# Patient Record
Sex: FEMALE | Race: WHITE | NOT HISPANIC OR LATINO | Employment: OTHER | URBAN - METROPOLITAN AREA
[De-identification: names, ages, dates, MRNs, and addresses within clinical notes are randomized per-mention and may not be internally consistent; named-entity substitution may affect disease eponyms.]

---

## 2019-04-13 ENCOUNTER — APPOINTMENT (OUTPATIENT)
Dept: GENERAL RADIOLOGY | Facility: HOSPITAL | Age: 74
End: 2019-04-13

## 2019-04-13 ENCOUNTER — APPOINTMENT (OUTPATIENT)
Dept: CT IMAGING | Facility: HOSPITAL | Age: 74
End: 2019-04-13

## 2019-04-13 ENCOUNTER — HOSPITAL ENCOUNTER (OUTPATIENT)
Facility: HOSPITAL | Age: 74
Setting detail: OBSERVATION
Discharge: HOME | End: 2019-04-15
Attending: EMERGENCY MEDICINE | Admitting: NURSE PRACTITIONER

## 2019-04-13 DIAGNOSIS — R11.2 INTRACTABLE VOMITING WITH NAUSEA, UNSPECIFIED VOMITING TYPE: Primary | ICD-10-CM

## 2019-04-13 DIAGNOSIS — R10.9 ACUTE ABDOMINAL PAIN: ICD-10-CM

## 2019-04-13 DIAGNOSIS — Z74.09 IMPAIRED FUNCTIONAL MOBILITY, BALANCE, GAIT, AND ENDURANCE: ICD-10-CM

## 2019-04-13 DIAGNOSIS — R19.7 DIARRHEA, UNSPECIFIED TYPE: ICD-10-CM

## 2019-04-13 PROBLEM — F39 MOOD DISORDER (HCC): Status: ACTIVE | Noted: 2019-04-13

## 2019-04-13 PROBLEM — K52.9 GASTROENTERITIS: Status: ACTIVE | Noted: 2019-04-13

## 2019-04-13 PROBLEM — E78.5 HYPERLIPEMIA: Status: ACTIVE | Noted: 2019-04-13

## 2019-04-13 PROBLEM — Z79.4 TYPE 2 DIABETES MELLITUS, WITH LONG-TERM CURRENT USE OF INSULIN (HCC): Chronic | Status: ACTIVE | Noted: 2019-04-13

## 2019-04-13 PROBLEM — E11.9 TYPE 2 DIABETES MELLITUS, WITH LONG-TERM CURRENT USE OF INSULIN: Chronic | Status: ACTIVE | Noted: 2019-04-13

## 2019-04-13 PROBLEM — K21.9 GERD WITHOUT ESOPHAGITIS: Status: ACTIVE | Noted: 2019-04-13

## 2019-04-13 PROBLEM — I10 ESSENTIAL HYPERTENSION: Status: ACTIVE | Noted: 2019-04-13

## 2019-04-13 LAB
ALBUMIN SERPL-MCNC: 4.2 G/DL (ref 3.5–5.2)
ALBUMIN/GLOB SERPL: 1.3 G/DL
ALP SERPL-CCNC: 57 U/L (ref 39–117)
ALT SERPL W P-5'-P-CCNC: 17 U/L (ref 1–33)
ANION GAP SERPL CALCULATED.3IONS-SCNC: 15 MMOL/L
AST SERPL-CCNC: 18 U/L (ref 1–32)
BACTERIA UR QL AUTO: ABNORMAL /HPF
BASOPHILS # BLD AUTO: 0.02 10*3/MM3 (ref 0–0.2)
BASOPHILS NFR BLD AUTO: 0.1 % (ref 0–1.5)
BILIRUB SERPL-MCNC: 0.2 MG/DL (ref 0.2–1.2)
BILIRUB UR QL STRIP: NEGATIVE
BUN BLD-MCNC: 18 MG/DL (ref 8–23)
BUN/CREAT SERPL: 20.7 (ref 7–25)
CALCIUM SPEC-SCNC: 9.5 MG/DL (ref 8.6–10.5)
CHLORIDE SERPL-SCNC: 97 MMOL/L (ref 98–107)
CLARITY UR: CLEAR
CO2 SERPL-SCNC: 30 MMOL/L (ref 22–29)
COLOR UR: YELLOW
CREAT BLD-MCNC: 0.87 MG/DL (ref 0.57–1)
D-LACTATE SERPL-SCNC: 2.5 MMOL/L (ref 0.5–2)
D-LACTATE SERPL-SCNC: 3.6 MMOL/L (ref 0.5–2)
DEPRECATED RDW RBC AUTO: 46.2 FL (ref 37–54)
EOSINOPHIL # BLD AUTO: 0.63 10*3/MM3 (ref 0–0.4)
EOSINOPHIL NFR BLD AUTO: 4.6 % (ref 0.3–6.2)
ERYTHROCYTE [DISTWIDTH] IN BLOOD BY AUTOMATED COUNT: 13.7 % (ref 12.3–15.4)
GFR SERPL CREATININE-BSD FRML MDRD: 63 ML/MIN/1.73
GLOBULIN UR ELPH-MCNC: 3.2 GM/DL
GLUCOSE BLD-MCNC: 116 MG/DL (ref 65–99)
GLUCOSE UR STRIP-MCNC: NEGATIVE MG/DL
HBA1C MFR BLD: 6.4 % (ref 4.8–5.6)
HCT VFR BLD AUTO: 39.6 % (ref 34–46.6)
HGB BLD-MCNC: 13.3 G/DL (ref 12–15.9)
HGB UR QL STRIP.AUTO: NEGATIVE
HOLD SPECIMEN: NORMAL
HYALINE CASTS UR QL AUTO: ABNORMAL /LPF
IMM GRANULOCYTES # BLD AUTO: 0.03 10*3/MM3 (ref 0–0.05)
IMM GRANULOCYTES NFR BLD AUTO: 0.2 % (ref 0–0.5)
KETONES UR QL STRIP: NEGATIVE
LEUKOCYTE ESTERASE UR QL STRIP.AUTO: ABNORMAL
LIPASE SERPL-CCNC: 17 U/L (ref 13–60)
LYMPHOCYTES # BLD AUTO: 1.35 10*3/MM3 (ref 0.7–3.1)
LYMPHOCYTES NFR BLD AUTO: 9.8 % (ref 19.6–45.3)
MCH RBC QN AUTO: 31 PG (ref 26.6–33)
MCHC RBC AUTO-ENTMCNC: 33.6 G/DL (ref 31.5–35.7)
MCV RBC AUTO: 92.3 FL (ref 79–97)
MONOCYTES # BLD AUTO: 0.9 10*3/MM3 (ref 0.1–0.9)
MONOCYTES NFR BLD AUTO: 6.6 % (ref 5–12)
NEUTROPHILS # BLD AUTO: 10.81 10*3/MM3 (ref 1.4–7)
NEUTROPHILS NFR BLD AUTO: 78.7 % (ref 42.7–76)
NITRITE UR QL STRIP: NEGATIVE
PH UR STRIP.AUTO: 7 [PH] (ref 5–8)
PLATELET # BLD AUTO: 280 10*3/MM3 (ref 140–450)
PMV BLD AUTO: 9.5 FL (ref 6–12)
POTASSIUM BLD-SCNC: 3.4 MMOL/L (ref 3.5–5.2)
PROT SERPL-MCNC: 7.4 G/DL (ref 6–8.5)
PROT UR QL STRIP: NEGATIVE
RBC # BLD AUTO: 4.29 10*6/MM3 (ref 3.77–5.28)
RBC # UR: ABNORMAL /HPF
REF LAB TEST METHOD: ABNORMAL
SODIUM BLD-SCNC: 142 MMOL/L (ref 136–145)
SP GR UR STRIP: 1.01 (ref 1–1.03)
SQUAMOUS #/AREA URNS HPF: ABNORMAL /HPF
TROPONIN I SERPL-MCNC: 0 NG/ML (ref 0–0.07)
UROBILINOGEN UR QL STRIP: ABNORMAL
WBC NRBC COR # BLD: 13.74 10*3/MM3 (ref 3.4–10.8)
WBC UR QL AUTO: ABNORMAL /HPF

## 2019-04-13 PROCEDURE — 93005 ELECTROCARDIOGRAM TRACING: CPT | Performed by: NURSE PRACTITIONER

## 2019-04-13 PROCEDURE — 83605 ASSAY OF LACTIC ACID: CPT | Performed by: NURSE PRACTITIONER

## 2019-04-13 PROCEDURE — 96375 TX/PRO/DX INJ NEW DRUG ADDON: CPT

## 2019-04-13 PROCEDURE — 83690 ASSAY OF LIPASE: CPT | Performed by: NURSE PRACTITIONER

## 2019-04-13 PROCEDURE — G0378 HOSPITAL OBSERVATION PER HR: HCPCS

## 2019-04-13 PROCEDURE — 99220 PR INITIAL OBSERVATION CARE/DAY 70 MINUTES: CPT | Performed by: INTERNAL MEDICINE

## 2019-04-13 PROCEDURE — 71045 X-RAY EXAM CHEST 1 VIEW: CPT

## 2019-04-13 PROCEDURE — 70450 CT HEAD/BRAIN W/O DYE: CPT

## 2019-04-13 PROCEDURE — 25010000002 PROMETHAZINE PER 50 MG: Performed by: NURSE PRACTITIONER

## 2019-04-13 PROCEDURE — 84484 ASSAY OF TROPONIN QUANT: CPT

## 2019-04-13 PROCEDURE — 25010000002 ONDANSETRON PER 1 MG: Performed by: NURSE PRACTITIONER

## 2019-04-13 PROCEDURE — 85025 COMPLETE CBC W/AUTO DIFF WBC: CPT | Performed by: NURSE PRACTITIONER

## 2019-04-13 PROCEDURE — 74176 CT ABD & PELVIS W/O CONTRAST: CPT

## 2019-04-13 PROCEDURE — 80053 COMPREHEN METABOLIC PANEL: CPT | Performed by: NURSE PRACTITIONER

## 2019-04-13 PROCEDURE — 99285 EMERGENCY DEPT VISIT HI MDM: CPT

## 2019-04-13 PROCEDURE — 83036 HEMOGLOBIN GLYCOSYLATED A1C: CPT | Performed by: INTERNAL MEDICINE

## 2019-04-13 PROCEDURE — 25810000003 SODIUM CHLORIDE 0.9 % WITH KCL 20 MEQ 20-0.9 MEQ/L-% SOLUTION: Performed by: INTERNAL MEDICINE

## 2019-04-13 PROCEDURE — 81001 URINALYSIS AUTO W/SCOPE: CPT | Performed by: NURSE PRACTITIONER

## 2019-04-13 PROCEDURE — 96376 TX/PRO/DX INJ SAME DRUG ADON: CPT

## 2019-04-13 RX ORDER — ONDANSETRON 4 MG/1
4 TABLET, FILM COATED ORAL EVERY 6 HOURS PRN
Status: DISCONTINUED | OUTPATIENT
Start: 2019-04-13 | End: 2019-04-15 | Stop reason: HOSPADM

## 2019-04-13 RX ORDER — DOCUSATE SODIUM 100 MG/1
100 CAPSULE, LIQUID FILLED ORAL 2 TIMES DAILY PRN
Status: DISCONTINUED | OUTPATIENT
Start: 2019-04-13 | End: 2019-04-15 | Stop reason: HOSPADM

## 2019-04-13 RX ORDER — ATORVASTATIN CALCIUM 10 MG/1
10 TABLET, FILM COATED ORAL DAILY
Status: DISCONTINUED | OUTPATIENT
Start: 2019-04-14 | End: 2019-04-15 | Stop reason: HOSPADM

## 2019-04-13 RX ORDER — PRAVASTATIN SODIUM 40 MG
40 TABLET ORAL DAILY
COMMUNITY

## 2019-04-13 RX ORDER — ACETAMINOPHEN 325 MG/1
650 TABLET ORAL EVERY 6 HOURS PRN
Status: DISCONTINUED | OUTPATIENT
Start: 2019-04-13 | End: 2019-04-15 | Stop reason: HOSPADM

## 2019-04-13 RX ORDER — FLUTICASONE PROPIONATE 50 MCG
2 SPRAY, SUSPENSION (ML) NASAL DAILY
Status: DISCONTINUED | OUTPATIENT
Start: 2019-04-14 | End: 2019-04-15 | Stop reason: HOSPADM

## 2019-04-13 RX ORDER — KETOROLAC TROMETHAMINE 30 MG/ML
15 INJECTION, SOLUTION INTRAMUSCULAR; INTRAVENOUS ONCE AS NEEDED
Status: COMPLETED | OUTPATIENT
Start: 2019-04-13 | End: 2019-04-14

## 2019-04-13 RX ORDER — VERAPAMIL HYDROCHLORIDE 240 MG/1
240 TABLET, FILM COATED, EXTENDED RELEASE ORAL NIGHTLY
Status: DISCONTINUED | OUTPATIENT
Start: 2019-04-14 | End: 2019-04-15 | Stop reason: HOSPADM

## 2019-04-13 RX ORDER — HYDROCODONE BITARTRATE AND ACETAMINOPHEN 10; 325 MG/1; MG/1
1 TABLET ORAL EVERY 12 HOURS PRN
Status: ON HOLD | COMMUNITY
End: 2022-01-01 | Stop reason: SDUPTHER

## 2019-04-13 RX ORDER — SACCHAROMYCES BOULARDII 250 MG
250 CAPSULE ORAL 2 TIMES DAILY
Status: ON HOLD | COMMUNITY
End: 2022-01-01

## 2019-04-13 RX ORDER — OXYBUTYNIN CHLORIDE 5 MG/1
5 TABLET, EXTENDED RELEASE ORAL DAILY
Status: DISCONTINUED | OUTPATIENT
Start: 2019-04-14 | End: 2019-04-15 | Stop reason: HOSPADM

## 2019-04-13 RX ORDER — ALPRAZOLAM 0.5 MG/1
0.5 TABLET ORAL DAILY PRN
Status: ON HOLD | COMMUNITY
End: 2022-01-01

## 2019-04-13 RX ORDER — SODIUM CHLORIDE 0.9 % (FLUSH) 0.9 %
10 SYRINGE (ML) INJECTION AS NEEDED
Status: DISCONTINUED | OUTPATIENT
Start: 2019-04-13 | End: 2019-04-15 | Stop reason: HOSPADM

## 2019-04-13 RX ORDER — INDAPAMIDE 2.5 MG/1
2.5 TABLET, FILM COATED ORAL DAILY
COMMUNITY

## 2019-04-13 RX ORDER — PANTOPRAZOLE SODIUM 40 MG/1
40 TABLET, DELAYED RELEASE ORAL
Status: DISCONTINUED | OUTPATIENT
Start: 2019-04-14 | End: 2019-04-15 | Stop reason: HOSPADM

## 2019-04-13 RX ORDER — SODIUM CHLORIDE 0.9 % (FLUSH) 0.9 %
1-10 SYRINGE (ML) INJECTION AS NEEDED
Status: DISCONTINUED | OUTPATIENT
Start: 2019-04-13 | End: 2019-04-15 | Stop reason: HOSPADM

## 2019-04-13 RX ORDER — PROMETHAZINE HYDROCHLORIDE 25 MG/ML
12.5 INJECTION, SOLUTION INTRAMUSCULAR; INTRAVENOUS ONCE
Status: COMPLETED | OUTPATIENT
Start: 2019-04-13 | End: 2019-04-13

## 2019-04-13 RX ORDER — FLUOXETINE HYDROCHLORIDE 20 MG/1
40 CAPSULE ORAL DAILY
Status: DISCONTINUED | OUTPATIENT
Start: 2019-04-14 | End: 2019-04-15 | Stop reason: HOSPADM

## 2019-04-13 RX ORDER — LANOLIN ALCOHOL/MO/W.PET/CERES
1000 CREAM (GRAM) TOPICAL EVERY 24 HOURS
Status: DISCONTINUED | OUTPATIENT
Start: 2019-04-14 | End: 2019-04-15 | Stop reason: HOSPADM

## 2019-04-13 RX ORDER — GABAPENTIN 800 MG/1
1200 TABLET ORAL 3 TIMES DAILY
Status: ON HOLD | COMMUNITY
End: 2022-01-01

## 2019-04-13 RX ORDER — LANOLIN ALCOHOL/MO/W.PET/CERES
1000 CREAM (GRAM) TOPICAL DAILY
Status: ON HOLD | COMMUNITY
End: 2022-01-01

## 2019-04-13 RX ORDER — ONDANSETRON 2 MG/ML
4 INJECTION INTRAMUSCULAR; INTRAVENOUS EVERY 6 HOURS PRN
Status: DISCONTINUED | OUTPATIENT
Start: 2019-04-13 | End: 2019-04-15 | Stop reason: HOSPADM

## 2019-04-13 RX ORDER — NICOTINE POLACRILEX 4 MG
15 LOZENGE BUCCAL
Status: DISCONTINUED | OUTPATIENT
Start: 2019-04-13 | End: 2019-04-15 | Stop reason: HOSPADM

## 2019-04-13 RX ORDER — DEXTROSE MONOHYDRATE 25 G/50ML
25 INJECTION, SOLUTION INTRAVENOUS
Status: DISCONTINUED | OUTPATIENT
Start: 2019-04-13 | End: 2019-04-15 | Stop reason: HOSPADM

## 2019-04-13 RX ORDER — LOSARTAN POTASSIUM 50 MG/1
100 TABLET ORAL DAILY
Status: ON HOLD | COMMUNITY
End: 2022-01-01 | Stop reason: SDUPTHER

## 2019-04-13 RX ORDER — VERAPAMIL HYDROCHLORIDE 240 MG/1
240 TABLET, FILM COATED, EXTENDED RELEASE ORAL DAILY
COMMUNITY
End: 2022-01-01 | Stop reason: HOSPADM

## 2019-04-13 RX ORDER — MELOXICAM 15 MG/1
15 TABLET ORAL DAILY
Status: ON HOLD | COMMUNITY
End: 2022-01-01

## 2019-04-13 RX ORDER — RANITIDINE 150 MG/1
300 TABLET ORAL 2 TIMES DAILY
Status: ON HOLD | COMMUNITY
End: 2022-01-01

## 2019-04-13 RX ORDER — ZOLPIDEM TARTRATE 10 MG/1
10 TABLET ORAL NIGHTLY PRN
Status: ON HOLD | COMMUNITY
End: 2022-01-01

## 2019-04-13 RX ORDER — FLUCONAZOLE 150 MG/1
150 TABLET ORAL DAILY
COMMUNITY
End: 2019-04-15 | Stop reason: HOSPADM

## 2019-04-13 RX ORDER — LOSARTAN POTASSIUM 50 MG/1
100 TABLET ORAL DAILY
Status: DISCONTINUED | OUTPATIENT
Start: 2019-04-14 | End: 2019-04-15 | Stop reason: HOSPADM

## 2019-04-13 RX ORDER — SODIUM CHLORIDE 0.9 % (FLUSH) 0.9 %
3 SYRINGE (ML) INJECTION EVERY 12 HOURS SCHEDULED
Status: DISCONTINUED | OUTPATIENT
Start: 2019-04-13 | End: 2019-04-15 | Stop reason: HOSPADM

## 2019-04-13 RX ORDER — SODIUM CHLORIDE AND POTASSIUM CHLORIDE 150; 900 MG/100ML; MG/100ML
100 INJECTION, SOLUTION INTRAVENOUS CONTINUOUS
Status: DISCONTINUED | OUTPATIENT
Start: 2019-04-13 | End: 2019-04-15 | Stop reason: HOSPADM

## 2019-04-13 RX ORDER — FLUOXETINE HYDROCHLORIDE 20 MG/1
40 CAPSULE ORAL DAILY
COMMUNITY

## 2019-04-13 RX ORDER — ONDANSETRON 2 MG/ML
4 INJECTION INTRAMUSCULAR; INTRAVENOUS ONCE
Status: COMPLETED | OUTPATIENT
Start: 2019-04-13 | End: 2019-04-13

## 2019-04-13 RX ORDER — GLIPIZIDE 10 MG/1
10 TABLET ORAL
COMMUNITY

## 2019-04-13 RX ORDER — OXYBUTYNIN CHLORIDE 5 MG/1
5 TABLET, EXTENDED RELEASE ORAL DAILY
COMMUNITY

## 2019-04-13 RX ORDER — SACCHAROMYCES BOULARDII 250 MG
250 CAPSULE ORAL 2 TIMES DAILY
Status: DISCONTINUED | OUTPATIENT
Start: 2019-04-14 | End: 2019-04-15 | Stop reason: HOSPADM

## 2019-04-13 RX ORDER — DICYCLOMINE HCL 20 MG
20 TABLET ORAL EVERY 6 HOURS
Status: ON HOLD | COMMUNITY
End: 2022-01-01

## 2019-04-13 RX ORDER — INSULIN GLARGINE 100 [IU]/ML
45 INJECTION, SOLUTION SUBCUTANEOUS NIGHTLY
Status: ON HOLD | COMMUNITY
End: 2022-01-01

## 2019-04-13 RX ORDER — GABAPENTIN 400 MG/1
800 CAPSULE ORAL EVERY 8 HOURS SCHEDULED
Status: DISCONTINUED | OUTPATIENT
Start: 2019-04-14 | End: 2019-04-15 | Stop reason: HOSPADM

## 2019-04-13 RX ORDER — OMEPRAZOLE 40 MG/1
40 CAPSULE, DELAYED RELEASE ORAL DAILY
COMMUNITY

## 2019-04-13 RX ORDER — FLUTICASONE PROPIONATE 50 MCG
2 SPRAY, SUSPENSION (ML) NASAL DAILY
Status: ON HOLD | COMMUNITY
End: 2022-01-01

## 2019-04-13 RX ADMIN — ONDANSETRON 4 MG: 2 INJECTION INTRAMUSCULAR; INTRAVENOUS at 18:11

## 2019-04-13 RX ADMIN — POTASSIUM CHLORIDE AND SODIUM CHLORIDE 100 ML/HR: 900; 150 INJECTION, SOLUTION INTRAVENOUS at 23:31

## 2019-04-13 RX ADMIN — PROMETHAZINE HYDROCHLORIDE 12.5 MG: 25 INJECTION, SOLUTION INTRAMUSCULAR; INTRAVENOUS at 21:50

## 2019-04-13 RX ADMIN — VERAPAMIL HYDROCHLORIDE 240 MG: 240 TABLET, FILM COATED, EXTENDED RELEASE ORAL at 23:31

## 2019-04-13 RX ADMIN — SODIUM CHLORIDE 1000 ML: 9 INJECTION, SOLUTION INTRAVENOUS at 18:11

## 2019-04-13 RX ADMIN — ONDANSETRON 4 MG: 2 INJECTION INTRAMUSCULAR; INTRAVENOUS at 20:43

## 2019-04-13 NOTE — ED PROVIDER NOTES
Subjective   Nausea vomiting diarrhea for several days.   here in the ER for similar.  He came by ambulance she felt ill while in the ER and checked in.  She reports he has Parkinson's and has been cleaning up after him and there has been a large amount of diarrhea she has been exposed to.  She is having some abdominal cramping as well as a headache.  There is no fever.  Blood in her stool or in her vomit.        Abdominal Pain   Pain location:  Generalized  Pain quality: aching and cramping    Pain radiates to:  Does not radiate  Pain severity:  Mild  Onset quality:  Gradual  Duration:  2 days  Timing:  Constant  Progression:  Waxing and waning  Chronicity:  New  Relieved by:  Nothing  Worsened by:  Eating  Ineffective treatments:  None tried  Associated symptoms: diarrhea, nausea and vomiting    Associated symptoms: no chest pain, no chills, no constipation, no cough, no dysuria, no fever, no hematuria, no shortness of breath and no sore throat        Review of Systems   Constitutional: Negative for chills, diaphoresis and fever.   HENT: Negative for congestion and sore throat.    Respiratory: Negative for cough, choking, chest tightness, shortness of breath and wheezing.    Cardiovascular: Negative for chest pain and leg swelling.   Gastrointestinal: Positive for abdominal pain, diarrhea, nausea and vomiting. Negative for abdominal distention, anal bleeding, blood in stool and constipation.   Genitourinary: Negative for difficulty urinating, dysuria, flank pain, frequency, hematuria and urgency.   All other systems reviewed and are negative.      Past Medical History:   Diagnosis Date   • Arthritis    • Diabetes mellitus (CMS/HCC)    • Fibromyalgia    • Hyperlipidemia    • Hypertension        Allergies   Allergen Reactions   • Trazodone Hallucinations   • Altace [Ramipril] Palpitations       Past Surgical History:   Procedure Laterality Date   • BACK SURGERY     •  SECTION     • CHOLECYSTECTOMY      • HYSTERECTOMY     • REPLACEMENT TOTAL KNEE         History reviewed. No pertinent family history.    Social History     Socioeconomic History   • Marital status:      Spouse name: Not on file   • Number of children: Not on file   • Years of education: Not on file   • Highest education level: Not on file   Tobacco Use   • Smoking status: Never Smoker   • Smokeless tobacco: Never Used   Substance and Sexual Activity   • Alcohol use: No     Frequency: Never   • Drug use: No   • Sexual activity: Defer           Objective   Physical Exam   Constitutional: She is oriented to person, place, and time. She appears well-developed and well-nourished. She appears ill.   HENT:   Head: Normocephalic and atraumatic.   Right Ear: External ear normal.   Left Ear: External ear normal.   Nose: Nose normal.   Mouth/Throat: Oropharynx is clear and moist.   Eyes: Conjunctivae and EOM are normal. Pupils are equal, round, and reactive to light.   Neck: Normal range of motion. Neck supple.   Cardiovascular: Normal rate, regular rhythm, normal heart sounds and intact distal pulses.   Pulmonary/Chest: Effort normal and breath sounds normal.   Abdominal: Soft. Bowel sounds are normal. There is generalized tenderness.   Musculoskeletal: Normal range of motion.   Neurological: She is alert and oriented to person, place, and time.   Skin: Skin is warm and dry.   Psychiatric: She has a normal mood and affect. Her behavior is normal. Judgment and thought content normal.       Procedures           ED Course  ED Course as of Apr 13 2106   Sat Apr 13, 2019 2058 I spoke with Dr. Huitron and she will admit.  Patient with intractable nausea vomiting.  Will also need to get a C. difficile on her as well.  She has vomited 4-5 times just here in the ER.  Her  will also most likely be admitted.  [JM]      ED Course User Index  [RELL] Deni Bolton, JULIANNA          CT Abdomen Pelvis Without Contrast   Preliminary Result   Chronic changes seen  within the abdomen and pelvis with no   evidence of acute intra-abdominal or pelvic abnormality. There is stool   throughout the colon. Small hiatal hernia.       DICTATED:   04/13/2019   EDITED/ls :   04/13/2019               CT Head Without Contrast   Preliminary Result   Mild bilateral frontal atrophy with chronic changes seen in   the brain and no acute intracranial abnormality.       DICTATED:   04/13/2019   EDITED/ls :   04/13/2019               XR Chest 1 View   Preliminary Result   Chronic changes seen within the lung fields with no evidence   of acute parenchymal disease.       DICTATED:   04/13/2019   EDITED/ls :   04/13/2019                         MDM      Final diagnoses:   Intractable vomiting with nausea, unspecified vomiting type   Diarrhea, unspecified type   Acute abdominal pain            Deni Bolton, APRN  04/13/19 1052

## 2019-04-14 LAB
ALBUMIN SERPL-MCNC: 3.4 G/DL (ref 3.5–5.2)
ALBUMIN/GLOB SERPL: 1.1 G/DL
ALP SERPL-CCNC: 39 U/L (ref 39–117)
ALT SERPL W P-5'-P-CCNC: 14 U/L (ref 1–33)
ANION GAP SERPL CALCULATED.3IONS-SCNC: 14 MMOL/L
AST SERPL-CCNC: 17 U/L (ref 1–32)
BASOPHILS # BLD AUTO: 0.02 10*3/MM3 (ref 0–0.2)
BASOPHILS NFR BLD AUTO: 0.1 % (ref 0–1.5)
BILIRUB SERPL-MCNC: 0.3 MG/DL (ref 0.2–1.2)
BUN BLD-MCNC: 19 MG/DL (ref 8–23)
BUN/CREAT SERPL: 22.4 (ref 7–25)
C DIFF TOX GENS STL QL NAA+PROBE: NOT DETECTED
CALCIUM SPEC-SCNC: 8.1 MG/DL (ref 8.6–10.5)
CHLORIDE SERPL-SCNC: 101 MMOL/L (ref 98–107)
CO2 SERPL-SCNC: 25 MMOL/L (ref 22–29)
CREAT BLD-MCNC: 0.85 MG/DL (ref 0.57–1)
D-LACTATE SERPL-SCNC: 1.7 MMOL/L (ref 0.5–2)
DEPRECATED RDW RBC AUTO: 48 FL (ref 37–54)
EOSINOPHIL # BLD AUTO: 0.11 10*3/MM3 (ref 0–0.4)
EOSINOPHIL NFR BLD AUTO: 0.7 % (ref 0.3–6.2)
ERYTHROCYTE [DISTWIDTH] IN BLOOD BY AUTOMATED COUNT: 14.1 % (ref 12.3–15.4)
GFR SERPL CREATININE-BSD FRML MDRD: 65 ML/MIN/1.73
GLOBULIN UR ELPH-MCNC: 3 GM/DL
GLUCOSE BLD-MCNC: 165 MG/DL (ref 65–99)
GLUCOSE BLDC GLUCOMTR-MCNC: 138 MG/DL (ref 70–130)
GLUCOSE BLDC GLUCOMTR-MCNC: 144 MG/DL (ref 70–130)
GLUCOSE BLDC GLUCOMTR-MCNC: 151 MG/DL (ref 70–130)
GLUCOSE BLDC GLUCOMTR-MCNC: 170 MG/DL (ref 70–130)
HCT VFR BLD AUTO: 35.8 % (ref 34–46.6)
HGB BLD-MCNC: 11.5 G/DL (ref 12–15.9)
IMM GRANULOCYTES # BLD AUTO: 0.05 10*3/MM3 (ref 0–0.05)
IMM GRANULOCYTES NFR BLD AUTO: 0.3 % (ref 0–0.5)
LYMPHOCYTES # BLD AUTO: 0.88 10*3/MM3 (ref 0.7–3.1)
LYMPHOCYTES NFR BLD AUTO: 5.4 % (ref 19.6–45.3)
MAGNESIUM SERPL-MCNC: 1.4 MG/DL (ref 1.6–2.4)
MCH RBC QN AUTO: 30 PG (ref 26.6–33)
MCHC RBC AUTO-ENTMCNC: 32.1 G/DL (ref 31.5–35.7)
MCV RBC AUTO: 93.5 FL (ref 79–97)
MONOCYTES # BLD AUTO: 0.81 10*3/MM3 (ref 0.1–0.9)
MONOCYTES NFR BLD AUTO: 5 % (ref 5–12)
NEUTROPHILS # BLD AUTO: 14.4 10*3/MM3 (ref 1.4–7)
NEUTROPHILS NFR BLD AUTO: 88.8 % (ref 42.7–76)
PLATELET # BLD AUTO: 264 10*3/MM3 (ref 140–450)
PMV BLD AUTO: 10 FL (ref 6–12)
POTASSIUM BLD-SCNC: 3.8 MMOL/L (ref 3.5–5.2)
PROCALCITONIN SERPL-MCNC: 0.46 NG/ML (ref 0.1–0.25)
PROT SERPL-MCNC: 6.4 G/DL (ref 6–8.5)
RBC # BLD AUTO: 3.83 10*6/MM3 (ref 3.77–5.28)
SODIUM BLD-SCNC: 140 MMOL/L (ref 136–145)
WBC NRBC COR # BLD: 16.22 10*3/MM3 (ref 3.4–10.8)
WBC STL QL MICRO: NORMAL

## 2019-04-14 PROCEDURE — G0378 HOSPITAL OBSERVATION PER HR: HCPCS

## 2019-04-14 PROCEDURE — 96372 THER/PROPH/DIAG INJ SC/IM: CPT

## 2019-04-14 PROCEDURE — 82962 GLUCOSE BLOOD TEST: CPT

## 2019-04-14 PROCEDURE — 25010000002 ENOXAPARIN PER 10 MG: Performed by: INTERNAL MEDICINE

## 2019-04-14 PROCEDURE — 83735 ASSAY OF MAGNESIUM: CPT | Performed by: HOSPITALIST

## 2019-04-14 PROCEDURE — 87205 SMEAR GRAM STAIN: CPT | Performed by: NURSE PRACTITIONER

## 2019-04-14 PROCEDURE — 87209 SMEAR COMPLEX STAIN: CPT | Performed by: NURSE PRACTITIONER

## 2019-04-14 PROCEDURE — 80053 COMPREHEN METABOLIC PANEL: CPT | Performed by: INTERNAL MEDICINE

## 2019-04-14 PROCEDURE — 87045 FECES CULTURE AEROBIC BACT: CPT | Performed by: NURSE PRACTITIONER

## 2019-04-14 PROCEDURE — 87046 STOOL CULTR AEROBIC BACT EA: CPT | Performed by: NURSE PRACTITIONER

## 2019-04-14 PROCEDURE — 87177 OVA AND PARASITES SMEARS: CPT | Performed by: NURSE PRACTITIONER

## 2019-04-14 PROCEDURE — 84145 PROCALCITONIN (PCT): CPT | Performed by: HOSPITALIST

## 2019-04-14 PROCEDURE — 99226 PR SBSQ OBSERVATION CARE/DAY 35 MINUTES: CPT | Performed by: HOSPITALIST

## 2019-04-14 PROCEDURE — 25010000002 KETOROLAC TROMETHAMINE PER 15 MG: Performed by: INTERNAL MEDICINE

## 2019-04-14 PROCEDURE — 87507 IADNA-DNA/RNA PROBE TQ 12-25: CPT | Performed by: INTERNAL MEDICINE

## 2019-04-14 PROCEDURE — 96375 TX/PRO/DX INJ NEW DRUG ADDON: CPT

## 2019-04-14 PROCEDURE — 87493 C DIFF AMPLIFIED PROBE: CPT | Performed by: NURSE PRACTITIONER

## 2019-04-14 PROCEDURE — 96365 THER/PROPH/DIAG IV INF INIT: CPT

## 2019-04-14 PROCEDURE — 83605 ASSAY OF LACTIC ACID: CPT | Performed by: INTERNAL MEDICINE

## 2019-04-14 PROCEDURE — 96361 HYDRATE IV INFUSION ADD-ON: CPT

## 2019-04-14 PROCEDURE — 25010000002 MAGNESIUM SULFATE 2 GM/50ML SOLUTION: Performed by: HOSPITALIST

## 2019-04-14 PROCEDURE — 85025 COMPLETE CBC W/AUTO DIFF WBC: CPT | Performed by: INTERNAL MEDICINE

## 2019-04-14 RX ORDER — HYDROCODONE BITARTRATE AND ACETAMINOPHEN 10; 325 MG/1; MG/1
1 TABLET ORAL EVERY 8 HOURS PRN
Status: DISCONTINUED | OUTPATIENT
Start: 2019-04-14 | End: 2019-04-15 | Stop reason: HOSPADM

## 2019-04-14 RX ORDER — MAGNESIUM SULFATE HEPTAHYDRATE 40 MG/ML
2 INJECTION, SOLUTION INTRAVENOUS AS NEEDED
Status: DISCONTINUED | OUTPATIENT
Start: 2019-04-14 | End: 2019-04-15 | Stop reason: HOSPADM

## 2019-04-14 RX ORDER — METRONIDAZOLE 500 MG/1
500 TABLET ORAL EVERY 8 HOURS SCHEDULED
Status: DISCONTINUED | OUTPATIENT
Start: 2019-04-14 | End: 2019-04-15 | Stop reason: HOSPADM

## 2019-04-14 RX ORDER — MAGNESIUM SULFATE HEPTAHYDRATE 40 MG/ML
4 INJECTION, SOLUTION INTRAVENOUS AS NEEDED
Status: DISCONTINUED | OUTPATIENT
Start: 2019-04-14 | End: 2019-04-15 | Stop reason: HOSPADM

## 2019-04-14 RX ADMIN — PANTOPRAZOLE SODIUM 40 MG: 40 TABLET, DELAYED RELEASE ORAL at 05:31

## 2019-04-14 RX ADMIN — GABAPENTIN 800 MG: 400 CAPSULE ORAL at 22:07

## 2019-04-14 RX ADMIN — ATORVASTATIN CALCIUM 10 MG: 10 TABLET, FILM COATED ORAL at 09:35

## 2019-04-14 RX ADMIN — HYDROCODONE BITARTRATE AND ACETAMINOPHEN 1 TABLET: 10; 325 TABLET ORAL at 22:07

## 2019-04-14 RX ADMIN — HYDROCODONE BITARTRATE AND ACETAMINOPHEN 1 TABLET: 10; 325 TABLET ORAL at 14:31

## 2019-04-14 RX ADMIN — OXYBUTYNIN CHLORIDE 5 MG: 5 TABLET, EXTENDED RELEASE ORAL at 09:34

## 2019-04-14 RX ADMIN — GABAPENTIN 800 MG: 400 CAPSULE ORAL at 05:31

## 2019-04-14 RX ADMIN — KETOROLAC TROMETHAMINE 15 MG: 30 INJECTION, SOLUTION INTRAMUSCULAR; INTRAVENOUS at 01:18

## 2019-04-14 RX ADMIN — ONDANSETRON HYDROCHLORIDE 4 MG: 4 TABLET, FILM COATED ORAL at 09:34

## 2019-04-14 RX ADMIN — FLUTICASONE PROPIONATE 2 SPRAY: 50 SPRAY, METERED NASAL at 09:36

## 2019-04-14 RX ADMIN — FLUOXETINE HYDROCHLORIDE 40 MG: 20 CAPSULE ORAL at 09:34

## 2019-04-14 RX ADMIN — VERAPAMIL HYDROCHLORIDE 240 MG: 240 TABLET, FILM COATED, EXTENDED RELEASE ORAL at 22:56

## 2019-04-14 RX ADMIN — ENOXAPARIN SODIUM 30 MG: 30 INJECTION SUBCUTANEOUS at 05:31

## 2019-04-14 RX ADMIN — LOSARTAN POTASSIUM 100 MG: 50 TABLET ORAL at 09:34

## 2019-04-14 RX ADMIN — Medication 250 MG: at 22:07

## 2019-04-14 RX ADMIN — METRONIDAZOLE 500 MG: 500 TABLET ORAL at 22:07

## 2019-04-14 RX ADMIN — SODIUM CHLORIDE, PRESERVATIVE FREE 3 ML: 5 INJECTION INTRAVENOUS at 09:37

## 2019-04-14 RX ADMIN — SODIUM CHLORIDE 1000 ML: 9 INJECTION, SOLUTION INTRAVENOUS at 00:36

## 2019-04-14 RX ADMIN — Medication 250 MG: at 09:34

## 2019-04-14 RX ADMIN — SODIUM CHLORIDE, PRESERVATIVE FREE 3 ML: 5 INJECTION INTRAVENOUS at 00:35

## 2019-04-14 RX ADMIN — GABAPENTIN 800 MG: 400 CAPSULE ORAL at 14:30

## 2019-04-14 RX ADMIN — MAGNESIUM SULFATE HEPTAHYDRATE 2 G: 40 INJECTION, SOLUTION INTRAVENOUS at 19:02

## 2019-04-14 NOTE — PLAN OF CARE
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 Patient sees podiatrist as outpatient. At this time wound bed is clean and measures 0.5x0.5x0.2cm. Applied Povidone-iodine and placed dressing. To be done daily. Thanks

## 2019-04-14 NOTE — PROGRESS NOTES
"                  Clinical Nutrition     Nutrition Assessment  Reason for Visit:   Identified at risk by screening criteria    Patient Name: Chikis Cuellar  YOB: 1945  MRN: [id number]  Date of Encounter: 19 3:45 PM  Admission date: 2019    Nutrition Assessment   Admission Diagnosis         Gastroenteritis    Type 2 diabetes mellitus, with long-term current use of insulin (CMS/[facility])    Essential hypertension    Hyperlipemia    GERD without esophagitis    Mood disorder (CMS/[facility])        PMH: She  has a past medical history of Arthritis, Diabetes mellitus (CMS/[facility]), Fibromyalgia, Hyperlipidemia, and Hypertension.   PSxH: She  has a past surgical history that includes Hysterectomy; Back surgery;  section; Cholecystectomy; and Replacement total knee.       Reported/Observed/Food/Nutrition Related History:     Pt resting in bed, reports feeling better today, tolerating clear liquids, has been sick past 3-4 days with abdominal cramping, nausea, then eventually vomiting, diarrhea, her  was sick first and she had been taking care of him    Anthropometrics     Height: 162.6 cm (64\")  Last filed wt: 207lb  Weight Method: Bed scale    BMI: 35.5  Obese Class II: 35-39.9kg/m2    Pt unsure if she has lost any weight     Last 15 Recorded Weights   View Complete Flowsheet   Weight Weight (kg) Weight (lbs) Weight Method   2019 94.257 kg 207 lb 12.8 oz Bed scale   2019 93.441 kg 206 lb          Labs reviewed     Results from last 7 days   Lab Units 19  0611   SODIUM mmol/L 140   POTASSIUM mmol/L 3.8   CHLORIDE mmol/L 101   CO2 mmol/L 25.0   BUN mg/dL 19   CREATININE mg/dL 0.85   CALCIUM mg/dL 8.1*   BILIRUBIN mg/dL 0.3   ALK PHOS U/L 39   ALT (SGPT) U/L 14   AST (SGOT) U/L 17   GLUCOSE mg/dL 165*       Results from last 7 days   Lab Units 19  1159 19  0738   GLUCOSE mg/dL 170* 144*     Lab Results   Lab Value Date/Time    HGBA1C 6.40 (H) 2019 2325 "         Medications reviewed   Pertinent: insulin, protonix, probiotic, B-12, NS @100ml/hr         GI: diarrhea    SKIN: R. Plantar ulcer    Current Nutrition Prescription     PO: Diet Clear Liquid; Consistent Carbohydrate, Cardiac, Low Sodium    Intake: no data         Nutrition Diagnosis     Problem Altered GI function   Etiology Gastroenteritis   Signs/Symptoms N/V/D/ Cramping       Nutrition Intervention     Interventions Goal    General: Nutrition support treatment     Establish PO    Nutrition Interventions   1.  Follow treatment progress, Advised available snacks, Interview for preferences, Supplement provided    RFB 3x/day    Monitor/ Evaluation    Per protocol, I&O, PO intake, Pertinent labs, Skin status, GI status, Symptoms        Cassi Avina, OLENA  Time Spent: 30min

## 2019-04-14 NOTE — H&P
[facility]/ Medicine History and Physical    Primary Care Physician: Eduardo Gamboa MD    CHIEF COMPLAIN :    Nausea/vomiting.    History of Present Illness  74-year-old female presented to ED with chief complaint of nausea, vomiting,   Abdominal cramps-past 3 DAYS.  No BM for past 2 days.  Fever-subjective, chills, body aches, headache-neck, frontal, no as's photo/phonophobia.   also sick with similar illness/diarrhea.  No cough, chest pain, no sore throat, or URT Sx.  No recent travel.  No hx of cdiff.  Chronic backpain, now worse.  No vertigo, no reflux sx.    In ED patient got Zofran 4 mg x2, normal saline 1 L.    Review of Systems   Complete ROS done, which is negative except as above and HPI.  Old records reviewed and summarized in PM hx         Past Medical History:   Diagnosis Date   • Arthritis/chronic back pain    • Diabetes mellitus/neuropathy-glipizide 10 mg/12, Neurontin 1200 mg/every 8, Lantus 45 units nightly, metformin 500 mg every 12.    Mood disorder-Xanax 0.5 mg p.o. nightly as needed, fluoxetine 20 mg p.o. daily    GERD-omeprazole 40 mg p.o. daily.    • Fibromyalgia    • Hyperlipidemia-pravastatin 40 mg p.o. daily    • Hypertension-where a panel 240 mg p.o. nightly, losartan 100 mg p.o. daily, indapamide 2.5 mg p.o. daily,        Past Surgical History:   Procedure Laterality Date   • BACK SURGERY     •  SECTION     • CHOLECYSTECTOMY     • HYSTERECTOMY     • REPLACEMENT TOTAL KNEE         Family History:   family history-noncontributory.    Social History:    reports that she has never smoked. She has never used smokeless tobacco. She reports that she does not drink alcohol or use drugs.    Medications:    (Not in a hospital admission)  Allergies   Allergen Reactions   • Trazodone Hallucinations   • Altace [Ramipril] Palpitations         PHYSICAL EXAM :    Vitals:    19   BP: 140/81   Pulse: 92   Resp: 16   Temp: 98.9   SpO2: 95%     VITALS-   AS ABOVE.  GENERAL-   distressed, well nourished.  RS- CTA-BL, ,  No wheezing , no crackles, good effort.  CVS- s1s2 Regular, no murmur.  ABD- soft, non tender, not distended, no organomegaly. BS good.  EXT- no edema. Pulses + .  NEURO- AAO-3, power 5/5 in all ext, no gross sensory deficit, cranial nerves intact.  EYES- Conjunctivae are normal. Pupils are equal, round, and reactive to light. No scleral icterus.   ENT- no external ear nose lesions, mucosa dry.  NECK-  No tracheal deviation present. No thyromegaly present,No cervical lymphadenopathy.  JOINTS/MSK- no deformity, no swelling.  SKIN- no rash , warm to touch.  PSYCHIATRIC-  has a normal mood and affect.Thought content normal.           RESULTS REVIEWED :   Data.    Results from last 7 days   Lab Units 04/13/19  1802   WBC 10*3/mm3 13.74*   HEMOGLOBIN g/dL 13.3   HEMATOCRIT % 39.6   PLATELETS 10*3/mm3 280     Results from last 7 days   Lab Units 04/13/19  1802   SODIUM mmol/L 142   POTASSIUM mmol/L 3.4*   CHLORIDE mmol/L 97*   CO2 mmol/L 30.0*   BUN mg/dL 18   CREATININE mg/dL 0.87   GLUCOSE mg/dL 116*   CALCIUM mg/dL 9.5   ALT (SGPT) U/L 17   AST (SGOT) U/L 18       Brief Urine Lab Results  (Last result in the past 365 days)      Color   Clarity   Blood   Leuk Est   Nitrite   Protein   CREAT   Urine HCG        04/13/19 2039 Yellow Clear Negative Trace Negative Negative               No results found for: PHART, PCO2  Troponin-0.0  LFT's within normal limit  UA-negative  Ekg-sinus rhythm heart rate of 78, no acute ST-T wave changes QTc-467    Cx-chronic changes no acute changes.  CT head-no acute changes.  I have personally reviewed current lab, radiology, and ekg .      Active Hospital Problems    Diagnosis POA   • Gastroenteritis [K52.9] Yes         Assessment / Plan   Nausea/vomiting/diarrhea-lactic acid of 2.5, lipase of 17, WBC of 13, CT abdomen- chronic changes,?  Constipation  -Considering increased white count, chills, patient having significant cramps.  -may consider  antibiotics if starts having diarrhea  -stool diathrex.  -IV hydration with potassium.    Hypokalemia-potassium of 3.4-we will replace it.    • Arthritis/chronic back pain-occ norco.  -Acute worsening of the pain, one-time tramadol.     • Diabetes mellitus/neuropathy-glipizide 10 mg/12, Neurontin 1200 mg/every 8, NPH-20 U/12, metformin 500 mg every 12.  -Blood glucose of 116-hold her diabetic medications, since patient has poor p.o. intake, fingerstick monitoring plus coverage.    Mood disorder-Xanax 0.5 mg p.o. nightly as needed, fluoxetine 20 mg p.o. daily    GERD-omeprazole 40 mg p.o. Daily-We will continue home medications     • Fibromyalgia     • Hyperlipidemia-pravastatin 40 mg p.o. Daily     • Hypertension-where a panel 240 mg p.o. nightly, losartan 100 mg p.o. Daily,   indapamide 2.5 mg p.o. daily,-hold indapamide will continue other medications.     DVT PXL  -Sushil's''s'/scds  -lovenox      I discussed the patients findings and my recommendations with: patient/family.    I believe this patient meets INPATIENT status due to the need for care which can only be reasonably provided in an hospital setting such as aggressive/expedited ancillary services and/or consultation services, the necessity for IV medications, close physician monitoring and/or the possible need for procedures.  In such, I feel patient’s risk for adverse outcomes and need for care warrant INPATIENT evaluation and predict the patient’s care encounter to likely last beyond 2 midnights.        Lizette Win MD  04/13/19  9:16 PM

## 2019-04-14 NOTE — PROGRESS NOTES
[facility]  PROGRESS NOTE    Patient Name: Chikis Cuellar  : 1945  MRN: [id number]    Date of Admission: 2019  Length of Stay: 0  Primary Care Physician: Eduardo Gamboa MD    Subjective   Subjective     CC:  Nausea / vomiting / diarrhea    HPI:  Reporting 2 diarrhea episodes this am.  Perhaps 16 oz of volume.  Having pain.  Says she has been on Norco 10 for 20 yrs.  Says she has neuropathy, but is unclear which type.  She says she does not know.    Review of Systems     Gen- No fevers, chills  CV- No chest pain, palpitations  Resp- No cough, dyspnea  GI- No N/V/D, abd pain    Otherwise ROS is negative except as mentioned in the HPI.    Objective   Objective     Vital Signs:   Temp:  [98.1 °F (36.7 °C)-99.8 °F (37.7 °C)] 98.1 °F (36.7 °C)  Heart Rate:  [] 68  Resp:  [16-18] 16  BP: (112-157)/(63-91) 116/66        Physical Exam:    Constitutional: No acute distress, awake, alert  HENT: NCAT, dry tongue  Respiratory: Clear to auscultation bilaterally, respiratory effort normal   Cardiovascular: RRR, no murmurs, rubs, or gallops, palpable pedal pulses bilaterally  Gastrointestinal: Positive bowel sounds, soft, generalized tenderness to palpation, no guarding  Musculoskeletal: No bilateral ankle edema  Psychiatric: Appropriate affect, cooperative  Neurologic: Oriented x 3, strength symmetric in all extremities, Cranial Nerves grossly intact to confrontation, speech clear  Skin: dry, + skin tenting    Results Reviewed:  I have personally reviewed current lab, radiology, and data and agree.    Results from last 7 days   Lab Units 19  0611 19  1802   WBC 10*3/mm3 16.22* 13.74*   HEMOGLOBIN g/dL 11.5* 13.3   HEMATOCRIT % 35.8 39.6   PLATELETS 10*3/mm3 264 280     Results from last 7 days   Lab Units 19  0611 19  1805 19  1802   SODIUM mmol/L 140  --  142   POTASSIUM mmol/L 3.8  --  3.4*   CHLORIDE mmol/L 101  --  97*   CO2  mmol/L 25.0  --  30.0*   BUN mg/dL 19  --  18   CREATININE mg/dL 0.85  --  0.87   GLUCOSE mg/dL 165*  --  116*   CALCIUM mg/dL 8.1*  --  9.5   ALT (SGPT) U/L 14  --  17   AST (SGOT) U/L 17  --  18   TROPONIN I ng/mL  --  0.00  --      Estimated Creatinine Clearance: 63.6 mL/min (by C-G formula based on SCr of 0.85 mg/dL).    No results found for: BNP    Microbiology Results Abnormal     Procedure Component Value - Date/Time    Clostridium Difficile Toxin - Stool, Per Rectum [[id number]] Collected:  04/14/19 0906    Lab Status:  Final result Specimen:  Stool from Per Rectum Updated:  04/14/19 1033    Narrative:       The following orders were created for panel order Clostridium Difficile Toxin - Stool, Per Rectum.  Procedure                               Abnormality         Status                     ---------                               -----------         ------                     Clostridium Difficile To...[[id number]]  Normal              Final result                 Please view results for these tests on the individual orders.    Clostridium Difficile Toxin, PCR - Stool, Per Rectum [[id number]]  (Normal) Collected:  04/14/19 0906    Lab Status:  Final result Specimen:  Stool from Per Rectum Updated:  04/14/19 1033     C. Difficile Toxins by PCR Not Detected    Narrative:       Performance characteristics of test not established for patients <2 years of age.  Negative for Toxigenic C. Difficile          Imaging Results (last 24 hours)     Procedure Component Value Units Date/Time    CT Head Without Contrast [[id number]] Collected:  04/13/19 1834     Updated:  04/14/19 1035    Narrative:       EXAMINATION: CT HEAD WO CONTRAST - 04/13/2019     INDICATION: Headache, confusion.      TECHNIQUE: Multiple axial CT imaging is obtained of the head from skull  base to  skull vertex without the administration of intravenous  contrast.     The radiation dose reduction device was turned on for each scan per the  ALARA (As Low  as Reasonably Achievable) protocol.     COMPARISON: NONE     FINDINGS: No atrophy bilaterally. There is no hemorrhage or  hydrocephalus. No mass, mass effect or midline shift. No abnormal  extra-axial fluid collection identified. The bony structures reveal no  evidence of osseous abnormality. The visualized paranasal sinuses are  clear. The mastoid air cells are patent.       Impression:       Mild bilateral frontal atrophy with chronic changes seen in  the brain and no acute intracranial abnormality.     DICTATED:   04/13/2019  EDITED/ls :   04/13/2019         This report was finalized on 4/14/2019 10:32 AM by Dr. Tish Gold MD.       XR Chest 1 View [[id number]] Collected:  04/13/19 1822     Updated:  04/14/19 1035    Narrative:          EXAMINATION: XR CHEST 1 VW - 04/13/2019     INDICATION: Abdominal pain.     COMPARISON: NONE     FINDINGS: Portable chest reveals the heart to be borderline enlarged.  Calcified granuloma identified of the right lower lobe. The lung fields  are otherwise clear. Degenerative change is seen within the spine.  Pulmonary vascularity is within normal limits. No focal parenchymal  opacification present. No pleural effusion or pneumothorax.           Impression:       Chronic changes seen within the lung fields with no evidence  of acute parenchymal disease.     DICTATED:   04/13/2019  EDITED/ls :   04/13/2019      This report was finalized on 4/14/2019 10:32 AM by Dr. Tish Gold MD.       CT Abdomen Pelvis Without Contrast [[id number]] Collected:  04/13/19 1838     Updated:  04/14/19 1035    Narrative:       EXAMINATION: CT ABDOMEN/PELVIS WO CONTRAST - 04/13/2019      INDICATION: Generalized abdominal pain. Nausea and vomiting.     TECHNIQUE: Multiple axial CT imaging is obtained of the abdomen and  pelvis without the administration of oral or intravenous contrast.     The radiation dose reduction device was turned on for each scan per the  ALARA (As Low as Reasonably  Achievable) protocol.     COMPARISON: NONE     FINDINGS:      ABDOMEN: There are atelectatic changes seen in the lung bases  bilaterally with fibronodular densities present suggesting chronic  change. Old healed granulomatous disease is seen within the chest. The  liver is homogeneous in appearance. Surgical clips are seen in the right  upper quadrant from prior cholecystectomy. The spleen is homogeneous.  Small hilar hernia. Kidneys and adrenal glands are within normal limits.  No abdominal or retroperitoneal lymphadenopathy. Vascular calcification  seen within the abdominal aorta and iliac vessels. Stool is seen  scattered throughout the colon.     PELVIS: The pelvic organs are unremarkable. The pelvic portion of the   gastrointestinal tract is within normal limits. Stool throughout the  colon. Vascular calcification seen within the abdominal aorta and iliac  vessels. Bony structures reveal degenerative changes identified within  the spine.       Impression:       Chronic changes seen within the abdomen and pelvis with no  evidence of acute intra-abdominal or pelvic abnormality. There is stool  throughout the colon. Small hiatal hernia.     DICTATED:   04/13/2019  EDITED/ls :   04/13/2019         This report was finalized on 4/14/2019 10:32 AM by Dr. Tish Gold MD.                  I have reviewed the medications:    Current Facility-Administered Medications:   •  acetaminophen (TYLENOL) tablet 650 mg, 650 mg, Oral, Q6H PRN, Lizette Win MD  •  atorvastatin (LIPITOR) tablet 10 mg, 10 mg, Oral, Daily, Lizette Win MD, 10 mg at 04/14/19 0935  •  dextrose (D50W) 25 g/ 50mL Intravenous Solution 25 g, 25 g, Intravenous, Q15 Min PRN, Lizette Win MD  •  dextrose (GLUTOSE) oral gel 15 g, 15 g, Oral, Q15 Min PRN, Lizette Win MD  •  docusate sodium (COLACE) capsule 100 mg, 100 mg, Oral, BID PRN, Lizette Win MD  •  enoxaparin (LOVENOX) syringe 30 mg, 30 mg, Subcutaneous, Q24H, Lizette Win  MD, 30 mg at 04/14/19 0531  •  FLUoxetine (PROzac) capsule 40 mg, 40 mg, Oral, Daily, Lizette Win MD, 40 mg at 04/14/19 0934  •  fluticasone (FLONASE) 50 MCG/ACT nasal spray 2 spray, 2 spray, Nasal, Daily, Lizette Win MD, 2 spray at 04/14/19 0936  •  gabapentin (NEURONTIN) capsule 800 mg, 800 mg, Oral, Q8H, Lizette Win MD, 800 mg at 04/14/19 0531  •  glucagon (human recombinant) (GLUCAGEN DIAGNOSTIC) injection 1 mg, 1 mg, Subcutaneous, PRN, Lizette Win MD  •  HYDROcodone-acetaminophen (NORCO)  MG per tablet 1 tablet, 1 tablet, Oral, Q8H PRN, Amarjit Brown MD  •  insulin lispro (humaLOG) injection 0-7 Units, 0-7 Units, Subcutaneous, 4x Daily With Meals & Nightly, Lizette Win MD  •  losartan (COZAAR) tablet 100 mg, 100 mg, Oral, Daily, Lizette Win MD, 100 mg at 04/14/19 0934  •  ondansetron (ZOFRAN) tablet 4 mg, 4 mg, Oral, Q6H PRN, 4 mg at 04/14/19 0934 **OR** ondansetron (ZOFRAN) injection 4 mg, 4 mg, Intravenous, Q6H PRN, Lizette Win MD  •  oxybutynin XL (DITROPAN-XL) 24 hr tablet 5 mg, 5 mg, Oral, Daily, Lizette Win MD, 5 mg at 04/14/19 0934  •  pantoprazole (PROTONIX) EC tablet 40 mg, 40 mg, Oral, Q AM, Lizette Win MD, 40 mg at 04/14/19 0531  •  saccharomyces boulardii (FLORASTOR) capsule 250 mg, 250 mg, Oral, BID, Lizette Win MD, 250 mg at 04/14/19 0934  •  sodium chloride 0.9 % flush 1-10 mL, 1-10 mL, Intravenous, PRN, Lizette Win MD  •  [COMPLETED] Insert peripheral IV, , , Once **AND** sodium chloride 0.9 % flush 10 mL, 10 mL, Intravenous, PRN, Deni Bolton APRN  •  sodium chloride 0.9 % flush 3 mL, 3 mL, Intravenous, Q12H, Lizette Win MD, 3 mL at 04/14/19 0937  •  sodium chloride 0.9 % with KCl 20 mEq/L infusion, 100 mL/hr, Intravenous, Continuous, Lizette Win MD, Last Rate: 100 mL/hr at 04/14/19 0905, 100 mL/hr at 04/14/19 0905  •  verapamil SR (CALAN-SR) CR tablet 240 mg, 240 mg, Oral, Nightly, Lizette Win MD, 240 mg at 04/13/19  2331  •  vitamin B-12 (CYANOCOBALAMIN) tablet 1,000 mcg, 1,000 mcg, Oral, Q24H, Lizette Win MD, Stopped at 04/14/19 1021      Assessment/Plan   Assessment / Plan     Active Hospital Problems    Diagnosis POA   • Gastroenteritis [K52.9] Yes   • Type 2 diabetes mellitus, with long-term current use of insulin (CMS/Formerly [facility]) [E11.9, Z79.4] Not Applicable   • Essential hypertension [I10] Yes   • Hyperlipemia [E78.5] Yes   • GERD without esophagitis [K21.9] Yes   • Mood disorder (CMS/Former[facility]) [F39] Unknown          Brief Hospital Course to date:  Chikis Cuellar is a 74 y.o. female with nausea, vomiting, diarrhea.    Nausea/vomiting/diarrhea-lactic acid of 2.5, lipase of 17, WBC of 13, CT abdomen- chronic changes,?  Constipation  -Considering increased white count, chills, patient having significant cramps.  -may consider antibiotics  -stool diathrex.  -IV hydration with potassium.     Hypokalemia  - improved   - check Magnesium Level    Lactic Acidosis  - improved today    Chronic Pain  - on Norco for 20 yrs up to 3 times per day    Obesity  - BMI ~36  - risk for nocturnal desaturations    Neuropathy  - Gabapentin    DM  - hemoglobin A1C at goal  - A1C - 6.4  - insulin, metformin, glipizie  - currently on low dose SS and controlled    Mood Disorder  - Xanax    Fibromyalgia    Restart home [place] 10 / 325 every 8 hours PRN as she has been on it for years and to prevent withdrawal symptoms.  Not continued on admission    DVT Prophylaxis:  Lovenox SC    Disposition: I expect the patient to be discharged TBD    CODE STATUS:   Code Status and Medical Interventions:   Ordered at: 04/13/19 7572     Level Of Support Discussed With:    Patient     Code Status:    CPR     Medical Interventions (Level of Support Prior to Arrest):    Full         Electronically signed by Amarjit Brown MD, 04/14/19, 10:47 AM.

## 2019-04-14 NOTE — PLAN OF CARE
Problem: Patient Care Overview  Goal: Plan of Care Review   04/14/19 0555   Coping/Psychosocial   Plan of Care Reviewed With patient   Plan of Care Review   Progress no change   OTHER   Outcome Summary Pt admitted from ED after bringing spouse to ED for nauea/vomiting diarrhea. While in ED for spouse, pt became sick with several bouts of N/V and abdominal pain. Pt states that she has had episodes of diarrhea at home. Pt has had one episode of incontinent diarrhea, no episodes of vomiting, no complaints of nausea. Pt c/o pain in neck, stomach, back, and legs x1, tordal given x1. One liter NS bolus given over 2 hours, NS+20K at 100 is running. VSS. Spore precautions, stool sample still needed.       Problem: Fall Risk (Adult)  Goal: Identify Related Risk Factors and Signs and Symptoms  Outcome: Outcome(s) achieved Date Met: 04/14/19    Goal: Absence of Fall  Outcome: Ongoing (interventions implemented as appropriate)      Problem: Skin Injury Risk (Adult)  Goal: Identify Related Risk Factors and Signs and Symptoms  Outcome: Outcome(s) achieved Date Met: 04/14/19    Goal: Skin Health and Integrity  Outcome: Ongoing (interventions implemented as appropriate)

## 2019-04-14 NOTE — PLAN OF CARE
Problem: Patient Care Overview  Goal: Plan of Care Review  Outcome: Ongoing (interventions implemented as appropriate)  Patient getting good relief with PO pain medication

## 2019-04-15 VITALS
HEART RATE: 64 BPM | DIASTOLIC BLOOD PRESSURE: 95 MMHG | OXYGEN SATURATION: 98 % | RESPIRATION RATE: 18 BRPM | WEIGHT: 207 LBS | BODY MASS INDEX: 35.34 KG/M2 | HEIGHT: 64 IN | TEMPERATURE: 98.9 F | SYSTOLIC BLOOD PRESSURE: 135 MMHG

## 2019-04-15 LAB
ANION GAP SERPL CALCULATED.3IONS-SCNC: 10 MMOL/L
BASOPHILS # BLD AUTO: 0.02 10*3/MM3 (ref 0–0.2)
BASOPHILS NFR BLD AUTO: 0.3 % (ref 0–1.5)
BUN BLD-MCNC: 11 MG/DL (ref 8–23)
BUN/CREAT SERPL: 16.2 (ref 7–25)
CALCIUM SPEC-SCNC: 8.2 MG/DL (ref 8.6–10.5)
CHLORIDE SERPL-SCNC: 103 MMOL/L (ref 98–107)
CO2 SERPL-SCNC: 25 MMOL/L (ref 22–29)
CREAT BLD-MCNC: 0.68 MG/DL (ref 0.57–1)
DEPRECATED RDW RBC AUTO: 49.2 FL (ref 37–54)
EOSINOPHIL # BLD AUTO: 0.45 10*3/MM3 (ref 0–0.4)
EOSINOPHIL NFR BLD AUTO: 6.6 % (ref 0.3–6.2)
ERYTHROCYTE [DISTWIDTH] IN BLOOD BY AUTOMATED COUNT: 14.1 % (ref 12.3–15.4)
GFR SERPL CREATININE-BSD FRML MDRD: 84 ML/MIN/1.73
GLUCOSE BLD-MCNC: 143 MG/DL (ref 65–99)
GLUCOSE BLDC GLUCOMTR-MCNC: 135 MG/DL (ref 70–130)
GLUCOSE BLDC GLUCOMTR-MCNC: 149 MG/DL (ref 70–130)
HCT VFR BLD AUTO: 35.9 % (ref 34–46.6)
HGB BLD-MCNC: 11.4 G/DL (ref 12–15.9)
IMM GRANULOCYTES # BLD AUTO: 0.01 10*3/MM3 (ref 0–0.05)
IMM GRANULOCYTES NFR BLD AUTO: 0.1 % (ref 0–0.5)
LYMPHOCYTES # BLD AUTO: 1.05 10*3/MM3 (ref 0.7–3.1)
LYMPHOCYTES NFR BLD AUTO: 15.5 % (ref 19.6–45.3)
MAGNESIUM SERPL-MCNC: 1.8 MG/DL (ref 1.6–2.4)
MCH RBC QN AUTO: 30.3 PG (ref 26.6–33)
MCHC RBC AUTO-ENTMCNC: 31.8 G/DL (ref 31.5–35.7)
MCV RBC AUTO: 95.5 FL (ref 79–97)
MONOCYTES # BLD AUTO: 0.53 10*3/MM3 (ref 0.1–0.9)
MONOCYTES NFR BLD AUTO: 7.8 % (ref 5–12)
NEUTROPHILS # BLD AUTO: 4.72 10*3/MM3 (ref 1.4–7)
NEUTROPHILS NFR BLD AUTO: 69.8 % (ref 42.7–76)
NRBC BLD AUTO-RTO: 0 /100 WBC (ref 0–0)
PHOSPHATE SERPL-MCNC: 2.5 MG/DL (ref 2.5–4.5)
PLATELET # BLD AUTO: 220 10*3/MM3 (ref 140–450)
PMV BLD AUTO: 9.6 FL (ref 6–12)
POTASSIUM BLD-SCNC: 4 MMOL/L (ref 3.5–5.2)
RBC # BLD AUTO: 3.76 10*6/MM3 (ref 3.77–5.28)
SODIUM BLD-SCNC: 138 MMOL/L (ref 136–145)
WBC NRBC COR # BLD: 6.77 10*3/MM3 (ref 3.4–10.8)

## 2019-04-15 PROCEDURE — 82962 GLUCOSE BLOOD TEST: CPT

## 2019-04-15 PROCEDURE — 80048 BASIC METABOLIC PNL TOTAL CA: CPT | Performed by: HOSPITALIST

## 2019-04-15 PROCEDURE — 25010000002 ENOXAPARIN PER 10 MG: Performed by: INTERNAL MEDICINE

## 2019-04-15 PROCEDURE — 83735 ASSAY OF MAGNESIUM: CPT | Performed by: HOSPITALIST

## 2019-04-15 PROCEDURE — 85025 COMPLETE CBC W/AUTO DIFF WBC: CPT | Performed by: HOSPITALIST

## 2019-04-15 PROCEDURE — 96372 THER/PROPH/DIAG INJ SC/IM: CPT

## 2019-04-15 PROCEDURE — 99217 PR OBSERVATION CARE DISCHARGE MANAGEMENT: CPT | Performed by: NURSE PRACTITIONER

## 2019-04-15 PROCEDURE — 25810000003 SODIUM CHLORIDE 0.9 % WITH KCL 20 MEQ 20-0.9 MEQ/L-% SOLUTION: Performed by: INTERNAL MEDICINE

## 2019-04-15 PROCEDURE — G0378 HOSPITAL OBSERVATION PER HR: HCPCS

## 2019-04-15 PROCEDURE — 84100 ASSAY OF PHOSPHORUS: CPT | Performed by: HOSPITALIST

## 2019-04-15 PROCEDURE — 97162 PT EVAL MOD COMPLEX 30 MIN: CPT

## 2019-04-15 RX ADMIN — CYANOCOBALAMIN TAB 1000 MCG 1000 MCG: 1000 TAB at 09:31

## 2019-04-15 RX ADMIN — POTASSIUM CHLORIDE AND SODIUM CHLORIDE 100 ML/HR: 900; 150 INJECTION, SOLUTION INTRAVENOUS at 00:31

## 2019-04-15 RX ADMIN — ATORVASTATIN CALCIUM 10 MG: 10 TABLET, FILM COATED ORAL at 09:31

## 2019-04-15 RX ADMIN — ENOXAPARIN SODIUM 30 MG: 30 INJECTION SUBCUTANEOUS at 07:25

## 2019-04-15 RX ADMIN — FLUOXETINE HYDROCHLORIDE 40 MG: 20 CAPSULE ORAL at 09:31

## 2019-04-15 RX ADMIN — FLUTICASONE PROPIONATE 2 SPRAY: 50 SPRAY, METERED NASAL at 09:31

## 2019-04-15 RX ADMIN — LOSARTAN POTASSIUM 100 MG: 50 TABLET ORAL at 09:31

## 2019-04-15 RX ADMIN — METRONIDAZOLE 500 MG: 500 TABLET ORAL at 14:13

## 2019-04-15 RX ADMIN — PANTOPRAZOLE SODIUM 40 MG: 40 TABLET, DELAYED RELEASE ORAL at 07:25

## 2019-04-15 RX ADMIN — SODIUM CHLORIDE, PRESERVATIVE FREE 3 ML: 5 INJECTION INTRAVENOUS at 09:34

## 2019-04-15 RX ADMIN — GABAPENTIN 800 MG: 400 CAPSULE ORAL at 14:13

## 2019-04-15 RX ADMIN — Medication 250 MG: at 09:31

## 2019-04-15 RX ADMIN — HYDROCODONE BITARTRATE AND ACETAMINOPHEN 1 TABLET: 10; 325 TABLET ORAL at 12:10

## 2019-04-15 RX ADMIN — OXYBUTYNIN CHLORIDE 5 MG: 5 TABLET, EXTENDED RELEASE ORAL at 09:31

## 2019-04-15 RX ADMIN — METRONIDAZOLE 500 MG: 500 TABLET ORAL at 07:24

## 2019-04-15 RX ADMIN — GABAPENTIN 800 MG: 400 CAPSULE ORAL at 07:25

## 2019-04-15 RX ADMIN — MAGNESIUM SULFATE HEPTAHYDRATE 4 G: 40 INJECTION, SOLUTION INTRAVENOUS at 07:54

## 2019-04-15 NOTE — PLAN OF CARE
Problem: Patient Care Overview  Goal: Plan of Care Review  Outcome: Ongoing (interventions implemented as appropriate)   04/15/19 1040   Coping/Psychosocial   Plan of Care Reviewed With patient   Plan of Care Review   Progress improving   OTHER   Outcome Summary Patient is able to get out of bed and ambulate 120 feet with supervision, no loss of balance or c/o dizziness or nausea. She is close to her baseline. Recommend increasing her upright activity for general strengthening.

## 2019-04-15 NOTE — THERAPY EVALUATION
Acute Care - Physical Therapy Initial Evaluation  [facility]     Patient Name: Chikis Cuellar  : 1945  MRN: [id number]  Today's Date: 4/15/2019   Onset of Illness/Injury or Date of Surgery: 19  Date of Referral to PT: 19  Referring Physician: Kevin HUITRON      Admit Date: 2019    Visit Dx:     ICD-10-CM ICD-9-CM   1. Intractable vomiting with nausea, unspecified vomiting type R11.2 536.2   2. Diarrhea, unspecified type R19.7 787.91   3. Acute abdominal pain R10.9 789.00     338.19   4. Impaired functional mobility, balance, gait, and endurance Z74.09 V49.89     Patient Active Problem List   Diagnosis   • Gastroenteritis   • Type 2 diabetes mellitus, with long-term current use of insulin (CMS/[facility])   • Essential hypertension   • Hyperlipemia   • GERD without esophagitis   • Mood disorder (CMS/[facility])     Past Medical History:   Diagnosis Date   • Arthritis    • Diabetes mellitus (CMS/[facility])    • Fibromyalgia    • Hyperlipidemia    • Hypertension      Past Surgical History:   Procedure Laterality Date   • BACK SURGERY     •  SECTION     • CHOLECYSTECTOMY     • HYSTERECTOMY     • REPLACEMENT TOTAL KNEE          PT ASSESSMENT (last 12 hours)      Physical Therapy Evaluation     Row Name 04/15/19 1040          PT Evaluation Time/Intention    Subjective Information  no complaints  -SC     Document Type  evaluation  -SC     Mode of Treatment  physical therapy  -SC     Patient Effort  good  -SC     Symptoms Noted During/After Treatment  none  -SC     Row Name 04/15/19 1040          General Information    Patient Profile Reviewed?  yes  -SC     Onset of Illness/Injury or Date of Surgery  19  -SC     Referring Physician  Kevin HUITRON  -SC     Patient Observations  alert;cooperative;agree to therapy  -SC     Patient/Family Observations  no family  -SC     General Observations of Patient  in bed  -SC     Prior Level of Function  independent:;all household mobility;community  mobility;gait;transfer  -SC     Pertinent History of Current Functional Problem  Patient had been caring for her spouse and both came down with Vomiting and diarrhea, abdominal pain and nauses. She has been admitted for treatment and C-Diff testing  -SC     Existing Precautions/Restrictions  fall  -SC     Risks Reviewed  patient:;increased discomfort;change in vital signs  -SC     Benefits Reviewed  patient:;improve function;increase independence;increase strength  -SC     Barriers to Rehab  none identified  -SC     Row Name 04/15/19 1040          Relationship/Environment    Lives With  spouse  -SC     Row Name 04/15/19 1040          Resource/Environmental Concerns    Current Living Arrangements  home/apartment/condo  -SC     Row Name 04/15/19 1040          Home Main Entrance    Number of Stairs, Main Entrance  one  -SC     Row Name 04/15/19 1040          Cognitive Assessment/Intervention- PT/OT    Orientation Status (Cognition)  oriented x 4  -SC     Follows Commands (Cognition)  WNL  -SC     Safety Deficit (Cognitive)  insight into deficits/self awareness  -SC     Row Name 04/15/19 1040          Safety Issues, Functional Mobility    Safety Issues Affecting Function (Mobility)  insight into deficits/self awareness  -SC     Row Name 04/15/19 1040          Bed Mobility Assessment/Treatment    Bed Mobility Assessment/Treatment  bed mobility (all) activities;scooting/bridging  -SC     [place] Level (Bed Mobility)  conditional independence  -SC     Row Name 04/15/19 1040          Transfer Assessment/Treatment    Transfer Assessment/Treatment  sit-stand transfer;stand-sit transfer  -SC     Sit-Stand [place] (Transfers)  supervision  -SC     Stand-Sit [place] (Transfers)  supervision  -SC     Row Name 04/15/19 1040          Gait/Stairs Assessment/Training    Gait/Stairs Assessment/Training  gait/ambulation independence  -SC     [place] Level (Gait)  supervision  -SC     Distance in Feet (Gait)  120   -SC     Pattern (Gait)  step-through  -SC     Comment (Gait/Stairs)  walked in room for activity. No LOB or c.o dizziness noted  -SC     Row Name 04/15/19 1040          General ROM    GENERAL ROM COMMENTS  wnl  -[facility] 04/15/19 1040          MMT (Manual Muscle Testing)    General MMT Comments  grossly 4+/5 all extremities  -SC     Row Name 04/15/19 1040          Motor Assessment/Intervention    Additional Documentation  Balance (Group);Therapeutic Exercise (Group);Therapeutic Exercise Interventions (Group)  -SC     Row Name 04/15/19 1040          Therapeutic Exercise    Lower Extremity (Therapeutic Exercise)  LAQ (long arc quad), bilateral;gastroc stretch, bilateral;marching while standing heel raises  -SC     Exercise Type (Therapeutic Exercise)  AROM (active range of motion)  -SC     Position (Therapeutic Exercise)  seated;standing  -SC     Sets/Reps (Therapeutic Exercise)  10  -SC     Row Name 04/15/19 1040          Balance    Balance  dynamic standing balance  -SC     Row Name 04/15/19 1040          Dynamic Standing Balance    Level of [place], Reaches Outside Midline (Standing, Dynamic Balance)  supervision  -SC     Time Able to Maintain Position, Reaches Outside Midline (Standing, Dynamic Balance)  3 to 4 minutes  -SC     Row Name 04/15/19 1040          Sensory Assessment/Intervention    Sensory General Assessment  no sensation deficits identified  -SC     Row Name 04/15/19 1040          Pain Assessment    Additional Documentation  Pain Scale: Numbers Pre/Post-Treatment (Group)  -SC     Row Name 04/15/19 1040          Pain Scale: Numbers Pre/Post-Treatment    Pain Scale: Numbers, Pretreatment  0/10 - no pain  -SC     Pain Scale: Numbers, Post-Treatment  0/10 - no pain  -SC     Row Name             Wound 04/13/19 2230 Right foot diabetic/neuropathic ulceration    Wound - Properties Group Date first assessed: 04/13/19  -PB Time first assessed: 2230  -PB Present On Admission : yes  -PB Side: Right   -PB Location: foot  -PB Type: diabetic/neuropathic ulceration  -PB    Row Name 04/15/19 1040          Coping    Observed Emotional State  accepting  -SC     Verbalized Emotional State  acceptance  -SC     Row Name 04/15/19 1040          Plan of Care Review    Plan of Care Reviewed With  patient  -SC     Row Name 04/15/19 1040          Physical Therapy Clinical Impression    Date of Referral to PT  04/14/19  -SC     PT Diagnosis (PT Clinical Impression)  general weakness  -SC     Patient/Family Goals Statement (PT Clinical Impression)  go home  -SC     Criteria for Skilled Interventions Met (PT Clinical Impression)  yes;treatment indicated  -SC     Pathology/Pathophysiology Noted (Describe Specifically for Each System)  musculoskeletal;cardiovascular  -SC     Impairments Found (describe specific impairments)  aerobic capacity/endurance  -SC     Rehab Potential (PT Clinical Summary)  good, to achieve stated therapy goals  -SC     Care Plan Review (PT)  risks/benefits reviewed;care plan/treatment goals reviewed;evaluation/treatment results reviewed  -SC     Row Name 04/15/19 1040          Vital Signs    Post Systolic BP Rehab  146  -SC     Post Treatment Diastolic BP  69  -SC     Intratreatment Heart Rate (beats/min)  80  -SC     Posttreatment Heart Rate (beats/min)  65  -SC     Post SpO2 (%)  95  -SC     O2 Delivery Post Treatment  room air  -SC     Row Name 04/15/19 1040          Physical Therapy Goals    Gait Training Goal Selection (PT)  gait training, PT goal 1  -SC     Row Name 04/15/19 1040          Gait Training Goal 1 (PT)    Activity/Assistive Device (Gait Training Goal 1, PT)  gait (walking locomotion)  -SC     [place] Level (Gait Training Goal 1, PT)  supervision required  -SC     Distance (Gait Goal 1, PT)  400  -SC     Time Frame (Gait Training Goal 1, PT)  long term goal (LTG);10 days  -SC     Row Name 04/15/19 1100 04/15/19 1040       Positioning and Restraints    Pre-Treatment Position  --  -SC   in bed  -SC    Post Treatment Position  --  -SC  chair  -SC    In Chair  --  sitting;reclined;notified nsg;call light within reach;encouraged to call for assist;exit alarm on  -SC    Row Name 04/15/19 1040          Living Environment    Home Accessibility  stairs to enter home  -SC       User Key  (r) = Recorded By, (t) = Taken By, (c) = Cosigned By    Initials Name Provider Type    SC Aparna Jones, PT Physical Therapist    Rosy Lantigua RN Registered Nurse        Physical Therapy Education     Title: PT OT SLP Therapies (Done)     Topic: Physical Therapy (Done)     Point: Mobility training (Done)     Learning Progress Summary           Patient Acceptance, E, VU,DU,NR by SC at 4/15/2019 10:40 AM    Comment:  reviewed hep                   Point: Home exercise program (Done)     Learning Progress Summary           Patient Acceptance, E, VU,DU,NR by SC at 4/15/2019 10:40 AM    Comment:  reviewed hep                   Point: Body mechanics (Done)     Learning Progress Summary           Patient Acceptance, E, VU,DU,NR by SC at 4/15/2019 10:40 AM    Comment:  reviewed hep                   Point: Precautions (Done)     Learning Progress Summary           Patient Acceptance, E, VU,DU,NR by SC at 4/15/2019 10:40 AM    Comment:  reviewed hep                               User Key     Initials Effective Dates Name Provider Type [facility] 06/19/15 -  Aparna Jones PT Physical Therapist PT              PT Recommendation and Plan  Planned Therapy Interventions (PT Eval): home exercise program, gait training  Therapy Frequency (PT Clinical Impression): daily  Plan of Care Reviewed With: patient  Progress: improving  Outcome Summary: Mikayla is able to get out of bed and ambuate 120 feet with supervision, no loss of balance or c/o dizziness or nausea. She is close to her baseline. Recommend increasing her upright activityt for general strengehting.  Outcome Measures     Row Name 04/15/19 1040             How  much help from another person do you currently need...    Turning from your back to your side while in flat bed without using bedrails?  4  -SC      Moving from lying on back to sitting on the side of a flat bed without bedrails?  4  -SC      Moving to and from a bed to a chair (including a wheelchair)?  4  -SC      Standing up from a chair using your arms (e.g., wheelchair, bedside chair)?  4  -SC      Climbing 3-5 steps with a railing?  3  -SC      To walk in hospital room?  3  -SC      AM-PAC 6 Clicks Score  22  -SC         Functional Assessment    Outcome Measure Options  AM-PAC 6 Clicks Basic Mobility (PT)  -SC        User Key  (r) = Recorded By, (t) = Taken By, (c) = Cosigned By    Initials Name Provider Type    Aparna Beltran PT Physical Therapist         Time Calculation:   PT Charges     Row Name 04/15/19 1040             Time Calculation    Start Time  1040  -SC      PT Received On  04/15/19  -SC      PT Goal Re-Cert Due Date  04/25/19  -SC        User Key  (r) = Recorded By, (t) = Taken By, (c) = Cosigned By    Initials Name Provider Type    SC Aparna Jones, PT Physical Therapist        Therapy Charges for Today     Code Description Service Date Service Provider Modifiers Qty    [id number]  PT EVAL MOD COMPLEXITY 4 4/15/2019 Aparna Jones PT GP 1          PT G-Codes  Outcome Measure Options: AM-PAC 6 Clicks Basic Mobility (PT)  AM-PAC 6 Clicks Score: 22      Aparna Jones PT  4/15/2019

## 2019-04-15 NOTE — DISCHARGE SUMMARY
[facility] Medicine Services  DISCHARGE SUMMARY    Patient Name: Chikis Cuellar  : 1945  MRN: [id number]    Date of Admission: 2019  Date of Discharge:  4/15/2019  Primary Care Physician: Eduardo Gamboa MD    Consults     No orders found from 3/15/2019 to 2019.          Hospital Course     Presenting Problem:   Gastroenteritis [K52.9]    Active Hospital Problems    Diagnosis  POA   • Gastroenteritis [K52.9]  Yes   • Type 2 diabetes mellitus, with long-term current use of insulin (CMS/[facility]) [E11.9, Z79.4]  Not Applicable   • Essential hypertension [I10]  Yes   • Hyperlipemia [E78.5]  Yes   • GERD without esophagitis [K21.9]  Yes   • Mood disorder (CMS/[facility]) [F39]  Unknown      Resolved Hospital Problems   No resolved problems to display.          Hospital Course:  Chikis Cuellar is a 74 y.o. female to ED with chief complaint of nausea, vomiting, Abdominal cramps-past 3 DAYS.No BM for past 2 days.  Fever-subjective, chills, body aches, headache-neck, frontal, no as's photo/phonophobia. also sick with similar illness/diarrhea. Lactic acid 2.5, lipase 17, WBC 13 CT abdomen chronic changes.     Patient was admitted to hospital medicine for further evaluation. Patient WBC  Increased up to 16 and was given 24 hours of flagyl. No diarrhea for 24 hours and wBC normalized and lactic  Improved. diet was advanced and patient was able to tolerate. Talked with Dr. Brown and no strong evidence to continue abx at discharge possible gastroenteritis which has self resolved.   Stool  For cdiff is negative. Stool for diatherix is still pending and will need to be followed up on by PCP. Patient had some hypokalemia and hypomagnesia which was replaced. Patient worked with PT and was able to ambulate 120 feet.     Patient has remained clinically stable and wants to go home today. She  will be discharge home today. Patient will need to follow up with PCP in one week.      Discharge Follow Up Recommendations for labs/diagnostics:      Day of Discharge     HPI:   Patient is resting in bed in [facility]. She feels better today. No diarrhea since yesterday morning. Tolerating diet. Will advance and if she tolerates she will be discharged home today and she is agreeable.     Review of Systems  Gen- No fevers, chills  CV- No chest pain, palpitations  Resp- No cough, dyspnea  GI- No N/V/D, abd pain    Otherwise ROS is negative except as mentioned in the HPI.    Vital Signs:   Temp:  [98 °F (36.7 °C)-99 °F (37.2 °C)] 98.9 °F (37.2 °C)  Heart Rate:  [64-73] 64  Resp:  [16-18] 18  BP: (103-135)/(64-95) 135/95     Physical Exam:  Constitutional: No acute distress, awake, alert  HENT: NCAT, dry tongue  Respiratory: Clear to auscultation bilaterally, respiratory effort normal, room air 94%  Cardiovascular: RRR, no murmurs, rubs, or gallops, palpable pedal pulses bilaterally  Gastrointestinal: Positive bowel sounds, soft, generalized tenderness to palpation, no guarding  Musculoskeletal: No bilateral ankle edema  Psychiatric: Appropriate affect, cooperative  Neurologic: Oriented x 3, strength symmetric in all extremities, Cranial Nerves grossly intact to confrontation, speech clear  Skin: dry,           Pertinent  and/or Most Recent Results     Results from last 7 days   Lab Units 04/15/19  0534 04/14/19  0611 04/13/19  1802   WBC 10*3/mm3 6.77 16.22* 13.74*   HEMOGLOBIN g/dL 11.4* 11.5* 13.3   HEMATOCRIT % 35.9 35.8 39.6   PLATELETS 10*3/mm3 220 264 280   SODIUM mmol/L 138 140 142   POTASSIUM mmol/L 4.0 3.8 3.4*   CHLORIDE mmol/L 103 101 97*   CO2 mmol/L 25.0 25.0 30.0*   BUN mg/dL 11 19 18   CREATININE mg/dL 0.68 0.85 0.87   GLUCOSE mg/dL 143* 165* 116*   CALCIUM mg/dL 8.2* 8.1* 9.5     Results from last 7 days   Lab Units 04/14/19  0611 04/13/19  1802   BILIRUBIN mg/dL 0.3 0.2   ALK PHOS U/L 39 57   ALT (SGPT) U/L 14 17   AST (SGOT) U/L 17 18           Invalid input(s): TG, LDLCALC,  LDLREALC  Results from last 7 days   Lab Units 04/13/19  2325 04/13/19  1805   HEMOGLOBIN A1C % 6.40*  --    TROPONIN I ng/mL  --  0.00       Brief Urine Lab Results  (Last result in the past 365 days)      Color   Clarity   Blood   Leuk Est   Nitrite   Protein   CREAT   Urine HCG        04/13/19 2039 Yellow Clear Negative Trace Negative Negative               Microbiology Results Abnormal     Procedure Component Value - Date/Time    Fecal Leukocytes - Stool, Per Rectum [[id number]]  (Normal) Collected:  04/14/19 0906    Lab Status:  Final result Specimen:  Stool from Per Rectum Updated:  04/14/19 1155     Fecal Leukocytes No WBC's Seen    Clostridium Difficile Toxin - Stool, Per Rectum [[id number]] Collected:  04/14/19 0906    Lab Status:  Final result Specimen:  Stool from Per Rectum Updated:  04/14/19 1033    Narrative:       The following orders were created for panel order Clostridium Difficile Toxin - Stool, Per Rectum.  Procedure                               Abnormality         Status                     ---------                               -----------         ------                     Clostridium Difficile To...[[id number]]  Normal              Final result                 Please view results for these tests on the individual orders.    Clostridium Difficile Toxin, PCR - Stool, Per Rectum [[id number]]  (Normal) Collected:  04/14/19 0906    Lab Status:  Final result Specimen:  Stool from Per Rectum Updated:  04/14/19 1033     C. Difficile Toxins by PCR Not Detected    Narrative:       Performance characteristics of test not established for patients <2 years of age.  Negative for Toxigenic C. Difficile          Imaging Results (all)     Procedure Component Value Units Date/Time    CT Head Without Contrast [[id number]] Collected:  04/13/19 1834     Updated:  04/14/19 1035    Narrative:       EXAMINATION: CT HEAD WO CONTRAST - 04/13/2019     INDICATION: Headache, confusion.      TECHNIQUE: Multiple axial CT  imaging is obtained of the head from skull  base to  skull vertex without the administration of intravenous  contrast.     The radiation dose reduction device was turned on for each scan per the  ALARA (As Low as Reasonably Achievable) protocol.     COMPARISON: NONE     FINDINGS: No atrophy bilaterally. There is no hemorrhage or  hydrocephalus. No mass, mass effect or midline shift. No abnormal  extra-axial fluid collection identified. The bony structures reveal no  evidence of osseous abnormality. The visualized paranasal sinuses are  clear. The mastoid air cells are patent.       Impression:       Mild bilateral frontal atrophy with chronic changes seen in  the brain and no acute intracranial abnormality.     DICTATED:   04/13/2019  EDITED/ls :   04/13/2019         This report was finalized on 4/14/2019 10:32 AM by Dr. Tish Gold MD.       XR Chest 1 View [[id number]] Collected:  04/13/19 1822     Updated:  04/14/19 1035    Narrative:          EXAMINATION: XR CHEST 1 VW - 04/13/2019     INDICATION: Abdominal pain.     COMPARISON: NONE     FINDINGS: Portable chest reveals the heart to be borderline enlarged.  Calcified granuloma identified of the right lower lobe. The lung fields  are otherwise clear. Degenerative change is seen within the spine.  Pulmonary vascularity is within normal limits. No focal parenchymal  opacification present. No pleural effusion or pneumothorax.           Impression:       Chronic changes seen within the lung fields with no evidence  of acute parenchymal disease.     DICTATED:   04/13/2019  EDITED/ls :   04/13/2019      This report was finalized on 4/14/2019 10:32 AM by Dr. Tish Gold MD.       CT Abdomen Pelvis Without Contrast [[id number]] Collected:  04/13/19 1838     Updated:  04/14/19 1035    Narrative:       EXAMINATION: CT ABDOMEN/PELVIS WO CONTRAST - 04/13/2019      INDICATION: Generalized abdominal pain. Nausea and vomiting.     TECHNIQUE: Multiple axial CT  imaging is obtained of the abdomen and  pelvis without the administration of oral or intravenous contrast.     The radiation dose reduction device was turned on for each scan per the  ALARA (As Low as Reasonably Achievable) protocol.     COMPARISON: NONE     FINDINGS:      ABDOMEN: There are atelectatic changes seen in the lung bases  bilaterally with fibronodular densities present suggesting chronic  change. Old healed granulomatous disease is seen within the chest. The  liver is homogeneous in appearance. Surgical clips are seen in the right  upper quadrant from prior cholecystectomy. The spleen is homogeneous.  Small hilar hernia. Kidneys and adrenal glands are within normal limits.  No abdominal or retroperitoneal lymphadenopathy. Vascular calcification  seen within the abdominal aorta and iliac vessels. Stool is seen  scattered throughout the colon.     PELVIS: The pelvic organs are unremarkable. The pelvic portion of the   gastrointestinal tract is within normal limits. Stool throughout the  colon. Vascular calcification seen within the abdominal aorta and iliac  vessels. Bony structures reveal degenerative changes identified within  the spine.       Impression:       Chronic changes seen within the abdomen and pelvis with no  evidence of acute intra-abdominal or pelvic abnormality. There is stool  throughout the colon. Small hiatal hernia.     DICTATED:   04/13/2019  EDITED/ls :   04/13/2019         This report was finalized on 4/14/2019 10:32 AM by Dr. Tish Gold MD.                             Order Current Status    Gastrointestinal Panel, PCR ([facility]) - Stool, Per Rectum In process    Ova & Parasite Examination - Stool, Per Rectum In process    Stool Culture - Stool, Per Rectum In process        Discharge Details        Discharge Medications      Continue These Medications      Instructions Start Date   ALPRAZolam 0.5 MG tablet  Commonly known as:  XANAX   0.5 mg, Oral, Daily PRN      AMBIEN  10 MG tablet  Generic drug:  zolpidem   10 mg, Oral, Nightly PRN      diclofenac 1 % gel gel  Commonly known as:  VOLTAREN   4 g, Topical, 4 Times Daily PRN      dicyclomine 20 MG tablet  Commonly known as:  BENTYL   20 mg, Oral, Every 6 Hours      FLUoxetine 20 MG capsule  Commonly known as:  PROzac   40 mg, Oral, Daily      fluticasone 50 MCG/ACT nasal spray  Commonly known as:  FLONASE   2 sprays, Nasal, Daily      gabapentin 800 MG tablet  Commonly known as:  NEURONTIN   1,200 mg, Oral, 3 Times Daily      glipiZIDE 10 MG tablet  Commonly known as:  GLUCOTROL   10 mg, Oral, 2 Times Daily Before Meals      HYDROcodone-acetaminophen  MG per tablet  Commonly known as:  NORCO   1 tablet, Oral, Every 6 Hours PRN      indapamide 2.5 MG tablet  Commonly known as:  LOZOL   2.5 mg, Oral, Daily      insulin glargine 100 UNIT/ML injection  Commonly known as:  LANTUS   45 Units, Subcutaneous, Nightly      losartan 50 MG tablet  Commonly known as:  COZAAR   100 mg, Oral, Daily      meloxicam 15 MG tablet  Commonly known as:  MOBIC   15 mg, Oral, Daily      metFORMIN 500 MG tablet  Commonly known as:  GLUCOPHAGE   500 mg, Oral, 2 Times Daily With Meals      omeprazole 40 MG capsule  Commonly known as:  priLOSEC   40 mg, Oral, Daily      oxybutynin XL 5 MG 24 hr tablet  Commonly known as:  DITROPAN-XL   5 mg, Oral, Daily      pravastatin 40 MG tablet  Commonly known as:  PRAVACHOL   40 mg, Oral, Daily      raNITIdine 150 MG tablet  Commonly known as:  ZANTAC   300 mg, Oral, 2 Times Daily      saccharomyces boulardii 250 MG capsule  Commonly known as:  FLORASTOR   250 mg, Oral, 2 Times Daily      terconazole 0.4 % vaginal cream  Commonly known as:  TERAZOL 7   1 applicator, Vaginal, Nightly      verapamil  MG CR tablet  Commonly known as:  CALAN-SR   240 mg, Oral, Nightly      vitamin B-12 1000 MCG tablet  Commonly known as:  CYANOCOBALAMIN   1,000 mcg, Oral, Daily         Stop These Medications    fluconazole 150 MG  tablet  Commonly known as:  DIFLUCAN            Allergies   Allergen Reactions   • Trazodone Hallucinations   • Altace [Ramipril] Palpitations         Discharge Disposition:      Discharge Diet:  Diet Order   Procedures   • Diet Regular; GI Soft/[place], Consistent Carbohydrate, Cardiac         Discharge Activity:   Activity Instructions     Activity as Tolerated      Measure Blood Pressure              CODE STATUS:    Code Status and Medical Interventions:   Ordered at: 04/13/19 8020     Level Of Support Discussed With:    Patient     Code Status:    CPR     Medical Interventions (Level of Support Prior to Arrest):    Full         No future appointments.    Additional Instructions for the Follow-ups that You Need to Schedule     Discharge Follow-up with PCP   As directed       Currently Documented PCP:    Eduardo Gamboa MD    PCP Phone Number:    [phone]     Follow Up Details:  follow up PCP one week               Time Spent on Discharge: 35 minutes    Electronically signed by JULIANNA Marin, 04/15/19, 12:18 PM.

## 2019-04-15 NOTE — PROGRESS NOTES
Case Management Discharge Note    Final Note: Plan is home to self care    Destination      No service has been selected for the patient.      Durable Medical Equipment      No service has been selected for the patient.      Dialysis/Infusion      No service has been selected for the patient.      Home Medical Care      No service has been selected for the patient.      Therapy      No service has been selected for the patient.      Community Resources      No service has been selected for the patient.             Final Discharge Disposition Code: 01 - home or self-care

## 2019-04-15 NOTE — PROGRESS NOTES
Discharge Planning Assessment  [facility]     Patient Name: Chikis Cuellar  MRN: [id number]  Today's Date: 4/15/2019    Admit Date: 4/13/2019    Discharge Needs Assessment     Row Name 04/15/19 1311       Living Environment    Lives With  spouse    Name(s) of Who Lives With Patient  Spouse Williams Cuellar (currently in the hospital)    Current Living Arrangements  home/apartment/condo    Primary Care Provided by  self    Provides Primary Care For  spouse    Family Caregiver if Needed  child(javier), adult    Family Caregiver Names  Son, Wilver Cuellar    Quality of Family Relationships  involved;helpful;supportive    Able to Return to Prior Arrangements  yes    Living Arrangement Comments  Lives with spouse in house with 3 steps at entrance.  Spouse with alzeheimers.       Resource/Environmental Concerns    Resource/Environmental Concerns  none    Transportation Concerns  car, none       Transition Planning    Patient/Family Anticipates Transition to  home with family    Patient/Family Anticipated Services at Transition  none    Transportation Anticipated  family or friend will provide       Discharge Needs Assessment    Readmission Within the Last 30 Days  no previous admission in last 30 days    Concerns to be Addressed  no discharge needs identified    Equipment Currently Used at Home  none    Anticipated Changes Related to Illness  none    Equipment Needed After Discharge  none        Discharge Plan     Row Name 04/15/19 1313       Plan    Plan  Plan is home with family at discharge    Patient/Family in Agreement with Plan  yes    Plan Comments  Met with patient at bedside.  Lives with spouse she cares for in house with 3 steps at entrance.  Lift for going down steps to basement.  PCP is Eduardo Gamboa.  No home health or DME.  No discharge needs identified.     Final Discharge Disposition Code  01 - home or self-care        Destination      No service coordination in this encounter.      Durable Medical Equipment       No service coordination in this encounter.      Dialysis/Infusion      No service coordination in this encounter.      Home Medical Care      No service coordination in this encounter.      Therapy      No service coordination in this encounter.      Community Resources      No service coordination in this encounter.        Expected Discharge Date and Time     Expected Discharge Date Expected Discharge Time    Apr 15, 2019         Demographic Summary     Row Name 04/15/19 1309       General Information    Admission Type  observation    Arrived From  emergency department    Referral Source  admission list;physician    Reason for Consult  discharge planning    Preferred Language  English        Functional Status     Row Name 04/15/19 1310       Functional Status    Usual Activity Tolerance  good    Current Activity Tolerance  good       Functional Status, IADL    Medications  independent    Meal Preparation  independent    Housekeeping  independent    Laundry  independent    Shopping  independent        Psychosocial    No documentation.       Abuse/Neglect    No documentation.       Legal    No documentation.       Substance Abuse    No documentation.       Patient Forms    No documentation.           Tia Jarvis RN

## 2019-04-16 LAB
O+P SPEC MICRO: NORMAL
O+P STL TRI STN: NORMAL

## 2019-04-17 LAB — REF LAB TEST METHOD: NORMAL

## 2019-04-19 LAB
CAMPYLOBACTER STL CULT: NORMAL
E COLI SXT STL QL IA: NEGATIVE
Lab: NORMAL
Lab: NORMAL
SALM + SHIG STL CULT: NORMAL

## 2022-01-01 ENCOUNTER — APPOINTMENT (OUTPATIENT)
Dept: GENERAL RADIOLOGY | Facility: HOSPITAL | Age: 77
End: 2022-01-01

## 2022-01-01 ENCOUNTER — HOSPITAL ENCOUNTER (INPATIENT)
Facility: HOSPITAL | Age: 77
LOS: 26 days | Discharge: NURSING, REHAB OR LONG-TERM CARE | End: 2022-06-27
Attending: EMERGENCY MEDICINE | Admitting: INTERNAL MEDICINE

## 2022-01-01 ENCOUNTER — APPOINTMENT (OUTPATIENT)
Dept: CARDIOLOGY | Facility: HOSPITAL | Age: 77
End: 2022-01-01

## 2022-01-01 ENCOUNTER — ANCILLARY PROCEDURE (OUTPATIENT)
Dept: SPEECH THERAPY | Facility: HOSPITAL | Age: 77
End: 2022-01-01

## 2022-01-01 ENCOUNTER — APPOINTMENT (OUTPATIENT)
Dept: CT IMAGING | Facility: HOSPITAL | Age: 77
End: 2022-01-01

## 2022-01-01 ENCOUNTER — HOSPITAL ENCOUNTER (INPATIENT)
Facility: HOSPITAL | Age: 77
LOS: 2 days | End: 2022-07-15
Attending: INTERNAL MEDICINE

## 2022-01-01 ENCOUNTER — HOSPITAL ENCOUNTER (INPATIENT)
Facility: HOSPITAL | Age: 77
LOS: 15 days | End: 2022-07-13
Attending: EMERGENCY MEDICINE | Admitting: INTERNAL MEDICINE

## 2022-01-01 VITALS
SYSTOLIC BLOOD PRESSURE: 127 MMHG | WEIGHT: 195.1 LBS | OXYGEN SATURATION: 89 % | HEIGHT: 64 IN | BODY MASS INDEX: 33.31 KG/M2 | HEART RATE: 100 BPM | TEMPERATURE: 98.2 F | DIASTOLIC BLOOD PRESSURE: 82 MMHG | RESPIRATION RATE: 22 BRPM

## 2022-01-01 VITALS
BODY MASS INDEX: 38.55 KG/M2 | SYSTOLIC BLOOD PRESSURE: 129 MMHG | HEART RATE: 93 BPM | TEMPERATURE: 98.2 F | RESPIRATION RATE: 18 BRPM | WEIGHT: 209.5 LBS | OXYGEN SATURATION: 87 % | HEIGHT: 62 IN | DIASTOLIC BLOOD PRESSURE: 68 MMHG

## 2022-01-01 VITALS
RESPIRATION RATE: 24 BRPM | HEART RATE: 101 BPM | DIASTOLIC BLOOD PRESSURE: 60 MMHG | OXYGEN SATURATION: 79 % | TEMPERATURE: 98.2 F | SYSTOLIC BLOOD PRESSURE: 91 MMHG

## 2022-01-01 DIAGNOSIS — J18.9 ATYPICAL PNEUMONIA: ICD-10-CM

## 2022-01-01 DIAGNOSIS — R13.10 DYSPHAGIA, UNSPECIFIED TYPE: ICD-10-CM

## 2022-01-01 DIAGNOSIS — R06.02 SHORTNESS OF BREATH: ICD-10-CM

## 2022-01-01 DIAGNOSIS — J18.9 COMMUNITY ACQUIRED PNEUMONIA, UNSPECIFIED LATERALITY: ICD-10-CM

## 2022-01-01 DIAGNOSIS — D72.829 LEUKOCYTOSIS, UNSPECIFIED TYPE: ICD-10-CM

## 2022-01-01 DIAGNOSIS — J96.01 ACUTE RESPIRATORY FAILURE WITH HYPOXIA: Primary | ICD-10-CM

## 2022-01-01 DIAGNOSIS — F39 MOOD DISORDER: ICD-10-CM

## 2022-01-01 DIAGNOSIS — E87.6 HYPOKALEMIA: ICD-10-CM

## 2022-01-01 DIAGNOSIS — G89.29 OTHER CHRONIC PAIN: ICD-10-CM

## 2022-01-01 DIAGNOSIS — R73.9 HYPERGLYCEMIA: ICD-10-CM

## 2022-01-01 LAB
1,3 BETA GLUCAN SER-MCNC: <31 PG/ML
A FUMIGATUS IGG SER QL: NEGATIVE
A PULLULANS IGG SER QL: NEGATIVE
ALBUMIN SERPL-MCNC: 2.9 G/DL (ref 3.5–5.2)
ALBUMIN SERPL-MCNC: 3 G/DL (ref 3.5–5.2)
ALBUMIN SERPL-MCNC: 3.1 G/DL (ref 3.5–5.2)
ALBUMIN SERPL-MCNC: 3.2 G/DL (ref 3.5–5.2)
ALBUMIN SERPL-MCNC: 3.3 G/DL (ref 3.5–5.2)
ALBUMIN SERPL-MCNC: 3.4 G/DL (ref 3.5–5.2)
ALBUMIN/GLOB SERPL: 0.9 G/DL
ALBUMIN/GLOB SERPL: 1.2 G/DL
ALBUMIN/GLOB SERPL: 1.3 G/DL
ALP SERPL-CCNC: 60 U/L (ref 39–117)
ALP SERPL-CCNC: 79 U/L (ref 39–117)
ALP SERPL-CCNC: 83 U/L (ref 39–117)
ALP SERPL-CCNC: 86 U/L (ref 39–117)
ALP SERPL-CCNC: 90 U/L (ref 39–117)
ALP SERPL-CCNC: 92 U/L (ref 39–117)
ALT SERPL W P-5'-P-CCNC: 13 U/L (ref 1–33)
ALT SERPL W P-5'-P-CCNC: 14 U/L (ref 1–33)
ALT SERPL W P-5'-P-CCNC: 18 U/L (ref 1–33)
ALT SERPL W P-5'-P-CCNC: 35 U/L (ref 1–33)
ANION GAP SERPL CALCULATED.3IONS-SCNC: 10 MMOL/L (ref 5–15)
ANION GAP SERPL CALCULATED.3IONS-SCNC: 11 MMOL/L (ref 5–15)
ANION GAP SERPL CALCULATED.3IONS-SCNC: 12 MMOL/L (ref 5–15)
ANION GAP SERPL CALCULATED.3IONS-SCNC: 13 MMOL/L (ref 5–15)
ANION GAP SERPL CALCULATED.3IONS-SCNC: 16 MMOL/L (ref 5–15)
ANION GAP SERPL CALCULATED.3IONS-SCNC: 4 MMOL/L (ref 5–15)
ANION GAP SERPL CALCULATED.3IONS-SCNC: 5 MMOL/L (ref 5–15)
ANION GAP SERPL CALCULATED.3IONS-SCNC: 5 MMOL/L (ref 5–15)
ANION GAP SERPL CALCULATED.3IONS-SCNC: 6 MMOL/L (ref 5–15)
ANION GAP SERPL CALCULATED.3IONS-SCNC: 7 MMOL/L (ref 5–15)
ANION GAP SERPL CALCULATED.3IONS-SCNC: 8 MMOL/L (ref 5–15)
ANION GAP SERPL CALCULATED.3IONS-SCNC: 9 MMOL/L (ref 5–15)
ARTERIAL PATENCY WRIST A: ABNORMAL
ARTERIAL PATENCY WRIST A: POSITIVE
ASCENDING AORTA: 3.4 CM
AST SERPL-CCNC: 16 U/L (ref 1–32)
AST SERPL-CCNC: 17 U/L (ref 1–32)
AST SERPL-CCNC: 18 U/L (ref 1–32)
AST SERPL-CCNC: 18 U/L (ref 1–32)
AST SERPL-CCNC: 27 U/L (ref 1–32)
AST SERPL-CCNC: 38 U/L (ref 1–32)
ATMOSPHERIC PRESS: ABNORMAL MM[HG]
ATMOSPHERIC PRESS: ABNORMAL MM[HG]
B DERMAT AB TITR SER: NEGATIVE {TITER}
B PARAPERT DNA SPEC QL NAA+PROBE: NOT DETECTED
B PERT DNA SPEC QL NAA+PROBE: NOT DETECTED
BACTERIA SPEC AEROBE CULT: NORMAL
BACTERIA SPEC AEROBE CULT: NORMAL
BASE EXCESS BLDA CALC-SCNC: 10.2 MMOL/L (ref 0–2)
BASE EXCESS BLDA CALC-SCNC: 5.1 MMOL/L (ref 0–2)
BASOPHILS # BLD AUTO: 0.02 10*3/MM3 (ref 0–0.2)
BASOPHILS # BLD AUTO: 0.03 10*3/MM3 (ref 0–0.2)
BASOPHILS # BLD AUTO: 0.04 10*3/MM3 (ref 0–0.2)
BASOPHILS # BLD AUTO: 0.05 10*3/MM3 (ref 0–0.2)
BASOPHILS # BLD AUTO: 0.06 10*3/MM3 (ref 0–0.2)
BASOPHILS # BLD AUTO: 0.08 10*3/MM3 (ref 0–0.2)
BASOPHILS # BLD AUTO: 0.11 10*3/MM3 (ref 0–0.2)
BASOPHILS # BLD AUTO: 0.12 10*3/MM3 (ref 0–0.2)
BASOPHILS NFR BLD AUTO: 0.1 % (ref 0–1.5)
BASOPHILS NFR BLD AUTO: 0.2 % (ref 0–1.5)
BASOPHILS NFR BLD AUTO: 0.3 % (ref 0–1.5)
BASOPHILS NFR BLD AUTO: 0.3 % (ref 0–1.5)
BASOPHILS NFR BLD AUTO: 0.4 % (ref 0–1.5)
BASOPHILS NFR BLD AUTO: 0.4 % (ref 0–1.5)
BASOPHILS NFR BLD AUTO: 0.5 % (ref 0–1.5)
BASOPHILS NFR BLD AUTO: 0.5 % (ref 0–1.5)
BDY SITE: ABNORMAL
BDY SITE: ABNORMAL
BH CV ECHO MEAS - AO MAX PG: 6 MMHG
BH CV ECHO MEAS - AO MEAN PG: 2.9 MMHG
BH CV ECHO MEAS - AO ROOT DIAM: 3.2 CM
BH CV ECHO MEAS - AO V2 MAX: 122.6 CM/SEC
BH CV ECHO MEAS - AO V2 VTI: 26.1 CM
BH CV ECHO MEAS - AVA(I,D): 3.3 CM2
BH CV ECHO MEAS - EDV(CUBED): 132.4 ML
BH CV ECHO MEAS - EDV(MOD-SP2): 109 ML
BH CV ECHO MEAS - EDV(MOD-SP4): 109 ML
BH CV ECHO MEAS - EF(MOD-BP): 60 %
BH CV ECHO MEAS - EF(MOD-SP2): 61.5 %
BH CV ECHO MEAS - EF(MOD-SP4): 57.8 %
BH CV ECHO MEAS - ESV(CUBED): 51.7 ML
BH CV ECHO MEAS - ESV(MOD-SP2): 42 ML
BH CV ECHO MEAS - ESV(MOD-SP4): 46 ML
BH CV ECHO MEAS - FS: 26.9 %
BH CV ECHO MEAS - IVS/LVPW: 1.19 CM
BH CV ECHO MEAS - IVSD: 0.9 CM
BH CV ECHO MEAS - LA DIMENSION: 3.5 CM
BH CV ECHO MEAS - LAT PEAK E' VEL: 9 CM/SEC
BH CV ECHO MEAS - LV MASS(C)D: 145.8 GRAMS
BH CV ECHO MEAS - LV MAX PG: 4.8 MMHG
BH CV ECHO MEAS - LV MEAN PG: 2.7 MMHG
BH CV ECHO MEAS - LV V1 MAX: 109.7 CM/SEC
BH CV ECHO MEAS - LV V1 VTI: 26.4 CM
BH CV ECHO MEAS - LVIDD: 5.1 CM
BH CV ECHO MEAS - LVIDS: 3.7 CM
BH CV ECHO MEAS - LVOT AREA: 3.3 CM2
BH CV ECHO MEAS - LVOT DIAM: 2.04 CM
BH CV ECHO MEAS - LVPWD: 0.8 CM
BH CV ECHO MEAS - MED PEAK E' VEL: 8.44 CM/SEC
BH CV ECHO MEAS - MV A MAX VEL: 111.7 CM/SEC
BH CV ECHO MEAS - MV DEC TIME: 0.18 MSEC
BH CV ECHO MEAS - MV E MAX VEL: 92.5 CM/SEC
BH CV ECHO MEAS - MV E/A: 0.83
BH CV ECHO MEAS - PA ACC SLOPE: 314.5 CM/SEC2
BH CV ECHO MEAS - PA ACC TIME: 0.14 SEC
BH CV ECHO MEAS - PA PR(ACCEL): 17.2 MMHG
BH CV ECHO MEAS - PI END-D VEL: 97.7 CM/SEC
BH CV ECHO MEAS - RAP SYSTOLE: 3 MMHG
BH CV ECHO MEAS - RVSP: 40 MMHG
BH CV ECHO MEAS - SV(LVOT): 86.7 ML
BH CV ECHO MEAS - SV(MOD-SP2): 67 ML
BH CV ECHO MEAS - SV(MOD-SP4): 63 ML
BH CV ECHO MEAS - TAPSE (>1.6): 1.7 CM
BH CV ECHO MEAS - TR MAX PG: 32.8 MMHG
BH CV ECHO MEAS - TR MAX VEL: 285.1 CM/SEC
BH CV ECHO MEASUREMENTS AVERAGE E/E' RATIO: 10.61
BH CV VAS BP LEFT ARM: NORMAL MMHG
BH CV XLRA - RV BASE: 5.2 CM
BH CV XLRA - RV LENGTH: 7.4 CM
BH CV XLRA - RV MID: 4 CM
BH CV XLRA - TDI S': 8.86 CM/SEC
BILIRUB SERPL-MCNC: 0.2 MG/DL (ref 0–1.2)
BILIRUB SERPL-MCNC: 0.3 MG/DL (ref 0–1.2)
BILIRUB SERPL-MCNC: 0.4 MG/DL (ref 0–1.2)
BODY TEMPERATURE: 37 C
BODY TEMPERATURE: 37 C
BUN SERPL-MCNC: 16 MG/DL (ref 8–23)
BUN SERPL-MCNC: 19 MG/DL (ref 8–23)
BUN SERPL-MCNC: 19 MG/DL (ref 8–23)
BUN SERPL-MCNC: 20 MG/DL (ref 8–23)
BUN SERPL-MCNC: 21 MG/DL (ref 8–23)
BUN SERPL-MCNC: 22 MG/DL (ref 8–23)
BUN SERPL-MCNC: 23 MG/DL (ref 8–23)
BUN SERPL-MCNC: 24 MG/DL (ref 8–23)
BUN SERPL-MCNC: 25 MG/DL (ref 8–23)
BUN SERPL-MCNC: 26 MG/DL (ref 8–23)
BUN SERPL-MCNC: 27 MG/DL (ref 8–23)
BUN SERPL-MCNC: 27 MG/DL (ref 8–23)
BUN SERPL-MCNC: 28 MG/DL (ref 8–23)
BUN SERPL-MCNC: 28 MG/DL (ref 8–23)
BUN SERPL-MCNC: 30 MG/DL (ref 8–23)
BUN SERPL-MCNC: 31 MG/DL (ref 8–23)
BUN SERPL-MCNC: 36 MG/DL (ref 8–23)
BUN SERPL-MCNC: 37 MG/DL (ref 8–23)
BUN SERPL-MCNC: 37 MG/DL (ref 8–23)
BUN SERPL-MCNC: 38 MG/DL (ref 8–23)
BUN SERPL-MCNC: 41 MG/DL (ref 8–23)
BUN SERPL-MCNC: 43 MG/DL (ref 8–23)
BUN SERPL-MCNC: 44 MG/DL (ref 8–23)
BUN SERPL-MCNC: 46 MG/DL (ref 8–23)
BUN SERPL-MCNC: 48 MG/DL (ref 8–23)
BUN SERPL-MCNC: 55 MG/DL (ref 8–23)
BUN/CREAT SERPL: 19.7 (ref 7–25)
BUN/CREAT SERPL: 21.3 (ref 7–25)
BUN/CREAT SERPL: 25.9 (ref 7–25)
BUN/CREAT SERPL: 26.2 (ref 7–25)
BUN/CREAT SERPL: 26.3 (ref 7–25)
BUN/CREAT SERPL: 27.5 (ref 7–25)
BUN/CREAT SERPL: 28 (ref 7–25)
BUN/CREAT SERPL: 28.2 (ref 7–25)
BUN/CREAT SERPL: 28.6 (ref 7–25)
BUN/CREAT SERPL: 30.2 (ref 7–25)
BUN/CREAT SERPL: 30.8 (ref 7–25)
BUN/CREAT SERPL: 31.7 (ref 7–25)
BUN/CREAT SERPL: 31.8 (ref 7–25)
BUN/CREAT SERPL: 32.9 (ref 7–25)
BUN/CREAT SERPL: 34.2 (ref 7–25)
BUN/CREAT SERPL: 34.2 (ref 7–25)
BUN/CREAT SERPL: 34.4 (ref 7–25)
BUN/CREAT SERPL: 34.8 (ref 7–25)
BUN/CREAT SERPL: 35.4 (ref 7–25)
BUN/CREAT SERPL: 35.6 (ref 7–25)
BUN/CREAT SERPL: 36.1 (ref 7–25)
BUN/CREAT SERPL: 37.5 (ref 7–25)
BUN/CREAT SERPL: 38.5 (ref 7–25)
BUN/CREAT SERPL: 45.8 (ref 7–25)
BUN/CREAT SERPL: 45.8 (ref 7–25)
BUN/CREAT SERPL: 48.3 (ref 7–25)
BUN/CREAT SERPL: 48.4 (ref 7–25)
BUN/CREAT SERPL: 49.3 (ref 7–25)
BUN/CREAT SERPL: 49.5 (ref 7–25)
BUN/CREAT SERPL: 50.6 (ref 7–25)
BUN/CREAT SERPL: 50.9 (ref 7–25)
BUN/CREAT SERPL: 52.7 (ref 7–25)
BUN/CREAT SERPL: 55 (ref 7–25)
BUN/CREAT SERPL: 56.9 (ref 7–25)
BUN/CREAT SERPL: 61 (ref 7–25)
C PNEUM DNA NPH QL NAA+NON-PROBE: NOT DETECTED
C-ANCA TITR SER IF: ABNORMAL TITER
CALCIUM SPEC-SCNC: 10 MG/DL (ref 8.6–10.5)
CALCIUM SPEC-SCNC: 8.6 MG/DL (ref 8.6–10.5)
CALCIUM SPEC-SCNC: 8.7 MG/DL (ref 8.6–10.5)
CALCIUM SPEC-SCNC: 8.8 MG/DL (ref 8.6–10.5)
CALCIUM SPEC-SCNC: 8.8 MG/DL (ref 8.6–10.5)
CALCIUM SPEC-SCNC: 8.9 MG/DL (ref 8.6–10.5)
CALCIUM SPEC-SCNC: 9 MG/DL (ref 8.6–10.5)
CALCIUM SPEC-SCNC: 9.1 MG/DL (ref 8.6–10.5)
CALCIUM SPEC-SCNC: 9.2 MG/DL (ref 8.6–10.5)
CALCIUM SPEC-SCNC: 9.3 MG/DL (ref 8.6–10.5)
CALCIUM SPEC-SCNC: 9.4 MG/DL (ref 8.6–10.5)
CALCIUM SPEC-SCNC: 9.5 MG/DL (ref 8.6–10.5)
CALCIUM SPEC-SCNC: 9.6 MG/DL (ref 8.6–10.5)
CALCIUM SPEC-SCNC: 9.7 MG/DL (ref 8.6–10.5)
CALCIUM SPEC-SCNC: 9.8 MG/DL (ref 8.6–10.5)
CALCIUM SPEC-SCNC: 9.9 MG/DL (ref 8.6–10.5)
CENTROMERE B AB SER-ACNC: <0.2 AI (ref 0–0.9)
CHLORIDE SERPL-SCNC: 100 MMOL/L (ref 98–107)
CHLORIDE SERPL-SCNC: 100 MMOL/L (ref 98–107)
CHLORIDE SERPL-SCNC: 101 MMOL/L (ref 98–107)
CHLORIDE SERPL-SCNC: 103 MMOL/L (ref 98–107)
CHLORIDE SERPL-SCNC: 91 MMOL/L (ref 98–107)
CHLORIDE SERPL-SCNC: 92 MMOL/L (ref 98–107)
CHLORIDE SERPL-SCNC: 93 MMOL/L (ref 98–107)
CHLORIDE SERPL-SCNC: 94 MMOL/L (ref 98–107)
CHLORIDE SERPL-SCNC: 96 MMOL/L (ref 98–107)
CHLORIDE SERPL-SCNC: 97 MMOL/L (ref 98–107)
CHLORIDE SERPL-SCNC: 98 MMOL/L (ref 98–107)
CHLORIDE SERPL-SCNC: 98 MMOL/L (ref 98–107)
CHLORIDE SERPL-SCNC: 99 MMOL/L (ref 98–107)
CHROMATIN AB SERPL-ACNC: 21 IU/ML (ref 0–14)
CHROMATIN AB SERPL-ACNC: <0.2 AI (ref 0–0.9)
CO2 BLDA-SCNC: 31.1 MMOL/L (ref 22–33)
CO2 BLDA-SCNC: 38.5 MMOL/L (ref 22–33)
CO2 SERPL-SCNC: 25 MMOL/L (ref 22–29)
CO2 SERPL-SCNC: 29 MMOL/L (ref 22–29)
CO2 SERPL-SCNC: 30 MMOL/L (ref 22–29)
CO2 SERPL-SCNC: 31 MMOL/L (ref 22–29)
CO2 SERPL-SCNC: 32 MMOL/L (ref 22–29)
CO2 SERPL-SCNC: 33 MMOL/L (ref 22–29)
CO2 SERPL-SCNC: 34 MMOL/L (ref 22–29)
CO2 SERPL-SCNC: 35 MMOL/L (ref 22–29)
CO2 SERPL-SCNC: 36 MMOL/L (ref 22–29)
CO2 SERPL-SCNC: 37 MMOL/L (ref 22–29)
CO2 SERPL-SCNC: 38 MMOL/L (ref 22–29)
COHGB MFR BLD: 1.1 % (ref 0–2)
COHGB MFR BLD: 1.1 % (ref 0–2)
CREAT SERPL-MCNC: 0.55 MG/DL (ref 0.57–1)
CREAT SERPL-MCNC: 0.59 MG/DL (ref 0.57–1)
CREAT SERPL-MCNC: 0.59 MG/DL (ref 0.57–1)
CREAT SERPL-MCNC: 0.61 MG/DL (ref 0.57–1)
CREAT SERPL-MCNC: 0.63 MG/DL (ref 0.57–1)
CREAT SERPL-MCNC: 0.64 MG/DL (ref 0.57–1)
CREAT SERPL-MCNC: 0.65 MG/DL (ref 0.57–1)
CREAT SERPL-MCNC: 0.65 MG/DL (ref 0.57–1)
CREAT SERPL-MCNC: 0.66 MG/DL (ref 0.57–1)
CREAT SERPL-MCNC: 0.69 MG/DL (ref 0.57–1)
CREAT SERPL-MCNC: 0.69 MG/DL (ref 0.57–1)
CREAT SERPL-MCNC: 0.71 MG/DL (ref 0.57–1)
CREAT SERPL-MCNC: 0.73 MG/DL (ref 0.57–1)
CREAT SERPL-MCNC: 0.75 MG/DL (ref 0.57–1)
CREAT SERPL-MCNC: 0.76 MG/DL (ref 0.57–1)
CREAT SERPL-MCNC: 0.76 MG/DL (ref 0.57–1)
CREAT SERPL-MCNC: 0.78 MG/DL (ref 0.57–1)
CREAT SERPL-MCNC: 0.78 MG/DL (ref 0.57–1)
CREAT SERPL-MCNC: 0.79 MG/DL (ref 0.57–1)
CREAT SERPL-MCNC: 0.81 MG/DL (ref 0.57–1)
CREAT SERPL-MCNC: 0.81 MG/DL (ref 0.57–1)
CREAT SERPL-MCNC: 0.82 MG/DL (ref 0.57–1)
CREAT SERPL-MCNC: 0.83 MG/DL (ref 0.57–1)
CREAT SERPL-MCNC: 0.87 MG/DL (ref 0.57–1)
CREAT SERPL-MCNC: 0.89 MG/DL (ref 0.57–1)
CREAT SERPL-MCNC: 0.91 MG/DL (ref 0.57–1)
CREAT SERPL-MCNC: 0.93 MG/DL (ref 0.57–1)
CREAT SERPL-MCNC: 0.94 MG/DL (ref 0.57–1)
CREAT SERPL-MCNC: 1 MG/DL (ref 0.57–1)
CREAT SERPL-MCNC: 1 MG/DL (ref 0.57–1)
CREAT SERPL-MCNC: 1.27 MG/DL (ref 0.57–1)
CRP SERPL-MCNC: 18.79 MG/DL (ref 0–0.5)
D-LACTATE SERPL-SCNC: 1 MMOL/L (ref 0.5–2)
D-LACTATE SERPL-SCNC: 1.5 MMOL/L (ref 0.5–2)
D-LACTATE SERPL-SCNC: 1.6 MMOL/L (ref 0.5–2)
D-LACTATE SERPL-SCNC: 1.7 MMOL/L (ref 0.5–2)
D-LACTATE SERPL-SCNC: 2 MMOL/L (ref 0.5–2)
D-LACTATE SERPL-SCNC: 2.6 MMOL/L (ref 0.5–2)
DEPRECATED RDW RBC AUTO: 42 FL (ref 37–54)
DEPRECATED RDW RBC AUTO: 43 FL (ref 37–54)
DEPRECATED RDW RBC AUTO: 43.5 FL (ref 37–54)
DEPRECATED RDW RBC AUTO: 43.8 FL (ref 37–54)
DEPRECATED RDW RBC AUTO: 43.8 FL (ref 37–54)
DEPRECATED RDW RBC AUTO: 45 FL (ref 37–54)
DEPRECATED RDW RBC AUTO: 45 FL (ref 37–54)
DEPRECATED RDW RBC AUTO: 45.1 FL (ref 37–54)
DEPRECATED RDW RBC AUTO: 45.1 FL (ref 37–54)
DEPRECATED RDW RBC AUTO: 45.8 FL (ref 37–54)
DEPRECATED RDW RBC AUTO: 47.7 FL (ref 37–54)
DEPRECATED RDW RBC AUTO: 49.1 FL (ref 37–54)
DEPRECATED RDW RBC AUTO: 51.2 FL (ref 37–54)
DEPRECATED RDW RBC AUTO: 51.8 FL (ref 37–54)
DEPRECATED RDW RBC AUTO: 51.9 FL (ref 37–54)
DEPRECATED RDW RBC AUTO: 52 FL (ref 37–54)
DEPRECATED RDW RBC AUTO: 52.3 FL (ref 37–54)
DEPRECATED RDW RBC AUTO: 52.9 FL (ref 37–54)
DEPRECATED RDW RBC AUTO: 52.9 FL (ref 37–54)
DEPRECATED RDW RBC AUTO: 53.1 FL (ref 37–54)
DEPRECATED RDW RBC AUTO: 53.3 FL (ref 37–54)
DEPRECATED RDW RBC AUTO: 53.4 FL (ref 37–54)
DEPRECATED RDW RBC AUTO: 53.9 FL (ref 37–54)
DEPRECATED RDW RBC AUTO: 54.4 FL (ref 37–54)
DEPRECATED RDW RBC AUTO: 55.4 FL (ref 37–54)
DEPRECATED RDW RBC AUTO: 55.5 FL (ref 37–54)
DSDNA AB SER-ACNC: <1 IU/ML (ref 0–9)
EGFRCR SERPLBLD CKD-EPI 2021: 43.4 ML/MIN/1.73
EGFRCR SERPLBLD CKD-EPI 2021: 57.8 ML/MIN/1.73
EGFRCR SERPLBLD CKD-EPI 2021: 57.8 ML/MIN/1.73
EGFRCR SERPLBLD CKD-EPI 2021: 62.2 ML/MIN/1.73
EGFRCR SERPLBLD CKD-EPI 2021: 63 ML/MIN/1.73
EGFRCR SERPLBLD CKD-EPI 2021: 64.7 ML/MIN/1.73
EGFRCR SERPLBLD CKD-EPI 2021: 66.5 ML/MIN/1.73
EGFRCR SERPLBLD CKD-EPI 2021: 68.3 ML/MIN/1.73
EGFRCR SERPLBLD CKD-EPI 2021: 72.3 ML/MIN/1.73
EGFRCR SERPLBLD CKD-EPI 2021: 73.3 ML/MIN/1.73
EGFRCR SERPLBLD CKD-EPI 2021: 74.4 ML/MIN/1.73
EGFRCR SERPLBLD CKD-EPI 2021: 74.4 ML/MIN/1.73
EGFRCR SERPLBLD CKD-EPI 2021: 76.7 ML/MIN/1.73
EGFRCR SERPLBLD CKD-EPI 2021: 77.9 ML/MIN/1.73
EGFRCR SERPLBLD CKD-EPI 2021: 77.9 ML/MIN/1.73
EGFRCR SERPLBLD CKD-EPI 2021: 80.3 ML/MIN/1.73
EGFRCR SERPLBLD CKD-EPI 2021: 80.3 ML/MIN/1.73
EGFRCR SERPLBLD CKD-EPI 2021: 81.6 ML/MIN/1.73
EGFRCR SERPLBLD CKD-EPI 2021: 84.3 ML/MIN/1.73
EGFRCR SERPLBLD CKD-EPI 2021: 87.2 ML/MIN/1.73
EGFRCR SERPLBLD CKD-EPI 2021: 89 ML/MIN/1.73
EGFRCR SERPLBLD CKD-EPI 2021: 89 ML/MIN/1.73
EGFRCR SERPLBLD CKD-EPI 2021: 89.9 ML/MIN/1.73
EGFRCR SERPLBLD CKD-EPI 2021: 90.2 ML/MIN/1.73
EGFRCR SERPLBLD CKD-EPI 2021: 90.2 ML/MIN/1.73
EGFRCR SERPLBLD CKD-EPI 2021: 90.6 ML/MIN/1.73
EGFRCR SERPLBLD CKD-EPI 2021: 90.9 ML/MIN/1.73
EGFRCR SERPLBLD CKD-EPI 2021: 91.6 ML/MIN/1.73
EGFRCR SERPLBLD CKD-EPI 2021: 92.4 ML/MIN/1.73
EGFRCR SERPLBLD CKD-EPI 2021: 92.4 ML/MIN/1.73
EGFRCR SERPLBLD CKD-EPI 2021: 94 ML/MIN/1.73
ENA JO1 AB SER-ACNC: <0.2 AI (ref 0–0.9)
ENA RNP AB SER-ACNC: <0.2 AI (ref 0–0.9)
ENA SCL70 AB SER-ACNC: <0.2 AI (ref 0–0.9)
ENA SM AB SER-ACNC: <0.2 AI (ref 0–0.9)
ENA SS-A AB SER-ACNC: <0.2 AI (ref 0–0.9)
ENA SS-B AB SER-ACNC: <0.2 AI (ref 0–0.9)
EOSINOPHIL # BLD AUTO: 0.01 10*3/MM3 (ref 0–0.4)
EOSINOPHIL # BLD AUTO: 0.01 10*3/MM3 (ref 0–0.4)
EOSINOPHIL # BLD AUTO: 0.03 10*3/MM3 (ref 0–0.4)
EOSINOPHIL # BLD AUTO: 0.04 10*3/MM3 (ref 0–0.4)
EOSINOPHIL # BLD AUTO: 0.05 10*3/MM3 (ref 0–0.4)
EOSINOPHIL # BLD AUTO: 0.12 10*3/MM3 (ref 0–0.4)
EOSINOPHIL # BLD AUTO: 0.23 10*3/MM3 (ref 0–0.4)
EOSINOPHIL # BLD AUTO: 0.36 10*3/MM3 (ref 0–0.4)
EOSINOPHIL # BLD AUTO: 0.41 10*3/MM3 (ref 0–0.4)
EOSINOPHIL # BLD AUTO: 0.64 10*3/MM3 (ref 0–0.4)
EOSINOPHIL # BLD AUTO: 0.73 10*3/MM3 (ref 0–0.4)
EOSINOPHIL # BLD AUTO: 0.84 10*3/MM3 (ref 0–0.4)
EOSINOPHIL # BLD AUTO: 0.84 10*3/MM3 (ref 0–0.4)
EOSINOPHIL # BLD AUTO: 0.99 10*3/MM3 (ref 0–0.4)
EOSINOPHIL # BLD AUTO: 1.33 10*3/MM3 (ref 0–0.4)
EOSINOPHIL # BLD AUTO: 1.43 10*3/MM3 (ref 0–0.4)
EOSINOPHIL # BLD AUTO: 1.79 10*3/MM3 (ref 0–0.4)
EOSINOPHIL NFR BLD AUTO: 0 % (ref 0.3–6.2)
EOSINOPHIL NFR BLD AUTO: 0 % (ref 0.3–6.2)
EOSINOPHIL NFR BLD AUTO: 0.2 % (ref 0.3–6.2)
EOSINOPHIL NFR BLD AUTO: 0.2 % (ref 0.3–6.2)
EOSINOPHIL NFR BLD AUTO: 0.3 % (ref 0.3–6.2)
EOSINOPHIL NFR BLD AUTO: 0.7 % (ref 0.3–6.2)
EOSINOPHIL NFR BLD AUTO: 1.6 % (ref 0.3–6.2)
EOSINOPHIL NFR BLD AUTO: 10 % (ref 0.3–6.2)
EOSINOPHIL NFR BLD AUTO: 11.2 % (ref 0.3–6.2)
EOSINOPHIL NFR BLD AUTO: 2.4 % (ref 0.3–6.2)
EOSINOPHIL NFR BLD AUTO: 2.6 % (ref 0.3–6.2)
EOSINOPHIL NFR BLD AUTO: 3.3 % (ref 0.3–6.2)
EOSINOPHIL NFR BLD AUTO: 3.6 % (ref 0.3–6.2)
EOSINOPHIL NFR BLD AUTO: 3.7 % (ref 0.3–6.2)
EOSINOPHIL NFR BLD AUTO: 5.3 % (ref 0.3–6.2)
EOSINOPHIL NFR BLD AUTO: 5.4 % (ref 0.3–6.2)
EOSINOPHIL NFR BLD AUTO: 9.7 % (ref 0.3–6.2)
EPAP: 0
ERYTHROCYTE [DISTWIDTH] IN BLOOD BY AUTOMATED COUNT: 14.2 % (ref 12.3–15.4)
ERYTHROCYTE [DISTWIDTH] IN BLOOD BY AUTOMATED COUNT: 14.4 % (ref 12.3–15.4)
ERYTHROCYTE [DISTWIDTH] IN BLOOD BY AUTOMATED COUNT: 14.6 % (ref 12.3–15.4)
ERYTHROCYTE [DISTWIDTH] IN BLOOD BY AUTOMATED COUNT: 14.6 % (ref 12.3–15.4)
ERYTHROCYTE [DISTWIDTH] IN BLOOD BY AUTOMATED COUNT: 14.9 % (ref 12.3–15.4)
ERYTHROCYTE [DISTWIDTH] IN BLOOD BY AUTOMATED COUNT: 15 % (ref 12.3–15.4)
ERYTHROCYTE [DISTWIDTH] IN BLOOD BY AUTOMATED COUNT: 15.4 % (ref 12.3–15.4)
ERYTHROCYTE [DISTWIDTH] IN BLOOD BY AUTOMATED COUNT: 15.5 % (ref 12.3–15.4)
ERYTHROCYTE [DISTWIDTH] IN BLOOD BY AUTOMATED COUNT: 15.6 % (ref 12.3–15.4)
ERYTHROCYTE [DISTWIDTH] IN BLOOD BY AUTOMATED COUNT: 15.7 % (ref 12.3–15.4)
ERYTHROCYTE [DISTWIDTH] IN BLOOD BY AUTOMATED COUNT: 15.9 % (ref 12.3–15.4)
ERYTHROCYTE [DISTWIDTH] IN BLOOD BY AUTOMATED COUNT: 16.8 % (ref 12.3–15.4)
ERYTHROCYTE [DISTWIDTH] IN BLOOD BY AUTOMATED COUNT: 16.9 % (ref 12.3–15.4)
ERYTHROCYTE [DISTWIDTH] IN BLOOD BY AUTOMATED COUNT: 16.9 % (ref 12.3–15.4)
ERYTHROCYTE [DISTWIDTH] IN BLOOD BY AUTOMATED COUNT: 17 % (ref 12.3–15.4)
ERYTHROCYTE [DISTWIDTH] IN BLOOD BY AUTOMATED COUNT: 17.1 % (ref 12.3–15.4)
ERYTHROCYTE [DISTWIDTH] IN BLOOD BY AUTOMATED COUNT: 17.1 % (ref 12.3–15.4)
ERYTHROCYTE [DISTWIDTH] IN BLOOD BY AUTOMATED COUNT: 17.2 % (ref 12.3–15.4)
ERYTHROCYTE [DISTWIDTH] IN BLOOD BY AUTOMATED COUNT: 17.3 % (ref 12.3–15.4)
ERYTHROCYTE [DISTWIDTH] IN BLOOD BY AUTOMATED COUNT: 17.5 % (ref 12.3–15.4)
ERYTHROCYTE [DISTWIDTH] IN BLOOD BY AUTOMATED COUNT: 17.7 % (ref 12.3–15.4)
ERYTHROCYTE [DISTWIDTH] IN BLOOD BY AUTOMATED COUNT: 18.1 % (ref 12.3–15.4)
ERYTHROCYTE [DISTWIDTH] IN BLOOD BY AUTOMATED COUNT: 18.2 % (ref 12.3–15.4)
ERYTHROCYTE [SEDIMENTATION RATE] IN BLOOD: 37 MM/HR (ref 0–30)
ERYTHROCYTE [SEDIMENTATION RATE] IN BLOOD: 59 MM/HR (ref 0–30)
FLUAV RNA RESP QL NAA+PROBE: NOT DETECTED
FLUAV SUBTYP SPEC NAA+PROBE: NOT DETECTED
FLUAV SUBTYP SPEC NAA+PROBE: NOT DETECTED
FLUBV RNA ISLT QL NAA+PROBE: NOT DETECTED
FLUBV RNA ISLT QL NAA+PROBE: NOT DETECTED
FLUBV RNA RESP QL NAA+PROBE: NOT DETECTED
GLOBULIN UR ELPH-MCNC: 2.5 GM/DL
GLOBULIN UR ELPH-MCNC: 2.6 GM/DL
GLOBULIN UR ELPH-MCNC: 2.7 GM/DL
GLOBULIN UR ELPH-MCNC: 2.7 GM/DL
GLOBULIN UR ELPH-MCNC: 2.9 GM/DL
GLOBULIN UR ELPH-MCNC: 3.6 GM/DL
GLUCOSE BLDC GLUCOMTR-MCNC: 101 MG/DL (ref 70–130)
GLUCOSE BLDC GLUCOMTR-MCNC: 105 MG/DL (ref 70–130)
GLUCOSE BLDC GLUCOMTR-MCNC: 110 MG/DL (ref 70–130)
GLUCOSE BLDC GLUCOMTR-MCNC: 111 MG/DL (ref 70–130)
GLUCOSE BLDC GLUCOMTR-MCNC: 111 MG/DL (ref 70–130)
GLUCOSE BLDC GLUCOMTR-MCNC: 112 MG/DL (ref 70–130)
GLUCOSE BLDC GLUCOMTR-MCNC: 112 MG/DL (ref 70–130)
GLUCOSE BLDC GLUCOMTR-MCNC: 115 MG/DL (ref 70–130)
GLUCOSE BLDC GLUCOMTR-MCNC: 116 MG/DL (ref 70–130)
GLUCOSE BLDC GLUCOMTR-MCNC: 116 MG/DL (ref 70–130)
GLUCOSE BLDC GLUCOMTR-MCNC: 117 MG/DL (ref 70–130)
GLUCOSE BLDC GLUCOMTR-MCNC: 119 MG/DL (ref 70–130)
GLUCOSE BLDC GLUCOMTR-MCNC: 121 MG/DL (ref 70–130)
GLUCOSE BLDC GLUCOMTR-MCNC: 123 MG/DL (ref 70–130)
GLUCOSE BLDC GLUCOMTR-MCNC: 127 MG/DL (ref 70–130)
GLUCOSE BLDC GLUCOMTR-MCNC: 134 MG/DL (ref 70–130)
GLUCOSE BLDC GLUCOMTR-MCNC: 136 MG/DL (ref 70–130)
GLUCOSE BLDC GLUCOMTR-MCNC: 139 MG/DL (ref 70–130)
GLUCOSE BLDC GLUCOMTR-MCNC: 140 MG/DL (ref 70–130)
GLUCOSE BLDC GLUCOMTR-MCNC: 142 MG/DL (ref 70–130)
GLUCOSE BLDC GLUCOMTR-MCNC: 153 MG/DL (ref 70–130)
GLUCOSE BLDC GLUCOMTR-MCNC: 154 MG/DL (ref 70–130)
GLUCOSE BLDC GLUCOMTR-MCNC: 154 MG/DL (ref 70–130)
GLUCOSE BLDC GLUCOMTR-MCNC: 156 MG/DL (ref 70–130)
GLUCOSE BLDC GLUCOMTR-MCNC: 156 MG/DL (ref 70–130)
GLUCOSE BLDC GLUCOMTR-MCNC: 157 MG/DL (ref 70–130)
GLUCOSE BLDC GLUCOMTR-MCNC: 157 MG/DL (ref 70–130)
GLUCOSE BLDC GLUCOMTR-MCNC: 161 MG/DL (ref 70–130)
GLUCOSE BLDC GLUCOMTR-MCNC: 161 MG/DL (ref 70–130)
GLUCOSE BLDC GLUCOMTR-MCNC: 163 MG/DL (ref 70–130)
GLUCOSE BLDC GLUCOMTR-MCNC: 166 MG/DL (ref 70–130)
GLUCOSE BLDC GLUCOMTR-MCNC: 166 MG/DL (ref 70–130)
GLUCOSE BLDC GLUCOMTR-MCNC: 167 MG/DL (ref 70–130)
GLUCOSE BLDC GLUCOMTR-MCNC: 169 MG/DL (ref 70–130)
GLUCOSE BLDC GLUCOMTR-MCNC: 171 MG/DL (ref 70–130)
GLUCOSE BLDC GLUCOMTR-MCNC: 172 MG/DL (ref 70–130)
GLUCOSE BLDC GLUCOMTR-MCNC: 172 MG/DL (ref 70–130)
GLUCOSE BLDC GLUCOMTR-MCNC: 175 MG/DL (ref 70–130)
GLUCOSE BLDC GLUCOMTR-MCNC: 175 MG/DL (ref 70–130)
GLUCOSE BLDC GLUCOMTR-MCNC: 177 MG/DL (ref 70–130)
GLUCOSE BLDC GLUCOMTR-MCNC: 178 MG/DL (ref 70–130)
GLUCOSE BLDC GLUCOMTR-MCNC: 179 MG/DL (ref 70–130)
GLUCOSE BLDC GLUCOMTR-MCNC: 182 MG/DL (ref 70–130)
GLUCOSE BLDC GLUCOMTR-MCNC: 183 MG/DL (ref 70–130)
GLUCOSE BLDC GLUCOMTR-MCNC: 184 MG/DL (ref 70–130)
GLUCOSE BLDC GLUCOMTR-MCNC: 190 MG/DL (ref 70–130)
GLUCOSE BLDC GLUCOMTR-MCNC: 194 MG/DL (ref 70–130)
GLUCOSE BLDC GLUCOMTR-MCNC: 197 MG/DL (ref 70–130)
GLUCOSE BLDC GLUCOMTR-MCNC: 197 MG/DL (ref 70–130)
GLUCOSE BLDC GLUCOMTR-MCNC: 198 MG/DL (ref 70–130)
GLUCOSE BLDC GLUCOMTR-MCNC: 199 MG/DL (ref 70–130)
GLUCOSE BLDC GLUCOMTR-MCNC: 200 MG/DL (ref 70–130)
GLUCOSE BLDC GLUCOMTR-MCNC: 203 MG/DL (ref 70–130)
GLUCOSE BLDC GLUCOMTR-MCNC: 204 MG/DL (ref 70–130)
GLUCOSE BLDC GLUCOMTR-MCNC: 204 MG/DL (ref 70–130)
GLUCOSE BLDC GLUCOMTR-MCNC: 206 MG/DL (ref 70–130)
GLUCOSE BLDC GLUCOMTR-MCNC: 214 MG/DL (ref 70–130)
GLUCOSE BLDC GLUCOMTR-MCNC: 215 MG/DL (ref 70–130)
GLUCOSE BLDC GLUCOMTR-MCNC: 215 MG/DL (ref 70–130)
GLUCOSE BLDC GLUCOMTR-MCNC: 217 MG/DL (ref 70–130)
GLUCOSE BLDC GLUCOMTR-MCNC: 218 MG/DL (ref 70–130)
GLUCOSE BLDC GLUCOMTR-MCNC: 218 MG/DL (ref 70–130)
GLUCOSE BLDC GLUCOMTR-MCNC: 220 MG/DL (ref 70–130)
GLUCOSE BLDC GLUCOMTR-MCNC: 221 MG/DL (ref 70–130)
GLUCOSE BLDC GLUCOMTR-MCNC: 222 MG/DL (ref 70–130)
GLUCOSE BLDC GLUCOMTR-MCNC: 222 MG/DL (ref 70–130)
GLUCOSE BLDC GLUCOMTR-MCNC: 223 MG/DL (ref 70–130)
GLUCOSE BLDC GLUCOMTR-MCNC: 225 MG/DL (ref 70–130)
GLUCOSE BLDC GLUCOMTR-MCNC: 227 MG/DL (ref 70–130)
GLUCOSE BLDC GLUCOMTR-MCNC: 230 MG/DL (ref 70–130)
GLUCOSE BLDC GLUCOMTR-MCNC: 231 MG/DL (ref 70–130)
GLUCOSE BLDC GLUCOMTR-MCNC: 232 MG/DL (ref 70–130)
GLUCOSE BLDC GLUCOMTR-MCNC: 232 MG/DL (ref 70–130)
GLUCOSE BLDC GLUCOMTR-MCNC: 235 MG/DL (ref 70–130)
GLUCOSE BLDC GLUCOMTR-MCNC: 236 MG/DL (ref 70–130)
GLUCOSE BLDC GLUCOMTR-MCNC: 238 MG/DL (ref 70–130)
GLUCOSE BLDC GLUCOMTR-MCNC: 238 MG/DL (ref 70–130)
GLUCOSE BLDC GLUCOMTR-MCNC: 240 MG/DL (ref 70–130)
GLUCOSE BLDC GLUCOMTR-MCNC: 241 MG/DL (ref 70–130)
GLUCOSE BLDC GLUCOMTR-MCNC: 250 MG/DL (ref 70–130)
GLUCOSE BLDC GLUCOMTR-MCNC: 250 MG/DL (ref 70–130)
GLUCOSE BLDC GLUCOMTR-MCNC: 251 MG/DL (ref 70–130)
GLUCOSE BLDC GLUCOMTR-MCNC: 252 MG/DL (ref 70–130)
GLUCOSE BLDC GLUCOMTR-MCNC: 253 MG/DL (ref 70–130)
GLUCOSE BLDC GLUCOMTR-MCNC: 255 MG/DL (ref 70–130)
GLUCOSE BLDC GLUCOMTR-MCNC: 257 MG/DL (ref 70–130)
GLUCOSE BLDC GLUCOMTR-MCNC: 258 MG/DL (ref 70–130)
GLUCOSE BLDC GLUCOMTR-MCNC: 259 MG/DL (ref 70–130)
GLUCOSE BLDC GLUCOMTR-MCNC: 260 MG/DL (ref 70–130)
GLUCOSE BLDC GLUCOMTR-MCNC: 261 MG/DL (ref 70–130)
GLUCOSE BLDC GLUCOMTR-MCNC: 263 MG/DL (ref 70–130)
GLUCOSE BLDC GLUCOMTR-MCNC: 264 MG/DL (ref 70–130)
GLUCOSE BLDC GLUCOMTR-MCNC: 264 MG/DL (ref 70–130)
GLUCOSE BLDC GLUCOMTR-MCNC: 265 MG/DL (ref 70–130)
GLUCOSE BLDC GLUCOMTR-MCNC: 266 MG/DL (ref 70–130)
GLUCOSE BLDC GLUCOMTR-MCNC: 269 MG/DL (ref 70–130)
GLUCOSE BLDC GLUCOMTR-MCNC: 270 MG/DL (ref 70–130)
GLUCOSE BLDC GLUCOMTR-MCNC: 270 MG/DL (ref 70–130)
GLUCOSE BLDC GLUCOMTR-MCNC: 273 MG/DL (ref 70–130)
GLUCOSE BLDC GLUCOMTR-MCNC: 274 MG/DL (ref 70–130)
GLUCOSE BLDC GLUCOMTR-MCNC: 275 MG/DL (ref 70–130)
GLUCOSE BLDC GLUCOMTR-MCNC: 283 MG/DL (ref 70–130)
GLUCOSE BLDC GLUCOMTR-MCNC: 283 MG/DL (ref 70–130)
GLUCOSE BLDC GLUCOMTR-MCNC: 284 MG/DL (ref 70–130)
GLUCOSE BLDC GLUCOMTR-MCNC: 284 MG/DL (ref 70–130)
GLUCOSE BLDC GLUCOMTR-MCNC: 286 MG/DL (ref 70–130)
GLUCOSE BLDC GLUCOMTR-MCNC: 288 MG/DL (ref 70–130)
GLUCOSE BLDC GLUCOMTR-MCNC: 292 MG/DL (ref 70–130)
GLUCOSE BLDC GLUCOMTR-MCNC: 293 MG/DL (ref 70–130)
GLUCOSE BLDC GLUCOMTR-MCNC: 296 MG/DL (ref 70–130)
GLUCOSE BLDC GLUCOMTR-MCNC: 298 MG/DL (ref 70–130)
GLUCOSE BLDC GLUCOMTR-MCNC: 300 MG/DL (ref 70–130)
GLUCOSE BLDC GLUCOMTR-MCNC: 301 MG/DL (ref 70–130)
GLUCOSE BLDC GLUCOMTR-MCNC: 303 MG/DL (ref 70–130)
GLUCOSE BLDC GLUCOMTR-MCNC: 305 MG/DL (ref 70–130)
GLUCOSE BLDC GLUCOMTR-MCNC: 308 MG/DL (ref 70–130)
GLUCOSE BLDC GLUCOMTR-MCNC: 310 MG/DL (ref 70–130)
GLUCOSE BLDC GLUCOMTR-MCNC: 314 MG/DL (ref 70–130)
GLUCOSE BLDC GLUCOMTR-MCNC: 316 MG/DL (ref 70–130)
GLUCOSE BLDC GLUCOMTR-MCNC: 316 MG/DL (ref 70–130)
GLUCOSE BLDC GLUCOMTR-MCNC: 317 MG/DL (ref 70–130)
GLUCOSE BLDC GLUCOMTR-MCNC: 317 MG/DL (ref 70–130)
GLUCOSE BLDC GLUCOMTR-MCNC: 319 MG/DL (ref 70–130)
GLUCOSE BLDC GLUCOMTR-MCNC: 321 MG/DL (ref 70–130)
GLUCOSE BLDC GLUCOMTR-MCNC: 322 MG/DL (ref 70–130)
GLUCOSE BLDC GLUCOMTR-MCNC: 322 MG/DL (ref 70–130)
GLUCOSE BLDC GLUCOMTR-MCNC: 324 MG/DL (ref 70–130)
GLUCOSE BLDC GLUCOMTR-MCNC: 324 MG/DL (ref 70–130)
GLUCOSE BLDC GLUCOMTR-MCNC: 331 MG/DL (ref 70–130)
GLUCOSE BLDC GLUCOMTR-MCNC: 331 MG/DL (ref 70–130)
GLUCOSE BLDC GLUCOMTR-MCNC: 334 MG/DL (ref 70–130)
GLUCOSE BLDC GLUCOMTR-MCNC: 336 MG/DL (ref 70–130)
GLUCOSE BLDC GLUCOMTR-MCNC: 337 MG/DL (ref 70–130)
GLUCOSE BLDC GLUCOMTR-MCNC: 340 MG/DL (ref 70–130)
GLUCOSE BLDC GLUCOMTR-MCNC: 341 MG/DL (ref 70–130)
GLUCOSE BLDC GLUCOMTR-MCNC: 343 MG/DL (ref 70–130)
GLUCOSE BLDC GLUCOMTR-MCNC: 350 MG/DL (ref 70–130)
GLUCOSE BLDC GLUCOMTR-MCNC: 351 MG/DL (ref 70–130)
GLUCOSE BLDC GLUCOMTR-MCNC: 355 MG/DL (ref 70–130)
GLUCOSE BLDC GLUCOMTR-MCNC: 357 MG/DL (ref 70–130)
GLUCOSE BLDC GLUCOMTR-MCNC: 357 MG/DL (ref 70–130)
GLUCOSE BLDC GLUCOMTR-MCNC: 358 MG/DL (ref 70–130)
GLUCOSE BLDC GLUCOMTR-MCNC: 359 MG/DL (ref 70–130)
GLUCOSE BLDC GLUCOMTR-MCNC: 375 MG/DL (ref 70–130)
GLUCOSE BLDC GLUCOMTR-MCNC: 377 MG/DL (ref 70–130)
GLUCOSE BLDC GLUCOMTR-MCNC: 386 MG/DL (ref 70–130)
GLUCOSE BLDC GLUCOMTR-MCNC: 414 MG/DL (ref 70–130)
GLUCOSE BLDC GLUCOMTR-MCNC: 418 MG/DL (ref 70–130)
GLUCOSE BLDC GLUCOMTR-MCNC: 419 MG/DL (ref 70–130)
GLUCOSE BLDC GLUCOMTR-MCNC: 44 MG/DL (ref 70–130)
GLUCOSE BLDC GLUCOMTR-MCNC: 441 MG/DL (ref 70–130)
GLUCOSE BLDC GLUCOMTR-MCNC: 489 MG/DL (ref 70–130)
GLUCOSE BLDC GLUCOMTR-MCNC: 54 MG/DL (ref 70–130)
GLUCOSE BLDC GLUCOMTR-MCNC: 62 MG/DL (ref 70–130)
GLUCOSE BLDC GLUCOMTR-MCNC: 68 MG/DL (ref 70–130)
GLUCOSE BLDC GLUCOMTR-MCNC: 70 MG/DL (ref 70–130)
GLUCOSE BLDC GLUCOMTR-MCNC: 75 MG/DL (ref 70–130)
GLUCOSE BLDC GLUCOMTR-MCNC: 78 MG/DL (ref 70–130)
GLUCOSE BLDC GLUCOMTR-MCNC: 79 MG/DL (ref 70–130)
GLUCOSE BLDC GLUCOMTR-MCNC: 84 MG/DL (ref 70–130)
GLUCOSE BLDC GLUCOMTR-MCNC: 91 MG/DL (ref 70–130)
GLUCOSE BLDC GLUCOMTR-MCNC: 92 MG/DL (ref 70–130)
GLUCOSE BLDC GLUCOMTR-MCNC: 95 MG/DL (ref 70–130)
GLUCOSE BLDC GLUCOMTR-MCNC: 98 MG/DL (ref 70–130)
GLUCOSE SERPL-MCNC: 102 MG/DL (ref 65–99)
GLUCOSE SERPL-MCNC: 106 MG/DL (ref 65–99)
GLUCOSE SERPL-MCNC: 112 MG/DL (ref 65–99)
GLUCOSE SERPL-MCNC: 130 MG/DL (ref 65–99)
GLUCOSE SERPL-MCNC: 132 MG/DL (ref 65–99)
GLUCOSE SERPL-MCNC: 133 MG/DL (ref 65–99)
GLUCOSE SERPL-MCNC: 136 MG/DL (ref 65–99)
GLUCOSE SERPL-MCNC: 137 MG/DL (ref 65–99)
GLUCOSE SERPL-MCNC: 142 MG/DL (ref 65–99)
GLUCOSE SERPL-MCNC: 151 MG/DL (ref 65–99)
GLUCOSE SERPL-MCNC: 158 MG/DL (ref 65–99)
GLUCOSE SERPL-MCNC: 162 MG/DL (ref 65–99)
GLUCOSE SERPL-MCNC: 165 MG/DL (ref 65–99)
GLUCOSE SERPL-MCNC: 177 MG/DL (ref 65–99)
GLUCOSE SERPL-MCNC: 181 MG/DL (ref 65–99)
GLUCOSE SERPL-MCNC: 193 MG/DL (ref 65–99)
GLUCOSE SERPL-MCNC: 209 MG/DL (ref 65–99)
GLUCOSE SERPL-MCNC: 221 MG/DL (ref 65–99)
GLUCOSE SERPL-MCNC: 223 MG/DL (ref 65–99)
GLUCOSE SERPL-MCNC: 233 MG/DL (ref 65–99)
GLUCOSE SERPL-MCNC: 237 MG/DL (ref 65–99)
GLUCOSE SERPL-MCNC: 238 MG/DL (ref 65–99)
GLUCOSE SERPL-MCNC: 269 MG/DL (ref 65–99)
GLUCOSE SERPL-MCNC: 276 MG/DL (ref 65–99)
GLUCOSE SERPL-MCNC: 287 MG/DL (ref 65–99)
GLUCOSE SERPL-MCNC: 297 MG/DL (ref 65–99)
GLUCOSE SERPL-MCNC: 340 MG/DL (ref 65–99)
GLUCOSE SERPL-MCNC: 414 MG/DL (ref 65–99)
GLUCOSE SERPL-MCNC: 65 MG/DL (ref 65–99)
GLUCOSE SERPL-MCNC: 72 MG/DL (ref 65–99)
GLUCOSE SERPL-MCNC: 72 MG/DL (ref 65–99)
GLUCOSE SERPL-MCNC: 76 MG/DL (ref 65–99)
GLUCOSE SERPL-MCNC: 87 MG/DL (ref 65–99)
GLUCOSE SERPL-MCNC: 90 MG/DL (ref 65–99)
GLUCOSE SERPL-MCNC: 91 MG/DL (ref 65–99)
H CAPSUL AB TITR SER ID: NEGATIVE {TITER}
H CAPSUL AG UR QL IA: <0.5
HADV DNA SPEC NAA+PROBE: NOT DETECTED
HBA1C MFR BLD: 6.2 % (ref 4.8–5.6)
HCO3 BLDA-SCNC: 29.7 MMOL/L (ref 20–26)
HCO3 BLDA-SCNC: 36.8 MMOL/L (ref 20–26)
HCOV 229E RNA SPEC QL NAA+PROBE: NOT DETECTED
HCOV HKU1 RNA SPEC QL NAA+PROBE: NOT DETECTED
HCOV NL63 RNA SPEC QL NAA+PROBE: NOT DETECTED
HCOV OC43 RNA SPEC QL NAA+PROBE: NOT DETECTED
HCT VFR BLD AUTO: 29.8 % (ref 34–46.6)
HCT VFR BLD AUTO: 31.2 % (ref 34–46.6)
HCT VFR BLD AUTO: 32.6 % (ref 34–46.6)
HCT VFR BLD AUTO: 33.1 % (ref 34–46.6)
HCT VFR BLD AUTO: 33.3 % (ref 34–46.6)
HCT VFR BLD AUTO: 33.4 % (ref 34–46.6)
HCT VFR BLD AUTO: 33.8 % (ref 34–46.6)
HCT VFR BLD AUTO: 34.2 % (ref 34–46.6)
HCT VFR BLD AUTO: 34.7 % (ref 34–46.6)
HCT VFR BLD AUTO: 35.2 % (ref 34–46.6)
HCT VFR BLD AUTO: 35.8 % (ref 34–46.6)
HCT VFR BLD AUTO: 36.8 % (ref 34–46.6)
HCT VFR BLD AUTO: 36.9 % (ref 34–46.6)
HCT VFR BLD AUTO: 36.9 % (ref 34–46.6)
HCT VFR BLD AUTO: 37.4 % (ref 34–46.6)
HCT VFR BLD AUTO: 37.7 % (ref 34–46.6)
HCT VFR BLD AUTO: 37.8 % (ref 34–46.6)
HCT VFR BLD AUTO: 38.6 % (ref 34–46.6)
HCT VFR BLD AUTO: 39.6 % (ref 34–46.6)
HCT VFR BLD AUTO: 39.7 % (ref 34–46.6)
HCT VFR BLD AUTO: 40.2 % (ref 34–46.6)
HCT VFR BLD AUTO: 41.9 % (ref 34–46.6)
HCT VFR BLD AUTO: 42.6 % (ref 34–46.6)
HCT VFR BLD AUTO: 43.2 % (ref 34–46.6)
HCT VFR BLD AUTO: 47 % (ref 34–46.6)
HCT VFR BLD AUTO: 47.6 % (ref 34–46.6)
HCT VFR BLD CALC: 30 % (ref 38–51)
HCT VFR BLD CALC: 41.9 % (ref 38–51)
HGB BLD-MCNC: 10.3 G/DL (ref 12–15.9)
HGB BLD-MCNC: 10.4 G/DL (ref 12–15.9)
HGB BLD-MCNC: 10.7 G/DL (ref 12–15.9)
HGB BLD-MCNC: 10.7 G/DL (ref 12–15.9)
HGB BLD-MCNC: 10.8 G/DL (ref 12–15.9)
HGB BLD-MCNC: 10.9 G/DL (ref 12–15.9)
HGB BLD-MCNC: 11 G/DL (ref 12–15.9)
HGB BLD-MCNC: 11.1 G/DL (ref 12–15.9)
HGB BLD-MCNC: 11.2 G/DL (ref 12–15.9)
HGB BLD-MCNC: 11.4 G/DL (ref 12–15.9)
HGB BLD-MCNC: 11.5 G/DL (ref 12–15.9)
HGB BLD-MCNC: 11.6 G/DL (ref 12–15.9)
HGB BLD-MCNC: 11.8 G/DL (ref 12–15.9)
HGB BLD-MCNC: 12 G/DL (ref 12–15.9)
HGB BLD-MCNC: 12.1 G/DL (ref 12–15.9)
HGB BLD-MCNC: 12.1 G/DL (ref 12–15.9)
HGB BLD-MCNC: 12.3 G/DL (ref 12–15.9)
HGB BLD-MCNC: 12.8 G/DL (ref 12–15.9)
HGB BLD-MCNC: 13 G/DL (ref 12–15.9)
HGB BLD-MCNC: 13 G/DL (ref 12–15.9)
HGB BLD-MCNC: 13.4 G/DL (ref 12–15.9)
HGB BLD-MCNC: 13.4 G/DL (ref 12–15.9)
HGB BLD-MCNC: 13.9 G/DL (ref 12–15.9)
HGB BLD-MCNC: 15 G/DL (ref 12–15.9)
HGB BLD-MCNC: 15.3 G/DL (ref 12–15.9)
HGB BLD-MCNC: 9.7 G/DL (ref 12–15.9)
HGB BLDA-MCNC: 13.7 G/DL (ref 14–18)
HGB BLDA-MCNC: 9.8 G/DL (ref 14–18)
HISTONE IGG SER IA-ACNC: 0.6 UNITS (ref 0–0.9)
HMPV RNA NPH QL NAA+NON-PROBE: NOT DETECTED
HOLD SPECIMEN: NORMAL
HPIV1 RNA ISLT QL NAA+PROBE: NOT DETECTED
HPIV2 RNA SPEC QL NAA+PROBE: NOT DETECTED
HPIV3 RNA NPH QL NAA+PROBE: NOT DETECTED
HPIV4 P GENE NPH QL NAA+PROBE: NOT DETECTED
IMM GRANULOCYTES # BLD AUTO: 0.05 10*3/MM3 (ref 0–0.05)
IMM GRANULOCYTES # BLD AUTO: 0.06 10*3/MM3 (ref 0–0.05)
IMM GRANULOCYTES # BLD AUTO: 0.08 10*3/MM3 (ref 0–0.05)
IMM GRANULOCYTES # BLD AUTO: 0.08 10*3/MM3 (ref 0–0.05)
IMM GRANULOCYTES # BLD AUTO: 0.11 10*3/MM3 (ref 0–0.05)
IMM GRANULOCYTES # BLD AUTO: 0.12 10*3/MM3 (ref 0–0.05)
IMM GRANULOCYTES # BLD AUTO: 0.13 10*3/MM3 (ref 0–0.05)
IMM GRANULOCYTES # BLD AUTO: 0.17 10*3/MM3 (ref 0–0.05)
IMM GRANULOCYTES # BLD AUTO: 0.17 10*3/MM3 (ref 0–0.05)
IMM GRANULOCYTES # BLD AUTO: 0.18 10*3/MM3 (ref 0–0.05)
IMM GRANULOCYTES # BLD AUTO: 0.23 10*3/MM3 (ref 0–0.05)
IMM GRANULOCYTES # BLD AUTO: 0.25 10*3/MM3 (ref 0–0.05)
IMM GRANULOCYTES # BLD AUTO: 0.28 10*3/MM3 (ref 0–0.05)
IMM GRANULOCYTES # BLD AUTO: 0.31 10*3/MM3 (ref 0–0.05)
IMM GRANULOCYTES # BLD AUTO: 0.39 10*3/MM3 (ref 0–0.05)
IMM GRANULOCYTES # BLD AUTO: 0.45 10*3/MM3 (ref 0–0.05)
IMM GRANULOCYTES # BLD AUTO: 0.68 10*3/MM3 (ref 0–0.05)
IMM GRANULOCYTES NFR BLD AUTO: 0.4 % (ref 0–0.5)
IMM GRANULOCYTES NFR BLD AUTO: 0.4 % (ref 0–0.5)
IMM GRANULOCYTES NFR BLD AUTO: 0.5 % (ref 0–0.5)
IMM GRANULOCYTES NFR BLD AUTO: 0.5 % (ref 0–0.5)
IMM GRANULOCYTES NFR BLD AUTO: 0.6 % (ref 0–0.5)
IMM GRANULOCYTES NFR BLD AUTO: 0.8 % (ref 0–0.5)
IMM GRANULOCYTES NFR BLD AUTO: 0.8 % (ref 0–0.5)
IMM GRANULOCYTES NFR BLD AUTO: 0.9 % (ref 0–0.5)
IMM GRANULOCYTES NFR BLD AUTO: 1 % (ref 0–0.5)
IMM GRANULOCYTES NFR BLD AUTO: 1.3 % (ref 0–0.5)
IMM GRANULOCYTES NFR BLD AUTO: 1.5 % (ref 0–0.5)
IMM GRANULOCYTES NFR BLD AUTO: 1.5 % (ref 0–0.5)
IMM GRANULOCYTES NFR BLD AUTO: 1.8 % (ref 0–0.5)
IMM GRANULOCYTES NFR BLD AUTO: 2 % (ref 0–0.5)
IMM GRANULOCYTES NFR BLD AUTO: 2 % (ref 0–0.5)
IMM GRANULOCYTES NFR BLD AUTO: 2.4 % (ref 0–0.5)
IMM GRANULOCYTES NFR BLD AUTO: 2.5 % (ref 0–0.5)
INHALED O2 CONCENTRATION: 100 %
INHALED O2 CONCENTRATION: 70 %
IPAP: 0
L PNEUMO1 AG UR QL IA: NEGATIVE
LACEYELLA SACCHARI AB SER QL ID: NEGATIVE
LDH SERPL-CCNC: 412 U/L (ref 135–214)
LEFT ATRIUM VOLUME INDEX: 31.9 ML/M2
LV EF 2D ECHO EST: 60 %
LYMPHOCYTES # BLD AUTO: 0.75 10*3/MM3 (ref 0.7–3.1)
LYMPHOCYTES # BLD AUTO: 0.79 10*3/MM3 (ref 0.7–3.1)
LYMPHOCYTES # BLD AUTO: 0.87 10*3/MM3 (ref 0.7–3.1)
LYMPHOCYTES # BLD AUTO: 0.87 10*3/MM3 (ref 0.7–3.1)
LYMPHOCYTES # BLD AUTO: 0.9 10*3/MM3 (ref 0.7–3.1)
LYMPHOCYTES # BLD AUTO: 0.91 10*3/MM3 (ref 0.7–3.1)
LYMPHOCYTES # BLD AUTO: 0.95 10*3/MM3 (ref 0.7–3.1)
LYMPHOCYTES # BLD AUTO: 1.07 10*3/MM3 (ref 0.7–3.1)
LYMPHOCYTES # BLD AUTO: 1.16 10*3/MM3 (ref 0.7–3.1)
LYMPHOCYTES # BLD AUTO: 1.32 10*3/MM3 (ref 0.7–3.1)
LYMPHOCYTES # BLD AUTO: 1.5 10*3/MM3 (ref 0.7–3.1)
LYMPHOCYTES # BLD AUTO: 1.67 10*3/MM3 (ref 0.7–3.1)
LYMPHOCYTES # BLD AUTO: 1.68 10*3/MM3 (ref 0.7–3.1)
LYMPHOCYTES # BLD AUTO: 1.88 10*3/MM3 (ref 0.7–3.1)
LYMPHOCYTES # BLD AUTO: 2.11 10*3/MM3 (ref 0.7–3.1)
LYMPHOCYTES # BLD AUTO: 2.2 10*3/MM3 (ref 0.7–3.1)
LYMPHOCYTES # BLD AUTO: 2.56 10*3/MM3 (ref 0.7–3.1)
LYMPHOCYTES NFR BLD AUTO: 10.4 % (ref 19.6–45.3)
LYMPHOCYTES NFR BLD AUTO: 10.8 % (ref 19.6–45.3)
LYMPHOCYTES NFR BLD AUTO: 11.6 % (ref 19.6–45.3)
LYMPHOCYTES NFR BLD AUTO: 11.7 % (ref 19.6–45.3)
LYMPHOCYTES NFR BLD AUTO: 15.8 % (ref 19.6–45.3)
LYMPHOCYTES NFR BLD AUTO: 3.4 % (ref 19.6–45.3)
LYMPHOCYTES NFR BLD AUTO: 4.2 % (ref 19.6–45.3)
LYMPHOCYTES NFR BLD AUTO: 5 % (ref 19.6–45.3)
LYMPHOCYTES NFR BLD AUTO: 5.1 % (ref 19.6–45.3)
LYMPHOCYTES NFR BLD AUTO: 5.9 % (ref 19.6–45.3)
LYMPHOCYTES NFR BLD AUTO: 6.2 % (ref 19.6–45.3)
LYMPHOCYTES NFR BLD AUTO: 6.8 % (ref 19.6–45.3)
LYMPHOCYTES NFR BLD AUTO: 6.8 % (ref 19.6–45.3)
LYMPHOCYTES NFR BLD AUTO: 7.3 % (ref 19.6–45.3)
LYMPHOCYTES NFR BLD AUTO: 8 % (ref 19.6–45.3)
LYMPHOCYTES NFR BLD AUTO: 8.7 % (ref 19.6–45.3)
LYMPHOCYTES NFR BLD AUTO: 9 % (ref 19.6–45.3)
Lab: ABNORMAL
Lab: NORMAL
M PNEUMO IGG SER IA-ACNC: NOT DETECTED
MAGNESIUM SERPL-MCNC: 1.5 MG/DL (ref 1.6–2.4)
MAGNESIUM SERPL-MCNC: 1.6 MG/DL (ref 1.6–2.4)
MAGNESIUM SERPL-MCNC: 1.6 MG/DL (ref 1.6–2.4)
MAGNESIUM SERPL-MCNC: 1.7 MG/DL (ref 1.6–2.4)
MAGNESIUM SERPL-MCNC: 1.8 MG/DL (ref 1.6–2.4)
MAGNESIUM SERPL-MCNC: 1.9 MG/DL (ref 1.6–2.4)
MAGNESIUM SERPL-MCNC: 1.9 MG/DL (ref 1.6–2.4)
MAGNESIUM SERPL-MCNC: 2 MG/DL (ref 1.6–2.4)
MAGNESIUM SERPL-MCNC: 2.1 MG/DL (ref 1.6–2.4)
MAGNESIUM SERPL-MCNC: 2.2 MG/DL (ref 1.6–2.4)
MAGNESIUM SERPL-MCNC: 2.3 MG/DL (ref 1.6–2.4)
MAGNESIUM SERPL-MCNC: 2.6 MG/DL (ref 1.6–2.4)
MAXIMAL PREDICTED HEART RATE: 142 BPM
MCH RBC QN AUTO: 26.1 PG (ref 26.6–33)
MCH RBC QN AUTO: 26.1 PG (ref 26.6–33)
MCH RBC QN AUTO: 26.3 PG (ref 26.6–33)
MCH RBC QN AUTO: 26.5 PG (ref 26.6–33)
MCH RBC QN AUTO: 26.6 PG (ref 26.6–33)
MCH RBC QN AUTO: 26.7 PG (ref 26.6–33)
MCH RBC QN AUTO: 26.8 PG (ref 26.6–33)
MCH RBC QN AUTO: 26.9 PG (ref 26.6–33)
MCH RBC QN AUTO: 26.9 PG (ref 26.6–33)
MCH RBC QN AUTO: 27 PG (ref 26.6–33)
MCH RBC QN AUTO: 27 PG (ref 26.6–33)
MCH RBC QN AUTO: 27.1 PG (ref 26.6–33)
MCH RBC QN AUTO: 27.2 PG (ref 26.6–33)
MCH RBC QN AUTO: 27.3 PG (ref 26.6–33)
MCH RBC QN AUTO: 27.3 PG (ref 26.6–33)
MCH RBC QN AUTO: 27.5 PG (ref 26.6–33)
MCHC RBC AUTO-ENTMCNC: 30.7 G/DL (ref 31.5–35.7)
MCHC RBC AUTO-ENTMCNC: 31.2 G/DL (ref 31.5–35.7)
MCHC RBC AUTO-ENTMCNC: 31.4 G/DL (ref 31.5–35.7)
MCHC RBC AUTO-ENTMCNC: 31.5 G/DL (ref 31.5–35.7)
MCHC RBC AUTO-ENTMCNC: 31.9 G/DL (ref 31.5–35.7)
MCHC RBC AUTO-ENTMCNC: 32 G/DL (ref 31.5–35.7)
MCHC RBC AUTO-ENTMCNC: 32.1 G/DL (ref 31.5–35.7)
MCHC RBC AUTO-ENTMCNC: 32.2 G/DL (ref 31.5–35.7)
MCHC RBC AUTO-ENTMCNC: 32.2 G/DL (ref 31.5–35.7)
MCHC RBC AUTO-ENTMCNC: 32.3 G/DL (ref 31.5–35.7)
MCHC RBC AUTO-ENTMCNC: 32.4 G/DL (ref 31.5–35.7)
MCHC RBC AUTO-ENTMCNC: 32.6 G/DL (ref 31.5–35.7)
MCHC RBC AUTO-ENTMCNC: 32.6 G/DL (ref 31.5–35.7)
MCHC RBC AUTO-ENTMCNC: 32.8 G/DL (ref 31.5–35.7)
MCHC RBC AUTO-ENTMCNC: 33 G/DL (ref 31.5–35.7)
MCHC RBC AUTO-ENTMCNC: 33.1 G/DL (ref 31.5–35.7)
MCV RBC AUTO: 80.3 FL (ref 79–97)
MCV RBC AUTO: 80.6 FL (ref 79–97)
MCV RBC AUTO: 80.7 FL (ref 79–97)
MCV RBC AUTO: 81.5 FL (ref 79–97)
MCV RBC AUTO: 81.8 FL (ref 79–97)
MCV RBC AUTO: 82.1 FL (ref 79–97)
MCV RBC AUTO: 82.1 FL (ref 79–97)
MCV RBC AUTO: 82.2 FL (ref 79–97)
MCV RBC AUTO: 82.8 FL (ref 79–97)
MCV RBC AUTO: 83.6 FL (ref 79–97)
MCV RBC AUTO: 83.6 FL (ref 79–97)
MCV RBC AUTO: 83.7 FL (ref 79–97)
MCV RBC AUTO: 83.8 FL (ref 79–97)
MCV RBC AUTO: 83.9 FL (ref 79–97)
MCV RBC AUTO: 84.1 FL (ref 79–97)
MCV RBC AUTO: 84.3 FL (ref 79–97)
MCV RBC AUTO: 84.5 FL (ref 79–97)
MCV RBC AUTO: 84.5 FL (ref 79–97)
MCV RBC AUTO: 84.8 FL (ref 79–97)
MCV RBC AUTO: 84.9 FL (ref 79–97)
MCV RBC AUTO: 85 FL (ref 79–97)
MCV RBC AUTO: 85 FL (ref 79–97)
MCV RBC AUTO: 85.6 FL (ref 79–97)
MCV RBC AUTO: 85.8 FL (ref 79–97)
MCV RBC AUTO: 86.2 FL (ref 79–97)
MCV RBC AUTO: 88.2 FL (ref 79–97)
METHGB BLD QL: 0.2 % (ref 0–1.5)
METHGB BLD QL: ABNORMAL
MODALITY: ABNORMAL
MODALITY: ABNORMAL
MONOCYTES # BLD AUTO: 0.51 10*3/MM3 (ref 0.1–0.9)
MONOCYTES # BLD AUTO: 0.55 10*3/MM3 (ref 0.1–0.9)
MONOCYTES # BLD AUTO: 0.56 10*3/MM3 (ref 0.1–0.9)
MONOCYTES # BLD AUTO: 0.65 10*3/MM3 (ref 0.1–0.9)
MONOCYTES # BLD AUTO: 0.69 10*3/MM3 (ref 0.1–0.9)
MONOCYTES # BLD AUTO: 0.69 10*3/MM3 (ref 0.1–0.9)
MONOCYTES # BLD AUTO: 0.7 10*3/MM3 (ref 0.1–0.9)
MONOCYTES # BLD AUTO: 0.71 10*3/MM3 (ref 0.1–0.9)
MONOCYTES # BLD AUTO: 0.77 10*3/MM3 (ref 0.1–0.9)
MONOCYTES # BLD AUTO: 0.79 10*3/MM3 (ref 0.1–0.9)
MONOCYTES # BLD AUTO: 0.82 10*3/MM3 (ref 0.1–0.9)
MONOCYTES # BLD AUTO: 0.95 10*3/MM3 (ref 0.1–0.9)
MONOCYTES # BLD AUTO: 0.95 10*3/MM3 (ref 0.1–0.9)
MONOCYTES # BLD AUTO: 1 10*3/MM3 (ref 0.1–0.9)
MONOCYTES # BLD AUTO: 1.05 10*3/MM3 (ref 0.1–0.9)
MONOCYTES # BLD AUTO: 1.21 10*3/MM3 (ref 0.1–0.9)
MONOCYTES # BLD AUTO: 1.37 10*3/MM3 (ref 0.1–0.9)
MONOCYTES NFR BLD AUTO: 2.5 % (ref 5–12)
MONOCYTES NFR BLD AUTO: 3 % (ref 5–12)
MONOCYTES NFR BLD AUTO: 3.1 % (ref 5–12)
MONOCYTES NFR BLD AUTO: 3.7 % (ref 5–12)
MONOCYTES NFR BLD AUTO: 4.4 % (ref 5–12)
MONOCYTES NFR BLD AUTO: 4.5 % (ref 5–12)
MONOCYTES NFR BLD AUTO: 4.7 % (ref 5–12)
MONOCYTES NFR BLD AUTO: 4.8 % (ref 5–12)
MONOCYTES NFR BLD AUTO: 4.9 % (ref 5–12)
MONOCYTES NFR BLD AUTO: 5 % (ref 5–12)
MONOCYTES NFR BLD AUTO: 5.3 % (ref 5–12)
MONOCYTES NFR BLD AUTO: 5.6 % (ref 5–12)
MONOCYTES NFR BLD AUTO: 5.9 % (ref 5–12)
MONOCYTES NFR BLD AUTO: 6.1 % (ref 5–12)
MONOCYTES NFR BLD AUTO: 6.1 % (ref 5–12)
MONOCYTES NFR BLD AUTO: 6.2 % (ref 5–12)
MONOCYTES NFR BLD AUTO: 6.4 % (ref 5–12)
MRSA DNA SPEC QL NAA+PROBE: POSITIVE
MYELOPEROXIDASE AB SER IA-ACNC: <9 U/ML (ref 0–9)
NEUTROPHILS NFR BLD AUTO: 10.98 10*3/MM3 (ref 1.7–7)
NEUTROPHILS NFR BLD AUTO: 11.17 10*3/MM3 (ref 1.7–7)
NEUTROPHILS NFR BLD AUTO: 11.18 10*3/MM3 (ref 1.7–7)
NEUTROPHILS NFR BLD AUTO: 11.73 10*3/MM3 (ref 1.7–7)
NEUTROPHILS NFR BLD AUTO: 12.38 10*3/MM3 (ref 1.7–7)
NEUTROPHILS NFR BLD AUTO: 12.73 10*3/MM3 (ref 1.7–7)
NEUTROPHILS NFR BLD AUTO: 12.74 10*3/MM3 (ref 1.7–7)
NEUTROPHILS NFR BLD AUTO: 13.01 10*3/MM3 (ref 1.7–7)
NEUTROPHILS NFR BLD AUTO: 13.21 10*3/MM3 (ref 1.7–7)
NEUTROPHILS NFR BLD AUTO: 14.48 10*3/MM3 (ref 1.7–7)
NEUTROPHILS NFR BLD AUTO: 14.89 10*3/MM3 (ref 1.7–7)
NEUTROPHILS NFR BLD AUTO: 15.32 10*3/MM3 (ref 1.7–7)
NEUTROPHILS NFR BLD AUTO: 16.88 10*3/MM3 (ref 1.7–7)
NEUTROPHILS NFR BLD AUTO: 19.22 10*3/MM3 (ref 1.7–7)
NEUTROPHILS NFR BLD AUTO: 21.7 10*3/MM3 (ref 1.7–7)
NEUTROPHILS NFR BLD AUTO: 22.41 10*3/MM3 (ref 1.7–7)
NEUTROPHILS NFR BLD AUTO: 68.2 % (ref 42.7–76)
NEUTROPHILS NFR BLD AUTO: 68.4 % (ref 42.7–76)
NEUTROPHILS NFR BLD AUTO: 75.5 % (ref 42.7–76)
NEUTROPHILS NFR BLD AUTO: 75.9 % (ref 42.7–76)
NEUTROPHILS NFR BLD AUTO: 76.4 % (ref 42.7–76)
NEUTROPHILS NFR BLD AUTO: 77.4 % (ref 42.7–76)
NEUTROPHILS NFR BLD AUTO: 81.1 % (ref 42.7–76)
NEUTROPHILS NFR BLD AUTO: 83.2 % (ref 42.7–76)
NEUTROPHILS NFR BLD AUTO: 83.4 % (ref 42.7–76)
NEUTROPHILS NFR BLD AUTO: 86 % (ref 42.7–76)
NEUTROPHILS NFR BLD AUTO: 86.3 % (ref 42.7–76)
NEUTROPHILS NFR BLD AUTO: 86.6 % (ref 42.7–76)
NEUTROPHILS NFR BLD AUTO: 86.8 % (ref 42.7–76)
NEUTROPHILS NFR BLD AUTO: 87.5 % (ref 42.7–76)
NEUTROPHILS NFR BLD AUTO: 87.7 % (ref 42.7–76)
NEUTROPHILS NFR BLD AUTO: 9.12 10*3/MM3 (ref 1.7–7)
NEUTROPHILS NFR BLD AUTO: 92.6 % (ref 42.7–76)
NEUTROPHILS NFR BLD AUTO: 92.7 % (ref 42.7–76)
NOTE: ABNORMAL
NOTE: ABNORMAL
NOTIFIED BY: ABNORMAL
NOTIFIED WHO: ABNORMAL
NRBC BLD AUTO-RTO: 0 /100 WBC (ref 0–0.2)
NT-PROBNP SERPL-MCNC: 198.8 PG/ML (ref 0–1800)
NT-PROBNP SERPL-MCNC: 2368 PG/ML (ref 0–1800)
NT-PROBNP SERPL-MCNC: 365.6 PG/ML (ref 0–1800)
NT-PROBNP SERPL-MCNC: 485.9 PG/ML (ref 0–1800)
OXYHGB MFR BLDV: 95.4 % (ref 94–99)
OXYHGB MFR BLDV: ABNORMAL %
P-ANCA ATYPICAL TITR SER IF: ABNORMAL TITER
P-ANCA TITR SER IF: ABNORMAL TITER
PAW @ PEAK INSP FLOW SETTING VENT: 0 CMH2O
PCO2 BLDA: 43.1 MM HG (ref 35–45)
PCO2 BLDA: 56.5 MM HG (ref 35–45)
PCO2 TEMP ADJ BLD: 43.1 MM HG (ref 35–45)
PCO2 TEMP ADJ BLD: 56.5 MM HG (ref 35–45)
PH BLDA: 7.42 PH UNITS (ref 7.35–7.45)
PH BLDA: 7.45 PH UNITS (ref 7.35–7.45)
PH, TEMP CORRECTED: 7.42 PH UNITS
PH, TEMP CORRECTED: 7.45 PH UNITS
PHOSPHATE SERPL-MCNC: 2.7 MG/DL (ref 2.5–4.5)
PHOSPHATE SERPL-MCNC: 3 MG/DL (ref 2.5–4.5)
PHOSPHATE SERPL-MCNC: 3.2 MG/DL (ref 2.5–4.5)
PHOSPHATE SERPL-MCNC: 3.3 MG/DL (ref 2.5–4.5)
PHOSPHATE SERPL-MCNC: 3.4 MG/DL (ref 2.5–4.5)
PHOSPHATE SERPL-MCNC: 3.5 MG/DL (ref 2.5–4.5)
PHOSPHATE SERPL-MCNC: 3.9 MG/DL (ref 2.5–4.5)
PHOSPHATE SERPL-MCNC: 4.1 MG/DL (ref 2.5–4.5)
PIGEON SERUM IGG QL: NEGATIVE
PLATELET # BLD AUTO: 215 10*3/MM3 (ref 140–450)
PLATELET # BLD AUTO: 219 10*3/MM3 (ref 140–450)
PLATELET # BLD AUTO: 227 10*3/MM3 (ref 140–450)
PLATELET # BLD AUTO: 243 10*3/MM3 (ref 140–450)
PLATELET # BLD AUTO: 250 10*3/MM3 (ref 140–450)
PLATELET # BLD AUTO: 251 10*3/MM3 (ref 140–450)
PLATELET # BLD AUTO: 270 10*3/MM3 (ref 140–450)
PLATELET # BLD AUTO: 279 10*3/MM3 (ref 140–450)
PLATELET # BLD AUTO: 302 10*3/MM3 (ref 140–450)
PLATELET # BLD AUTO: 316 10*3/MM3 (ref 140–450)
PLATELET # BLD AUTO: 333 10*3/MM3 (ref 140–450)
PLATELET # BLD AUTO: 347 10*3/MM3 (ref 140–450)
PLATELET # BLD AUTO: 356 10*3/MM3 (ref 140–450)
PLATELET # BLD AUTO: 367 10*3/MM3 (ref 140–450)
PLATELET # BLD AUTO: 370 10*3/MM3 (ref 140–450)
PLATELET # BLD AUTO: 372 10*3/MM3 (ref 140–450)
PLATELET # BLD AUTO: 383 10*3/MM3 (ref 140–450)
PLATELET # BLD AUTO: 441 10*3/MM3 (ref 140–450)
PLATELET # BLD AUTO: 468 10*3/MM3 (ref 140–450)
PLATELET # BLD AUTO: 499 10*3/MM3 (ref 140–450)
PLATELET # BLD AUTO: 519 10*3/MM3 (ref 140–450)
PLATELET # BLD AUTO: 571 10*3/MM3 (ref 140–450)
PLATELET # BLD AUTO: 582 10*3/MM3 (ref 140–450)
PLATELET # BLD AUTO: 596 10*3/MM3 (ref 140–450)
PLATELET # BLD AUTO: 613 10*3/MM3 (ref 140–450)
PLATELET # BLD AUTO: 619 10*3/MM3 (ref 140–450)
PMV BLD AUTO: 10.1 FL (ref 6–12)
PMV BLD AUTO: 10.2 FL (ref 6–12)
PMV BLD AUTO: 10.2 FL (ref 6–12)
PMV BLD AUTO: 10.3 FL (ref 6–12)
PMV BLD AUTO: 10.4 FL (ref 6–12)
PMV BLD AUTO: 10.5 FL (ref 6–12)
PMV BLD AUTO: 10.6 FL (ref 6–12)
PMV BLD AUTO: 10.6 FL (ref 6–12)
PMV BLD AUTO: 10.8 FL (ref 6–12)
PMV BLD AUTO: 10.8 FL (ref 6–12)
PMV BLD AUTO: 10.9 FL (ref 6–12)
PMV BLD AUTO: 10.9 FL (ref 6–12)
PMV BLD AUTO: 11 FL (ref 6–12)
PMV BLD AUTO: 9.2 FL (ref 6–12)
PMV BLD AUTO: 9.3 FL (ref 6–12)
PMV BLD AUTO: 9.3 FL (ref 6–12)
PMV BLD AUTO: 9.4 FL (ref 6–12)
PMV BLD AUTO: 9.4 FL (ref 6–12)
PMV BLD AUTO: 9.5 FL (ref 6–12)
PMV BLD AUTO: 9.5 FL (ref 6–12)
PMV BLD AUTO: 9.6 FL (ref 6–12)
PMV BLD AUTO: 9.8 FL (ref 6–12)
PMV BLD AUTO: 9.9 FL (ref 6–12)
PO2 BLDA: 144 MM HG (ref 83–108)
PO2 BLDA: 84 MM HG (ref 83–108)
PO2 TEMP ADJ BLD: 144 MM HG (ref 83–108)
PO2 TEMP ADJ BLD: 84 MM HG (ref 83–108)
POTASSIUM SERPL-SCNC: 3.4 MMOL/L (ref 3.5–5.2)
POTASSIUM SERPL-SCNC: 3.4 MMOL/L (ref 3.5–5.2)
POTASSIUM SERPL-SCNC: 3.6 MMOL/L (ref 3.5–5.2)
POTASSIUM SERPL-SCNC: 3.7 MMOL/L (ref 3.5–5.2)
POTASSIUM SERPL-SCNC: 3.8 MMOL/L (ref 3.5–5.2)
POTASSIUM SERPL-SCNC: 3.9 MMOL/L (ref 3.5–5.2)
POTASSIUM SERPL-SCNC: 4 MMOL/L (ref 3.5–5.2)
POTASSIUM SERPL-SCNC: 4 MMOL/L (ref 3.5–5.2)
POTASSIUM SERPL-SCNC: 4.1 MMOL/L (ref 3.5–5.2)
POTASSIUM SERPL-SCNC: 4.1 MMOL/L (ref 3.5–5.2)
POTASSIUM SERPL-SCNC: 4.2 MMOL/L (ref 3.5–5.2)
POTASSIUM SERPL-SCNC: 4.2 MMOL/L (ref 3.5–5.2)
POTASSIUM SERPL-SCNC: 4.3 MMOL/L (ref 3.5–5.2)
POTASSIUM SERPL-SCNC: 4.5 MMOL/L (ref 3.5–5.2)
POTASSIUM SERPL-SCNC: 4.5 MMOL/L (ref 3.5–5.2)
POTASSIUM SERPL-SCNC: 4.6 MMOL/L (ref 3.5–5.2)
POTASSIUM SERPL-SCNC: 4.7 MMOL/L (ref 3.5–5.2)
POTASSIUM SERPL-SCNC: 4.8 MMOL/L (ref 3.5–5.2)
POTASSIUM SERPL-SCNC: 5 MMOL/L (ref 3.5–5.2)
POTASSIUM SERPL-SCNC: 5.1 MMOL/L (ref 3.5–5.2)
POTASSIUM SERPL-SCNC: 5.2 MMOL/L (ref 3.5–5.2)
POTASSIUM SERPL-SCNC: 5.2 MMOL/L (ref 3.5–5.2)
POTASSIUM SERPL-SCNC: 5.3 MMOL/L (ref 3.5–5.2)
PROCALCITONIN SERPL-MCNC: 0.06 NG/ML (ref 0–0.25)
PROCALCITONIN SERPL-MCNC: 0.08 NG/ML (ref 0–0.25)
PROCALCITONIN SERPL-MCNC: 0.09 NG/ML (ref 0–0.25)
PROCALCITONIN SERPL-MCNC: 0.09 NG/ML (ref 0–0.25)
PROCALCITONIN SERPL-MCNC: 0.18 NG/ML (ref 0–0.25)
PROT SERPL-MCNC: 5.6 G/DL (ref 6–8.5)
PROT SERPL-MCNC: 5.9 G/DL (ref 6–8.5)
PROT SERPL-MCNC: 6.3 G/DL (ref 6–8.5)
PROT SERPL-MCNC: 7 G/DL (ref 6–8.5)
PROTEINASE3 AB SER IA-ACNC: <3.5 U/ML (ref 0–3.5)
QT INTERVAL: 362 MS
QT INTERVAL: 394 MS
QT INTERVAL: 474 MS
QTC INTERVAL: 422 MS
QTC INTERVAL: 461 MS
QTC INTERVAL: 476 MS
RBC # BLD AUTO: 3.63 10*6/MM3 (ref 3.77–5.28)
RBC # BLD AUTO: 3.87 10*6/MM3 (ref 3.77–5.28)
RBC # BLD AUTO: 3.89 10*6/MM3 (ref 3.77–5.28)
RBC # BLD AUTO: 3.96 10*6/MM3 (ref 3.77–5.28)
RBC # BLD AUTO: 4.02 10*6/MM3 (ref 3.77–5.28)
RBC # BLD AUTO: 4.06 10*6/MM3 (ref 3.77–5.28)
RBC # BLD AUTO: 4.07 10*6/MM3 (ref 3.77–5.28)
RBC # BLD AUTO: 4.09 10*6/MM3 (ref 3.77–5.28)
RBC # BLD AUTO: 4.21 10*6/MM3 (ref 3.77–5.28)
RBC # BLD AUTO: 4.26 10*6/MM3 (ref 3.77–5.28)
RBC # BLD AUTO: 4.28 10*6/MM3 (ref 3.77–5.28)
RBC # BLD AUTO: 4.28 10*6/MM3 (ref 3.77–5.28)
RBC # BLD AUTO: 4.29 10*6/MM3 (ref 3.77–5.28)
RBC # BLD AUTO: 4.31 10*6/MM3 (ref 3.77–5.28)
RBC # BLD AUTO: 4.45 10*6/MM3 (ref 3.77–5.28)
RBC # BLD AUTO: 4.47 10*6/MM3 (ref 3.77–5.28)
RBC # BLD AUTO: 4.47 10*6/MM3 (ref 3.77–5.28)
RBC # BLD AUTO: 4.7 10*6/MM3 (ref 3.77–5.28)
RBC # BLD AUTO: 4.72 10*6/MM3 (ref 3.77–5.28)
RBC # BLD AUTO: 4.78 10*6/MM3 (ref 3.77–5.28)
RBC # BLD AUTO: 4.91 10*6/MM3 (ref 3.77–5.28)
RBC # BLD AUTO: 4.96 10*6/MM3 (ref 3.77–5.28)
RBC # BLD AUTO: 5.01 10*6/MM3 (ref 3.77–5.28)
RBC # BLD AUTO: 5.15 10*6/MM3 (ref 3.77–5.28)
RBC # BLD AUTO: 5.53 10*6/MM3 (ref 3.77–5.28)
RBC # BLD AUTO: 5.61 10*6/MM3 (ref 3.77–5.28)
RHINOVIRUS RNA SPEC NAA+PROBE: NOT DETECTED
RSV RNA NPH QL NAA+NON-PROBE: NOT DETECTED
S PNEUM AG SPEC QL LA: NEGATIVE
S RECTIVIRGULA IGG SER QL ID: NEGATIVE
SARS-COV-2 RNA NPH QL NAA+NON-PROBE: NOT DETECTED
SARS-COV-2 RNA PNL SPEC NAA+PROBE: NOT DETECTED
SARS-COV-2 RNA RESP QL NAA+PROBE: NOT DETECTED
SODIUM SERPL-SCNC: 133 MMOL/L (ref 136–145)
SODIUM SERPL-SCNC: 134 MMOL/L (ref 136–145)
SODIUM SERPL-SCNC: 134 MMOL/L (ref 136–145)
SODIUM SERPL-SCNC: 135 MMOL/L (ref 136–145)
SODIUM SERPL-SCNC: 135 MMOL/L (ref 136–145)
SODIUM SERPL-SCNC: 136 MMOL/L (ref 136–145)
SODIUM SERPL-SCNC: 137 MMOL/L (ref 136–145)
SODIUM SERPL-SCNC: 137 MMOL/L (ref 136–145)
SODIUM SERPL-SCNC: 138 MMOL/L (ref 136–145)
SODIUM SERPL-SCNC: 138 MMOL/L (ref 136–145)
SODIUM SERPL-SCNC: 139 MMOL/L (ref 136–145)
SODIUM SERPL-SCNC: 140 MMOL/L (ref 136–145)
SODIUM SERPL-SCNC: 140 MMOL/L (ref 136–145)
SODIUM SERPL-SCNC: 141 MMOL/L (ref 136–145)
SODIUM SERPL-SCNC: 142 MMOL/L (ref 136–145)
SODIUM SERPL-SCNC: 143 MMOL/L (ref 136–145)
SODIUM SERPL-SCNC: 145 MMOL/L (ref 136–145)
STRESS TARGET HR: 121 BPM
STRONGYLOIDES IGG SER QL IA: NEGATIVE
T VULGARIS IGG SER QL: NEGATIVE
TOTAL RATE: 0 BREATHS/MINUTE
TROPONIN T SERPL-MCNC: 0.01 NG/ML (ref 0–0.03)
TROPONIN T SERPL-MCNC: 0.03 NG/ML (ref 0–0.03)
TROPONIN T SERPL-MCNC: 0.03 NG/ML (ref 0–0.03)
TROPONIN T SERPL-MCNC: <0.01 NG/ML (ref 0–0.03)
VANCOMYCIN SERPL-MCNC: 11.1 MCG/ML (ref 5–40)
VANCOMYCIN SERPL-MCNC: 8.5 MCG/ML (ref 5–40)
VANCOMYCIN TROUGH SERPL-MCNC: 11.7 MCG/ML (ref 5–20)
VENTILATOR MODE: ABNORMAL
WBC NRBC COR # BLD: 11.45 10*3/MM3 (ref 3.4–10.8)
WBC NRBC COR # BLD: 13 10*3/MM3 (ref 3.4–10.8)
WBC NRBC COR # BLD: 13.36 10*3/MM3 (ref 3.4–10.8)
WBC NRBC COR # BLD: 13.87 10*3/MM3 (ref 3.4–10.8)
WBC NRBC COR # BLD: 14.39 10*3/MM3 (ref 3.4–10.8)
WBC NRBC COR # BLD: 14.51 10*3/MM3 (ref 3.4–10.8)
WBC NRBC COR # BLD: 14.7 10*3/MM3 (ref 3.4–10.8)
WBC NRBC COR # BLD: 14.73 10*3/MM3 (ref 3.4–10.8)
WBC NRBC COR # BLD: 15.3 10*3/MM3 (ref 3.4–10.8)
WBC NRBC COR # BLD: 15.54 10*3/MM3 (ref 3.4–10.8)
WBC NRBC COR # BLD: 15.65 10*3/MM3 (ref 3.4–10.8)
WBC NRBC COR # BLD: 15.73 10*3/MM3 (ref 3.4–10.8)
WBC NRBC COR # BLD: 15.85 10*3/MM3 (ref 3.4–10.8)
WBC NRBC COR # BLD: 15.99 10*3/MM3 (ref 3.4–10.8)
WBC NRBC COR # BLD: 16.05 10*3/MM3 (ref 3.4–10.8)
WBC NRBC COR # BLD: 16.22 10*3/MM3 (ref 3.4–10.8)
WBC NRBC COR # BLD: 16.43 10*3/MM3 (ref 3.4–10.8)
WBC NRBC COR # BLD: 16.54 10*3/MM3 (ref 3.4–10.8)
WBC NRBC COR # BLD: 17.15 10*3/MM3 (ref 3.4–10.8)
WBC NRBC COR # BLD: 17.34 10*3/MM3 (ref 3.4–10.8)
WBC NRBC COR # BLD: 17.5 10*3/MM3 (ref 3.4–10.8)
WBC NRBC COR # BLD: 18.19 10*3/MM3 (ref 3.4–10.8)
WBC NRBC COR # BLD: 20.76 10*3/MM3 (ref 3.4–10.8)
WBC NRBC COR # BLD: 22.1 10*3/MM3 (ref 3.4–10.8)
WBC NRBC COR # BLD: 23.41 10*3/MM3 (ref 3.4–10.8)
WBC NRBC COR # BLD: 27.61 10*3/MM3 (ref 3.4–10.8)
WHOLE BLOOD HOLD COAG: NORMAL
WHOLE BLOOD HOLD COAG: NORMAL
WHOLE BLOOD HOLD SPECIMEN: NORMAL
WHOLE BLOOD HOLD SPECIMEN: NORMAL

## 2022-01-01 PROCEDURE — 63710000001 INSULIN DETEMIR PER 5 UNITS: Performed by: NURSE PRACTITIONER

## 2022-01-01 PROCEDURE — 94799 UNLISTED PULMONARY SVC/PX: CPT

## 2022-01-01 PROCEDURE — 99232 SBSQ HOSP IP/OBS MODERATE 35: CPT | Performed by: INTERNAL MEDICINE

## 2022-01-01 PROCEDURE — 80048 BASIC METABOLIC PNL TOTAL CA: CPT | Performed by: INTERNAL MEDICINE

## 2022-01-01 PROCEDURE — 82962 GLUCOSE BLOOD TEST: CPT

## 2022-01-01 PROCEDURE — 94761 N-INVAS EAR/PLS OXIMETRY MLT: CPT

## 2022-01-01 PROCEDURE — 25010000002 VANCOMYCIN 10 G RECONSTITUTED SOLUTION: Performed by: INTERNAL MEDICINE

## 2022-01-01 PROCEDURE — 63710000001 PREDNISONE PER 5 MG: Performed by: NURSE PRACTITIONER

## 2022-01-01 PROCEDURE — 25010000002 METHYLPREDNISOLONE PER 40 MG: Performed by: INTERNAL MEDICINE

## 2022-01-01 PROCEDURE — 25010000002 ENOXAPARIN PER 10 MG: Performed by: NURSE PRACTITIONER

## 2022-01-01 PROCEDURE — 94664 DEMO&/EVAL PT USE INHALER: CPT

## 2022-01-01 PROCEDURE — 80202 ASSAY OF VANCOMYCIN: CPT | Performed by: INTERNAL MEDICINE

## 2022-01-01 PROCEDURE — 63710000001 INSULIN LISPRO (HUMAN) PER 5 UNITS: Performed by: NURSE PRACTITIONER

## 2022-01-01 PROCEDURE — 63710000001 INSULIN DETEMIR PER 5 UNITS: Performed by: INTERNAL MEDICINE

## 2022-01-01 PROCEDURE — 80053 COMPREHEN METABOLIC PANEL: CPT | Performed by: EMERGENCY MEDICINE

## 2022-01-01 PROCEDURE — 63710000001 INSULIN LISPRO (HUMAN) PER 5 UNITS: Performed by: INTERNAL MEDICINE

## 2022-01-01 PROCEDURE — 87040 BLOOD CULTURE FOR BACTERIA: CPT | Performed by: EMERGENCY MEDICINE

## 2022-01-01 PROCEDURE — 25010000002 FUROSEMIDE PER 20 MG: Performed by: INTERNAL MEDICINE

## 2022-01-01 PROCEDURE — 25010000002 MAGNESIUM SULFATE 2 GM/50ML SOLUTION: Performed by: NURSE PRACTITIONER

## 2022-01-01 PROCEDURE — 63710000001 PREDNISONE PER 1 MG: Performed by: INTERNAL MEDICINE

## 2022-01-01 PROCEDURE — 83735 ASSAY OF MAGNESIUM: CPT | Performed by: INTERNAL MEDICINE

## 2022-01-01 PROCEDURE — 63710000001 INSULIN LISPRO (HUMAN) PER 5 UNITS: Performed by: FAMILY MEDICINE

## 2022-01-01 PROCEDURE — 80069 RENAL FUNCTION PANEL: CPT | Performed by: INTERNAL MEDICINE

## 2022-01-01 PROCEDURE — 97530 THERAPEUTIC ACTIVITIES: CPT

## 2022-01-01 PROCEDURE — 86235 NUCLEAR ANTIGEN ANTIBODY: CPT | Performed by: INTERNAL MEDICINE

## 2022-01-01 PROCEDURE — 87636 SARSCOV2 & INF A&B AMP PRB: CPT | Performed by: EMERGENCY MEDICINE

## 2022-01-01 PROCEDURE — 80053 COMPREHEN METABOLIC PANEL: CPT | Performed by: INTERNAL MEDICINE

## 2022-01-01 PROCEDURE — 99233 SBSQ HOSP IP/OBS HIGH 50: CPT | Performed by: INTERNAL MEDICINE

## 2022-01-01 PROCEDURE — 25010000002 ENOXAPARIN PER 10 MG: Performed by: INTERNAL MEDICINE

## 2022-01-01 PROCEDURE — 71045 X-RAY EXAM CHEST 1 VIEW: CPT

## 2022-01-01 PROCEDURE — 97110 THERAPEUTIC EXERCISES: CPT

## 2022-01-01 PROCEDURE — 86037 ANCA TITER EACH ANTIBODY: CPT | Performed by: INTERNAL MEDICINE

## 2022-01-01 PROCEDURE — 25010000002 METHYLPREDNISOLONE PER 125 MG: Performed by: INTERNAL MEDICINE

## 2022-01-01 PROCEDURE — 80202 ASSAY OF VANCOMYCIN: CPT

## 2022-01-01 PROCEDURE — 25010000002 AZITHROMYCIN PER 500 MG: Performed by: INTERNAL MEDICINE

## 2022-01-01 PROCEDURE — 99223 1ST HOSP IP/OBS HIGH 75: CPT | Performed by: INTERNAL MEDICINE

## 2022-01-01 PROCEDURE — 84145 PROCALCITONIN (PCT): CPT | Performed by: INTERNAL MEDICINE

## 2022-01-01 PROCEDURE — 25010000002 PIPERACILLIN SOD-TAZOBACTAM PER 1 G: Performed by: INTERNAL MEDICINE

## 2022-01-01 PROCEDURE — 63710000001 INSULIN LISPRO (HUMAN) PER 5 UNITS: Performed by: HOSPITALIST

## 2022-01-01 PROCEDURE — 71250 CT THORAX DX C-: CPT

## 2022-01-01 PROCEDURE — 85027 COMPLETE CBC AUTOMATED: CPT | Performed by: INTERNAL MEDICINE

## 2022-01-01 PROCEDURE — 87449 NOS EACH ORGANISM AG IA: CPT | Performed by: INTERNAL MEDICINE

## 2022-01-01 PROCEDURE — 80048 BASIC METABOLIC PNL TOTAL CA: CPT | Performed by: NURSE PRACTITIONER

## 2022-01-01 PROCEDURE — 86671 FUNGUS NES ANTIBODY: CPT | Performed by: INTERNAL MEDICINE

## 2022-01-01 PROCEDURE — 25010000002 FLUCONAZOLE PER 200 MG: Performed by: FAMILY MEDICINE

## 2022-01-01 PROCEDURE — 25010000002 MIDAZOLAM PER 1 MG: Performed by: NURSE PRACTITIONER

## 2022-01-01 PROCEDURE — C1894 INTRO/SHEATH, NON-LASER: HCPCS

## 2022-01-01 PROCEDURE — 85652 RBC SED RATE AUTOMATED: CPT | Performed by: EMERGENCY MEDICINE

## 2022-01-01 PROCEDURE — 99232 SBSQ HOSP IP/OBS MODERATE 35: CPT | Performed by: STUDENT IN AN ORGANIZED HEALTH CARE EDUCATION/TRAINING PROGRAM

## 2022-01-01 PROCEDURE — 0 MAGNESIUM SULFATE 4 GM/100ML SOLUTION: Performed by: INTERNAL MEDICINE

## 2022-01-01 PROCEDURE — 0 IOPAMIDOL PER 1 ML: Performed by: EMERGENCY MEDICINE

## 2022-01-01 PROCEDURE — 63710000001 INSULIN ISOPHANE HUMAN PER 5 UNITS: Performed by: HOSPITALIST

## 2022-01-01 PROCEDURE — 85025 COMPLETE CBC W/AUTO DIFF WBC: CPT | Performed by: INTERNAL MEDICINE

## 2022-01-01 PROCEDURE — 83880 ASSAY OF NATRIURETIC PEPTIDE: CPT | Performed by: INTERNAL MEDICINE

## 2022-01-01 PROCEDURE — 0 MAGNESIUM SULFATE 4 GM/100ML SOLUTION: Performed by: NURSE PRACTITIONER

## 2022-01-01 PROCEDURE — 82805 BLOOD GASES W/O2 SATURATION: CPT

## 2022-01-01 PROCEDURE — 99232 SBSQ HOSP IP/OBS MODERATE 35: CPT | Performed by: FAMILY MEDICINE

## 2022-01-01 PROCEDURE — 86602 ANTINOMYCES ANTIBODY: CPT | Performed by: INTERNAL MEDICINE

## 2022-01-01 PROCEDURE — 83036 HEMOGLOBIN GLYCOSYLATED A1C: CPT | Performed by: INTERNAL MEDICINE

## 2022-01-01 PROCEDURE — 63710000001 DIPHENHYDRAMINE PER 50 MG: Performed by: INTERNAL MEDICINE

## 2022-01-01 PROCEDURE — 25010000002 ACETAZOLAMIDE PER 500 MG: Performed by: INTERNAL MEDICINE

## 2022-01-01 PROCEDURE — 25010000002 VANCOMYCIN PER 500 MG

## 2022-01-01 PROCEDURE — 99239 HOSP IP/OBS DSCHRG MGMT >30: CPT | Performed by: INTERNAL MEDICINE

## 2022-01-01 PROCEDURE — 71275 CT ANGIOGRAPHY CHEST: CPT

## 2022-01-01 PROCEDURE — 02HV33Z INSERTION OF INFUSION DEVICE INTO SUPERIOR VENA CAVA, PERCUTANEOUS APPROACH: ICD-10-PCS | Performed by: INTERNAL MEDICINE

## 2022-01-01 PROCEDURE — 63710000001 INSULIN DETEMIR PER 5 UNITS: Performed by: HOSPITALIST

## 2022-01-01 PROCEDURE — 99231 SBSQ HOSP IP/OBS SF/LOW 25: CPT | Performed by: HOSPITALIST

## 2022-01-01 PROCEDURE — 83605 ASSAY OF LACTIC ACID: CPT | Performed by: INTERNAL MEDICINE

## 2022-01-01 PROCEDURE — 85025 COMPLETE CBC W/AUTO DIFF WBC: CPT | Performed by: NURSE PRACTITIONER

## 2022-01-01 PROCEDURE — 87641 MR-STAPH DNA AMP PROBE: CPT

## 2022-01-01 PROCEDURE — 93306 TTE W/DOPPLER COMPLETE: CPT | Performed by: INTERNAL MEDICINE

## 2022-01-01 PROCEDURE — 83880 ASSAY OF NATRIURETIC PEPTIDE: CPT | Performed by: EMERGENCY MEDICINE

## 2022-01-01 PROCEDURE — 94668 MNPJ CHEST WALL SBSQ: CPT

## 2022-01-01 PROCEDURE — 93010 ELECTROCARDIOGRAM REPORT: CPT | Performed by: INTERNAL MEDICINE

## 2022-01-01 PROCEDURE — 25010000002 HALOPERIDOL LACTATE PER 5 MG: Performed by: NURSE PRACTITIONER

## 2022-01-01 PROCEDURE — 86331 IMMUNODIFFUSION OUCHTERLONY: CPT | Performed by: INTERNAL MEDICINE

## 2022-01-01 PROCEDURE — 97165 OT EVAL LOW COMPLEX 30 MIN: CPT

## 2022-01-01 PROCEDURE — 85027 COMPLETE CBC AUTOMATED: CPT | Performed by: FAMILY MEDICINE

## 2022-01-01 PROCEDURE — 84100 ASSAY OF PHOSPHORUS: CPT | Performed by: INTERNAL MEDICINE

## 2022-01-01 PROCEDURE — 99232 SBSQ HOSP IP/OBS MODERATE 35: CPT | Performed by: HOSPITALIST

## 2022-01-01 PROCEDURE — 63710000001 INSULIN DETEMIR PER 5 UNITS: Performed by: FAMILY MEDICINE

## 2022-01-01 PROCEDURE — 86431 RHEUMATOID FACTOR QUANT: CPT | Performed by: INTERNAL MEDICINE

## 2022-01-01 PROCEDURE — 36600 WITHDRAWAL OF ARTERIAL BLOOD: CPT

## 2022-01-01 PROCEDURE — 94760 N-INVAS EAR/PLS OXIMETRY 1: CPT

## 2022-01-01 PROCEDURE — 93005 ELECTROCARDIOGRAM TRACING: CPT | Performed by: EMERGENCY MEDICINE

## 2022-01-01 PROCEDURE — 99285 EMERGENCY DEPT VISIT HI MDM: CPT

## 2022-01-01 PROCEDURE — 92610 EVALUATE SWALLOWING FUNCTION: CPT

## 2022-01-01 PROCEDURE — 25010000002 VANCOMYCIN 10 G RECONSTITUTED SOLUTION

## 2022-01-01 PROCEDURE — 25010000002 METHYLPREDNISOLONE PER 40 MG: Performed by: NURSE PRACTITIONER

## 2022-01-01 PROCEDURE — 85025 COMPLETE CBC W/AUTO DIFF WBC: CPT | Performed by: EMERGENCY MEDICINE

## 2022-01-01 PROCEDURE — 25010000002 DEXAMETHASONE PER 1 MG: Performed by: EMERGENCY MEDICINE

## 2022-01-01 PROCEDURE — 83520 IMMUNOASSAY QUANT NOS NONAB: CPT | Performed by: INTERNAL MEDICINE

## 2022-01-01 PROCEDURE — 0202U NFCT DS 22 TRGT SARS-COV-2: CPT | Performed by: INTERNAL MEDICINE

## 2022-01-01 PROCEDURE — 80048 BASIC METABOLIC PNL TOTAL CA: CPT | Performed by: HOSPITALIST

## 2022-01-01 PROCEDURE — 83735 ASSAY OF MAGNESIUM: CPT | Performed by: NURSE PRACTITIONER

## 2022-01-01 PROCEDURE — 87899 AGENT NOS ASSAY W/OPTIC: CPT | Performed by: INTERNAL MEDICINE

## 2022-01-01 PROCEDURE — 25010000002 MORPHINE PER 10 MG: Performed by: INTERNAL MEDICINE

## 2022-01-01 PROCEDURE — 0 MORPHINE 50 MG/ML SOLUTION: Performed by: NURSE PRACTITIONER

## 2022-01-01 PROCEDURE — 80048 BASIC METABOLIC PNL TOTAL CA: CPT | Performed by: FAMILY MEDICINE

## 2022-01-01 PROCEDURE — 82375 ASSAY CARBOXYHB QUANT: CPT

## 2022-01-01 PROCEDURE — 93306 TTE W/DOPPLER COMPLETE: CPT

## 2022-01-01 PROCEDURE — 84484 ASSAY OF TROPONIN QUANT: CPT | Performed by: NURSE PRACTITIONER

## 2022-01-01 PROCEDURE — 86140 C-REACTIVE PROTEIN: CPT | Performed by: EMERGENCY MEDICINE

## 2022-01-01 PROCEDURE — 84132 ASSAY OF SERUM POTASSIUM: CPT | Performed by: INTERNAL MEDICINE

## 2022-01-01 PROCEDURE — 86698 HISTOPLASMA ANTIBODY: CPT | Performed by: INTERNAL MEDICINE

## 2022-01-01 PROCEDURE — 99233 SBSQ HOSP IP/OBS HIGH 50: CPT | Performed by: FAMILY MEDICINE

## 2022-01-01 PROCEDURE — 86225 DNA ANTIBODY NATIVE: CPT | Performed by: INTERNAL MEDICINE

## 2022-01-01 PROCEDURE — 92612 ENDOSCOPY SWALLOW (FEES) VID: CPT

## 2022-01-01 PROCEDURE — 99233 SBSQ HOSP IP/OBS HIGH 50: CPT | Performed by: HOSPITALIST

## 2022-01-01 PROCEDURE — 84484 ASSAY OF TROPONIN QUANT: CPT | Performed by: EMERGENCY MEDICINE

## 2022-01-01 PROCEDURE — 94667 MNPJ CHEST WALL 1ST: CPT

## 2022-01-01 PROCEDURE — 85652 RBC SED RATE AUTOMATED: CPT | Performed by: INTERNAL MEDICINE

## 2022-01-01 PROCEDURE — 87385 HISTOPLASMA CAPSUL AG IA: CPT | Performed by: INTERNAL MEDICINE

## 2022-01-01 PROCEDURE — 86606 ASPERGILLUS ANTIBODY: CPT | Performed by: INTERNAL MEDICINE

## 2022-01-01 PROCEDURE — 85025 COMPLETE CBC W/AUTO DIFF WBC: CPT | Performed by: FAMILY MEDICINE

## 2022-01-01 PROCEDURE — C1751 CATH, INF, PER/CENT/MIDLINE: HCPCS

## 2022-01-01 PROCEDURE — 25010000002 MORPHINE PER 10 MG: Performed by: NURSE PRACTITIONER

## 2022-01-01 PROCEDURE — G0378 HOSPITAL OBSERVATION PER HR: HCPCS

## 2022-01-01 PROCEDURE — 87636 SARSCOV2 & INF A&B AMP PRB: CPT | Performed by: INTERNAL MEDICINE

## 2022-01-01 PROCEDURE — 83605 ASSAY OF LACTIC ACID: CPT | Performed by: NURSE PRACTITIONER

## 2022-01-01 PROCEDURE — 83605 ASSAY OF LACTIC ACID: CPT | Performed by: EMERGENCY MEDICINE

## 2022-01-01 PROCEDURE — 83615 LACTATE (LD) (LDH) ENZYME: CPT | Performed by: EMERGENCY MEDICINE

## 2022-01-01 PROCEDURE — 85027 COMPLETE CBC AUTOMATED: CPT

## 2022-01-01 PROCEDURE — 25010000002 PIPERACILLIN SOD-TAZOBACTAM PER 1 G

## 2022-01-01 PROCEDURE — 97166 OT EVAL MOD COMPLEX 45 MIN: CPT

## 2022-01-01 PROCEDURE — 02HV33Z INSERTION OF INFUSION DEVICE INTO SUPERIOR VENA CAVA, PERCUTANEOUS APPROACH: ICD-10-PCS | Performed by: NURSE PRACTITIONER

## 2022-01-01 PROCEDURE — 86682 HELMINTH ANTIBODY: CPT | Performed by: INTERNAL MEDICINE

## 2022-01-01 PROCEDURE — 63710000001 DIPHENHYDRAMINE PER 50 MG: Performed by: NURSE PRACTITIONER

## 2022-01-01 PROCEDURE — 99222 1ST HOSP IP/OBS MODERATE 55: CPT | Performed by: INTERNAL MEDICINE

## 2022-01-01 PROCEDURE — 83050 HGB METHEMOGLOBIN QUAN: CPT

## 2022-01-01 PROCEDURE — 97535 SELF CARE MNGMENT TRAINING: CPT

## 2022-01-01 PROCEDURE — 0 POTASSIUM CHLORIDE 10 MEQ/100ML SOLUTION: Performed by: NURSE PRACTITIONER

## 2022-01-01 PROCEDURE — 97162 PT EVAL MOD COMPLEX 30 MIN: CPT

## 2022-01-01 PROCEDURE — 97116 GAIT TRAINING THERAPY: CPT

## 2022-01-01 PROCEDURE — 25010000002 CEFTRIAXONE PER 250 MG: Performed by: FAMILY MEDICINE

## 2022-01-01 PROCEDURE — 36415 COLL VENOUS BLD VENIPUNCTURE: CPT

## 2022-01-01 PROCEDURE — 84145 PROCALCITONIN (PCT): CPT | Performed by: NURSE PRACTITIONER

## 2022-01-01 PROCEDURE — 84145 PROCALCITONIN (PCT): CPT | Performed by: EMERGENCY MEDICINE

## 2022-01-01 PROCEDURE — 80048 BASIC METABOLIC PNL TOTAL CA: CPT

## 2022-01-01 PROCEDURE — 93005 ELECTROCARDIOGRAM TRACING: CPT | Performed by: NURSE PRACTITIONER

## 2022-01-01 PROCEDURE — 0 IOPAMIDOL PER 1 ML: Performed by: FAMILY MEDICINE

## 2022-01-01 PROCEDURE — 80048 BASIC METABOLIC PNL TOTAL CA: CPT | Performed by: STUDENT IN AN ORGANIZED HEALTH CARE EDUCATION/TRAINING PROGRAM

## 2022-01-01 PROCEDURE — 86612 BLASTOMYCES ANTIBODY: CPT | Performed by: INTERNAL MEDICINE

## 2022-01-01 PROCEDURE — 25010000002 MAGNESIUM SULFATE 2 GM/50ML SOLUTION: Performed by: INTERNAL MEDICINE

## 2022-01-01 PROCEDURE — 86609 BACTERIUM ANTIBODY: CPT | Performed by: INTERNAL MEDICINE

## 2022-01-01 PROCEDURE — 94640 AIRWAY INHALATION TREATMENT: CPT

## 2022-01-01 PROCEDURE — 85025 COMPLETE CBC W/AUTO DIFF WBC: CPT | Performed by: STUDENT IN AN ORGANIZED HEALTH CARE EDUCATION/TRAINING PROGRAM

## 2022-01-01 PROCEDURE — 99291 CRITICAL CARE FIRST HOUR: CPT | Performed by: INTERNAL MEDICINE

## 2022-01-01 RX ORDER — MAGNESIUM SULFATE HEPTAHYDRATE 40 MG/ML
2 INJECTION, SOLUTION INTRAVENOUS AS NEEDED
Status: DISCONTINUED | OUTPATIENT
Start: 2022-01-01 | End: 2022-01-01 | Stop reason: HOSPADM

## 2022-01-01 RX ORDER — ACETAMINOPHEN 325 MG/1
650 TABLET ORAL EVERY 6 HOURS PRN
Status: CANCELLED | OUTPATIENT
Start: 2022-01-01

## 2022-01-01 RX ORDER — PANTOPRAZOLE SODIUM 40 MG/1
40 TABLET, DELAYED RELEASE ORAL
Status: CANCELLED | OUTPATIENT
Start: 2022-01-01

## 2022-01-01 RX ORDER — ONDANSETRON 2 MG/ML
4 INJECTION INTRAMUSCULAR; INTRAVENOUS EVERY 6 HOURS PRN
Status: DISCONTINUED | OUTPATIENT
Start: 2022-01-01 | End: 2022-01-01 | Stop reason: HOSPADM

## 2022-01-01 RX ORDER — ALPRAZOLAM 0.5 MG/1
0.5 TABLET ORAL DAILY PRN
Status: DISCONTINUED | OUTPATIENT
Start: 2022-01-01 | End: 2022-01-01 | Stop reason: HOSPADM

## 2022-01-01 RX ORDER — MAGNESIUM SULFATE HEPTAHYDRATE 40 MG/ML
2 INJECTION, SOLUTION INTRAVENOUS AS NEEDED
Status: CANCELLED | OUTPATIENT
Start: 2022-01-01

## 2022-01-01 RX ORDER — BISACODYL 5 MG/1
5 TABLET, DELAYED RELEASE ORAL DAILY PRN
Status: DISCONTINUED | OUTPATIENT
Start: 2022-01-01 | End: 2022-01-01 | Stop reason: HOSPADM

## 2022-01-01 RX ORDER — NYSTATIN 100000 [USP'U]/G
POWDER TOPICAL EVERY 12 HOURS SCHEDULED
Status: DISCONTINUED | OUTPATIENT
Start: 2022-01-01 | End: 2022-01-01

## 2022-01-01 RX ORDER — FLUOXETINE HYDROCHLORIDE 20 MG/1
40 CAPSULE ORAL DAILY
Status: DISCONTINUED | OUTPATIENT
Start: 2022-01-01 | End: 2022-01-01 | Stop reason: HOSPADM

## 2022-01-01 RX ORDER — INSULIN LISPRO 100 [IU]/ML
0-9 INJECTION, SOLUTION INTRAVENOUS; SUBCUTANEOUS
Status: DISCONTINUED | OUTPATIENT
Start: 2022-01-01 | End: 2022-01-01

## 2022-01-01 RX ORDER — SODIUM CHLORIDE 0.9 % (FLUSH) 0.9 %
10 SYRINGE (ML) INJECTION AS NEEDED
Status: DISCONTINUED | OUTPATIENT
Start: 2022-01-01 | End: 2022-01-01 | Stop reason: HOSPADM

## 2022-01-01 RX ORDER — METHYLPREDNISOLONE SODIUM SUCCINATE 40 MG/ML
40 INJECTION, POWDER, LYOPHILIZED, FOR SOLUTION INTRAMUSCULAR; INTRAVENOUS 2 TIMES DAILY
Status: DISCONTINUED | OUTPATIENT
Start: 2022-01-01 | End: 2022-01-01

## 2022-01-01 RX ORDER — FLUCONAZOLE 200 MG/1
200 TABLET ORAL
Status: DISCONTINUED | OUTPATIENT
Start: 2022-01-01 | End: 2022-01-01 | Stop reason: HOSPADM

## 2022-01-01 RX ORDER — BISACODYL 10 MG
10 SUPPOSITORY, RECTAL RECTAL DAILY PRN
Status: DISCONTINUED | OUTPATIENT
Start: 2022-01-01 | End: 2022-01-01 | Stop reason: HOSPADM

## 2022-01-01 RX ORDER — HYDROCODONE BITARTRATE AND ACETAMINOPHEN 10; 325 MG/1; MG/1
1 TABLET ORAL EVERY 12 HOURS PRN
Status: DISCONTINUED | OUTPATIENT
Start: 2022-01-01 | End: 2022-01-01

## 2022-01-01 RX ORDER — INSULIN LISPRO 100 [IU]/ML
0-14 INJECTION, SOLUTION INTRAVENOUS; SUBCUTANEOUS
Status: DISCONTINUED | OUTPATIENT
Start: 2022-01-01 | End: 2022-01-01

## 2022-01-01 RX ORDER — POTASSIUM CHLORIDE 750 MG/1
40 CAPSULE, EXTENDED RELEASE ORAL AS NEEDED
Status: DISCONTINUED | OUTPATIENT
Start: 2022-01-01 | End: 2022-01-01 | Stop reason: HOSPADM

## 2022-01-01 RX ORDER — MORPHINE SULFATE 1 MG/ML
INJECTION INTRAVENOUS CONTINUOUS
Status: DISCONTINUED | OUTPATIENT
Start: 2022-01-01 | End: 2022-01-01 | Stop reason: HOSPADM

## 2022-01-01 RX ORDER — CLOTRIMAZOLE AND BETAMETHASONE DIPROPIONATE 10; .64 MG/G; MG/G
1 CREAM TOPICAL EVERY 12 HOURS SCHEDULED
Status: DISCONTINUED | OUTPATIENT
Start: 2022-01-01 | End: 2022-01-01 | Stop reason: HOSPADM

## 2022-01-01 RX ORDER — IPRATROPIUM BROMIDE AND ALBUTEROL SULFATE 2.5; .5 MG/3ML; MG/3ML
3 SOLUTION RESPIRATORY (INHALATION)
Status: DISCONTINUED | OUTPATIENT
Start: 2022-01-01 | End: 2022-01-01 | Stop reason: HOSPADM

## 2022-01-01 RX ORDER — POTASSIUM CHLORIDE 1.5 G/1.77G
40 POWDER, FOR SOLUTION ORAL AS NEEDED
Status: DISCONTINUED | OUTPATIENT
Start: 2022-01-01 | End: 2022-01-01 | Stop reason: HOSPADM

## 2022-01-01 RX ORDER — SODIUM CHLORIDE 0.9 % (FLUSH) 0.9 %
10 SYRINGE (ML) INJECTION EVERY 12 HOURS SCHEDULED
Status: CANCELLED | OUTPATIENT
Start: 2022-01-01

## 2022-01-01 RX ORDER — MAGNESIUM SULFATE HEPTAHYDRATE 40 MG/ML
2 INJECTION, SOLUTION INTRAVENOUS AS NEEDED
Status: DISCONTINUED | OUTPATIENT
Start: 2022-01-01 | End: 2022-01-01 | Stop reason: SDUPTHER

## 2022-01-01 RX ORDER — ENOXAPARIN SODIUM 100 MG/ML
40 INJECTION SUBCUTANEOUS EVERY 24 HOURS
Status: DISCONTINUED | OUTPATIENT
Start: 2022-01-01 | End: 2022-01-01 | Stop reason: HOSPADM

## 2022-01-01 RX ORDER — LORAZEPAM 2 MG/ML
0.5 INJECTION INTRAMUSCULAR EVERY 4 HOURS PRN
Status: DISCONTINUED | OUTPATIENT
Start: 2022-01-01 | End: 2022-01-01

## 2022-01-01 RX ORDER — INSULIN LISPRO 100 [IU]/ML
2 INJECTION, SOLUTION INTRAVENOUS; SUBCUTANEOUS
Status: DISCONTINUED | OUTPATIENT
Start: 2022-01-01 | End: 2022-01-01 | Stop reason: HOSPADM

## 2022-01-01 RX ORDER — HYDROCHLOROTHIAZIDE 25 MG/1
25 TABLET ORAL DAILY
Status: DISCONTINUED | OUTPATIENT
Start: 2022-01-01 | End: 2022-01-01 | Stop reason: HOSPADM

## 2022-01-01 RX ORDER — ACETAZOLAMIDE SODIUM 500 MG/5ML
500 INJECTION, POWDER, LYOPHILIZED, FOR SOLUTION INTRAVENOUS ONCE
Status: COMPLETED | OUTPATIENT
Start: 2022-01-01 | End: 2022-01-01

## 2022-01-01 RX ORDER — BUMETANIDE 0.25 MG/ML
2 INJECTION INTRAMUSCULAR; INTRAVENOUS EVERY 12 HOURS
Status: COMPLETED | OUTPATIENT
Start: 2022-01-01 | End: 2022-01-01

## 2022-01-01 RX ORDER — GABAPENTIN 400 MG/1
800 CAPSULE ORAL EVERY 8 HOURS SCHEDULED
Status: DISCONTINUED | OUTPATIENT
Start: 2022-01-01 | End: 2022-01-01

## 2022-01-01 RX ORDER — FLUOXETINE HYDROCHLORIDE 20 MG/1
40 CAPSULE ORAL DAILY
Status: CANCELLED | OUTPATIENT
Start: 2022-01-01

## 2022-01-01 RX ORDER — L.ACID,PARA/B.BIFIDUM/S.THERM 8B CELL
1 CAPSULE ORAL DAILY
Status: DISCONTINUED | OUTPATIENT
Start: 2022-01-01 | End: 2022-01-01 | Stop reason: HOSPADM

## 2022-01-01 RX ORDER — SULFAMETHOXAZOLE AND TRIMETHOPRIM 800; 160 MG/1; MG/1
1 TABLET ORAL 3 TIMES WEEKLY
Status: CANCELLED | OUTPATIENT
Start: 2022-01-01 | End: 2022-08-03

## 2022-01-01 RX ORDER — MORPHINE SULFATE 4 MG/ML
4 INJECTION, SOLUTION INTRAMUSCULAR; INTRAVENOUS
Status: DISCONTINUED | OUTPATIENT
Start: 2022-01-01 | End: 2022-01-01 | Stop reason: HOSPADM

## 2022-01-01 RX ORDER — PREGABALIN 75 MG/1
150 CAPSULE ORAL EVERY 8 HOURS SCHEDULED
Status: DISCONTINUED | OUTPATIENT
Start: 2022-01-01 | End: 2022-01-01 | Stop reason: HOSPADM

## 2022-01-01 RX ORDER — FLUCONAZOLE 150 MG/1
150 TABLET ORAL ONCE
Status: COMPLETED | OUTPATIENT
Start: 2022-01-01 | End: 2022-01-01

## 2022-01-01 RX ORDER — PREDNISONE 10 MG/1
10 TABLET ORAL
Status: DISCONTINUED | OUTPATIENT
Start: 2022-01-01 | End: 2022-01-01

## 2022-01-01 RX ORDER — ALBUTEROL SULFATE 2.5 MG/3ML
2.5 SOLUTION RESPIRATORY (INHALATION)
Status: DISCONTINUED | OUTPATIENT
Start: 2022-01-01 | End: 2022-01-01

## 2022-01-01 RX ORDER — MIDAZOLAM HYDROCHLORIDE 1 MG/ML
1 INJECTION INTRAMUSCULAR; INTRAVENOUS EVERY 4 HOURS PRN
Status: DISCONTINUED | OUTPATIENT
Start: 2022-01-01 | End: 2022-01-01 | Stop reason: HOSPADM

## 2022-01-01 RX ORDER — PREDNISONE 1 MG/1
TABLET ORAL
Qty: 24 TABLET | Refills: 0 | Status: SHIPPED | OUTPATIENT
Start: 2022-01-01 | End: 2022-01-01

## 2022-01-01 RX ORDER — METHYLPREDNISOLONE SODIUM SUCCINATE 40 MG/ML
40 INJECTION, POWDER, LYOPHILIZED, FOR SOLUTION INTRAMUSCULAR; INTRAVENOUS EVERY 12 HOURS
Status: DISCONTINUED | OUTPATIENT
Start: 2022-01-01 | End: 2022-01-01

## 2022-01-01 RX ORDER — POTASSIUM CHLORIDE 750 MG/1
20 CAPSULE, EXTENDED RELEASE ORAL ONCE
Status: COMPLETED | OUTPATIENT
Start: 2022-01-01 | End: 2022-01-01

## 2022-01-01 RX ORDER — KETOROLAC TROMETHAMINE 15 MG/ML
15 INJECTION, SOLUTION INTRAMUSCULAR; INTRAVENOUS EVERY 6 HOURS PRN
Status: DISCONTINUED | OUTPATIENT
Start: 2022-01-01 | End: 2022-01-01 | Stop reason: HOSPADM

## 2022-01-01 RX ORDER — GUAIFENESIN 600 MG/1
600 TABLET, EXTENDED RELEASE ORAL EVERY 12 HOURS SCHEDULED
Status: DISCONTINUED | OUTPATIENT
Start: 2022-01-01 | End: 2022-01-01 | Stop reason: HOSPADM

## 2022-01-01 RX ORDER — LOSARTAN POTASSIUM 25 MG/1
25 TABLET ORAL DAILY
Status: DISCONTINUED | OUTPATIENT
Start: 2022-01-01 | End: 2022-01-01

## 2022-01-01 RX ORDER — HYDROCODONE BITARTRATE AND ACETAMINOPHEN 10; 325 MG/1; MG/1
1 TABLET ORAL EVERY 6 HOURS PRN
Status: DISCONTINUED | OUTPATIENT
Start: 2022-01-01 | End: 2022-01-01 | Stop reason: HOSPADM

## 2022-01-01 RX ORDER — FLUCONAZOLE 200 MG/1
200 TABLET ORAL EVERY 24 HOURS
Status: COMPLETED | OUTPATIENT
Start: 2022-01-01 | End: 2022-01-01

## 2022-01-01 RX ORDER — INSULIN LISPRO 100 [IU]/ML
5 INJECTION, SOLUTION INTRAVENOUS; SUBCUTANEOUS
Status: DISCONTINUED | OUTPATIENT
Start: 2022-01-01 | End: 2022-01-01

## 2022-01-01 RX ORDER — INSULIN LISPRO 100 [IU]/ML
0-24 INJECTION, SOLUTION INTRAVENOUS; SUBCUTANEOUS
Status: DISCONTINUED | OUTPATIENT
Start: 2022-01-01 | End: 2022-01-01 | Stop reason: HOSPADM

## 2022-01-01 RX ORDER — FUROSEMIDE 10 MG/ML
40 INJECTION INTRAMUSCULAR; INTRAVENOUS ONCE
Status: COMPLETED | OUTPATIENT
Start: 2022-01-01 | End: 2022-01-01

## 2022-01-01 RX ORDER — NALOXONE HCL 0.4 MG/ML
0.1 VIAL (ML) INJECTION
Status: DISCONTINUED | OUTPATIENT
Start: 2022-01-01 | End: 2022-01-01

## 2022-01-01 RX ORDER — PREGABALIN 75 MG/1
150 CAPSULE ORAL 3 TIMES DAILY
Status: DISCONTINUED | OUTPATIENT
Start: 2022-01-01 | End: 2022-01-01 | Stop reason: HOSPADM

## 2022-01-01 RX ORDER — PREGABALIN 75 MG/1
150 CAPSULE ORAL 3 TIMES DAILY
Status: CANCELLED | OUTPATIENT
Start: 2022-01-01

## 2022-01-01 RX ORDER — BUMETANIDE 0.25 MG/ML
2 INJECTION INTRAMUSCULAR; INTRAVENOUS ONCE
Status: COMPLETED | OUTPATIENT
Start: 2022-01-01 | End: 2022-01-01

## 2022-01-01 RX ORDER — INSULIN LISPRO 100 [IU]/ML
13 INJECTION, SOLUTION INTRAVENOUS; SUBCUTANEOUS
Status: DISCONTINUED | OUTPATIENT
Start: 2022-01-01 | End: 2022-01-01

## 2022-01-01 RX ORDER — DIPHENHYDRAMINE HCL 25 MG
25 CAPSULE ORAL EVERY 6 HOURS PRN
Status: DISCONTINUED | OUTPATIENT
Start: 2022-01-01 | End: 2022-01-01 | Stop reason: HOSPADM

## 2022-01-01 RX ORDER — ALBUTEROL SULFATE 2.5 MG/3ML
2.5 SOLUTION RESPIRATORY (INHALATION) EVERY 6 HOURS PRN
Status: DISCONTINUED | OUTPATIENT
Start: 2022-01-01 | End: 2022-01-01 | Stop reason: HOSPADM

## 2022-01-01 RX ORDER — PREDNISONE 20 MG/1
40 TABLET ORAL
Status: DISCONTINUED | OUTPATIENT
Start: 2022-01-01 | End: 2022-01-01

## 2022-01-01 RX ORDER — IPRATROPIUM BROMIDE AND ALBUTEROL SULFATE 2.5; .5 MG/3ML; MG/3ML
3 SOLUTION RESPIRATORY (INHALATION)
Status: CANCELLED | OUTPATIENT
Start: 2022-01-01

## 2022-01-01 RX ORDER — HYDROCODONE BITARTRATE AND ACETAMINOPHEN 10; 325 MG/1; MG/1
1 TABLET ORAL EVERY 12 HOURS PRN
Qty: 6 TABLET | Refills: 0 | Status: SHIPPED | OUTPATIENT
Start: 2022-01-01

## 2022-01-01 RX ORDER — INSULIN LISPRO 100 [IU]/ML
8 INJECTION, SOLUTION INTRAVENOUS; SUBCUTANEOUS
Status: DISCONTINUED | OUTPATIENT
Start: 2022-01-01 | End: 2022-01-01

## 2022-01-01 RX ORDER — MORPHINE SULFATE 2 MG/ML
2 INJECTION, SOLUTION INTRAMUSCULAR; INTRAVENOUS EVERY 6 HOURS
Status: DISCONTINUED | OUTPATIENT
Start: 2022-01-01 | End: 2022-01-01

## 2022-01-01 RX ORDER — SODIUM CHLORIDE 0.9 % (FLUSH) 0.9 %
10 SYRINGE (ML) INJECTION EVERY 12 HOURS SCHEDULED
Status: DISCONTINUED | OUTPATIENT
Start: 2022-01-01 | End: 2022-01-01 | Stop reason: HOSPADM

## 2022-01-01 RX ORDER — INSULIN LISPRO 100 [IU]/ML
0-24 INJECTION, SOLUTION INTRAVENOUS; SUBCUTANEOUS
Status: CANCELLED | OUTPATIENT
Start: 2022-01-01

## 2022-01-01 RX ORDER — INSULIN LISPRO 100 [IU]/ML
0-9 INJECTION, SOLUTION INTRAVENOUS; SUBCUTANEOUS EVERY 6 HOURS
Status: DISCONTINUED | OUTPATIENT
Start: 2022-01-01 | End: 2022-01-01

## 2022-01-01 RX ORDER — ALBUTEROL SULFATE 2.5 MG/3ML
2.5 SOLUTION RESPIRATORY (INHALATION) EVERY 6 HOURS PRN
Status: CANCELLED | OUTPATIENT
Start: 2022-01-01

## 2022-01-01 RX ORDER — LOSARTAN POTASSIUM 50 MG/1
50 TABLET ORAL DAILY
Status: DISCONTINUED | OUTPATIENT
Start: 2022-01-01 | End: 2022-01-01

## 2022-01-01 RX ORDER — NICOTINE POLACRILEX 4 MG
15 LOZENGE BUCCAL
Status: DISCONTINUED | OUTPATIENT
Start: 2022-01-01 | End: 2022-01-01 | Stop reason: HOSPADM

## 2022-01-01 RX ORDER — DEXTROSE MONOHYDRATE 25 G/50ML
25 INJECTION, SOLUTION INTRAVENOUS
Status: CANCELLED | OUTPATIENT
Start: 2022-01-01

## 2022-01-01 RX ORDER — METHYLPREDNISOLONE SODIUM SUCCINATE 40 MG/ML
40 INJECTION, POWDER, LYOPHILIZED, FOR SOLUTION INTRAMUSCULAR; INTRAVENOUS DAILY
Status: DISCONTINUED | OUTPATIENT
Start: 2022-01-01 | End: 2022-01-01

## 2022-01-01 RX ORDER — POLYETHYLENE GLYCOL 3350 17 G/17G
17 POWDER, FOR SOLUTION ORAL DAILY PRN
Status: CANCELLED | OUTPATIENT
Start: 2022-01-01

## 2022-01-01 RX ORDER — PREDNISONE 1 MG/1
5 TABLET ORAL
Status: DISCONTINUED | OUTPATIENT
Start: 2022-01-01 | End: 2022-01-01

## 2022-01-01 RX ORDER — INDAPAMIDE 1.25 MG/1
2.5 TABLET, FILM COATED ORAL DAILY
Status: DISCONTINUED | OUTPATIENT
Start: 2022-01-01 | End: 2022-01-01

## 2022-01-01 RX ORDER — PREGABALIN 150 MG/1
150 CAPSULE ORAL 3 TIMES DAILY
Status: ON HOLD | COMMUNITY
End: 2022-01-01 | Stop reason: SDUPTHER

## 2022-01-01 RX ORDER — NICOTINE POLACRILEX 4 MG
15 LOZENGE BUCCAL
Status: CANCELLED | OUTPATIENT
Start: 2022-01-01

## 2022-01-01 RX ORDER — PANTOPRAZOLE SODIUM 40 MG/1
40 TABLET, DELAYED RELEASE ORAL EVERY MORNING
Status: DISCONTINUED | OUTPATIENT
Start: 2022-01-01 | End: 2022-01-01 | Stop reason: HOSPADM

## 2022-01-01 RX ORDER — METHYLPREDNISOLONE SODIUM SUCCINATE 40 MG/ML
40 INJECTION, POWDER, LYOPHILIZED, FOR SOLUTION INTRAMUSCULAR; INTRAVENOUS EVERY 24 HOURS
Status: CANCELLED | OUTPATIENT
Start: 2022-01-01

## 2022-01-01 RX ORDER — PREDNISONE 1 MG/1
15 TABLET ORAL
Status: COMPLETED | OUTPATIENT
Start: 2022-01-01 | End: 2022-01-01

## 2022-01-01 RX ORDER — SCOLOPAMINE TRANSDERMAL SYSTEM 1 MG/1
1 PATCH, EXTENDED RELEASE TRANSDERMAL
Status: DISCONTINUED | OUTPATIENT
Start: 2022-01-01 | End: 2022-01-01 | Stop reason: HOSPADM

## 2022-01-01 RX ORDER — MAGNESIUM SULFATE HEPTAHYDRATE 40 MG/ML
4 INJECTION, SOLUTION INTRAVENOUS AS NEEDED
Status: DISCONTINUED | OUTPATIENT
Start: 2022-01-01 | End: 2022-01-01 | Stop reason: SDUPTHER

## 2022-01-01 RX ORDER — BUMETANIDE 0.25 MG/ML
2 INJECTION INTRAMUSCULAR; INTRAVENOUS DAILY
Status: DISCONTINUED | OUTPATIENT
Start: 2022-01-01 | End: 2022-01-01

## 2022-01-01 RX ORDER — FLUCONAZOLE 200 MG/1
200 TABLET ORAL
Status: CANCELLED | OUTPATIENT
Start: 2022-01-01 | End: 2022-07-20

## 2022-01-01 RX ORDER — OXYBUTYNIN CHLORIDE 5 MG/1
5 TABLET, EXTENDED RELEASE ORAL DAILY
Status: DISCONTINUED | OUTPATIENT
Start: 2022-01-01 | End: 2022-01-01 | Stop reason: HOSPADM

## 2022-01-01 RX ORDER — INSULIN LISPRO 100 [IU]/ML
10 INJECTION, SOLUTION INTRAVENOUS; SUBCUTANEOUS
Status: DISCONTINUED | OUTPATIENT
Start: 2022-01-01 | End: 2022-01-01

## 2022-01-01 RX ORDER — CHOLECALCIFEROL (VITAMIN D3) 125 MCG
5 CAPSULE ORAL NIGHTLY PRN
Status: DISCONTINUED | OUTPATIENT
Start: 2022-01-01 | End: 2022-01-01 | Stop reason: HOSPADM

## 2022-01-01 RX ORDER — HYDROXYZINE HYDROCHLORIDE 25 MG/1
25 TABLET, FILM COATED ORAL 3 TIMES DAILY PRN
Status: CANCELLED | OUTPATIENT
Start: 2022-01-01

## 2022-01-01 RX ORDER — SODIUM CHLORIDE 0.9 % (FLUSH) 0.9 %
10 SYRINGE (ML) INJECTION AS NEEDED
Status: CANCELLED | OUTPATIENT
Start: 2022-01-01

## 2022-01-01 RX ORDER — MAGNESIUM SULFATE HEPTAHYDRATE 40 MG/ML
4 INJECTION, SOLUTION INTRAVENOUS AS NEEDED
Status: DISCONTINUED | OUTPATIENT
Start: 2022-01-01 | End: 2022-01-01 | Stop reason: HOSPADM

## 2022-01-01 RX ORDER — PREGABALIN 100 MG/1
100 CAPSULE ORAL EVERY 8 HOURS SCHEDULED
Status: DISCONTINUED | OUTPATIENT
Start: 2022-01-01 | End: 2022-01-01

## 2022-01-01 RX ORDER — ACETAMINOPHEN 325 MG/1
650 TABLET ORAL EVERY 6 HOURS PRN
Status: DISCONTINUED | OUTPATIENT
Start: 2022-01-01 | End: 2022-01-01 | Stop reason: HOSPADM

## 2022-01-01 RX ORDER — POTASSIUM CHLORIDE 7.45 MG/ML
10 INJECTION INTRAVENOUS
Status: DISCONTINUED | OUTPATIENT
Start: 2022-01-01 | End: 2022-01-01 | Stop reason: HOSPADM

## 2022-01-01 RX ORDER — DEXTROSE MONOHYDRATE 25 G/50ML
25 INJECTION, SOLUTION INTRAVENOUS
Status: DISCONTINUED | OUTPATIENT
Start: 2022-01-01 | End: 2022-01-01 | Stop reason: HOSPADM

## 2022-01-01 RX ORDER — BISACODYL 10 MG
10 SUPPOSITORY, RECTAL RECTAL DAILY PRN
Status: CANCELLED | OUTPATIENT
Start: 2022-01-01

## 2022-01-01 RX ORDER — BUMETANIDE 0.25 MG/ML
1 INJECTION INTRAMUSCULAR; INTRAVENOUS ONCE
Status: COMPLETED | OUTPATIENT
Start: 2022-01-01 | End: 2022-01-01

## 2022-01-01 RX ORDER — AMOXICILLIN 250 MG
2 CAPSULE ORAL 2 TIMES DAILY
Status: DISCONTINUED | OUTPATIENT
Start: 2022-01-01 | End: 2022-01-01 | Stop reason: HOSPADM

## 2022-01-01 RX ORDER — AMOXICILLIN 250 MG
2 CAPSULE ORAL 2 TIMES DAILY
Status: CANCELLED | OUTPATIENT
Start: 2022-01-01

## 2022-01-01 RX ORDER — MORPHINE SULFATE 2 MG/ML
2 INJECTION, SOLUTION INTRAMUSCULAR; INTRAVENOUS
Status: DISCONTINUED | OUTPATIENT
Start: 2022-01-01 | End: 2022-01-01

## 2022-01-01 RX ORDER — PRAVASTATIN SODIUM 40 MG
40 TABLET ORAL NIGHTLY
Status: CANCELLED | OUTPATIENT
Start: 2022-01-01

## 2022-01-01 RX ORDER — FUROSEMIDE 10 MG/ML
60 INJECTION INTRAMUSCULAR; INTRAVENOUS ONCE
Status: COMPLETED | OUTPATIENT
Start: 2022-01-01 | End: 2022-01-01

## 2022-01-01 RX ORDER — ALPRAZOLAM 0.5 MG/1
0.5 TABLET ORAL EVERY 12 HOURS
Status: CANCELLED | OUTPATIENT
Start: 2022-01-01 | End: 2022-07-18

## 2022-01-01 RX ORDER — ALPRAZOLAM 0.5 MG/1
0.5 TABLET ORAL EVERY 12 HOURS
Status: DISCONTINUED | OUTPATIENT
Start: 2022-01-01 | End: 2022-01-01 | Stop reason: HOSPADM

## 2022-01-01 RX ORDER — OXYBUTYNIN CHLORIDE 5 MG/1
5 TABLET, EXTENDED RELEASE ORAL DAILY
Status: CANCELLED | OUTPATIENT
Start: 2022-01-01

## 2022-01-01 RX ORDER — SODIUM CHLORIDE 0.9 % (FLUSH) 0.9 %
10 SYRINGE (ML) INJECTION AS NEEDED
Status: DISCONTINUED | OUTPATIENT
Start: 2022-01-01 | End: 2022-01-01

## 2022-01-01 RX ORDER — IPRATROPIUM BROMIDE AND ALBUTEROL SULFATE 2.5; .5 MG/3ML; MG/3ML
3 SOLUTION RESPIRATORY (INHALATION) EVERY 4 HOURS PRN
Status: DISCONTINUED | OUTPATIENT
Start: 2022-01-01 | End: 2022-01-01 | Stop reason: HOSPADM

## 2022-01-01 RX ORDER — INSULIN ASPART 100 [IU]/ML
INJECTION, SOLUTION INTRAVENOUS; SUBCUTANEOUS
COMMUNITY

## 2022-01-01 RX ORDER — PANTOPRAZOLE SODIUM 40 MG/1
40 TABLET, DELAYED RELEASE ORAL
Status: DISCONTINUED | OUTPATIENT
Start: 2022-01-01 | End: 2022-01-01 | Stop reason: HOSPADM

## 2022-01-01 RX ORDER — MAGNESIUM SULFATE HEPTAHYDRATE 40 MG/ML
4 INJECTION, SOLUTION INTRAVENOUS AS NEEDED
Status: CANCELLED | OUTPATIENT
Start: 2022-01-01

## 2022-01-01 RX ORDER — MORPHINE SULFATE 2 MG/ML
0.5 INJECTION, SOLUTION INTRAMUSCULAR; INTRAVENOUS EVERY 4 HOURS PRN
Status: DISCONTINUED | OUTPATIENT
Start: 2022-01-01 | End: 2022-01-01

## 2022-01-01 RX ORDER — DIPHENHYDRAMINE HCL 25 MG
25 CAPSULE ORAL EVERY 6 HOURS PRN
Status: CANCELLED | OUTPATIENT
Start: 2022-01-01

## 2022-01-01 RX ORDER — ACETAMINOPHEN 650 MG/1
650 SUPPOSITORY RECTAL EVERY 4 HOURS PRN
Status: DISCONTINUED | OUTPATIENT
Start: 2022-01-01 | End: 2022-01-01 | Stop reason: HOSPADM

## 2022-01-01 RX ORDER — INSULIN LISPRO 100 [IU]/ML
0-7 INJECTION, SOLUTION INTRAVENOUS; SUBCUTANEOUS
Status: DISCONTINUED | OUTPATIENT
Start: 2022-01-01 | End: 2022-01-01 | Stop reason: HOSPADM

## 2022-01-01 RX ORDER — MORPHINE SULFATE 2 MG/ML
1 INJECTION, SOLUTION INTRAMUSCULAR; INTRAVENOUS
Status: CANCELLED | OUTPATIENT
Start: 2022-01-01

## 2022-01-01 RX ORDER — BISACODYL 5 MG/1
5 TABLET, DELAYED RELEASE ORAL DAILY PRN
Status: CANCELLED | OUTPATIENT
Start: 2022-01-01

## 2022-01-01 RX ORDER — METHYLPREDNISOLONE SODIUM SUCCINATE 40 MG/ML
40 INJECTION, POWDER, LYOPHILIZED, FOR SOLUTION INTRAMUSCULAR; INTRAVENOUS EVERY 24 HOURS
Status: DISCONTINUED | OUTPATIENT
Start: 2022-01-01 | End: 2022-01-01 | Stop reason: HOSPADM

## 2022-01-01 RX ORDER — ALBUTEROL SULFATE 2.5 MG/3ML
2.5 SOLUTION RESPIRATORY (INHALATION) EVERY 6 HOURS PRN
Refills: 12
Start: 2022-01-01

## 2022-01-01 RX ORDER — POLYETHYLENE GLYCOL 3350 17 G/17G
17 POWDER, FOR SOLUTION ORAL DAILY PRN
Status: DISCONTINUED | OUTPATIENT
Start: 2022-01-01 | End: 2022-01-01 | Stop reason: HOSPADM

## 2022-01-01 RX ORDER — LOSARTAN POTASSIUM 25 MG/1
25 TABLET ORAL DAILY
Start: 2022-01-01

## 2022-01-01 RX ORDER — HALOPERIDOL 5 MG/ML
1 INJECTION INTRAMUSCULAR EVERY 4 HOURS PRN
Status: DISCONTINUED | OUTPATIENT
Start: 2022-01-01 | End: 2022-01-01 | Stop reason: HOSPADM

## 2022-01-01 RX ORDER — PREGABALIN 50 MG/1
50 CAPSULE ORAL ONCE
Status: COMPLETED | OUTPATIENT
Start: 2022-01-01 | End: 2022-01-01

## 2022-01-01 RX ORDER — L.ACID,PARA/B.BIFIDUM/S.THERM 8B CELL
1 CAPSULE ORAL DAILY
Status: CANCELLED | OUTPATIENT
Start: 2022-01-01

## 2022-01-01 RX ORDER — CYANOCOBALAMIN 1000 UG/ML
1000 INJECTION, SOLUTION INTRAMUSCULAR; SUBCUTANEOUS
COMMUNITY

## 2022-01-01 RX ORDER — SODIUM CHLORIDE 0.9 % (FLUSH) 0.9 %
10 SYRINGE (ML) INJECTION EVERY 12 HOURS SCHEDULED
Status: DISCONTINUED | OUTPATIENT
Start: 2022-01-01 | End: 2022-01-01

## 2022-01-01 RX ORDER — INSULIN LISPRO 100 [IU]/ML
15 INJECTION, SOLUTION INTRAVENOUS; SUBCUTANEOUS
Status: DISCONTINUED | OUTPATIENT
Start: 2022-01-01 | End: 2022-01-01

## 2022-01-01 RX ORDER — LOSARTAN POTASSIUM 50 MG/1
50 TABLET ORAL EVERY 12 HOURS SCHEDULED
Status: CANCELLED | OUTPATIENT
Start: 2022-01-01

## 2022-01-01 RX ORDER — SULFAMETHOXAZOLE AND TRIMETHOPRIM 800; 160 MG/1; MG/1
1 TABLET ORAL 3 TIMES WEEKLY
Status: COMPLETED | OUTPATIENT
Start: 2022-01-01 | End: 2022-01-01

## 2022-01-01 RX ORDER — MORPHINE SULFATE 2 MG/ML
1 INJECTION, SOLUTION INTRAMUSCULAR; INTRAVENOUS
Status: DISCONTINUED | OUTPATIENT
Start: 2022-01-01 | End: 2022-01-01 | Stop reason: HOSPADM

## 2022-01-01 RX ORDER — SULFAMETHOXAZOLE AND TRIMETHOPRIM 800; 160 MG/1; MG/1
1 TABLET ORAL 3 TIMES WEEKLY
Status: DISCONTINUED | OUTPATIENT
Start: 2022-01-01 | End: 2022-01-01 | Stop reason: HOSPADM

## 2022-01-01 RX ORDER — PRAVASTATIN SODIUM 40 MG
40 TABLET ORAL NIGHTLY
Status: DISCONTINUED | OUTPATIENT
Start: 2022-01-01 | End: 2022-01-01 | Stop reason: HOSPADM

## 2022-01-01 RX ORDER — VANCOMYCIN HYDROCHLORIDE 1 G/200ML
1000 INJECTION, SOLUTION INTRAVENOUS EVERY 12 HOURS
Status: DISCONTINUED | OUTPATIENT
Start: 2022-01-01 | End: 2022-01-01

## 2022-01-01 RX ORDER — ALPRAZOLAM 0.5 MG/1
0.5 TABLET ORAL DAILY PRN
Qty: 3 TABLET | Refills: 0 | Status: SHIPPED | OUTPATIENT
Start: 2022-01-01

## 2022-01-01 RX ORDER — HYDROCHLOROTHIAZIDE 25 MG/1
25 TABLET ORAL DAILY
Status: CANCELLED | OUTPATIENT
Start: 2022-01-01

## 2022-01-01 RX ORDER — PREGABALIN 150 MG/1
150 CAPSULE ORAL 3 TIMES DAILY
Qty: 9 CAPSULE | Refills: 0 | Status: SHIPPED | OUTPATIENT
Start: 2022-01-01

## 2022-01-01 RX ORDER — LOSARTAN POTASSIUM 50 MG/1
50 TABLET ORAL EVERY 12 HOURS SCHEDULED
Status: DISCONTINUED | OUTPATIENT
Start: 2022-01-01 | End: 2022-01-01 | Stop reason: HOSPADM

## 2022-01-01 RX ORDER — ZOLPIDEM TARTRATE 5 MG/1
10 TABLET ORAL NIGHTLY PRN
Status: DISCONTINUED | OUTPATIENT
Start: 2022-01-01 | End: 2022-01-01

## 2022-01-01 RX ORDER — METHYLPREDNISOLONE SODIUM SUCCINATE 125 MG/2ML
60 INJECTION, POWDER, LYOPHILIZED, FOR SOLUTION INTRAMUSCULAR; INTRAVENOUS EVERY 12 HOURS
Status: COMPLETED | OUTPATIENT
Start: 2022-01-01 | End: 2022-01-01

## 2022-01-01 RX ORDER — FUROSEMIDE 10 MG/ML
20 INJECTION INTRAMUSCULAR; INTRAVENOUS EVERY 6 HOURS PRN
Status: DISCONTINUED | OUTPATIENT
Start: 2022-01-01 | End: 2022-01-01 | Stop reason: HOSPADM

## 2022-01-01 RX ORDER — PRAVASTATIN SODIUM 40 MG
40 TABLET ORAL DAILY
Status: DISCONTINUED | OUTPATIENT
Start: 2022-01-01 | End: 2022-01-01 | Stop reason: HOSPADM

## 2022-01-01 RX ORDER — NAPROXEN 250 MG/1
250 TABLET ORAL ONCE
Status: COMPLETED | OUTPATIENT
Start: 2022-01-01 | End: 2022-01-01

## 2022-01-01 RX ORDER — PREDNISONE 20 MG/1
20 TABLET ORAL
Status: DISCONTINUED | OUTPATIENT
Start: 2022-01-01 | End: 2022-01-01 | Stop reason: HOSPADM

## 2022-01-01 RX ORDER — METHYLPREDNISOLONE SODIUM SUCCINATE 125 MG/2ML
60 INJECTION, POWDER, LYOPHILIZED, FOR SOLUTION INTRAMUSCULAR; INTRAVENOUS EVERY 8 HOURS
Status: DISCONTINUED | OUTPATIENT
Start: 2022-01-01 | End: 2022-01-01

## 2022-01-01 RX ORDER — ENOXAPARIN SODIUM 100 MG/ML
40 INJECTION SUBCUTANEOUS EVERY 24 HOURS
Status: CANCELLED | OUTPATIENT
Start: 2022-01-01

## 2022-01-01 RX ORDER — IPRATROPIUM BROMIDE AND ALBUTEROL SULFATE 2.5; .5 MG/3ML; MG/3ML
3 SOLUTION RESPIRATORY (INHALATION) EVERY 4 HOURS PRN
Status: CANCELLED | OUTPATIENT
Start: 2022-01-01

## 2022-01-01 RX ORDER — DIPHENHYDRAMINE HCL 25 MG
25 CAPSULE ORAL ONCE
Status: COMPLETED | OUTPATIENT
Start: 2022-01-01 | End: 2022-01-01

## 2022-01-01 RX ORDER — ALBUTEROL SULFATE 2.5 MG/3ML
2.5 SOLUTION RESPIRATORY (INHALATION) EVERY 6 HOURS PRN
Status: DISCONTINUED | OUTPATIENT
Start: 2022-01-01 | End: 2022-01-01

## 2022-01-01 RX ORDER — BUMETANIDE 0.25 MG/ML
0.5 INJECTION INTRAMUSCULAR; INTRAVENOUS ONCE
Status: COMPLETED | OUTPATIENT
Start: 2022-01-01 | End: 2022-01-01

## 2022-01-01 RX ORDER — GLYCOPYRROLATE 0.2 MG/ML
0.2 INJECTION INTRAMUSCULAR; INTRAVENOUS EVERY 6 HOURS PRN
Status: DISCONTINUED | OUTPATIENT
Start: 2022-01-01 | End: 2022-01-01 | Stop reason: HOSPADM

## 2022-01-01 RX ORDER — DEXAMETHASONE SODIUM PHOSPHATE 4 MG/ML
6 INJECTION, SOLUTION INTRA-ARTICULAR; INTRALESIONAL; INTRAMUSCULAR; INTRAVENOUS; SOFT TISSUE ONCE
Status: COMPLETED | OUTPATIENT
Start: 2022-01-01 | End: 2022-01-01

## 2022-01-01 RX ORDER — CLOTRIMAZOLE AND BETAMETHASONE DIPROPIONATE 10; .64 MG/G; MG/G
1 CREAM TOPICAL EVERY 12 HOURS SCHEDULED
Status: CANCELLED | OUTPATIENT
Start: 2022-01-01 | End: 2022-07-19

## 2022-01-01 RX ORDER — ALPRAZOLAM 0.5 MG/1
0.5 TABLET ORAL DAILY PRN
Status: CANCELLED | OUTPATIENT
Start: 2022-01-01

## 2022-01-01 RX ORDER — INSULIN LISPRO 100 [IU]/ML
0-7 INJECTION, SOLUTION INTRAVENOUS; SUBCUTANEOUS
Status: DISCONTINUED | OUTPATIENT
Start: 2022-01-01 | End: 2022-01-01

## 2022-01-01 RX ORDER — SODIUM CHLORIDE, SODIUM LACTATE, POTASSIUM CHLORIDE, CALCIUM CHLORIDE 600; 310; 30; 20 MG/100ML; MG/100ML; MG/100ML; MG/100ML
150 INJECTION, SOLUTION INTRAVENOUS CONTINUOUS
Status: ACTIVE | OUTPATIENT
Start: 2022-01-01 | End: 2022-01-01

## 2022-01-01 RX ORDER — HYDROCODONE BITARTRATE AND ACETAMINOPHEN 10; 325 MG/1; MG/1
1 TABLET ORAL EVERY 6 HOURS PRN
Status: CANCELLED | OUTPATIENT
Start: 2022-01-01

## 2022-01-01 RX ORDER — FLUCONAZOLE 2 MG/ML
400 INJECTION, SOLUTION INTRAVENOUS DAILY
Status: DISCONTINUED | OUTPATIENT
Start: 2022-01-01 | End: 2022-01-01

## 2022-01-01 RX ORDER — VERAPAMIL HYDROCHLORIDE 120 MG/1
120 CAPSULE, EXTENDED RELEASE ORAL NIGHTLY
COMMUNITY
End: 2022-01-01 | Stop reason: HOSPADM

## 2022-01-01 RX ORDER — HYDROXYZINE HYDROCHLORIDE 25 MG/1
25 TABLET, FILM COATED ORAL 3 TIMES DAILY PRN
Status: DISCONTINUED | OUTPATIENT
Start: 2022-01-01 | End: 2022-01-01 | Stop reason: HOSPADM

## 2022-01-01 RX ADMIN — SENNOSIDES AND DOCUSATE SODIUM 2 TABLET: 50; 8.6 TABLET ORAL at 08:17

## 2022-01-01 RX ADMIN — METHYLPREDNISOLONE SODIUM SUCCINATE 60 MG: 125 INJECTION, POWDER, FOR SOLUTION INTRAMUSCULAR; INTRAVENOUS at 10:07

## 2022-01-01 RX ADMIN — INSULIN LISPRO 2 UNITS: 100 INJECTION, SOLUTION INTRAVENOUS; SUBCUTANEOUS at 06:36

## 2022-01-01 RX ADMIN — FLUCONAZOLE 400 MG: 400 INJECTION, SOLUTION INTRAVENOUS at 10:51

## 2022-01-01 RX ADMIN — ALBUTEROL SULFATE 2.5 MG: 2.5 SOLUTION RESPIRATORY (INHALATION) at 19:11

## 2022-01-01 RX ADMIN — PREGABALIN 150 MG: 75 CAPSULE ORAL at 17:54

## 2022-01-01 RX ADMIN — PANTOPRAZOLE SODIUM 40 MG: 40 TABLET, DELAYED RELEASE ORAL at 05:59

## 2022-01-01 RX ADMIN — HYDROCODONE BITARTRATE AND ACETAMINOPHEN 1 TABLET: 10; 325 TABLET ORAL at 16:57

## 2022-01-01 RX ADMIN — Medication 5 MG: at 21:31

## 2022-01-01 RX ADMIN — TAZOBACTAM SODIUM AND PIPERACILLIN SODIUM 3.38 G: 375; 3 INJECTION, SOLUTION INTRAVENOUS at 05:25

## 2022-01-01 RX ADMIN — PRAVASTATIN SODIUM 40 MG: 40 TABLET ORAL at 21:48

## 2022-01-01 RX ADMIN — INSULIN LISPRO 4 UNITS: 100 INJECTION, SOLUTION INTRAVENOUS; SUBCUTANEOUS at 12:25

## 2022-01-01 RX ADMIN — Medication 10 ML: at 20:12

## 2022-01-01 RX ADMIN — LOSARTAN POTASSIUM 25 MG: 25 TABLET, FILM COATED ORAL at 08:34

## 2022-01-01 RX ADMIN — HYDROCORTISONE 1 APPLICATION: 25 CREAM TOPICAL at 08:41

## 2022-01-01 RX ADMIN — PRAVASTATIN SODIUM 40 MG: 40 TABLET ORAL at 21:26

## 2022-01-01 RX ADMIN — INSULIN DETEMIR 10 UNITS: 100 INJECTION, SOLUTION SUBCUTANEOUS at 20:47

## 2022-01-01 RX ADMIN — IPRATROPIUM BROMIDE AND ALBUTEROL SULFATE 3 ML: .5; 3 SOLUTION RESPIRATORY (INHALATION) at 15:58

## 2022-01-01 RX ADMIN — IPRATROPIUM BROMIDE AND ALBUTEROL SULFATE 3 ML: .5; 3 SOLUTION RESPIRATORY (INHALATION) at 19:25

## 2022-01-01 RX ADMIN — MICONAZOLE NITRATE 1 APPLICATION: 2 CREAM TOPICAL at 20:14

## 2022-01-01 RX ADMIN — GABAPENTIN 800 MG: 400 CAPSULE ORAL at 22:18

## 2022-01-01 RX ADMIN — LOSARTAN POTASSIUM 25 MG: 25 TABLET, FILM COATED ORAL at 09:51

## 2022-01-01 RX ADMIN — Medication 10 ML: at 08:54

## 2022-01-01 RX ADMIN — ENOXAPARIN SODIUM 40 MG: 40 INJECTION SUBCUTANEOUS at 16:14

## 2022-01-01 RX ADMIN — INSULIN LISPRO 3 UNITS: 100 INJECTION, SOLUTION INTRAVENOUS; SUBCUTANEOUS at 12:35

## 2022-01-01 RX ADMIN — INSULIN LISPRO 10 UNITS: 100 INJECTION, SOLUTION INTRAVENOUS; SUBCUTANEOUS at 12:53

## 2022-01-01 RX ADMIN — GUAIFENESIN 600 MG: 600 TABLET, EXTENDED RELEASE ORAL at 08:29

## 2022-01-01 RX ADMIN — CLOTRIMAZOLE AND BETAMETHASONE DIPROPIONATE 1 APPLICATION: 10; .5 CREAM TOPICAL at 21:33

## 2022-01-01 RX ADMIN — METHYLPREDNISOLONE SODIUM SUCCINATE 40 MG: 40 INJECTION, POWDER, FOR SOLUTION INTRAMUSCULAR; INTRAVENOUS at 17:34

## 2022-01-01 RX ADMIN — ENOXAPARIN SODIUM 40 MG: 40 INJECTION SUBCUTANEOUS at 16:01

## 2022-01-01 RX ADMIN — PREGABALIN 150 MG: 75 CAPSULE ORAL at 06:23

## 2022-01-01 RX ADMIN — MORPHINE SULFATE 2 MG: 2 INJECTION, SOLUTION INTRAMUSCULAR; INTRAVENOUS at 09:28

## 2022-01-01 RX ADMIN — INSULIN LISPRO 10 UNITS: 100 INJECTION, SOLUTION INTRAVENOUS; SUBCUTANEOUS at 07:44

## 2022-01-01 RX ADMIN — LOSARTAN POTASSIUM 25 MG: 25 TABLET, FILM COATED ORAL at 08:36

## 2022-01-01 RX ADMIN — HYDROCODONE BITARTRATE AND ACETAMINOPHEN 1 TABLET: 10; 325 TABLET ORAL at 08:26

## 2022-01-01 RX ADMIN — INSULIN LISPRO 13 UNITS: 100 INJECTION, SOLUTION INTRAVENOUS; SUBCUTANEOUS at 12:05

## 2022-01-01 RX ADMIN — BUMETANIDE 2 MG: 0.25 INJECTION, SOLUTION INTRAMUSCULAR; INTRAVENOUS at 15:41

## 2022-01-01 RX ADMIN — SENNOSIDES AND DOCUSATE SODIUM 2 TABLET: 50; 8.6 TABLET ORAL at 08:55

## 2022-01-01 RX ADMIN — Medication 10 ML: at 21:14

## 2022-01-01 RX ADMIN — HYDROCORTISONE 1 APPLICATION: 25 CREAM TOPICAL at 15:43

## 2022-01-01 RX ADMIN — SENNOSIDES AND DOCUSATE SODIUM 2 TABLET: 50; 8.6 TABLET ORAL at 20:13

## 2022-01-01 RX ADMIN — GABAPENTIN 800 MG: 400 CAPSULE ORAL at 23:04

## 2022-01-01 RX ADMIN — PREGABALIN 150 MG: 75 CAPSULE ORAL at 15:41

## 2022-01-01 RX ADMIN — HYDROCODONE BITARTRATE AND ACETAMINOPHEN 1 TABLET: 10; 325 TABLET ORAL at 09:14

## 2022-01-01 RX ADMIN — PREDNISONE 10 MG: 5 TABLET ORAL at 08:55

## 2022-01-01 RX ADMIN — PREGABALIN 150 MG: 75 CAPSULE ORAL at 20:49

## 2022-01-01 RX ADMIN — Medication 10 ML: at 21:33

## 2022-01-01 RX ADMIN — Medication 10 ML: at 20:17

## 2022-01-01 RX ADMIN — TAZOBACTAM SODIUM AND PIPERACILLIN SODIUM 3.38 G: 375; 3 INJECTION, SOLUTION INTRAVENOUS at 22:18

## 2022-01-01 RX ADMIN — HYDROCODONE BITARTRATE AND ACETAMINOPHEN 1 TABLET: 10; 325 TABLET ORAL at 13:22

## 2022-01-01 RX ADMIN — Medication 10 ML: at 09:31

## 2022-01-01 RX ADMIN — INSULIN DETEMIR 8 UNITS: 100 INJECTION, SOLUTION SUBCUTANEOUS at 08:59

## 2022-01-01 RX ADMIN — IPRATROPIUM BROMIDE AND ALBUTEROL SULFATE 3 ML: .5; 3 SOLUTION RESPIRATORY (INHALATION) at 20:18

## 2022-01-01 RX ADMIN — PREGABALIN 150 MG: 75 CAPSULE ORAL at 17:40

## 2022-01-01 RX ADMIN — ENOXAPARIN SODIUM 40 MG: 40 INJECTION SUBCUTANEOUS at 17:15

## 2022-01-01 RX ADMIN — INSULIN LISPRO 10 UNITS: 100 INJECTION, SOLUTION INTRAVENOUS; SUBCUTANEOUS at 12:25

## 2022-01-01 RX ADMIN — HYDROCODONE BITARTRATE AND ACETAMINOPHEN 1 TABLET: 10; 325 TABLET ORAL at 15:26

## 2022-01-01 RX ADMIN — INSULIN LISPRO 8 UNITS: 100 INJECTION, SOLUTION INTRAVENOUS; SUBCUTANEOUS at 16:52

## 2022-01-01 RX ADMIN — INSULIN DETEMIR 10 UNITS: 100 INJECTION, SOLUTION SUBCUTANEOUS at 09:48

## 2022-01-01 RX ADMIN — HYDROCODONE BITARTRATE AND ACETAMINOPHEN 1 TABLET: 10; 325 TABLET ORAL at 09:25

## 2022-01-01 RX ADMIN — INSULIN LISPRO 3 UNITS: 100 INJECTION, SOLUTION INTRAVENOUS; SUBCUTANEOUS at 18:19

## 2022-01-01 RX ADMIN — PANTOPRAZOLE SODIUM 40 MG: 40 TABLET, DELAYED RELEASE ORAL at 05:23

## 2022-01-01 RX ADMIN — PRAVASTATIN SODIUM 40 MG: 40 TABLET ORAL at 09:00

## 2022-01-01 RX ADMIN — INSULIN DETEMIR 5 UNITS: 100 INJECTION, SOLUTION SUBCUTANEOUS at 08:32

## 2022-01-01 RX ADMIN — MICONAZOLE NITRATE 1 APPLICATION: 2 CREAM TOPICAL at 08:24

## 2022-01-01 RX ADMIN — INSULIN LISPRO 2 UNITS: 100 INJECTION, SOLUTION INTRAVENOUS; SUBCUTANEOUS at 08:47

## 2022-01-01 RX ADMIN — ENOXAPARIN SODIUM 40 MG: 40 INJECTION SUBCUTANEOUS at 05:24

## 2022-01-01 RX ADMIN — MORPHINE SULFATE 4 MG: 4 INJECTION, SOLUTION INTRAMUSCULAR; INTRAVENOUS at 08:29

## 2022-01-01 RX ADMIN — IPRATROPIUM BROMIDE AND ALBUTEROL SULFATE 3 ML: .5; 3 SOLUTION RESPIRATORY (INHALATION) at 18:57

## 2022-01-01 RX ADMIN — CLOTRIMAZOLE AND BETAMETHASONE DIPROPIONATE 1 APPLICATION: 10; .5 CREAM TOPICAL at 21:10

## 2022-01-01 RX ADMIN — OXYBUTYNIN CHLORIDE 5 MG: 5 TABLET, EXTENDED RELEASE ORAL at 09:36

## 2022-01-01 RX ADMIN — INSULIN LISPRO 4 UNITS: 100 INJECTION, SOLUTION INTRAVENOUS; SUBCUTANEOUS at 16:28

## 2022-01-01 RX ADMIN — INSULIN DETEMIR 20 UNITS: 100 INJECTION, SOLUTION SUBCUTANEOUS at 08:04

## 2022-01-01 RX ADMIN — IPRATROPIUM BROMIDE AND ALBUTEROL SULFATE 3 ML: .5; 3 SOLUTION RESPIRATORY (INHALATION) at 08:03

## 2022-01-01 RX ADMIN — HYDROCODONE BITARTRATE AND ACETAMINOPHEN 1 TABLET: 10; 325 TABLET ORAL at 17:55

## 2022-01-01 RX ADMIN — INSULIN DETEMIR 12 UNITS: 100 INJECTION, SOLUTION SUBCUTANEOUS at 20:54

## 2022-01-01 RX ADMIN — BISACODYL 5 MG: 5 TABLET, COATED ORAL at 08:41

## 2022-01-01 RX ADMIN — GUAIFENESIN 600 MG: 600 TABLET, EXTENDED RELEASE ORAL at 20:07

## 2022-01-01 RX ADMIN — INSULIN DETEMIR 30 UNITS: 100 INJECTION, SOLUTION SUBCUTANEOUS at 20:23

## 2022-01-01 RX ADMIN — INSULIN LISPRO 8 UNITS: 100 INJECTION, SOLUTION INTRAVENOUS; SUBCUTANEOUS at 11:55

## 2022-01-01 RX ADMIN — INSULIN DETEMIR 12 UNITS: 100 INJECTION, SOLUTION SUBCUTANEOUS at 08:07

## 2022-01-01 RX ADMIN — ALBUTEROL SULFATE 2.5 MG: 2.5 SOLUTION RESPIRATORY (INHALATION) at 00:25

## 2022-01-01 RX ADMIN — Medication 10 ML: at 08:52

## 2022-01-01 RX ADMIN — INSULIN LISPRO 10 UNITS: 100 INJECTION, SOLUTION INTRAVENOUS; SUBCUTANEOUS at 17:15

## 2022-01-01 RX ADMIN — Medication 10 ML: at 21:50

## 2022-01-01 RX ADMIN — IPRATROPIUM BROMIDE AND ALBUTEROL SULFATE 3 ML: .5; 3 SOLUTION RESPIRATORY (INHALATION) at 15:29

## 2022-01-01 RX ADMIN — Medication 10 ML: at 08:18

## 2022-01-01 RX ADMIN — MICONAZOLE NITRATE 1 APPLICATION: 2 CREAM TOPICAL at 08:36

## 2022-01-01 RX ADMIN — HYDROCODONE BITARTRATE AND ACETAMINOPHEN 1 TABLET: 10; 325 TABLET ORAL at 13:43

## 2022-01-01 RX ADMIN — Medication 10 ML: at 08:08

## 2022-01-01 RX ADMIN — METHYLPREDNISOLONE SODIUM SUCCINATE 40 MG: 40 INJECTION, POWDER, FOR SOLUTION INTRAMUSCULAR; INTRAVENOUS at 09:05

## 2022-01-01 RX ADMIN — FLUCONAZOLE 150 MG: 150 TABLET ORAL at 09:43

## 2022-01-01 RX ADMIN — HYDROCODONE BITARTRATE AND ACETAMINOPHEN 1 TABLET: 10; 325 TABLET ORAL at 14:54

## 2022-01-01 RX ADMIN — MORPHINE SULFATE 2 MG: 2 INJECTION, SOLUTION INTRAMUSCULAR; INTRAVENOUS at 16:05

## 2022-01-01 RX ADMIN — ACETAMINOPHEN 650 MG: 325 TABLET ORAL at 21:32

## 2022-01-01 RX ADMIN — FLUCONAZOLE 200 MG: 200 TABLET ORAL at 15:32

## 2022-01-01 RX ADMIN — FLUOXETINE 40 MG: 20 CAPSULE ORAL at 09:36

## 2022-01-01 RX ADMIN — INSULIN DETEMIR 20 UNITS: 100 INJECTION, SOLUTION SUBCUTANEOUS at 21:32

## 2022-01-01 RX ADMIN — HYDROCORTISONE 1 APPLICATION: 25 CREAM TOPICAL at 22:08

## 2022-01-01 RX ADMIN — INSULIN LISPRO 10 UNITS: 100 INJECTION, SOLUTION INTRAVENOUS; SUBCUTANEOUS at 13:02

## 2022-01-01 RX ADMIN — HYDROCODONE BITARTRATE AND ACETAMINOPHEN 1 TABLET: 10; 325 TABLET ORAL at 12:24

## 2022-01-01 RX ADMIN — PANTOPRAZOLE SODIUM 40 MG: 40 TABLET, DELAYED RELEASE ORAL at 06:14

## 2022-01-01 RX ADMIN — TAZOBACTAM SODIUM AND PIPERACILLIN SODIUM 3.38 G: 375; 3 INJECTION, SOLUTION INTRAVENOUS at 05:07

## 2022-01-01 RX ADMIN — ACETAMINOPHEN 650 MG: 325 TABLET ORAL at 20:52

## 2022-01-01 RX ADMIN — INSULIN DETEMIR 14 UNITS: 100 INJECTION, SOLUTION SUBCUTANEOUS at 20:32

## 2022-01-01 RX ADMIN — PRAVASTATIN SODIUM 40 MG: 40 TABLET ORAL at 09:56

## 2022-01-01 RX ADMIN — PRAVASTATIN SODIUM 40 MG: 40 TABLET ORAL at 22:06

## 2022-01-01 RX ADMIN — IPRATROPIUM BROMIDE AND ALBUTEROL SULFATE 3 ML: .5; 3 SOLUTION RESPIRATORY (INHALATION) at 16:12

## 2022-01-01 RX ADMIN — OXYBUTYNIN CHLORIDE 5 MG: 5 TABLET, EXTENDED RELEASE ORAL at 09:44

## 2022-01-01 RX ADMIN — PREGABALIN 150 MG: 75 CAPSULE ORAL at 22:06

## 2022-01-01 RX ADMIN — METHYLPREDNISOLONE SODIUM SUCCINATE 40 MG: 40 INJECTION, POWDER, FOR SOLUTION INTRAMUSCULAR; INTRAVENOUS at 06:14

## 2022-01-01 RX ADMIN — PREGABALIN 150 MG: 75 CAPSULE ORAL at 05:44

## 2022-01-01 RX ADMIN — FLUOXETINE HYDROCHLORIDE 40 MG: 20 CAPSULE ORAL at 09:14

## 2022-01-01 RX ADMIN — Medication 1 CAPSULE: at 12:23

## 2022-01-01 RX ADMIN — VANCOMYCIN HYDROCHLORIDE 1500 MG: 10 INJECTION, POWDER, LYOPHILIZED, FOR SOLUTION INTRAVENOUS at 10:51

## 2022-01-01 RX ADMIN — INSULIN LISPRO 10 UNITS: 100 INJECTION, SOLUTION INTRAVENOUS; SUBCUTANEOUS at 17:50

## 2022-01-01 RX ADMIN — SENNOSIDES AND DOCUSATE SODIUM 2 TABLET: 50; 8.6 TABLET ORAL at 20:23

## 2022-01-01 RX ADMIN — ENOXAPARIN SODIUM 40 MG: 40 INJECTION SUBCUTANEOUS at 17:09

## 2022-01-01 RX ADMIN — SENNOSIDES AND DOCUSATE SODIUM 2 TABLET: 50; 8.6 TABLET ORAL at 22:18

## 2022-01-01 RX ADMIN — OXYBUTYNIN CHLORIDE 5 MG: 5 TABLET, EXTENDED RELEASE ORAL at 09:22

## 2022-01-01 RX ADMIN — GUAIFENESIN 600 MG: 600 TABLET, EXTENDED RELEASE ORAL at 08:46

## 2022-01-01 RX ADMIN — Medication 10 ML: at 08:50

## 2022-01-01 RX ADMIN — METHYLPREDNISOLONE SODIUM SUCCINATE 40 MG: 40 INJECTION, POWDER, FOR SOLUTION INTRAMUSCULAR; INTRAVENOUS at 16:31

## 2022-01-01 RX ADMIN — INSULIN LISPRO 5 UNITS: 100 INJECTION, SOLUTION INTRAVENOUS; SUBCUTANEOUS at 21:12

## 2022-01-01 RX ADMIN — PREGABALIN 150 MG: 75 CAPSULE ORAL at 21:29

## 2022-01-01 RX ADMIN — GUAIFENESIN 600 MG: 600 TABLET, EXTENDED RELEASE ORAL at 21:00

## 2022-01-01 RX ADMIN — SENNOSIDES AND DOCUSATE SODIUM 2 TABLET: 50; 8.6 TABLET ORAL at 21:00

## 2022-01-01 RX ADMIN — PREGABALIN 150 MG: 75 CAPSULE ORAL at 05:18

## 2022-01-01 RX ADMIN — PREGABALIN 50 MG: 50 CAPSULE ORAL at 23:15

## 2022-01-01 RX ADMIN — MORPHINE SULFATE 2 MG: 2 INJECTION, SOLUTION INTRAMUSCULAR; INTRAVENOUS at 11:05

## 2022-01-01 RX ADMIN — INSULIN DETEMIR 10 UNITS: 100 INJECTION, SOLUTION SUBCUTANEOUS at 21:32

## 2022-01-01 RX ADMIN — Medication 10 ML: at 08:20

## 2022-01-01 RX ADMIN — PREGABALIN 150 MG: 75 CAPSULE ORAL at 14:36

## 2022-01-01 RX ADMIN — INSULIN LISPRO 5 UNITS: 100 INJECTION, SOLUTION INTRAVENOUS; SUBCUTANEOUS at 17:18

## 2022-01-01 RX ADMIN — Medication 10 ML: at 20:16

## 2022-01-01 RX ADMIN — IPRATROPIUM BROMIDE AND ALBUTEROL SULFATE 3 ML: .5; 3 SOLUTION RESPIRATORY (INHALATION) at 19:31

## 2022-01-01 RX ADMIN — ALBUTEROL SULFATE 2.5 MG: 2.5 SOLUTION RESPIRATORY (INHALATION) at 08:05

## 2022-01-01 RX ADMIN — INSULIN DETEMIR 16 UNITS: 100 INJECTION, SOLUTION SUBCUTANEOUS at 20:22

## 2022-01-01 RX ADMIN — SENNOSIDES AND DOCUSATE SODIUM 2 TABLET: 50; 8.6 TABLET ORAL at 20:43

## 2022-01-01 RX ADMIN — PREGABALIN 100 MG: 100 CAPSULE ORAL at 21:12

## 2022-01-01 RX ADMIN — FLUOXETINE 40 MG: 20 CAPSULE ORAL at 08:34

## 2022-01-01 RX ADMIN — Medication 10 ML: at 20:56

## 2022-01-01 RX ADMIN — METHYLPREDNISOLONE SODIUM SUCCINATE 60 MG: 125 INJECTION, POWDER, FOR SOLUTION INTRAMUSCULAR; INTRAVENOUS at 17:15

## 2022-01-01 RX ADMIN — INSULIN LISPRO 5 UNITS: 100 INJECTION, SOLUTION INTRAVENOUS; SUBCUTANEOUS at 17:15

## 2022-01-01 RX ADMIN — INSULIN LISPRO 15 UNITS: 100 INJECTION, SOLUTION INTRAVENOUS; SUBCUTANEOUS at 12:10

## 2022-01-01 RX ADMIN — PANTOPRAZOLE SODIUM 40 MG: 40 TABLET, DELAYED RELEASE ORAL at 08:36

## 2022-01-01 RX ADMIN — GABAPENTIN 800 MG: 400 CAPSULE ORAL at 13:29

## 2022-01-01 RX ADMIN — CLOTRIMAZOLE AND BETAMETHASONE DIPROPIONATE 1 APPLICATION: 10; .5 CREAM TOPICAL at 08:41

## 2022-01-01 RX ADMIN — Medication 10 ML: at 08:29

## 2022-01-01 RX ADMIN — INSULIN LISPRO 10 UNITS: 100 INJECTION, SOLUTION INTRAVENOUS; SUBCUTANEOUS at 13:34

## 2022-01-01 RX ADMIN — PRAVASTATIN SODIUM 40 MG: 40 TABLET ORAL at 09:31

## 2022-01-01 RX ADMIN — BUMETANIDE 2 MG: 0.25 INJECTION, SOLUTION INTRAMUSCULAR; INTRAVENOUS at 09:00

## 2022-01-01 RX ADMIN — BISACODYL 5 MG: 5 TABLET, COATED ORAL at 08:04

## 2022-01-01 RX ADMIN — MAGNESIUM SULFATE HEPTAHYDRATE 2 G: 2 INJECTION, SOLUTION INTRAVENOUS at 17:17

## 2022-01-01 RX ADMIN — ALBUTEROL SULFATE 2.5 MG: 2.5 SOLUTION RESPIRATORY (INHALATION) at 14:23

## 2022-01-01 RX ADMIN — FLUOXETINE 40 MG: 20 CAPSULE ORAL at 08:44

## 2022-01-01 RX ADMIN — Medication 1 CAPSULE: at 09:01

## 2022-01-01 RX ADMIN — Medication 10 ML: at 10:03

## 2022-01-01 RX ADMIN — Medication 10 ML: at 09:09

## 2022-01-01 RX ADMIN — ALBUTEROL SULFATE 2.5 MG: 2.5 SOLUTION RESPIRATORY (INHALATION) at 15:15

## 2022-01-01 RX ADMIN — SENNOSIDES AND DOCUSATE SODIUM 2 TABLET: 50; 8.6 TABLET ORAL at 21:45

## 2022-01-01 RX ADMIN — OXYBUTYNIN CHLORIDE 5 MG: 5 TABLET, EXTENDED RELEASE ORAL at 08:26

## 2022-01-01 RX ADMIN — INSULIN LISPRO 10 UNITS: 100 INJECTION, SOLUTION INTRAVENOUS; SUBCUTANEOUS at 17:45

## 2022-01-01 RX ADMIN — INSULIN LISPRO 3 UNITS: 100 INJECTION, SOLUTION INTRAVENOUS; SUBCUTANEOUS at 11:57

## 2022-01-01 RX ADMIN — INSULIN LISPRO 10 UNITS: 100 INJECTION, SOLUTION INTRAVENOUS; SUBCUTANEOUS at 11:28

## 2022-01-01 RX ADMIN — INSULIN LISPRO 10 UNITS: 100 INJECTION, SOLUTION INTRAVENOUS; SUBCUTANEOUS at 11:51

## 2022-01-01 RX ADMIN — SENNOSIDES AND DOCUSATE SODIUM 2 TABLET: 50; 8.6 TABLET ORAL at 08:29

## 2022-01-01 RX ADMIN — OXYBUTYNIN CHLORIDE 5 MG: 5 TABLET, EXTENDED RELEASE ORAL at 08:34

## 2022-01-01 RX ADMIN — Medication 10 ML: at 08:35

## 2022-01-01 RX ADMIN — PREGABALIN 150 MG: 75 CAPSULE ORAL at 08:35

## 2022-01-01 RX ADMIN — INSULIN LISPRO 5 UNITS: 100 INJECTION, SOLUTION INTRAVENOUS; SUBCUTANEOUS at 20:54

## 2022-01-01 RX ADMIN — ENOXAPARIN SODIUM 40 MG: 40 INJECTION SUBCUTANEOUS at 05:17

## 2022-01-01 RX ADMIN — INSULIN LISPRO 3 UNITS: 100 INJECTION, SOLUTION INTRAVENOUS; SUBCUTANEOUS at 11:59

## 2022-01-01 RX ADMIN — HYDROCODONE BITARTRATE AND ACETAMINOPHEN 1 TABLET: 10; 325 TABLET ORAL at 12:25

## 2022-01-01 RX ADMIN — INSULIN DETEMIR 12 UNITS: 100 INJECTION, SOLUTION SUBCUTANEOUS at 21:27

## 2022-01-01 RX ADMIN — INSULIN LISPRO 4 UNITS: 100 INJECTION, SOLUTION INTRAVENOUS; SUBCUTANEOUS at 17:53

## 2022-01-01 RX ADMIN — INSULIN DETEMIR 20 UNITS: 100 INJECTION, SOLUTION SUBCUTANEOUS at 22:03

## 2022-01-01 RX ADMIN — PRAVASTATIN SODIUM 40 MG: 40 TABLET ORAL at 21:03

## 2022-01-01 RX ADMIN — TAZOBACTAM SODIUM AND PIPERACILLIN SODIUM 3.38 G: 375; 3 INJECTION, SOLUTION INTRAVENOUS at 14:13

## 2022-01-01 RX ADMIN — INSULIN LISPRO 13 UNITS: 100 INJECTION, SOLUTION INTRAVENOUS; SUBCUTANEOUS at 17:35

## 2022-01-01 RX ADMIN — INSULIN LISPRO 5 UNITS: 100 INJECTION, SOLUTION INTRAVENOUS; SUBCUTANEOUS at 10:37

## 2022-01-01 RX ADMIN — PREGABALIN 150 MG: 75 CAPSULE ORAL at 15:17

## 2022-01-01 RX ADMIN — Medication 10 ML: at 20:24

## 2022-01-01 RX ADMIN — ENOXAPARIN SODIUM 40 MG: 40 INJECTION SUBCUTANEOUS at 06:10

## 2022-01-01 RX ADMIN — PRAVASTATIN SODIUM 40 MG: 40 TABLET ORAL at 21:41

## 2022-01-01 RX ADMIN — INSULIN LISPRO 3 UNITS: 100 INJECTION, SOLUTION INTRAVENOUS; SUBCUTANEOUS at 21:02

## 2022-01-01 RX ADMIN — HYDROCODONE BITARTRATE AND ACETAMINOPHEN 1 TABLET: 10; 325 TABLET ORAL at 11:57

## 2022-01-01 RX ADMIN — FLUOXETINE HYDROCHLORIDE 40 MG: 20 CAPSULE ORAL at 08:08

## 2022-01-01 RX ADMIN — Medication 10 ML: at 08:48

## 2022-01-01 RX ADMIN — ALPRAZOLAM 0.5 MG: 0.5 TABLET ORAL at 21:32

## 2022-01-01 RX ADMIN — INSULIN LISPRO 10 UNITS: 100 INJECTION, SOLUTION INTRAVENOUS; SUBCUTANEOUS at 09:22

## 2022-01-01 RX ADMIN — PREDNISONE 15 MG: 5 TABLET ORAL at 08:33

## 2022-01-01 RX ADMIN — SENNOSIDES AND DOCUSATE SODIUM 2 TABLET: 50; 8.6 TABLET ORAL at 08:06

## 2022-01-01 RX ADMIN — INSULIN DETEMIR 12 UNITS: 100 INJECTION, SOLUTION SUBCUTANEOUS at 20:14

## 2022-01-01 RX ADMIN — Medication 10 ML: at 21:27

## 2022-01-01 RX ADMIN — INDAPAMIDE 2.5 MG: 1.25 TABLET, FILM COATED ORAL at 08:35

## 2022-01-01 RX ADMIN — INSULIN LISPRO 2 UNITS: 100 INJECTION, SOLUTION INTRAVENOUS; SUBCUTANEOUS at 21:27

## 2022-01-01 RX ADMIN — PREGABALIN 150 MG: 75 CAPSULE ORAL at 20:48

## 2022-01-01 RX ADMIN — HYDROCHLOROTHIAZIDE 25 MG: 25 TABLET ORAL at 11:39

## 2022-01-01 RX ADMIN — INSULIN DETEMIR 14 UNITS: 100 INJECTION, SOLUTION SUBCUTANEOUS at 08:36

## 2022-01-01 RX ADMIN — IPRATROPIUM BROMIDE AND ALBUTEROL SULFATE 3 ML: .5; 3 SOLUTION RESPIRATORY (INHALATION) at 16:44

## 2022-01-01 RX ADMIN — ALBUTEROL SULFATE 2.5 MG: 2.5 SOLUTION RESPIRATORY (INHALATION) at 00:53

## 2022-01-01 RX ADMIN — DIPHENHYDRAMINE HYDROCHLORIDE 25 MG: 25 CAPSULE ORAL at 16:50

## 2022-01-01 RX ADMIN — INSULIN DETEMIR 10 UNITS: 100 INJECTION, SOLUTION SUBCUTANEOUS at 21:06

## 2022-01-01 RX ADMIN — INSULIN DETEMIR 17 UNITS: 100 INJECTION, SOLUTION SUBCUTANEOUS at 20:08

## 2022-01-01 RX ADMIN — SENNOSIDES AND DOCUSATE SODIUM 2 TABLET: 50; 8.6 TABLET ORAL at 09:04

## 2022-01-01 RX ADMIN — INSULIN LISPRO 13 UNITS: 100 INJECTION, SOLUTION INTRAVENOUS; SUBCUTANEOUS at 08:17

## 2022-01-01 RX ADMIN — IPRATROPIUM BROMIDE AND ALBUTEROL SULFATE 3 ML: .5; 3 SOLUTION RESPIRATORY (INHALATION) at 07:51

## 2022-01-01 RX ADMIN — ALPRAZOLAM 0.5 MG: 0.5 TABLET ORAL at 21:11

## 2022-01-01 RX ADMIN — ENOXAPARIN SODIUM 40 MG: 40 INJECTION SUBCUTANEOUS at 16:22

## 2022-01-01 RX ADMIN — INSULIN LISPRO 3 UNITS: 100 INJECTION, SOLUTION INTRAVENOUS; SUBCUTANEOUS at 17:15

## 2022-01-01 RX ADMIN — IPRATROPIUM BROMIDE AND ALBUTEROL SULFATE 3 ML: .5; 3 SOLUTION RESPIRATORY (INHALATION) at 12:37

## 2022-01-01 RX ADMIN — PRAVASTATIN SODIUM 40 MG: 40 TABLET ORAL at 08:00

## 2022-01-01 RX ADMIN — PREDNISONE 20 MG: 20 TABLET ORAL at 08:45

## 2022-01-01 RX ADMIN — HYDROCODONE BITARTRATE AND ACETAMINOPHEN 1 TABLET: 10; 325 TABLET ORAL at 03:00

## 2022-01-01 RX ADMIN — INSULIN DETEMIR 10 UNITS: 100 INJECTION, SOLUTION SUBCUTANEOUS at 21:03

## 2022-01-01 RX ADMIN — IPRATROPIUM BROMIDE AND ALBUTEROL SULFATE 3 ML: .5; 3 SOLUTION RESPIRATORY (INHALATION) at 07:57

## 2022-01-01 RX ADMIN — INSULIN LISPRO 10 UNITS: 100 INJECTION, SOLUTION INTRAVENOUS; SUBCUTANEOUS at 17:09

## 2022-01-01 RX ADMIN — INSULIN LISPRO 10 UNITS: 100 INJECTION, SOLUTION INTRAVENOUS; SUBCUTANEOUS at 09:57

## 2022-01-01 RX ADMIN — PREGABALIN 150 MG: 75 CAPSULE ORAL at 13:03

## 2022-01-01 RX ADMIN — Medication 10 ML: at 09:54

## 2022-01-01 RX ADMIN — Medication 10 ML: at 09:01

## 2022-01-01 RX ADMIN — INSULIN LISPRO 5 UNITS: 100 INJECTION, SOLUTION INTRAVENOUS; SUBCUTANEOUS at 12:37

## 2022-01-01 RX ADMIN — Medication 10 ML: at 21:19

## 2022-01-01 RX ADMIN — HYDROCODONE BITARTRATE AND ACETAMINOPHEN 1 TABLET: 10; 325 TABLET ORAL at 13:21

## 2022-01-01 RX ADMIN — FLUOXETINE HYDROCHLORIDE 40 MG: 20 CAPSULE ORAL at 08:00

## 2022-01-01 RX ADMIN — INSULIN LISPRO 10 UNITS: 100 INJECTION, SOLUTION INTRAVENOUS; SUBCUTANEOUS at 08:59

## 2022-01-01 RX ADMIN — METHYLPREDNISOLONE SODIUM SUCCINATE 60 MG: 125 INJECTION, POWDER, FOR SOLUTION INTRAMUSCULAR; INTRAVENOUS at 18:25

## 2022-01-01 RX ADMIN — Medication 10 ML: at 21:16

## 2022-01-01 RX ADMIN — Medication 5 MG: at 20:06

## 2022-01-01 RX ADMIN — FLUOXETINE HYDROCHLORIDE 40 MG: 20 CAPSULE ORAL at 08:45

## 2022-01-01 RX ADMIN — MAGNESIUM SULFATE HEPTAHYDRATE 2 G: 2 INJECTION, SOLUTION INTRAVENOUS at 11:52

## 2022-01-01 RX ADMIN — INSULIN DETEMIR 10 UNITS: 100 INJECTION, SOLUTION SUBCUTANEOUS at 09:31

## 2022-01-01 RX ADMIN — BISACODYL 5 MG: 5 TABLET, COATED ORAL at 05:39

## 2022-01-01 RX ADMIN — INSULIN DETEMIR 10 UNITS: 100 INJECTION, SOLUTION SUBCUTANEOUS at 21:00

## 2022-01-01 RX ADMIN — MICONAZOLE NITRATE 1 APPLICATION: 2 CREAM TOPICAL at 09:21

## 2022-01-01 RX ADMIN — INSULIN LISPRO 5 UNITS: 100 INJECTION, SOLUTION INTRAVENOUS; SUBCUTANEOUS at 17:49

## 2022-01-01 RX ADMIN — INSULIN DETEMIR 20 UNITS: 100 INJECTION, SOLUTION SUBCUTANEOUS at 21:27

## 2022-01-01 RX ADMIN — CLOTRIMAZOLE AND BETAMETHASONE DIPROPIONATE 1 APPLICATION: 10; .5 CREAM TOPICAL at 20:14

## 2022-01-01 RX ADMIN — SENNOSIDES AND DOCUSATE SODIUM 2 TABLET: 50; 8.6 TABLET ORAL at 20:33

## 2022-01-01 RX ADMIN — INSULIN LISPRO 3 UNITS: 100 INJECTION, SOLUTION INTRAVENOUS; SUBCUTANEOUS at 08:06

## 2022-01-01 RX ADMIN — OXYBUTYNIN CHLORIDE 5 MG: 5 TABLET, EXTENDED RELEASE ORAL at 08:33

## 2022-01-01 RX ADMIN — GABAPENTIN 800 MG: 400 CAPSULE ORAL at 14:12

## 2022-01-01 RX ADMIN — ALPRAZOLAM 0.5 MG: 0.5 TABLET ORAL at 21:01

## 2022-01-01 RX ADMIN — HYDROCODONE BITARTRATE AND ACETAMINOPHEN 1 TABLET: 10; 325 TABLET ORAL at 00:14

## 2022-01-01 RX ADMIN — HYDROCODONE BITARTRATE AND ACETAMINOPHEN 1 TABLET: 10; 325 TABLET ORAL at 21:02

## 2022-01-01 RX ADMIN — INSULIN LISPRO 3 UNITS: 100 INJECTION, SOLUTION INTRAVENOUS; SUBCUTANEOUS at 08:32

## 2022-01-01 RX ADMIN — Medication 10 ML: at 21:38

## 2022-01-01 RX ADMIN — MORPHINE SULFATE 2 MG: 2 INJECTION, SOLUTION INTRAMUSCULAR; INTRAVENOUS at 01:38

## 2022-01-01 RX ADMIN — INSULIN DETEMIR 20 UNITS: 100 INJECTION, SOLUTION SUBCUTANEOUS at 21:02

## 2022-01-01 RX ADMIN — CLOTRIMAZOLE AND BETAMETHASONE DIPROPIONATE 1 APPLICATION: 10; .5 CREAM TOPICAL at 09:23

## 2022-01-01 RX ADMIN — IPRATROPIUM BROMIDE AND ALBUTEROL SULFATE 3 ML: .5; 3 SOLUTION RESPIRATORY (INHALATION) at 19:30

## 2022-01-01 RX ADMIN — FLUOXETINE HYDROCHLORIDE 40 MG: 20 CAPSULE ORAL at 17:48

## 2022-01-01 RX ADMIN — PREGABALIN 150 MG: 75 CAPSULE ORAL at 17:17

## 2022-01-01 RX ADMIN — SENNOSIDES AND DOCUSATE SODIUM 2 TABLET: 50; 8.6 TABLET ORAL at 20:57

## 2022-01-01 RX ADMIN — INSULIN LISPRO 4 UNITS: 100 INJECTION, SOLUTION INTRAVENOUS; SUBCUTANEOUS at 13:01

## 2022-01-01 RX ADMIN — PREGABALIN 150 MG: 75 CAPSULE ORAL at 21:49

## 2022-01-01 RX ADMIN — MAGNESIUM SULFATE HEPTAHYDRATE 2 G: 2 INJECTION, SOLUTION INTRAVENOUS at 15:00

## 2022-01-01 RX ADMIN — MAGNESIUM SULFATE HEPTAHYDRATE 2 G: 2 INJECTION, SOLUTION INTRAVENOUS at 14:06

## 2022-01-01 RX ADMIN — HYDROCODONE BITARTRATE AND ACETAMINOPHEN 1 TABLET: 10; 325 TABLET ORAL at 09:31

## 2022-01-01 RX ADMIN — LOSARTAN POTASSIUM 25 MG: 25 TABLET, FILM COATED ORAL at 08:27

## 2022-01-01 RX ADMIN — HYDROCODONE BITARTRATE AND ACETAMINOPHEN 1 TABLET: 10; 325 TABLET ORAL at 16:31

## 2022-01-01 RX ADMIN — LOSARTAN POTASSIUM 50 MG: 50 TABLET, FILM COATED ORAL at 09:23

## 2022-01-01 RX ADMIN — PRAVASTATIN SODIUM 40 MG: 40 TABLET ORAL at 08:06

## 2022-01-01 RX ADMIN — INSULIN LISPRO 10 UNITS: 100 INJECTION, SOLUTION INTRAVENOUS; SUBCUTANEOUS at 12:35

## 2022-01-01 RX ADMIN — GUAIFENESIN 600 MG: 600 TABLET, EXTENDED RELEASE ORAL at 20:06

## 2022-01-01 RX ADMIN — FLUCONAZOLE 200 MG: 200 TABLET ORAL at 16:05

## 2022-01-01 RX ADMIN — TAZOBACTAM SODIUM AND PIPERACILLIN SODIUM 3.38 G: 375; 3 INJECTION, SOLUTION INTRAVENOUS at 23:04

## 2022-01-01 RX ADMIN — PREGABALIN 150 MG: 75 CAPSULE ORAL at 08:34

## 2022-01-01 RX ADMIN — INSULIN LISPRO 12 UNITS: 100 INJECTION, SOLUTION INTRAVENOUS; SUBCUTANEOUS at 18:29

## 2022-01-01 RX ADMIN — METHYLPREDNISOLONE SODIUM SUCCINATE 40 MG: 40 INJECTION, POWDER, FOR SOLUTION INTRAMUSCULAR; INTRAVENOUS at 16:51

## 2022-01-01 RX ADMIN — MAGNESIUM SULFATE HEPTAHYDRATE 2 G: 2 INJECTION, SOLUTION INTRAVENOUS at 07:38

## 2022-01-01 RX ADMIN — HYDROCODONE BITARTRATE AND ACETAMINOPHEN 1 TABLET: 10; 325 TABLET ORAL at 01:19

## 2022-01-01 RX ADMIN — TAZOBACTAM SODIUM AND PIPERACILLIN SODIUM 3.38 G: 375; 3 INJECTION, SOLUTION INTRAVENOUS at 06:36

## 2022-01-01 RX ADMIN — METHYLPREDNISOLONE SODIUM SUCCINATE 40 MG: 40 INJECTION, POWDER, FOR SOLUTION INTRAMUSCULAR; INTRAVENOUS at 09:30

## 2022-01-01 RX ADMIN — PANTOPRAZOLE SODIUM 40 MG: 40 TABLET, DELAYED RELEASE ORAL at 08:18

## 2022-01-01 RX ADMIN — ALPRAZOLAM 0.5 MG: 0.5 TABLET ORAL at 21:28

## 2022-01-01 RX ADMIN — Medication 1 CAPSULE: at 08:26

## 2022-01-01 RX ADMIN — INSULIN LISPRO 5 UNITS: 100 INJECTION, SOLUTION INTRAVENOUS; SUBCUTANEOUS at 11:18

## 2022-01-01 RX ADMIN — ALBUTEROL SULFATE 2.5 MG: 2.5 SOLUTION RESPIRATORY (INHALATION) at 08:38

## 2022-01-01 RX ADMIN — INSULIN LISPRO 12 UNITS: 100 INJECTION, SOLUTION INTRAVENOUS; SUBCUTANEOUS at 20:21

## 2022-01-01 RX ADMIN — PREGABALIN 150 MG: 75 CAPSULE ORAL at 16:55

## 2022-01-01 RX ADMIN — INSULIN LISPRO 5 UNITS: 100 INJECTION, SOLUTION INTRAVENOUS; SUBCUTANEOUS at 12:25

## 2022-01-01 RX ADMIN — IPRATROPIUM BROMIDE AND ALBUTEROL SULFATE 3 ML: .5; 3 SOLUTION RESPIRATORY (INHALATION) at 07:29

## 2022-01-01 RX ADMIN — INSULIN LISPRO 10 UNITS: 100 INJECTION, SOLUTION INTRAVENOUS; SUBCUTANEOUS at 12:23

## 2022-01-01 RX ADMIN — HYDROCODONE BITARTRATE AND ACETAMINOPHEN 1 TABLET: 10; 325 TABLET ORAL at 09:40

## 2022-01-01 RX ADMIN — PREDNISONE 15 MG: 5 TABLET ORAL at 08:34

## 2022-01-01 RX ADMIN — FLUCONAZOLE 200 MG: 200 TABLET ORAL at 16:55

## 2022-01-01 RX ADMIN — OXYBUTYNIN CHLORIDE 5 MG: 5 TABLET, EXTENDED RELEASE ORAL at 08:44

## 2022-01-01 RX ADMIN — SENNOSIDES AND DOCUSATE SODIUM 2 TABLET: 50; 8.6 TABLET ORAL at 21:02

## 2022-01-01 RX ADMIN — DIPHENHYDRAMINE HYDROCHLORIDE 25 MG: 25 CAPSULE ORAL at 08:35

## 2022-01-01 RX ADMIN — PRAVASTATIN SODIUM 40 MG: 40 TABLET ORAL at 20:16

## 2022-01-01 RX ADMIN — INSULIN LISPRO 10 UNITS: 100 INJECTION, SOLUTION INTRAVENOUS; SUBCUTANEOUS at 11:39

## 2022-01-01 RX ADMIN — FLUOXETINE HYDROCHLORIDE 40 MG: 20 CAPSULE ORAL at 08:18

## 2022-01-01 RX ADMIN — INSULIN LISPRO 5 UNITS: 100 INJECTION, SOLUTION INTRAVENOUS; SUBCUTANEOUS at 17:07

## 2022-01-01 RX ADMIN — Medication 10 ML: at 21:28

## 2022-01-01 RX ADMIN — PANTOPRAZOLE SODIUM 40 MG: 40 TABLET, DELAYED RELEASE ORAL at 04:49

## 2022-01-01 RX ADMIN — Medication 10 ML: at 23:04

## 2022-01-01 RX ADMIN — HYDROCODONE BITARTRATE AND ACETAMINOPHEN 1 TABLET: 10; 325 TABLET ORAL at 12:13

## 2022-01-01 RX ADMIN — DIPHENHYDRAMINE HYDROCHLORIDE 25 MG: 25 CAPSULE ORAL at 11:00

## 2022-01-01 RX ADMIN — INSULIN LISPRO 8 UNITS: 100 INJECTION, SOLUTION INTRAVENOUS; SUBCUTANEOUS at 08:56

## 2022-01-01 RX ADMIN — TAZOBACTAM SODIUM AND PIPERACILLIN SODIUM 3.38 G: 375; 3 INJECTION, SOLUTION INTRAVENOUS at 06:32

## 2022-01-01 RX ADMIN — INSULIN LISPRO 12 UNITS: 100 INJECTION, SOLUTION INTRAVENOUS; SUBCUTANEOUS at 18:08

## 2022-01-01 RX ADMIN — IPRATROPIUM BROMIDE AND ALBUTEROL SULFATE 3 ML: .5; 3 SOLUTION RESPIRATORY (INHALATION) at 16:42

## 2022-01-01 RX ADMIN — PRAVASTATIN SODIUM 40 MG: 40 TABLET ORAL at 21:30

## 2022-01-01 RX ADMIN — INSULIN LISPRO 2 UNITS: 100 INJECTION, SOLUTION INTRAVENOUS; SUBCUTANEOUS at 21:25

## 2022-01-01 RX ADMIN — INSULIN LISPRO 12 UNITS: 100 INJECTION, SOLUTION INTRAVENOUS; SUBCUTANEOUS at 11:28

## 2022-01-01 RX ADMIN — PANTOPRAZOLE SODIUM 40 MG: 40 TABLET, DELAYED RELEASE ORAL at 08:06

## 2022-01-01 RX ADMIN — PREGABALIN 150 MG: 75 CAPSULE ORAL at 16:06

## 2022-01-01 RX ADMIN — METHYLPREDNISOLONE SODIUM SUCCINATE 40 MG: 40 INJECTION, POWDER, LYOPHILIZED, FOR SOLUTION INTRAMUSCULAR; INTRAVENOUS at 14:18

## 2022-01-01 RX ADMIN — PREGABALIN 150 MG: 75 CAPSULE ORAL at 15:16

## 2022-01-01 RX ADMIN — METHYLPREDNISOLONE SODIUM SUCCINATE 40 MG: 40 INJECTION, POWDER, FOR SOLUTION INTRAMUSCULAR; INTRAVENOUS at 09:08

## 2022-01-01 RX ADMIN — FLUOXETINE 40 MG: 20 CAPSULE ORAL at 07:55

## 2022-01-01 RX ADMIN — WATER 500 MG: 1 INJECTION INTRAMUSCULAR; INTRAVENOUS; SUBCUTANEOUS at 16:15

## 2022-01-01 RX ADMIN — ALPRAZOLAM 0.5 MG: 0.5 TABLET ORAL at 21:18

## 2022-01-01 RX ADMIN — Medication 10 ML: at 09:24

## 2022-01-01 RX ADMIN — INSULIN LISPRO 10 UNITS: 100 INJECTION, SOLUTION INTRAVENOUS; SUBCUTANEOUS at 17:35

## 2022-01-01 RX ADMIN — HYDROCHLOROTHIAZIDE 25 MG: 25 TABLET ORAL at 08:25

## 2022-01-01 RX ADMIN — SENNOSIDES AND DOCUSATE SODIUM 2 TABLET: 50; 8.6 TABLET ORAL at 08:34

## 2022-01-01 RX ADMIN — GABAPENTIN 800 MG: 400 CAPSULE ORAL at 21:27

## 2022-01-01 RX ADMIN — MAGNESIUM SULFATE HEPTAHYDRATE 4 G: 40 INJECTION, SOLUTION INTRAVENOUS at 13:20

## 2022-01-01 RX ADMIN — INSULIN LISPRO 10 UNITS: 100 INJECTION, SOLUTION INTRAVENOUS; SUBCUTANEOUS at 13:03

## 2022-01-01 RX ADMIN — INSULIN DETEMIR 13 UNITS: 100 INJECTION, SOLUTION SUBCUTANEOUS at 20:43

## 2022-01-01 RX ADMIN — VANCOMYCIN HYDROCHLORIDE 1250 MG: 10 INJECTION, POWDER, LYOPHILIZED, FOR SOLUTION INTRAVENOUS at 13:07

## 2022-01-01 RX ADMIN — INSULIN LISPRO 15 UNITS: 100 INJECTION, SOLUTION INTRAVENOUS; SUBCUTANEOUS at 17:47

## 2022-01-01 RX ADMIN — PREGABALIN 150 MG: 75 CAPSULE ORAL at 08:33

## 2022-01-01 RX ADMIN — ALBUTEROL SULFATE 2.5 MG: 2.5 SOLUTION RESPIRATORY (INHALATION) at 18:37

## 2022-01-01 RX ADMIN — PREGABALIN 150 MG: 75 CAPSULE ORAL at 21:26

## 2022-01-01 RX ADMIN — Medication 10 ML: at 20:38

## 2022-01-01 RX ADMIN — Medication 1 CAPSULE: at 13:34

## 2022-01-01 RX ADMIN — INSULIN LISPRO 12 UNITS: 100 INJECTION, SOLUTION INTRAVENOUS; SUBCUTANEOUS at 18:02

## 2022-01-01 RX ADMIN — IPRATROPIUM BROMIDE AND ALBUTEROL SULFATE 3 ML: .5; 3 SOLUTION RESPIRATORY (INHALATION) at 13:17

## 2022-01-01 RX ADMIN — ALPRAZOLAM 0.5 MG: 0.5 TABLET ORAL at 20:12

## 2022-01-01 RX ADMIN — PANTOPRAZOLE SODIUM 40 MG: 40 TABLET, DELAYED RELEASE ORAL at 05:56

## 2022-01-01 RX ADMIN — PREGABALIN 150 MG: 75 CAPSULE ORAL at 16:22

## 2022-01-01 RX ADMIN — FLUOXETINE HYDROCHLORIDE 40 MG: 20 CAPSULE ORAL at 08:41

## 2022-01-01 RX ADMIN — INSULIN LISPRO 8 UNITS: 100 INJECTION, SOLUTION INTRAVENOUS; SUBCUTANEOUS at 20:37

## 2022-01-01 RX ADMIN — INSULIN LISPRO 2 UNITS: 100 INJECTION, SOLUTION INTRAVENOUS; SUBCUTANEOUS at 18:15

## 2022-01-01 RX ADMIN — GABAPENTIN 800 MG: 400 CAPSULE ORAL at 21:02

## 2022-01-01 RX ADMIN — INSULIN LISPRO 2 UNITS: 100 INJECTION, SOLUTION INTRAVENOUS; SUBCUTANEOUS at 13:18

## 2022-01-01 RX ADMIN — PRAVASTATIN SODIUM 40 MG: 40 TABLET ORAL at 08:18

## 2022-01-01 RX ADMIN — TAZOBACTAM SODIUM AND PIPERACILLIN SODIUM 3.38 G: 375; 3 INJECTION, SOLUTION INTRAVENOUS at 20:26

## 2022-01-01 RX ADMIN — METHYLPREDNISOLONE SODIUM SUCCINATE 40 MG: 40 INJECTION, POWDER, LYOPHILIZED, FOR SOLUTION INTRAMUSCULAR; INTRAVENOUS at 14:58

## 2022-01-01 RX ADMIN — FLUOXETINE HYDROCHLORIDE 40 MG: 20 CAPSULE ORAL at 09:00

## 2022-01-01 RX ADMIN — PANTOPRAZOLE SODIUM 40 MG: 40 TABLET, DELAYED RELEASE ORAL at 08:41

## 2022-01-01 RX ADMIN — HYDROCODONE BITARTRATE AND ACETAMINOPHEN 1 TABLET: 10; 325 TABLET ORAL at 11:49

## 2022-01-01 RX ADMIN — PREGABALIN 150 MG: 75 CAPSULE ORAL at 09:22

## 2022-01-01 RX ADMIN — Medication 10 ML: at 08:34

## 2022-01-01 RX ADMIN — PRAVASTATIN SODIUM 40 MG: 40 TABLET ORAL at 20:43

## 2022-01-01 RX ADMIN — HYDROCODONE BITARTRATE AND ACETAMINOPHEN 1 TABLET: 10; 325 TABLET ORAL at 04:48

## 2022-01-01 RX ADMIN — FLUOXETINE 40 MG: 20 CAPSULE ORAL at 09:24

## 2022-01-01 RX ADMIN — IPRATROPIUM BROMIDE AND ALBUTEROL SULFATE 3 ML: .5; 3 SOLUTION RESPIRATORY (INHALATION) at 06:43

## 2022-01-01 RX ADMIN — OXYBUTYNIN CHLORIDE 5 MG: 5 TABLET, EXTENDED RELEASE ORAL at 08:36

## 2022-01-01 RX ADMIN — ALBUTEROL SULFATE 2.5 MG: 2.5 SOLUTION RESPIRATORY (INHALATION) at 19:33

## 2022-01-01 RX ADMIN — SENNOSIDES AND DOCUSATE SODIUM 2 TABLET: 50; 8.6 TABLET ORAL at 08:22

## 2022-01-01 RX ADMIN — INSULIN LISPRO 10 UNITS: 100 INJECTION, SOLUTION INTRAVENOUS; SUBCUTANEOUS at 23:48

## 2022-01-01 RX ADMIN — PREGABALIN 150 MG: 75 CAPSULE ORAL at 09:36

## 2022-01-01 RX ADMIN — Medication 1 CAPSULE: at 13:20

## 2022-01-01 RX ADMIN — SENNOSIDES AND DOCUSATE SODIUM 2 TABLET: 50; 8.6 TABLET ORAL at 21:30

## 2022-01-01 RX ADMIN — ENOXAPARIN SODIUM 40 MG: 40 INJECTION SUBCUTANEOUS at 17:21

## 2022-01-01 RX ADMIN — Medication 10 ML: at 08:24

## 2022-01-01 RX ADMIN — SENNOSIDES AND DOCUSATE SODIUM 2 TABLET: 50; 8.6 TABLET ORAL at 22:06

## 2022-01-01 RX ADMIN — Medication 10 ML: at 08:45

## 2022-01-01 RX ADMIN — Medication 10 ML: at 08:23

## 2022-01-01 RX ADMIN — Medication 10 ML: at 22:18

## 2022-01-01 RX ADMIN — INSULIN LISPRO 16 UNITS: 100 INJECTION, SOLUTION INTRAVENOUS; SUBCUTANEOUS at 12:23

## 2022-01-01 RX ADMIN — INSULIN LISPRO 10 UNITS: 100 INJECTION, SOLUTION INTRAVENOUS; SUBCUTANEOUS at 13:09

## 2022-01-01 RX ADMIN — FLUOXETINE HYDROCHLORIDE 40 MG: 20 CAPSULE ORAL at 09:31

## 2022-01-01 RX ADMIN — HYDROCODONE BITARTRATE AND ACETAMINOPHEN 1 TABLET: 10; 325 TABLET ORAL at 05:13

## 2022-01-01 RX ADMIN — Medication 10 ML: at 21:30

## 2022-01-01 RX ADMIN — HYDROCODONE BITARTRATE AND ACETAMINOPHEN 1 TABLET: 10; 325 TABLET ORAL at 22:21

## 2022-01-01 RX ADMIN — PREGABALIN 150 MG: 75 CAPSULE ORAL at 17:19

## 2022-01-01 RX ADMIN — HYDROCODONE BITARTRATE AND ACETAMINOPHEN 1 TABLET: 10; 325 TABLET ORAL at 10:28

## 2022-01-01 RX ADMIN — HYDROCODONE BITARTRATE AND ACETAMINOPHEN 1 TABLET: 10; 325 TABLET ORAL at 16:14

## 2022-01-01 RX ADMIN — ENOXAPARIN SODIUM 40 MG: 40 INJECTION SUBCUTANEOUS at 17:36

## 2022-01-01 RX ADMIN — Medication 10 ML: at 20:07

## 2022-01-01 RX ADMIN — HYDROCODONE BITARTRATE AND ACETAMINOPHEN 1 TABLET: 10; 325 TABLET ORAL at 18:38

## 2022-01-01 RX ADMIN — MORPHINE SULFATE: 50 INJECTION, SOLUTION, CONCENTRATE INTRAVENOUS at 17:34

## 2022-01-01 RX ADMIN — INSULIN DETEMIR 20 UNITS: 100 INJECTION, SOLUTION SUBCUTANEOUS at 08:37

## 2022-01-01 RX ADMIN — INSULIN DETEMIR 5 UNITS: 100 INJECTION, SOLUTION SUBCUTANEOUS at 12:04

## 2022-01-01 RX ADMIN — PRAVASTATIN SODIUM 40 MG: 40 TABLET ORAL at 08:43

## 2022-01-01 RX ADMIN — ENOXAPARIN SODIUM 40 MG: 40 INJECTION SUBCUTANEOUS at 04:49

## 2022-01-01 RX ADMIN — Medication 10 ML: at 09:16

## 2022-01-01 RX ADMIN — ENOXAPARIN SODIUM 40 MG: 40 INJECTION SUBCUTANEOUS at 17:39

## 2022-01-01 RX ADMIN — ALBUTEROL SULFATE 2.5 MG: 2.5 SOLUTION RESPIRATORY (INHALATION) at 19:10

## 2022-01-01 RX ADMIN — ALPRAZOLAM 0.5 MG: 0.5 TABLET ORAL at 23:17

## 2022-01-01 RX ADMIN — PANTOPRAZOLE SODIUM 40 MG: 40 TABLET, DELAYED RELEASE ORAL at 09:31

## 2022-01-01 RX ADMIN — PREGABALIN 150 MG: 75 CAPSULE ORAL at 16:51

## 2022-01-01 RX ADMIN — INSULIN LISPRO 13 UNITS: 100 INJECTION, SOLUTION INTRAVENOUS; SUBCUTANEOUS at 13:03

## 2022-01-01 RX ADMIN — ALBUTEROL SULFATE 2.5 MG: 2.5 SOLUTION RESPIRATORY (INHALATION) at 12:16

## 2022-01-01 RX ADMIN — METHYLPREDNISOLONE SODIUM SUCCINATE 40 MG: 40 INJECTION, POWDER, FOR SOLUTION INTRAMUSCULAR; INTRAVENOUS at 20:11

## 2022-01-01 RX ADMIN — HYDROCODONE BITARTRATE AND ACETAMINOPHEN 1 TABLET: 10; 325 TABLET ORAL at 21:01

## 2022-01-01 RX ADMIN — PRAVASTATIN SODIUM 40 MG: 40 TABLET ORAL at 20:03

## 2022-01-01 RX ADMIN — TAZOBACTAM SODIUM AND PIPERACILLIN SODIUM 3.38 G: 375; 3 INJECTION, SOLUTION INTRAVENOUS at 14:39

## 2022-01-01 RX ADMIN — CLOTRIMAZOLE AND BETAMETHASONE DIPROPIONATE 1 APPLICATION: 10; .5 CREAM TOPICAL at 08:36

## 2022-01-01 RX ADMIN — HYDROCODONE BITARTRATE AND ACETAMINOPHEN 1 TABLET: 10; 325 TABLET ORAL at 05:02

## 2022-01-01 RX ADMIN — LOSARTAN POTASSIUM 50 MG: 50 TABLET, FILM COATED ORAL at 21:48

## 2022-01-01 RX ADMIN — PREGABALIN 150 MG: 75 CAPSULE ORAL at 13:07

## 2022-01-01 RX ADMIN — IPRATROPIUM BROMIDE AND ALBUTEROL SULFATE 3 ML: .5; 3 SOLUTION RESPIRATORY (INHALATION) at 09:05

## 2022-01-01 RX ADMIN — PREGABALIN 150 MG: 75 CAPSULE ORAL at 20:33

## 2022-01-01 RX ADMIN — ALBUTEROL SULFATE 2.5 MG: 2.5 SOLUTION RESPIRATORY (INHALATION) at 10:32

## 2022-01-01 RX ADMIN — BUMETANIDE 1 MG: 0.25 INJECTION, SOLUTION INTRAMUSCULAR; INTRAVENOUS at 18:30

## 2022-01-01 RX ADMIN — PANTOPRAZOLE SODIUM 40 MG: 40 TABLET, DELAYED RELEASE ORAL at 05:17

## 2022-01-01 RX ADMIN — TAZOBACTAM SODIUM AND PIPERACILLIN SODIUM 3.38 G: 375; 3 INJECTION, SOLUTION INTRAVENOUS at 14:32

## 2022-01-01 RX ADMIN — IOPAMIDOL 42 ML: 755 INJECTION, SOLUTION INTRAVENOUS at 14:10

## 2022-01-01 RX ADMIN — FUROSEMIDE 40 MG: 10 INJECTION, SOLUTION INTRAMUSCULAR; INTRAVENOUS at 12:49

## 2022-01-01 RX ADMIN — PREGABALIN 150 MG: 75 CAPSULE ORAL at 21:44

## 2022-01-01 RX ADMIN — FLUOXETINE HYDROCHLORIDE 40 MG: 20 CAPSULE ORAL at 08:36

## 2022-01-01 RX ADMIN — VANCOMYCIN HYDROCHLORIDE 1000 MG: 1 INJECTION, SOLUTION INTRAVENOUS at 11:12

## 2022-01-01 RX ADMIN — INSULIN DETEMIR 14 UNITS: 100 INJECTION, SOLUTION SUBCUTANEOUS at 08:29

## 2022-01-01 RX ADMIN — DIPHENHYDRAMINE HYDROCHLORIDE 25 MG: 25 CAPSULE ORAL at 21:05

## 2022-01-01 RX ADMIN — INSULIN LISPRO 10 UNITS: 100 INJECTION, SOLUTION INTRAVENOUS; SUBCUTANEOUS at 08:03

## 2022-01-01 RX ADMIN — PREDNISONE 40 MG: 20 TABLET ORAL at 08:29

## 2022-01-01 RX ADMIN — SENNOSIDES AND DOCUSATE SODIUM 2 TABLET: 50; 8.6 TABLET ORAL at 08:47

## 2022-01-01 RX ADMIN — INSULIN LISPRO 10 UNITS: 100 INJECTION, SOLUTION INTRAVENOUS; SUBCUTANEOUS at 17:24

## 2022-01-01 RX ADMIN — IPRATROPIUM BROMIDE AND ALBUTEROL SULFATE 3 ML: .5; 3 SOLUTION RESPIRATORY (INHALATION) at 08:37

## 2022-01-01 RX ADMIN — Medication 10 ML: at 20:30

## 2022-01-01 RX ADMIN — INSULIN LISPRO 10 UNITS: 100 INJECTION, SOLUTION INTRAVENOUS; SUBCUTANEOUS at 17:14

## 2022-01-01 RX ADMIN — PREGABALIN 150 MG: 75 CAPSULE ORAL at 14:37

## 2022-01-01 RX ADMIN — INSULIN LISPRO 5 UNITS: 100 INJECTION, SOLUTION INTRAVENOUS; SUBCUTANEOUS at 12:26

## 2022-01-01 RX ADMIN — PREGABALIN 150 MG: 75 CAPSULE ORAL at 21:38

## 2022-01-01 RX ADMIN — PRAVASTATIN SODIUM 40 MG: 40 TABLET ORAL at 08:49

## 2022-01-01 RX ADMIN — SENNOSIDES AND DOCUSATE SODIUM 2 TABLET: 50; 8.6 TABLET ORAL at 09:07

## 2022-01-01 RX ADMIN — INSULIN DETEMIR 17 UNITS: 100 INJECTION, SOLUTION SUBCUTANEOUS at 20:14

## 2022-01-01 RX ADMIN — FLUCONAZOLE 200 MG: 200 TABLET ORAL at 07:56

## 2022-01-01 RX ADMIN — Medication 10 ML: at 13:03

## 2022-01-01 RX ADMIN — Medication 5 MG: at 20:11

## 2022-01-01 RX ADMIN — HYDROCODONE BITARTRATE AND ACETAMINOPHEN 1 TABLET: 10; 325 TABLET ORAL at 13:34

## 2022-01-01 RX ADMIN — MORPHINE SULFATE 2 MG: 2 INJECTION, SOLUTION INTRAMUSCULAR; INTRAVENOUS at 15:05

## 2022-01-01 RX ADMIN — INSULIN DETEMIR 10 UNITS: 100 INJECTION, SOLUTION SUBCUTANEOUS at 09:03

## 2022-01-01 RX ADMIN — ALPRAZOLAM 0.5 MG: 0.5 TABLET ORAL at 22:06

## 2022-01-01 RX ADMIN — GABAPENTIN 800 MG: 400 CAPSULE ORAL at 05:07

## 2022-01-01 RX ADMIN — CLOTRIMAZOLE AND BETAMETHASONE DIPROPIONATE 1 APPLICATION: 10; .5 CREAM TOPICAL at 09:24

## 2022-01-01 RX ADMIN — SENNOSIDES AND DOCUSATE SODIUM 2 TABLET: 50; 8.6 TABLET ORAL at 21:01

## 2022-01-01 RX ADMIN — INSULIN LISPRO 5 UNITS: 100 INJECTION, SOLUTION INTRAVENOUS; SUBCUTANEOUS at 16:44

## 2022-01-01 RX ADMIN — ENOXAPARIN SODIUM 40 MG: 40 INJECTION SUBCUTANEOUS at 16:23

## 2022-01-01 RX ADMIN — PRAVASTATIN SODIUM 40 MG: 40 TABLET ORAL at 20:49

## 2022-01-01 RX ADMIN — HYDROCODONE BITARTRATE AND ACETAMINOPHEN 1 TABLET: 10; 325 TABLET ORAL at 11:27

## 2022-01-01 RX ADMIN — PANTOPRAZOLE SODIUM 40 MG: 40 TABLET, DELAYED RELEASE ORAL at 08:47

## 2022-01-01 RX ADMIN — INSULIN DETEMIR 5 UNITS: 100 INJECTION, SOLUTION SUBCUTANEOUS at 21:38

## 2022-01-01 RX ADMIN — Medication 10 ML: at 11:42

## 2022-01-01 RX ADMIN — SENNOSIDES AND DOCUSATE SODIUM 2 TABLET: 50; 8.6 TABLET ORAL at 21:03

## 2022-01-01 RX ADMIN — Medication 10 ML: at 20:35

## 2022-01-01 RX ADMIN — METHYLPREDNISOLONE SODIUM SUCCINATE 40 MG: 40 INJECTION, POWDER, FOR SOLUTION INTRAMUSCULAR; INTRAVENOUS at 09:20

## 2022-01-01 RX ADMIN — FLUOXETINE HYDROCHLORIDE 40 MG: 20 CAPSULE ORAL at 09:08

## 2022-01-01 RX ADMIN — METHYLPREDNISOLONE SODIUM SUCCINATE 40 MG: 40 INJECTION, POWDER, FOR SOLUTION INTRAMUSCULAR; INTRAVENOUS at 20:12

## 2022-01-01 RX ADMIN — ENOXAPARIN SODIUM 40 MG: 40 INJECTION SUBCUTANEOUS at 05:49

## 2022-01-01 RX ADMIN — SULFAMETHOXAZOLE AND TRIMETHOPRIM 1 TABLET: 800; 160 TABLET ORAL at 11:59

## 2022-01-01 RX ADMIN — PREGABALIN 150 MG: 75 CAPSULE ORAL at 05:51

## 2022-01-01 RX ADMIN — SENNOSIDES AND DOCUSATE SODIUM 2 TABLET: 50; 8.6 TABLET ORAL at 09:08

## 2022-01-01 RX ADMIN — HYDROCODONE BITARTRATE AND ACETAMINOPHEN 1 TABLET: 10; 325 TABLET ORAL at 21:26

## 2022-01-01 RX ADMIN — PREGABALIN 150 MG: 75 CAPSULE ORAL at 08:25

## 2022-01-01 RX ADMIN — HYDROCODONE BITARTRATE AND ACETAMINOPHEN 1 TABLET: 10; 325 TABLET ORAL at 20:57

## 2022-01-01 RX ADMIN — INSULIN LISPRO 10 UNITS: 100 INJECTION, SOLUTION INTRAVENOUS; SUBCUTANEOUS at 12:05

## 2022-01-01 RX ADMIN — INSULIN LISPRO 10 UNITS: 100 INJECTION, SOLUTION INTRAVENOUS; SUBCUTANEOUS at 17:17

## 2022-01-01 RX ADMIN — INSULIN LISPRO 5 UNITS: 100 INJECTION, SOLUTION INTRAVENOUS; SUBCUTANEOUS at 17:23

## 2022-01-01 RX ADMIN — ALBUTEROL SULFATE 2.5 MG: 2.5 SOLUTION RESPIRATORY (INHALATION) at 13:31

## 2022-01-01 RX ADMIN — PREDNISONE 20 MG: 20 TABLET ORAL at 08:47

## 2022-01-01 RX ADMIN — IPRATROPIUM BROMIDE AND ALBUTEROL SULFATE 3 ML: .5; 3 SOLUTION RESPIRATORY (INHALATION) at 08:05

## 2022-01-01 RX ADMIN — SODIUM CHLORIDE 1 G: 900 INJECTION INTRAVENOUS at 11:11

## 2022-01-01 RX ADMIN — PREGABALIN 150 MG: 75 CAPSULE ORAL at 20:26

## 2022-01-01 RX ADMIN — Medication 5 MG: at 20:12

## 2022-01-01 RX ADMIN — INSULIN LISPRO 8 UNITS: 100 INJECTION, SOLUTION INTRAVENOUS; SUBCUTANEOUS at 20:05

## 2022-01-01 RX ADMIN — Medication 10 ML: at 12:26

## 2022-01-01 RX ADMIN — INSULIN LISPRO 3 UNITS: 100 INJECTION, SOLUTION INTRAVENOUS; SUBCUTANEOUS at 09:16

## 2022-01-01 RX ADMIN — INSULIN LISPRO 8 UNITS: 100 INJECTION, SOLUTION INTRAVENOUS; SUBCUTANEOUS at 09:44

## 2022-01-01 RX ADMIN — ALPRAZOLAM 0.5 MG: 0.5 TABLET ORAL at 13:03

## 2022-01-01 RX ADMIN — ENOXAPARIN SODIUM 40 MG: 40 INJECTION SUBCUTANEOUS at 06:14

## 2022-01-01 RX ADMIN — MAGNESIUM SULFATE HEPTAHYDRATE 2 G: 2 INJECTION, SOLUTION INTRAVENOUS at 09:54

## 2022-01-01 RX ADMIN — Medication 10 ML: at 21:51

## 2022-01-01 RX ADMIN — Medication 5 MG: at 21:00

## 2022-01-01 RX ADMIN — HYDROCODONE BITARTRATE AND ACETAMINOPHEN 1 TABLET: 10; 325 TABLET ORAL at 20:07

## 2022-01-01 RX ADMIN — ENOXAPARIN SODIUM 40 MG: 40 INJECTION SUBCUTANEOUS at 05:56

## 2022-01-01 RX ADMIN — MAGNESIUM SULFATE HEPTAHYDRATE 4 G: 40 INJECTION, SOLUTION INTRAVENOUS at 07:58

## 2022-01-01 RX ADMIN — INSULIN LISPRO 10 UNITS: 100 INJECTION, SOLUTION INTRAVENOUS; SUBCUTANEOUS at 18:02

## 2022-01-01 RX ADMIN — PREGABALIN 150 MG: 75 CAPSULE ORAL at 13:36

## 2022-01-01 RX ADMIN — HYDROCODONE BITARTRATE AND ACETAMINOPHEN 1 TABLET: 10; 325 TABLET ORAL at 10:39

## 2022-01-01 RX ADMIN — INSULIN DETEMIR 5 UNITS: 100 INJECTION, SOLUTION SUBCUTANEOUS at 00:16

## 2022-01-01 RX ADMIN — GABAPENTIN 800 MG: 400 CAPSULE ORAL at 05:24

## 2022-01-01 RX ADMIN — ALBUTEROL SULFATE 2.5 MG: 2.5 SOLUTION RESPIRATORY (INHALATION) at 18:58

## 2022-01-01 RX ADMIN — ALBUTEROL SULFATE 2.5 MG: 2.5 SOLUTION RESPIRATORY (INHALATION) at 08:01

## 2022-01-01 RX ADMIN — MAGNESIUM SULFATE HEPTAHYDRATE 2 G: 2 INJECTION, SOLUTION INTRAVENOUS at 11:57

## 2022-01-01 RX ADMIN — PANTOPRAZOLE SODIUM 40 MG: 40 TABLET, DELAYED RELEASE ORAL at 05:13

## 2022-01-01 RX ADMIN — METHYLPREDNISOLONE SODIUM SUCCINATE 40 MG: 40 INJECTION, POWDER, FOR SOLUTION INTRAMUSCULAR; INTRAVENOUS at 18:23

## 2022-01-01 RX ADMIN — PRAVASTATIN SODIUM 40 MG: 40 TABLET ORAL at 08:41

## 2022-01-01 RX ADMIN — FLUOXETINE HYDROCHLORIDE 40 MG: 20 CAPSULE ORAL at 09:04

## 2022-01-01 RX ADMIN — METHYLPREDNISOLONE SODIUM SUCCINATE 40 MG: 40 INJECTION, POWDER, FOR SOLUTION INTRAMUSCULAR; INTRAVENOUS at 05:08

## 2022-01-01 RX ADMIN — SENNOSIDES AND DOCUSATE SODIUM 2 TABLET: 50; 8.6 TABLET ORAL at 08:36

## 2022-01-01 RX ADMIN — GUAIFENESIN 600 MG: 600 TABLET, EXTENDED RELEASE ORAL at 21:19

## 2022-01-01 RX ADMIN — TAZOBACTAM SODIUM AND PIPERACILLIN SODIUM 3.38 G: 375; 3 INJECTION, SOLUTION INTRAVENOUS at 05:09

## 2022-01-01 RX ADMIN — HYDROCODONE BITARTRATE AND ACETAMINOPHEN 1 TABLET: 10; 325 TABLET ORAL at 13:19

## 2022-01-01 RX ADMIN — SENNOSIDES AND DOCUSATE SODIUM 2 TABLET: 50; 8.6 TABLET ORAL at 09:35

## 2022-01-01 RX ADMIN — SULFAMETHOXAZOLE AND TRIMETHOPRIM 1 TABLET: 800; 160 TABLET ORAL at 09:04

## 2022-01-01 RX ADMIN — PANTOPRAZOLE SODIUM 40 MG: 40 TABLET, DELAYED RELEASE ORAL at 07:35

## 2022-01-01 RX ADMIN — IPRATROPIUM BROMIDE AND ALBUTEROL SULFATE 3 ML: .5; 3 SOLUTION RESPIRATORY (INHALATION) at 16:35

## 2022-01-01 RX ADMIN — SENNOSIDES AND DOCUSATE SODIUM 2 TABLET: 50; 8.6 TABLET ORAL at 21:18

## 2022-01-01 RX ADMIN — METHYLPREDNISOLONE SODIUM SUCCINATE 40 MG: 40 INJECTION, POWDER, FOR SOLUTION INTRAMUSCULAR; INTRAVENOUS at 05:13

## 2022-01-01 RX ADMIN — IPRATROPIUM BROMIDE AND ALBUTEROL SULFATE 3 ML: .5; 3 SOLUTION RESPIRATORY (INHALATION) at 19:51

## 2022-01-01 RX ADMIN — INSULIN LISPRO 8 UNITS: 100 INJECTION, SOLUTION INTRAVENOUS; SUBCUTANEOUS at 12:03

## 2022-01-01 RX ADMIN — PANTOPRAZOLE SODIUM 40 MG: 40 TABLET, DELAYED RELEASE ORAL at 08:29

## 2022-01-01 RX ADMIN — INSULIN LISPRO 2 UNITS: 100 INJECTION, SOLUTION INTRAVENOUS; SUBCUTANEOUS at 08:56

## 2022-01-01 RX ADMIN — INSULIN DETEMIR 17 UNITS: 100 INJECTION, SOLUTION SUBCUTANEOUS at 09:10

## 2022-01-01 RX ADMIN — NYSTATIN: 100000 POWDER TOPICAL at 20:17

## 2022-01-01 RX ADMIN — ALPRAZOLAM 0.5 MG: 0.5 TABLET ORAL at 21:31

## 2022-01-01 RX ADMIN — CLOTRIMAZOLE AND BETAMETHASONE DIPROPIONATE 1 APPLICATION: 10; .5 CREAM TOPICAL at 21:13

## 2022-01-01 RX ADMIN — FLUOXETINE 40 MG: 20 CAPSULE ORAL at 09:21

## 2022-01-01 RX ADMIN — LOSARTAN POTASSIUM 25 MG: 25 TABLET, FILM COATED ORAL at 09:35

## 2022-01-01 RX ADMIN — METHYLPREDNISOLONE SODIUM SUCCINATE 40 MG: 40 INJECTION, POWDER, FOR SOLUTION INTRAMUSCULAR; INTRAVENOUS at 06:40

## 2022-01-01 RX ADMIN — PANTOPRAZOLE SODIUM 40 MG: 40 TABLET, DELAYED RELEASE ORAL at 05:49

## 2022-01-01 RX ADMIN — HYDROCORTISONE 1 APPLICATION: 25 CREAM TOPICAL at 21:27

## 2022-01-01 RX ADMIN — Medication 5 MG: at 00:38

## 2022-01-01 RX ADMIN — IPRATROPIUM BROMIDE AND ALBUTEROL SULFATE 3 ML: .5; 3 SOLUTION RESPIRATORY (INHALATION) at 08:00

## 2022-01-01 RX ADMIN — INSULIN LISPRO 8 UNITS: 100 INJECTION, SOLUTION INTRAVENOUS; SUBCUTANEOUS at 11:57

## 2022-01-01 RX ADMIN — PREGABALIN 150 MG: 75 CAPSULE ORAL at 21:03

## 2022-01-01 RX ADMIN — PREGABALIN 150 MG: 75 CAPSULE ORAL at 20:16

## 2022-01-01 RX ADMIN — FLUCONAZOLE 200 MG: 200 TABLET ORAL at 15:16

## 2022-01-01 RX ADMIN — OXYBUTYNIN CHLORIDE 5 MG: 5 TABLET, EXTENDED RELEASE ORAL at 08:55

## 2022-01-01 RX ADMIN — NYSTATIN 1 APPLICATION: 100000 POWDER TOPICAL at 08:56

## 2022-01-01 RX ADMIN — ALBUTEROL SULFATE 2.5 MG: 2.5 SOLUTION RESPIRATORY (INHALATION) at 12:33

## 2022-01-01 RX ADMIN — TAZOBACTAM SODIUM AND PIPERACILLIN SODIUM 3.38 G: 375; 3 INJECTION, SOLUTION INTRAVENOUS at 13:51

## 2022-01-01 RX ADMIN — INSULIN LISPRO 4 UNITS: 100 INJECTION, SOLUTION INTRAVENOUS; SUBCUTANEOUS at 07:53

## 2022-01-01 RX ADMIN — MAGNESIUM SULFATE HEPTAHYDRATE 4 G: 40 INJECTION, SOLUTION INTRAVENOUS at 09:03

## 2022-01-01 RX ADMIN — MICONAZOLE NITRATE 1 APPLICATION: 2 CREAM TOPICAL at 21:30

## 2022-01-01 RX ADMIN — FLUOXETINE HYDROCHLORIDE 40 MG: 20 CAPSULE ORAL at 08:14

## 2022-01-01 RX ADMIN — INSULIN LISPRO 8 UNITS: 100 INJECTION, SOLUTION INTRAVENOUS; SUBCUTANEOUS at 12:07

## 2022-01-01 RX ADMIN — PANTOPRAZOLE SODIUM 40 MG: 40 TABLET, DELAYED RELEASE ORAL at 08:45

## 2022-01-01 RX ADMIN — INSULIN LISPRO 3 UNITS: 100 INJECTION, SOLUTION INTRAVENOUS; SUBCUTANEOUS at 20:26

## 2022-01-01 RX ADMIN — INSULIN DETEMIR 20 UNITS: 100 INJECTION, SOLUTION SUBCUTANEOUS at 08:26

## 2022-01-01 RX ADMIN — Medication 5 MG: at 20:55

## 2022-01-01 RX ADMIN — ALBUTEROL SULFATE 2.5 MG: 2.5 SOLUTION RESPIRATORY (INHALATION) at 19:05

## 2022-01-01 RX ADMIN — INSULIN LISPRO 15 UNITS: 100 INJECTION, SOLUTION INTRAVENOUS; SUBCUTANEOUS at 13:07

## 2022-01-01 RX ADMIN — IPRATROPIUM BROMIDE AND ALBUTEROL SULFATE 3 ML: .5; 3 SOLUTION RESPIRATORY (INHALATION) at 12:57

## 2022-01-01 RX ADMIN — INSULIN DETEMIR 13 UNITS: 100 INJECTION, SOLUTION SUBCUTANEOUS at 10:43

## 2022-01-01 RX ADMIN — Medication 10 ML: at 21:10

## 2022-01-01 RX ADMIN — FLUOXETINE 40 MG: 20 CAPSULE ORAL at 08:03

## 2022-01-01 RX ADMIN — HYDROXYZINE HYDROCHLORIDE 25 MG: 25 TABLET ORAL at 10:13

## 2022-01-01 RX ADMIN — INSULIN DETEMIR 20 UNITS: 100 INJECTION, SOLUTION SUBCUTANEOUS at 20:16

## 2022-01-01 RX ADMIN — INSULIN LISPRO 10 UNITS: 100 INJECTION, SOLUTION INTRAVENOUS; SUBCUTANEOUS at 08:42

## 2022-01-01 RX ADMIN — GABAPENTIN 800 MG: 400 CAPSULE ORAL at 13:51

## 2022-01-01 RX ADMIN — PANTOPRAZOLE SODIUM 40 MG: 40 TABLET, DELAYED RELEASE ORAL at 09:00

## 2022-01-01 RX ADMIN — INSULIN LISPRO 4 UNITS: 100 INJECTION, SOLUTION INTRAVENOUS; SUBCUTANEOUS at 13:13

## 2022-01-01 RX ADMIN — HYDROXYZINE HYDROCHLORIDE 25 MG: 25 TABLET ORAL at 10:51

## 2022-01-01 RX ADMIN — INSULIN DETEMIR 20 UNITS: 100 INJECTION, SOLUTION SUBCUTANEOUS at 21:15

## 2022-01-01 RX ADMIN — HYDROCORTISONE 1 APPLICATION: 25 CREAM TOPICAL at 20:32

## 2022-01-01 RX ADMIN — INSULIN LISPRO 5 UNITS: 100 INJECTION, SOLUTION INTRAVENOUS; SUBCUTANEOUS at 08:26

## 2022-01-01 RX ADMIN — ALBUTEROL SULFATE 2.5 MG: 2.5 SOLUTION RESPIRATORY (INHALATION) at 12:43

## 2022-01-01 RX ADMIN — LOSARTAN POTASSIUM 25 MG: 25 TABLET, FILM COATED ORAL at 08:35

## 2022-01-01 RX ADMIN — METHYLPREDNISOLONE SODIUM SUCCINATE 60 MG: 125 INJECTION, POWDER, FOR SOLUTION INTRAMUSCULAR; INTRAVENOUS at 10:36

## 2022-01-01 RX ADMIN — MICONAZOLE NITRATE 1 APPLICATION: 2 CREAM TOPICAL at 21:49

## 2022-01-01 RX ADMIN — INSULIN LISPRO 2 UNITS: 100 INJECTION, SOLUTION INTRAVENOUS; SUBCUTANEOUS at 17:54

## 2022-01-01 RX ADMIN — ALBUTEROL SULFATE 2.5 MG: 2.5 SOLUTION RESPIRATORY (INHALATION) at 21:09

## 2022-01-01 RX ADMIN — FLUOXETINE HYDROCHLORIDE 40 MG: 20 CAPSULE ORAL at 08:22

## 2022-01-01 RX ADMIN — PREGABALIN 150 MG: 75 CAPSULE ORAL at 21:31

## 2022-01-01 RX ADMIN — INSULIN LISPRO 7 UNITS: 100 INJECTION, SOLUTION INTRAVENOUS; SUBCUTANEOUS at 22:20

## 2022-01-01 RX ADMIN — Medication 10 ML: at 21:03

## 2022-01-01 RX ADMIN — INSULIN LISPRO 10 UNITS: 100 INJECTION, SOLUTION INTRAVENOUS; SUBCUTANEOUS at 12:37

## 2022-01-01 RX ADMIN — GABAPENTIN 800 MG: 400 CAPSULE ORAL at 05:40

## 2022-01-01 RX ADMIN — ALBUTEROL SULFATE 2.5 MG: 2.5 SOLUTION RESPIRATORY (INHALATION) at 07:56

## 2022-01-01 RX ADMIN — TAZOBACTAM SODIUM AND PIPERACILLIN SODIUM 3.38 G: 375; 3 INJECTION, SOLUTION INTRAVENOUS at 05:53

## 2022-01-01 RX ADMIN — INSULIN DETEMIR 17 UNITS: 100 INJECTION, SOLUTION SUBCUTANEOUS at 08:46

## 2022-01-01 RX ADMIN — IPRATROPIUM BROMIDE AND ALBUTEROL SULFATE 3 ML: .5; 3 SOLUTION RESPIRATORY (INHALATION) at 19:14

## 2022-01-01 RX ADMIN — INSULIN DETEMIR 10 UNITS: 100 INJECTION, SOLUTION SUBCUTANEOUS at 10:02

## 2022-01-01 RX ADMIN — PRAVASTATIN SODIUM 40 MG: 40 TABLET ORAL at 08:46

## 2022-01-01 RX ADMIN — FLUOXETINE HYDROCHLORIDE 40 MG: 20 CAPSULE ORAL at 08:49

## 2022-01-01 RX ADMIN — INSULIN LISPRO 2 UNITS: 100 INJECTION, SOLUTION INTRAVENOUS; SUBCUTANEOUS at 17:48

## 2022-01-01 RX ADMIN — INSULIN LISPRO 5 UNITS: 100 INJECTION, SOLUTION INTRAVENOUS; SUBCUTANEOUS at 13:07

## 2022-01-01 RX ADMIN — OXYBUTYNIN CHLORIDE 5 MG: 5 TABLET, EXTENDED RELEASE ORAL at 08:04

## 2022-01-01 RX ADMIN — Medication 10 ML: at 20:15

## 2022-01-01 RX ADMIN — NAPROXEN 250 MG: 250 TABLET ORAL at 20:52

## 2022-01-01 RX ADMIN — HYDROCODONE BITARTRATE AND ACETAMINOPHEN 1 TABLET: 10; 325 TABLET ORAL at 04:49

## 2022-01-01 RX ADMIN — TAZOBACTAM SODIUM AND PIPERACILLIN SODIUM 3.38 G: 375; 3 INJECTION, SOLUTION INTRAVENOUS at 20:49

## 2022-01-01 RX ADMIN — ENOXAPARIN SODIUM 40 MG: 40 INJECTION SUBCUTANEOUS at 17:11

## 2022-01-01 RX ADMIN — CLOTRIMAZOLE AND BETAMETHASONE DIPROPIONATE 1 APPLICATION: 10; .5 CREAM TOPICAL at 08:24

## 2022-01-01 RX ADMIN — LOSARTAN POTASSIUM 25 MG: 25 TABLET, FILM COATED ORAL at 08:44

## 2022-01-01 RX ADMIN — Medication 1 CAPSULE: at 12:53

## 2022-01-01 RX ADMIN — Medication 10 ML: at 09:07

## 2022-01-01 RX ADMIN — METHYLPREDNISOLONE SODIUM SUCCINATE 40 MG: 40 INJECTION, POWDER, LYOPHILIZED, FOR SOLUTION INTRAMUSCULAR; INTRAVENOUS at 01:37

## 2022-01-01 RX ADMIN — INSULIN DETEMIR 20 UNITS: 100 INJECTION, SOLUTION SUBCUTANEOUS at 21:04

## 2022-01-01 RX ADMIN — HYDROCODONE BITARTRATE AND ACETAMINOPHEN 1 TABLET: 10; 325 TABLET ORAL at 17:04

## 2022-01-01 RX ADMIN — INSULIN LISPRO 10 UNITS: 100 INJECTION, SOLUTION INTRAVENOUS; SUBCUTANEOUS at 17:11

## 2022-01-01 RX ADMIN — PREGABALIN 150 MG: 75 CAPSULE ORAL at 04:48

## 2022-01-01 RX ADMIN — VANCOMYCIN HYDROCHLORIDE 1250 MG: 10 INJECTION, POWDER, LYOPHILIZED, FOR SOLUTION INTRAVENOUS at 12:07

## 2022-01-01 RX ADMIN — PRAVASTATIN SODIUM 40 MG: 40 TABLET ORAL at 08:29

## 2022-01-01 RX ADMIN — INSULIN LISPRO 15 UNITS: 100 INJECTION, SOLUTION INTRAVENOUS; SUBCUTANEOUS at 08:45

## 2022-01-01 RX ADMIN — ALBUTEROL SULFATE 2.5 MG: 2.5 SOLUTION RESPIRATORY (INHALATION) at 00:00

## 2022-01-01 RX ADMIN — ALPRAZOLAM 0.5 MG: 0.5 TABLET ORAL at 15:12

## 2022-01-01 RX ADMIN — ALPRAZOLAM 0.5 MG: 0.5 TABLET ORAL at 15:16

## 2022-01-01 RX ADMIN — Medication 10 ML: at 20:44

## 2022-01-01 RX ADMIN — INSULIN DETEMIR 10 UNITS: 100 INJECTION, SOLUTION SUBCUTANEOUS at 20:58

## 2022-01-01 RX ADMIN — LOSARTAN POTASSIUM 25 MG: 25 TABLET, FILM COATED ORAL at 07:51

## 2022-01-01 RX ADMIN — PRAVASTATIN SODIUM 40 MG: 40 TABLET ORAL at 20:33

## 2022-01-01 RX ADMIN — INSULIN LISPRO 4 UNITS: 100 INJECTION, SOLUTION INTRAVENOUS; SUBCUTANEOUS at 16:46

## 2022-01-01 RX ADMIN — Medication 10 ML: at 21:00

## 2022-01-01 RX ADMIN — ALBUTEROL SULFATE 2.5 MG: 2.5 SOLUTION RESPIRATORY (INHALATION) at 13:23

## 2022-01-01 RX ADMIN — INSULIN LISPRO 15 UNITS: 100 INJECTION, SOLUTION INTRAVENOUS; SUBCUTANEOUS at 09:08

## 2022-01-01 RX ADMIN — Medication 10 ML: at 09:44

## 2022-01-01 RX ADMIN — HYDROXYZINE HYDROCHLORIDE 25 MG: 25 TABLET ORAL at 14:09

## 2022-01-01 RX ADMIN — INSULIN LISPRO 5 UNITS: 100 INJECTION, SOLUTION INTRAVENOUS; SUBCUTANEOUS at 21:38

## 2022-01-01 RX ADMIN — GUAIFENESIN 600 MG: 600 TABLET, EXTENDED RELEASE ORAL at 08:45

## 2022-01-01 RX ADMIN — INSULIN LISPRO 8 UNITS: 100 INJECTION, SOLUTION INTRAVENOUS; SUBCUTANEOUS at 20:13

## 2022-01-01 RX ADMIN — INSULIN DETEMIR 20 UNITS: 100 INJECTION, SOLUTION SUBCUTANEOUS at 07:58

## 2022-01-01 RX ADMIN — ENOXAPARIN SODIUM 40 MG: 40 INJECTION SUBCUTANEOUS at 17:22

## 2022-01-01 RX ADMIN — METHYLPREDNISOLONE SODIUM SUCCINATE 40 MG: 40 INJECTION, POWDER, FOR SOLUTION INTRAMUSCULAR; INTRAVENOUS at 20:54

## 2022-01-01 RX ADMIN — TAZOBACTAM SODIUM AND PIPERACILLIN SODIUM 3.38 G: 375; 3 INJECTION, SOLUTION INTRAVENOUS at 21:12

## 2022-01-01 RX ADMIN — INSULIN LISPRO 13 UNITS: 100 INJECTION, SOLUTION INTRAVENOUS; SUBCUTANEOUS at 10:36

## 2022-01-01 RX ADMIN — CLOTRIMAZOLE AND BETAMETHASONE DIPROPIONATE 1 APPLICATION: 10; .5 CREAM TOPICAL at 08:37

## 2022-01-01 RX ADMIN — HYDROCODONE BITARTRATE AND ACETAMINOPHEN 1 TABLET: 10; 325 TABLET ORAL at 19:16

## 2022-01-01 RX ADMIN — SENNOSIDES AND DOCUSATE SODIUM 2 TABLET: 50; 8.6 TABLET ORAL at 20:49

## 2022-01-01 RX ADMIN — PREGABALIN 150 MG: 75 CAPSULE ORAL at 15:05

## 2022-01-01 RX ADMIN — PRAVASTATIN SODIUM 40 MG: 40 TABLET ORAL at 08:47

## 2022-01-01 RX ADMIN — HYDROCODONE BITARTRATE AND ACETAMINOPHEN 1 TABLET: 10; 325 TABLET ORAL at 22:58

## 2022-01-01 RX ADMIN — SENNOSIDES AND DOCUSATE SODIUM 2 TABLET: 50; 8.6 TABLET ORAL at 09:01

## 2022-01-01 RX ADMIN — IPRATROPIUM BROMIDE AND ALBUTEROL SULFATE 3 ML: .5; 3 SOLUTION RESPIRATORY (INHALATION) at 08:26

## 2022-01-01 RX ADMIN — VANCOMYCIN HYDROCHLORIDE 1250 MG: 10 INJECTION, POWDER, LYOPHILIZED, FOR SOLUTION INTRAVENOUS at 11:40

## 2022-01-01 RX ADMIN — PREGABALIN 100 MG: 100 CAPSULE ORAL at 14:58

## 2022-01-01 RX ADMIN — INSULIN LISPRO 10 UNITS: 100 INJECTION, SOLUTION INTRAVENOUS; SUBCUTANEOUS at 16:31

## 2022-01-01 RX ADMIN — INSULIN DETEMIR 14 UNITS: 100 INJECTION, SOLUTION SUBCUTANEOUS at 20:58

## 2022-01-01 RX ADMIN — NYSTATIN: 100000 POWDER TOPICAL at 09:36

## 2022-01-01 RX ADMIN — TAZOBACTAM SODIUM AND PIPERACILLIN SODIUM 3.38 G: 375; 3 INJECTION, SOLUTION INTRAVENOUS at 14:07

## 2022-01-01 RX ADMIN — SENNOSIDES AND DOCUSATE SODIUM 2 TABLET: 50; 8.6 TABLET ORAL at 21:27

## 2022-01-01 RX ADMIN — MAGNESIUM SULFATE HEPTAHYDRATE 2 G: 2 INJECTION, SOLUTION INTRAVENOUS at 18:48

## 2022-01-01 RX ADMIN — CLOTRIMAZOLE AND BETAMETHASONE DIPROPIONATE 1 APPLICATION: 10; .5 CREAM TOPICAL at 08:27

## 2022-01-01 RX ADMIN — ALPRAZOLAM 0.5 MG: 0.5 TABLET ORAL at 02:17

## 2022-01-01 RX ADMIN — INSULIN LISPRO 10 UNITS: 100 INJECTION, SOLUTION INTRAVENOUS; SUBCUTANEOUS at 18:26

## 2022-01-01 RX ADMIN — Medication 5 MG: at 01:21

## 2022-01-01 RX ADMIN — PREGABALIN 150 MG: 75 CAPSULE ORAL at 05:55

## 2022-01-01 RX ADMIN — SENNOSIDES AND DOCUSATE SODIUM 2 TABLET: 50; 8.6 TABLET ORAL at 10:36

## 2022-01-01 RX ADMIN — ALBUTEROL SULFATE 2.5 MG: 2.5 SOLUTION RESPIRATORY (INHALATION) at 00:55

## 2022-01-01 RX ADMIN — PREGABALIN 150 MG: 75 CAPSULE ORAL at 20:42

## 2022-01-01 RX ADMIN — ALBUTEROL SULFATE 2.5 MG: 2.5 SOLUTION RESPIRATORY (INHALATION) at 16:00

## 2022-01-01 RX ADMIN — INSULIN LISPRO 6 UNITS: 100 INJECTION, SOLUTION INTRAVENOUS; SUBCUTANEOUS at 17:36

## 2022-01-01 RX ADMIN — INSULIN DETEMIR 10 UNITS: 100 INJECTION, SOLUTION SUBCUTANEOUS at 20:42

## 2022-01-01 RX ADMIN — HYDROCODONE BITARTRATE AND ACETAMINOPHEN 1 TABLET: 10; 325 TABLET ORAL at 15:40

## 2022-01-01 RX ADMIN — TAZOBACTAM SODIUM AND PIPERACILLIN SODIUM 3.38 G: 375; 3 INJECTION, SOLUTION INTRAVENOUS at 14:03

## 2022-01-01 RX ADMIN — ALBUTEROL SULFATE 2.5 MG: 2.5 SOLUTION RESPIRATORY (INHALATION) at 07:21

## 2022-01-01 RX ADMIN — TAZOBACTAM SODIUM AND PIPERACILLIN SODIUM 3.38 G: 375; 3 INJECTION, SOLUTION INTRAVENOUS at 05:00

## 2022-01-01 RX ADMIN — SENNOSIDES AND DOCUSATE SODIUM 2 TABLET: 50; 8.6 TABLET ORAL at 20:12

## 2022-01-01 RX ADMIN — FLUCONAZOLE 200 MG: 200 TABLET ORAL at 17:34

## 2022-01-01 RX ADMIN — ALPRAZOLAM 0.5 MG: 0.5 TABLET ORAL at 21:27

## 2022-01-01 RX ADMIN — INSULIN LISPRO 10 UNITS: 100 INJECTION, SOLUTION INTRAVENOUS; SUBCUTANEOUS at 18:25

## 2022-01-01 RX ADMIN — Medication 10 ML: at 20:55

## 2022-01-01 RX ADMIN — MORPHINE SULFATE 2 MG: 2 INJECTION, SOLUTION INTRAMUSCULAR; INTRAVENOUS at 05:56

## 2022-01-01 RX ADMIN — PREGABALIN 150 MG: 75 CAPSULE ORAL at 20:12

## 2022-01-01 RX ADMIN — GUAIFENESIN 600 MG: 600 TABLET, EXTENDED RELEASE ORAL at 08:47

## 2022-01-01 RX ADMIN — IPRATROPIUM BROMIDE AND ALBUTEROL SULFATE 3 ML: .5; 3 SOLUTION RESPIRATORY (INHALATION) at 19:05

## 2022-01-01 RX ADMIN — MORPHINE SULFATE 2 MG: 2 INJECTION, SOLUTION INTRAMUSCULAR; INTRAVENOUS at 23:19

## 2022-01-01 RX ADMIN — PREGABALIN 150 MG: 75 CAPSULE ORAL at 20:57

## 2022-01-01 RX ADMIN — MICONAZOLE NITRATE 1 APPLICATION: 2 CREAM TOPICAL at 22:10

## 2022-01-01 RX ADMIN — INSULIN LISPRO 10 UNITS: 100 INJECTION, SOLUTION INTRAVENOUS; SUBCUTANEOUS at 17:01

## 2022-01-01 RX ADMIN — INSULIN LISPRO 5 UNITS: 100 INJECTION, SOLUTION INTRAVENOUS; SUBCUTANEOUS at 17:10

## 2022-01-01 RX ADMIN — Medication 10 ML: at 08:41

## 2022-01-01 RX ADMIN — MIDAZOLAM 1 MG: 1 INJECTION, SOLUTION INTRAMUSCULAR; INTRAVENOUS at 20:35

## 2022-01-01 RX ADMIN — PANTOPRAZOLE SODIUM 40 MG: 40 TABLET, DELAYED RELEASE ORAL at 05:08

## 2022-01-01 RX ADMIN — INSULIN LISPRO 5 UNITS: 100 INJECTION, SOLUTION INTRAVENOUS; SUBCUTANEOUS at 20:55

## 2022-01-01 RX ADMIN — OXYBUTYNIN CHLORIDE 5 MG: 5 TABLET, EXTENDED RELEASE ORAL at 09:24

## 2022-01-01 RX ADMIN — SODIUM CHLORIDE 1 G: 900 INJECTION INTRAVENOUS at 17:15

## 2022-01-01 RX ADMIN — HYDROCODONE BITARTRATE AND ACETAMINOPHEN 1 TABLET: 10; 325 TABLET ORAL at 21:18

## 2022-01-01 RX ADMIN — INSULIN LISPRO 8 UNITS: 100 INJECTION, SOLUTION INTRAVENOUS; SUBCUTANEOUS at 20:48

## 2022-01-01 RX ADMIN — HYDROCODONE BITARTRATE AND ACETAMINOPHEN 1 TABLET: 10; 325 TABLET ORAL at 11:50

## 2022-01-01 RX ADMIN — INSULIN LISPRO 6 UNITS: 100 INJECTION, SOLUTION INTRAVENOUS; SUBCUTANEOUS at 11:53

## 2022-01-01 RX ADMIN — HYDROCODONE BITARTRATE AND ACETAMINOPHEN 1 TABLET: 10; 325 TABLET ORAL at 11:59

## 2022-01-01 RX ADMIN — HYDROCODONE BITARTRATE AND ACETAMINOPHEN 1 TABLET: 10; 325 TABLET ORAL at 16:22

## 2022-01-01 RX ADMIN — SENNOSIDES AND DOCUSATE SODIUM 2 TABLET: 50; 8.6 TABLET ORAL at 09:24

## 2022-01-01 RX ADMIN — ENOXAPARIN SODIUM 40 MG: 40 INJECTION SUBCUTANEOUS at 16:41

## 2022-01-01 RX ADMIN — IPRATROPIUM BROMIDE AND ALBUTEROL SULFATE 3 ML: .5; 3 SOLUTION RESPIRATORY (INHALATION) at 13:27

## 2022-01-01 RX ADMIN — PREGABALIN 150 MG: 75 CAPSULE ORAL at 05:00

## 2022-01-01 RX ADMIN — IPRATROPIUM BROMIDE AND ALBUTEROL SULFATE 3 ML: .5; 3 SOLUTION RESPIRATORY (INHALATION) at 16:06

## 2022-01-01 RX ADMIN — INSULIN LISPRO 8 UNITS: 100 INJECTION, SOLUTION INTRAVENOUS; SUBCUTANEOUS at 09:05

## 2022-01-01 RX ADMIN — PRAVASTATIN SODIUM 40 MG: 40 TABLET ORAL at 09:14

## 2022-01-01 RX ADMIN — FLUOXETINE 40 MG: 20 CAPSULE ORAL at 08:35

## 2022-01-01 RX ADMIN — VANCOMYCIN HYDROCHLORIDE 1000 MG: 1 INJECTION, SOLUTION INTRAVENOUS at 09:40

## 2022-01-01 RX ADMIN — INSULIN LISPRO 10 UNITS: 100 INJECTION, SOLUTION INTRAVENOUS; SUBCUTANEOUS at 11:18

## 2022-01-01 RX ADMIN — WATER 500 MG: 1 INJECTION INTRAMUSCULAR; INTRAVENOUS; SUBCUTANEOUS at 17:21

## 2022-01-01 RX ADMIN — GUAIFENESIN 600 MG: 600 TABLET, EXTENDED RELEASE ORAL at 08:43

## 2022-01-01 RX ADMIN — PREGABALIN 150 MG: 75 CAPSULE ORAL at 14:07

## 2022-01-01 RX ADMIN — METHYLPREDNISOLONE SODIUM SUCCINATE 60 MG: 125 INJECTION, POWDER, FOR SOLUTION INTRAMUSCULAR; INTRAVENOUS at 09:57

## 2022-01-01 RX ADMIN — BISACODYL 5 MG: 5 TABLET, COATED ORAL at 08:35

## 2022-01-01 RX ADMIN — PRAVASTATIN SODIUM 40 MG: 40 TABLET ORAL at 17:48

## 2022-01-01 RX ADMIN — METHYLPREDNISOLONE SODIUM SUCCINATE 60 MG: 125 INJECTION, POWDER, FOR SOLUTION INTRAMUSCULAR; INTRAVENOUS at 17:24

## 2022-01-01 RX ADMIN — ENOXAPARIN SODIUM 40 MG: 40 INJECTION SUBCUTANEOUS at 06:55

## 2022-01-01 RX ADMIN — GUAIFENESIN 600 MG: 600 TABLET, EXTENDED RELEASE ORAL at 20:12

## 2022-01-01 RX ADMIN — AZITHROMYCIN 500 MG: 500 INJECTION, POWDER, LYOPHILIZED, FOR SOLUTION INTRAVENOUS at 16:11

## 2022-01-01 RX ADMIN — MAGNESIUM SULFATE HEPTAHYDRATE 4 G: 40 INJECTION, SOLUTION INTRAVENOUS at 21:46

## 2022-01-01 RX ADMIN — NYSTATIN: 100000 POWDER TOPICAL at 07:54

## 2022-01-01 RX ADMIN — MORPHINE SULFATE 2 MG: 2 INJECTION, SOLUTION INTRAMUSCULAR; INTRAVENOUS at 20:55

## 2022-01-01 RX ADMIN — HYDROCODONE BITARTRATE AND ACETAMINOPHEN 1 TABLET: 10; 325 TABLET ORAL at 05:15

## 2022-01-01 RX ADMIN — PREGABALIN 150 MG: 75 CAPSULE ORAL at 14:41

## 2022-01-01 RX ADMIN — PREDNISONE 10 MG: 5 TABLET ORAL at 08:27

## 2022-01-01 RX ADMIN — INSULIN DETEMIR 20 UNITS: 100 INJECTION, SOLUTION SUBCUTANEOUS at 20:44

## 2022-01-01 RX ADMIN — PANTOPRAZOLE SODIUM 40 MG: 40 TABLET, DELAYED RELEASE ORAL at 09:07

## 2022-01-01 RX ADMIN — LOSARTAN POTASSIUM 50 MG: 50 TABLET, FILM COATED ORAL at 21:04

## 2022-01-01 RX ADMIN — HYDROCODONE BITARTRATE AND ACETAMINOPHEN 1 TABLET: 10; 325 TABLET ORAL at 06:10

## 2022-01-01 RX ADMIN — HYDROCODONE BITARTRATE AND ACETAMINOPHEN 1 TABLET: 10; 325 TABLET ORAL at 16:20

## 2022-01-01 RX ADMIN — IPRATROPIUM BROMIDE AND ALBUTEROL SULFATE 3 ML: .5; 3 SOLUTION RESPIRATORY (INHALATION) at 15:28

## 2022-01-01 RX ADMIN — PANTOPRAZOLE SODIUM 40 MG: 40 TABLET, DELAYED RELEASE ORAL at 05:39

## 2022-01-01 RX ADMIN — HYDROCODONE BITARTRATE AND ACETAMINOPHEN 1 TABLET: 10; 325 TABLET ORAL at 03:23

## 2022-01-01 RX ADMIN — FLUCONAZOLE 200 MG: 200 TABLET ORAL at 17:54

## 2022-01-01 RX ADMIN — PREGABALIN 150 MG: 75 CAPSULE ORAL at 07:52

## 2022-01-01 RX ADMIN — INSULIN LISPRO 4 UNITS: 100 INJECTION, SOLUTION INTRAVENOUS; SUBCUTANEOUS at 11:57

## 2022-01-01 RX ADMIN — PREGABALIN 150 MG: 75 CAPSULE ORAL at 08:36

## 2022-01-01 RX ADMIN — INSULIN LISPRO 2 UNITS: 100 INJECTION, SOLUTION INTRAVENOUS; SUBCUTANEOUS at 11:44

## 2022-01-01 RX ADMIN — ENOXAPARIN SODIUM 40 MG: 40 INJECTION SUBCUTANEOUS at 05:35

## 2022-01-01 RX ADMIN — PANTOPRAZOLE SODIUM 40 MG: 40 TABLET, DELAYED RELEASE ORAL at 08:17

## 2022-01-01 RX ADMIN — PREGABALIN 150 MG: 75 CAPSULE ORAL at 13:57

## 2022-01-01 RX ADMIN — ENOXAPARIN SODIUM 40 MG: 40 INJECTION SUBCUTANEOUS at 15:16

## 2022-01-01 RX ADMIN — HYDROCODONE BITARTRATE AND ACETAMINOPHEN 1 TABLET: 10; 325 TABLET ORAL at 21:44

## 2022-01-01 RX ADMIN — FLUOXETINE HYDROCHLORIDE 40 MG: 20 CAPSULE ORAL at 09:56

## 2022-01-01 RX ADMIN — PREGABALIN 150 MG: 75 CAPSULE ORAL at 09:43

## 2022-01-01 RX ADMIN — PREGABALIN 150 MG: 75 CAPSULE ORAL at 20:06

## 2022-01-01 RX ADMIN — ALPRAZOLAM 0.5 MG: 0.5 TABLET ORAL at 16:06

## 2022-01-01 RX ADMIN — ALBUTEROL SULFATE 2.5 MG: 2.5 SOLUTION RESPIRATORY (INHALATION) at 11:26

## 2022-01-01 RX ADMIN — METHYLPREDNISOLONE SODIUM SUCCINATE 40 MG: 40 INJECTION, POWDER, FOR SOLUTION INTRAMUSCULAR; INTRAVENOUS at 17:47

## 2022-01-01 RX ADMIN — PREGABALIN 150 MG: 75 CAPSULE ORAL at 05:14

## 2022-01-01 RX ADMIN — PREGABALIN 150 MG: 75 CAPSULE ORAL at 14:19

## 2022-01-01 RX ADMIN — PRAVASTATIN SODIUM 40 MG: 40 TABLET ORAL at 21:15

## 2022-01-01 RX ADMIN — ALPRAZOLAM 0.5 MG: 0.5 TABLET ORAL at 01:10

## 2022-01-01 RX ADMIN — INSULIN LISPRO 5 UNITS: 100 INJECTION, SOLUTION INTRAVENOUS; SUBCUTANEOUS at 11:45

## 2022-01-01 RX ADMIN — MICONAZOLE NITRATE 1 APPLICATION: 2 CREAM TOPICAL at 21:10

## 2022-01-01 RX ADMIN — HYDROCODONE BITARTRATE AND ACETAMINOPHEN 1 TABLET: 10; 325 TABLET ORAL at 02:42

## 2022-01-01 RX ADMIN — IPRATROPIUM BROMIDE AND ALBUTEROL SULFATE 3 ML: .5; 3 SOLUTION RESPIRATORY (INHALATION) at 16:16

## 2022-01-01 RX ADMIN — AZITHROMYCIN 500 MG: 500 INJECTION, POWDER, LYOPHILIZED, FOR SOLUTION INTRAVENOUS at 16:01

## 2022-01-01 RX ADMIN — IPRATROPIUM BROMIDE AND ALBUTEROL SULFATE 3 ML: .5; 3 SOLUTION RESPIRATORY (INHALATION) at 19:47

## 2022-01-01 RX ADMIN — INSULIN DETEMIR 30 UNITS: 100 INJECTION, SOLUTION SUBCUTANEOUS at 22:20

## 2022-01-01 RX ADMIN — PREGABALIN 150 MG: 75 CAPSULE ORAL at 05:11

## 2022-01-01 RX ADMIN — INSULIN DETEMIR 12 UNITS: 100 INJECTION, SOLUTION SUBCUTANEOUS at 09:45

## 2022-01-01 RX ADMIN — HYDROCODONE BITARTRATE AND ACETAMINOPHEN 1 TABLET: 10; 325 TABLET ORAL at 05:11

## 2022-01-01 RX ADMIN — INSULIN LISPRO 10 UNITS: 100 INJECTION, SOLUTION INTRAVENOUS; SUBCUTANEOUS at 12:07

## 2022-01-01 RX ADMIN — TAZOBACTAM SODIUM AND PIPERACILLIN SODIUM 3.38 G: 375; 3 INJECTION, SOLUTION INTRAVENOUS at 21:02

## 2022-01-01 RX ADMIN — INSULIN LISPRO 3 UNITS: 100 INJECTION, SOLUTION INTRAVENOUS; SUBCUTANEOUS at 12:34

## 2022-01-01 RX ADMIN — ENOXAPARIN SODIUM 40 MG: 40 INJECTION SUBCUTANEOUS at 16:26

## 2022-01-01 RX ADMIN — SENNOSIDES AND DOCUSATE SODIUM 2 TABLET: 50; 8.6 TABLET ORAL at 09:56

## 2022-01-01 RX ADMIN — FLUOXETINE HYDROCHLORIDE 40 MG: 20 CAPSULE ORAL at 09:07

## 2022-01-01 RX ADMIN — IPRATROPIUM BROMIDE AND ALBUTEROL SULFATE 3 ML: .5; 3 SOLUTION RESPIRATORY (INHALATION) at 20:56

## 2022-01-01 RX ADMIN — SENNOSIDES AND DOCUSATE SODIUM 2 TABLET: 50; 8.6 TABLET ORAL at 21:11

## 2022-01-01 RX ADMIN — INSULIN DETEMIR 20 UNITS: 100 INJECTION, SOLUTION SUBCUTANEOUS at 09:17

## 2022-01-01 RX ADMIN — HYDROCODONE BITARTRATE AND ACETAMINOPHEN 1 TABLET: 10; 325 TABLET ORAL at 02:08

## 2022-01-01 RX ADMIN — ALPRAZOLAM 0.5 MG: 0.5 TABLET ORAL at 23:32

## 2022-01-01 RX ADMIN — Medication 10 ML: at 22:10

## 2022-01-01 RX ADMIN — BISACODYL 10 MG: 10 SUPPOSITORY RECTAL at 14:13

## 2022-01-01 RX ADMIN — HYDROCODONE BITARTRATE AND ACETAMINOPHEN 1 TABLET: 10; 325 TABLET ORAL at 13:18

## 2022-01-01 RX ADMIN — POTASSIUM CHLORIDE 10 MEQ: 7.46 INJECTION, SOLUTION INTRAVENOUS at 10:15

## 2022-01-01 RX ADMIN — ALBUTEROL SULFATE 2.5 MG: 2.5 SOLUTION RESPIRATORY (INHALATION) at 13:26

## 2022-01-01 RX ADMIN — ALPRAZOLAM 0.5 MG: 0.5 TABLET ORAL at 02:42

## 2022-01-01 RX ADMIN — INSULIN LISPRO 10 UNITS: 100 INJECTION, SOLUTION INTRAVENOUS; SUBCUTANEOUS at 07:57

## 2022-01-01 RX ADMIN — MICONAZOLE NITRATE 1 APPLICATION: 2 CREAM TOPICAL at 21:13

## 2022-01-01 RX ADMIN — Medication 10 ML: at 20:34

## 2022-01-01 RX ADMIN — NYSTATIN: 100000 POWDER TOPICAL at 08:33

## 2022-01-01 RX ADMIN — SENNOSIDES AND DOCUSATE SODIUM 2 TABLET: 50; 8.6 TABLET ORAL at 20:07

## 2022-01-01 RX ADMIN — MICONAZOLE NITRATE 1 APPLICATION: 2 CREAM TOPICAL at 08:27

## 2022-01-01 RX ADMIN — PREGABALIN 150 MG: 75 CAPSULE ORAL at 14:28

## 2022-01-01 RX ADMIN — HYDROCODONE BITARTRATE AND ACETAMINOPHEN 1 TABLET: 10; 325 TABLET ORAL at 19:36

## 2022-01-01 RX ADMIN — PREGABALIN 150 MG: 75 CAPSULE ORAL at 09:24

## 2022-01-01 RX ADMIN — IPRATROPIUM BROMIDE AND ALBUTEROL SULFATE 3 ML: .5; 3 SOLUTION RESPIRATORY (INHALATION) at 08:44

## 2022-01-01 RX ADMIN — FLUOXETINE 40 MG: 20 CAPSULE ORAL at 08:25

## 2022-01-01 RX ADMIN — FLUOXETINE HYDROCHLORIDE 40 MG: 20 CAPSULE ORAL at 08:37

## 2022-01-01 RX ADMIN — Medication 10 ML: at 20:53

## 2022-01-01 RX ADMIN — HYDROCODONE BITARTRATE AND ACETAMINOPHEN 1 TABLET: 10; 325 TABLET ORAL at 14:20

## 2022-01-01 RX ADMIN — PANTOPRAZOLE SODIUM 40 MG: 40 TABLET, DELAYED RELEASE ORAL at 08:08

## 2022-01-01 RX ADMIN — INSULIN DETEMIR 12 UNITS: 100 INJECTION, SOLUTION SUBCUTANEOUS at 08:37

## 2022-01-01 RX ADMIN — Medication 10 ML: at 09:56

## 2022-01-01 RX ADMIN — OXYBUTYNIN CHLORIDE 5 MG: 5 TABLET, EXTENDED RELEASE ORAL at 08:27

## 2022-01-01 RX ADMIN — PRAVASTATIN SODIUM 40 MG: 40 TABLET ORAL at 00:14

## 2022-01-01 RX ADMIN — Medication 10 ML: at 09:23

## 2022-01-01 RX ADMIN — TAZOBACTAM SODIUM AND PIPERACILLIN SODIUM 3.38 G: 375; 3 INJECTION, SOLUTION INTRAVENOUS at 15:27

## 2022-01-01 RX ADMIN — HYDROCHLOROTHIAZIDE 25 MG: 25 TABLET ORAL at 09:24

## 2022-01-01 RX ADMIN — PRAVASTATIN SODIUM 40 MG: 40 TABLET ORAL at 08:45

## 2022-01-01 RX ADMIN — SENNOSIDES AND DOCUSATE SODIUM 2 TABLET: 50; 8.6 TABLET ORAL at 20:06

## 2022-01-01 RX ADMIN — ENOXAPARIN SODIUM 40 MG: 40 INJECTION SUBCUTANEOUS at 05:13

## 2022-01-01 RX ADMIN — HYDROCODONE BITARTRATE AND ACETAMINOPHEN 1 TABLET: 10; 325 TABLET ORAL at 19:14

## 2022-01-01 RX ADMIN — INSULIN LISPRO 3 UNITS: 100 INJECTION, SOLUTION INTRAVENOUS; SUBCUTANEOUS at 17:49

## 2022-01-01 RX ADMIN — INSULIN LISPRO 12 UNITS: 100 INJECTION, SOLUTION INTRAVENOUS; SUBCUTANEOUS at 12:10

## 2022-01-01 RX ADMIN — METHYLPREDNISOLONE SODIUM SUCCINATE 60 MG: 125 INJECTION, POWDER, FOR SOLUTION INTRAMUSCULAR; INTRAVENOUS at 02:39

## 2022-01-01 RX ADMIN — ENOXAPARIN SODIUM 40 MG: 40 INJECTION SUBCUTANEOUS at 17:17

## 2022-01-01 RX ADMIN — SENNOSIDES AND DOCUSATE SODIUM 2 TABLET: 50; 8.6 TABLET ORAL at 20:24

## 2022-01-01 RX ADMIN — FLUOXETINE 40 MG: 20 CAPSULE ORAL at 08:36

## 2022-01-01 RX ADMIN — SENNOSIDES AND DOCUSATE SODIUM 2 TABLET: 50; 8.6 TABLET ORAL at 08:43

## 2022-01-01 RX ADMIN — ALBUTEROL SULFATE 2.5 MG: 2.5 SOLUTION RESPIRATORY (INHALATION) at 07:45

## 2022-01-01 RX ADMIN — FLUOXETINE 40 MG: 20 CAPSULE ORAL at 07:51

## 2022-01-01 RX ADMIN — INSULIN DETEMIR 20 UNITS: 100 INJECTION, SOLUTION SUBCUTANEOUS at 08:45

## 2022-01-01 RX ADMIN — INSULIN LISPRO 15 UNITS: 100 INJECTION, SOLUTION INTRAVENOUS; SUBCUTANEOUS at 12:25

## 2022-01-01 RX ADMIN — ALBUTEROL SULFATE 2.5 MG: 2.5 SOLUTION RESPIRATORY (INHALATION) at 00:13

## 2022-01-01 RX ADMIN — HYDROCODONE BITARTRATE AND ACETAMINOPHEN 1 TABLET: 10; 325 TABLET ORAL at 14:09

## 2022-01-01 RX ADMIN — ENOXAPARIN SODIUM 40 MG: 40 INJECTION SUBCUTANEOUS at 17:48

## 2022-01-01 RX ADMIN — GUAIFENESIN 600 MG: 600 TABLET, EXTENDED RELEASE ORAL at 09:08

## 2022-01-01 RX ADMIN — INSULIN LISPRO 12 UNITS: 100 INJECTION, SOLUTION INTRAVENOUS; SUBCUTANEOUS at 17:23

## 2022-01-01 RX ADMIN — Medication 10 ML: at 21:12

## 2022-01-01 RX ADMIN — METHYLPREDNISOLONE SODIUM SUCCINATE 40 MG: 40 INJECTION, POWDER, FOR SOLUTION INTRAMUSCULAR; INTRAVENOUS at 09:14

## 2022-01-01 RX ADMIN — HYDROCODONE BITARTRATE AND ACETAMINOPHEN 1 TABLET: 10; 325 TABLET ORAL at 14:19

## 2022-01-01 RX ADMIN — METHYLPREDNISOLONE SODIUM SUCCINATE 40 MG: 40 INJECTION, POWDER, FOR SOLUTION INTRAMUSCULAR; INTRAVENOUS at 20:53

## 2022-01-01 RX ADMIN — IPRATROPIUM BROMIDE AND ALBUTEROL SULFATE 3 ML: .5; 3 SOLUTION RESPIRATORY (INHALATION) at 13:28

## 2022-01-01 RX ADMIN — Medication 10 ML: at 20:29

## 2022-01-01 RX ADMIN — PREGABALIN 150 MG: 75 CAPSULE ORAL at 06:38

## 2022-01-01 RX ADMIN — INSULIN LISPRO 10 UNITS: 100 INJECTION, SOLUTION INTRAVENOUS; SUBCUTANEOUS at 08:29

## 2022-01-01 RX ADMIN — FLUOXETINE 40 MG: 20 CAPSULE ORAL at 08:27

## 2022-01-01 RX ADMIN — INSULIN LISPRO 5 UNITS: 100 INJECTION, SOLUTION INTRAVENOUS; SUBCUTANEOUS at 20:02

## 2022-01-01 RX ADMIN — INSULIN DETEMIR 15 UNITS: 100 INJECTION, SOLUTION SUBCUTANEOUS at 21:16

## 2022-01-01 RX ADMIN — IPRATROPIUM BROMIDE AND ALBUTEROL SULFATE 3 ML: .5; 3 SOLUTION RESPIRATORY (INHALATION) at 15:54

## 2022-01-01 RX ADMIN — SENNOSIDES AND DOCUSATE SODIUM 2 TABLET: 50; 8.6 TABLET ORAL at 21:15

## 2022-01-01 RX ADMIN — PREGABALIN 150 MG: 75 CAPSULE ORAL at 08:27

## 2022-01-01 RX ADMIN — FLUOXETINE HYDROCHLORIDE 40 MG: 20 CAPSULE ORAL at 08:06

## 2022-01-01 RX ADMIN — GABAPENTIN 800 MG: 400 CAPSULE ORAL at 14:03

## 2022-01-01 RX ADMIN — POTASSIUM CHLORIDE 10 MEQ: 7.46 INJECTION, SOLUTION INTRAVENOUS at 06:52

## 2022-01-01 RX ADMIN — FUROSEMIDE 40 MG: 10 INJECTION, SOLUTION INTRAMUSCULAR; INTRAVENOUS at 10:03

## 2022-01-01 RX ADMIN — HYDROCODONE BITARTRATE AND ACETAMINOPHEN 1 TABLET: 10; 325 TABLET ORAL at 04:17

## 2022-01-01 RX ADMIN — HYDROCODONE BITARTRATE AND ACETAMINOPHEN 1 TABLET: 10; 325 TABLET ORAL at 21:27

## 2022-01-01 RX ADMIN — IPRATROPIUM BROMIDE AND ALBUTEROL SULFATE 3 ML: .5; 3 SOLUTION RESPIRATORY (INHALATION) at 12:27

## 2022-01-01 RX ADMIN — INSULIN DETEMIR 17 UNITS: 100 INJECTION, SOLUTION SUBCUTANEOUS at 20:07

## 2022-01-01 RX ADMIN — CLOTRIMAZOLE AND BETAMETHASONE DIPROPIONATE 1 APPLICATION: 10; .5 CREAM TOPICAL at 22:08

## 2022-01-01 RX ADMIN — PREGABALIN 150 MG: 75 CAPSULE ORAL at 20:17

## 2022-01-01 RX ADMIN — MORPHINE SULFATE 1 MG: 2 INJECTION, SOLUTION INTRAMUSCULAR; INTRAVENOUS at 09:24

## 2022-01-01 RX ADMIN — INSULIN LISPRO 10 UNITS: 100 INJECTION, SOLUTION INTRAVENOUS; SUBCUTANEOUS at 13:20

## 2022-01-01 RX ADMIN — SENNOSIDES AND DOCUSATE SODIUM 2 TABLET: 50; 8.6 TABLET ORAL at 09:31

## 2022-01-01 RX ADMIN — MICONAZOLE NITRATE 1 APPLICATION: 2 CREAM TOPICAL at 21:02

## 2022-01-01 RX ADMIN — INSULIN LISPRO 10 UNITS: 100 INJECTION, SOLUTION INTRAVENOUS; SUBCUTANEOUS at 08:35

## 2022-01-01 RX ADMIN — METHYLPREDNISOLONE SODIUM SUCCINATE 40 MG: 40 INJECTION, POWDER, FOR SOLUTION INTRAMUSCULAR; INTRAVENOUS at 04:49

## 2022-01-01 RX ADMIN — INSULIN DETEMIR 13 UNITS: 100 INJECTION, SOLUTION SUBCUTANEOUS at 20:15

## 2022-01-01 RX ADMIN — ENOXAPARIN SODIUM 40 MG: 40 INJECTION SUBCUTANEOUS at 16:11

## 2022-01-01 RX ADMIN — PREGABALIN 150 MG: 75 CAPSULE ORAL at 16:47

## 2022-01-01 RX ADMIN — FLUOXETINE 40 MG: 20 CAPSULE ORAL at 08:33

## 2022-01-01 RX ADMIN — INSULIN LISPRO 8 UNITS: 100 INJECTION, SOLUTION INTRAVENOUS; SUBCUTANEOUS at 12:23

## 2022-01-01 RX ADMIN — INSULIN DETEMIR 10 UNITS: 100 INJECTION, SOLUTION SUBCUTANEOUS at 20:31

## 2022-01-01 RX ADMIN — INSULIN LISPRO 8 UNITS: 100 INJECTION, SOLUTION INTRAVENOUS; SUBCUTANEOUS at 18:25

## 2022-01-01 RX ADMIN — DEXAMETHASONE SODIUM PHOSPHATE 6 MG: 4 INJECTION, SOLUTION INTRA-ARTICULAR; INTRALESIONAL; INTRAMUSCULAR; INTRAVENOUS; SOFT TISSUE at 14:53

## 2022-01-01 RX ADMIN — INSULIN LISPRO 5 UNITS: 100 INJECTION, SOLUTION INTRAVENOUS; SUBCUTANEOUS at 13:18

## 2022-01-01 RX ADMIN — SENNOSIDES AND DOCUSATE SODIUM 2 TABLET: 50; 8.6 TABLET ORAL at 21:48

## 2022-01-01 RX ADMIN — SENNOSIDES AND DOCUSATE SODIUM 2 TABLET: 50; 8.6 TABLET ORAL at 08:44

## 2022-01-01 RX ADMIN — TAZOBACTAM SODIUM AND PIPERACILLIN SODIUM 3.38 G: 375; 3 INJECTION, SOLUTION INTRAVENOUS at 13:30

## 2022-01-01 RX ADMIN — LOSARTAN POTASSIUM 25 MG: 25 TABLET, FILM COATED ORAL at 08:33

## 2022-01-01 RX ADMIN — FLUOXETINE 40 MG: 20 CAPSULE ORAL at 09:44

## 2022-01-01 RX ADMIN — Medication 5 MG: at 21:18

## 2022-01-01 RX ADMIN — PREGABALIN 150 MG: 75 CAPSULE ORAL at 21:04

## 2022-01-01 RX ADMIN — PRAVASTATIN SODIUM 40 MG: 40 TABLET ORAL at 08:37

## 2022-01-01 RX ADMIN — INSULIN LISPRO 14 UNITS: 100 INJECTION, SOLUTION INTRAVENOUS; SUBCUTANEOUS at 21:01

## 2022-01-01 RX ADMIN — HYDROCORTISONE 1 APPLICATION: 25 CREAM TOPICAL at 09:55

## 2022-01-01 RX ADMIN — INSULIN LISPRO 15 UNITS: 100 INJECTION, SOLUTION INTRAVENOUS; SUBCUTANEOUS at 17:18

## 2022-01-01 RX ADMIN — MORPHINE SULFATE 2 MG: 2 INJECTION, SOLUTION INTRAMUSCULAR; INTRAVENOUS at 17:26

## 2022-01-01 RX ADMIN — PREGABALIN 150 MG: 75 CAPSULE ORAL at 17:34

## 2022-01-01 RX ADMIN — ENOXAPARIN SODIUM 40 MG: 40 INJECTION SUBCUTANEOUS at 17:35

## 2022-01-01 RX ADMIN — INSULIN DETEMIR 10 UNITS: 100 INJECTION, SOLUTION SUBCUTANEOUS at 20:49

## 2022-01-01 RX ADMIN — METHYLPREDNISOLONE SODIUM SUCCINATE 40 MG: 40 INJECTION, POWDER, FOR SOLUTION INTRAMUSCULAR; INTRAVENOUS at 10:36

## 2022-01-01 RX ADMIN — VANCOMYCIN HYDROCHLORIDE 1750 MG: 10 INJECTION, POWDER, LYOPHILIZED, FOR SOLUTION INTRAVENOUS at 17:49

## 2022-01-01 RX ADMIN — POTASSIUM CHLORIDE 20 MEQ: 750 CAPSULE, EXTENDED RELEASE ORAL at 15:31

## 2022-01-01 RX ADMIN — HALOPERIDOL LACTATE 1 MG: 5 INJECTION, SOLUTION INTRAMUSCULAR at 23:20

## 2022-01-01 RX ADMIN — ALPRAZOLAM 0.5 MG: 0.5 TABLET ORAL at 21:15

## 2022-01-01 RX ADMIN — ENOXAPARIN SODIUM 40 MG: 40 INJECTION SUBCUTANEOUS at 17:49

## 2022-01-01 RX ADMIN — SENNOSIDES AND DOCUSATE SODIUM 2 TABLET: 50; 8.6 TABLET ORAL at 08:35

## 2022-01-01 RX ADMIN — PRAVASTATIN SODIUM 40 MG: 40 TABLET ORAL at 08:22

## 2022-01-01 RX ADMIN — Medication 10 ML: at 23:10

## 2022-01-01 RX ADMIN — METHYLPREDNISOLONE SODIUM SUCCINATE 60 MG: 125 INJECTION, POWDER, FOR SOLUTION INTRAMUSCULAR; INTRAVENOUS at 02:24

## 2022-01-01 RX ADMIN — ENOXAPARIN SODIUM 40 MG: 40 INJECTION SUBCUTANEOUS at 05:38

## 2022-01-01 RX ADMIN — HYDROCODONE BITARTRATE AND ACETAMINOPHEN 1 TABLET: 10; 325 TABLET ORAL at 14:58

## 2022-01-01 RX ADMIN — METHYLPREDNISOLONE SODIUM SUCCINATE 60 MG: 125 INJECTION, POWDER, FOR SOLUTION INTRAMUSCULAR; INTRAVENOUS at 20:38

## 2022-01-01 RX ADMIN — Medication 5 MG: at 20:07

## 2022-01-01 RX ADMIN — PREGABALIN 150 MG: 75 CAPSULE ORAL at 21:01

## 2022-01-01 RX ADMIN — ENOXAPARIN SODIUM 40 MG: 40 INJECTION SUBCUTANEOUS at 18:02

## 2022-01-01 RX ADMIN — HYDROCODONE BITARTRATE AND ACETAMINOPHEN 1 TABLET: 10; 325 TABLET ORAL at 08:16

## 2022-01-01 RX ADMIN — INSULIN LISPRO 8 UNITS: 100 INJECTION, SOLUTION INTRAVENOUS; SUBCUTANEOUS at 17:11

## 2022-01-01 RX ADMIN — INSULIN LISPRO 8 UNITS: 100 INJECTION, SOLUTION INTRAVENOUS; SUBCUTANEOUS at 18:26

## 2022-01-01 RX ADMIN — HYDROCODONE BITARTRATE AND ACETAMINOPHEN 1 TABLET: 10; 325 TABLET ORAL at 11:37

## 2022-01-01 RX ADMIN — PANTOPRAZOLE SODIUM 40 MG: 40 TABLET, DELAYED RELEASE ORAL at 08:49

## 2022-01-01 RX ADMIN — HYDROCODONE BITARTRATE AND ACETAMINOPHEN 1 TABLET: 10; 325 TABLET ORAL at 18:25

## 2022-01-01 RX ADMIN — INSULIN LISPRO 8 UNITS: 100 INJECTION, SOLUTION INTRAVENOUS; SUBCUTANEOUS at 17:02

## 2022-01-01 RX ADMIN — MICONAZOLE NITRATE 1 APPLICATION: 2 CREAM TOPICAL at 12:15

## 2022-01-01 RX ADMIN — ALBUTEROL SULFATE 2.5 MG: 2.5 SOLUTION RESPIRATORY (INHALATION) at 13:07

## 2022-01-01 RX ADMIN — TAZOBACTAM SODIUM AND PIPERACILLIN SODIUM 3.38 G: 375; 3 INJECTION, SOLUTION INTRAVENOUS at 21:27

## 2022-01-01 RX ADMIN — HYDROCODONE BITARTRATE AND ACETAMINOPHEN 1 TABLET: 10; 325 TABLET ORAL at 05:07

## 2022-01-01 RX ADMIN — BUMETANIDE 2 MG: 0.25 INJECTION, SOLUTION INTRAMUSCULAR; INTRAVENOUS at 09:15

## 2022-01-01 RX ADMIN — PREGABALIN 150 MG: 75 CAPSULE ORAL at 15:34

## 2022-01-01 RX ADMIN — SULFAMETHOXAZOLE AND TRIMETHOPRIM 1 TABLET: 800; 160 TABLET ORAL at 08:29

## 2022-01-01 RX ADMIN — IPRATROPIUM BROMIDE AND ALBUTEROL SULFATE 3 ML: .5; 3 SOLUTION RESPIRATORY (INHALATION) at 12:31

## 2022-01-01 RX ADMIN — PANTOPRAZOLE SODIUM 40 MG: 40 TABLET, DELAYED RELEASE ORAL at 06:10

## 2022-01-01 RX ADMIN — SENNOSIDES AND DOCUSATE SODIUM 2 TABLET: 50; 8.6 TABLET ORAL at 08:14

## 2022-01-01 RX ADMIN — OXYBUTYNIN CHLORIDE 5 MG: 5 TABLET, EXTENDED RELEASE ORAL at 08:35

## 2022-01-01 RX ADMIN — INSULIN LISPRO 6 UNITS: 100 INJECTION, SOLUTION INTRAVENOUS; SUBCUTANEOUS at 17:19

## 2022-01-01 RX ADMIN — PREGABALIN 150 MG: 75 CAPSULE ORAL at 21:00

## 2022-01-01 RX ADMIN — PREGABALIN 150 MG: 75 CAPSULE ORAL at 20:55

## 2022-01-01 RX ADMIN — PRAVASTATIN SODIUM 40 MG: 40 TABLET ORAL at 20:14

## 2022-01-01 RX ADMIN — GABAPENTIN 800 MG: 400 CAPSULE ORAL at 06:32

## 2022-01-01 RX ADMIN — PREGABALIN 150 MG: 75 CAPSULE ORAL at 20:23

## 2022-01-01 RX ADMIN — INSULIN LISPRO 10 UNITS: 100 INJECTION, SOLUTION INTRAVENOUS; SUBCUTANEOUS at 09:30

## 2022-01-01 RX ADMIN — IPRATROPIUM BROMIDE AND ALBUTEROL SULFATE 3 ML: .5; 3 SOLUTION RESPIRATORY (INHALATION) at 08:36

## 2022-01-01 RX ADMIN — HYDROCODONE BITARTRATE AND ACETAMINOPHEN 1 TABLET: 10; 325 TABLET ORAL at 07:35

## 2022-01-01 RX ADMIN — GUAIFENESIN 600 MG: 600 TABLET, EXTENDED RELEASE ORAL at 20:14

## 2022-01-01 RX ADMIN — MORPHINE SULFATE 4 MG: 4 INJECTION, SOLUTION INTRAMUSCULAR; INTRAVENOUS at 17:50

## 2022-01-01 RX ADMIN — MORPHINE SULFATE 4 MG: 4 INJECTION, SOLUTION INTRAMUSCULAR; INTRAVENOUS at 13:41

## 2022-01-01 RX ADMIN — INSULIN LISPRO 3 UNITS: 100 INJECTION, SOLUTION INTRAVENOUS; SUBCUTANEOUS at 11:55

## 2022-01-01 RX ADMIN — INSULIN LISPRO 10 UNITS: 100 INJECTION, SOLUTION INTRAVENOUS; SUBCUTANEOUS at 20:47

## 2022-01-01 RX ADMIN — ALPRAZOLAM 0.5 MG: 0.5 TABLET ORAL at 21:03

## 2022-01-01 RX ADMIN — SENNOSIDES AND DOCUSATE SODIUM 2 TABLET: 50; 8.6 TABLET ORAL at 20:16

## 2022-01-01 RX ADMIN — PANTOPRAZOLE SODIUM 40 MG: 40 TABLET, DELAYED RELEASE ORAL at 08:43

## 2022-01-01 RX ADMIN — Medication 1 CAPSULE: at 11:39

## 2022-01-01 RX ADMIN — HYDROCODONE BITARTRATE AND ACETAMINOPHEN 1 TABLET: 10; 325 TABLET ORAL at 02:40

## 2022-01-01 RX ADMIN — INSULIN DETEMIR 15 UNITS: 100 INJECTION, SOLUTION SUBCUTANEOUS at 08:25

## 2022-01-01 RX ADMIN — PRAVASTATIN SODIUM 40 MG: 40 TABLET ORAL at 10:36

## 2022-01-01 RX ADMIN — VANCOMYCIN HYDROCHLORIDE 1250 MG: 10 INJECTION, POWDER, LYOPHILIZED, FOR SOLUTION INTRAVENOUS at 12:04

## 2022-01-01 RX ADMIN — INSULIN LISPRO 10 UNITS: 100 INJECTION, SOLUTION INTRAVENOUS; SUBCUTANEOUS at 11:22

## 2022-01-01 RX ADMIN — INSULIN DETEMIR 10 UNITS: 100 INJECTION, SOLUTION SUBCUTANEOUS at 09:00

## 2022-01-01 RX ADMIN — ALBUTEROL SULFATE 2.5 MG: 2.5 SOLUTION RESPIRATORY (INHALATION) at 00:54

## 2022-01-01 RX ADMIN — METHYLPREDNISOLONE SODIUM SUCCINATE 40 MG: 40 INJECTION, POWDER, FOR SOLUTION INTRAMUSCULAR; INTRAVENOUS at 17:54

## 2022-01-01 RX ADMIN — INSULIN DETEMIR 20 UNITS: 100 INJECTION, SOLUTION SUBCUTANEOUS at 21:12

## 2022-01-01 RX ADMIN — ENOXAPARIN SODIUM 40 MG: 40 INJECTION SUBCUTANEOUS at 06:01

## 2022-01-01 RX ADMIN — FLUCONAZOLE 400 MG: 400 INJECTION, SOLUTION INTRAVENOUS at 16:02

## 2022-01-01 RX ADMIN — SODIUM CHLORIDE, POTASSIUM CHLORIDE, SODIUM LACTATE AND CALCIUM CHLORIDE 150 ML/HR: 600; 310; 30; 20 INJECTION, SOLUTION INTRAVENOUS at 00:29

## 2022-01-01 RX ADMIN — INSULIN LISPRO 4 UNITS: 100 INJECTION, SOLUTION INTRAVENOUS; SUBCUTANEOUS at 17:18

## 2022-01-01 RX ADMIN — INSULIN LISPRO 8 UNITS: 100 INJECTION, SOLUTION INTRAVENOUS; SUBCUTANEOUS at 17:47

## 2022-01-01 RX ADMIN — Medication 10 ML: at 21:02

## 2022-01-01 RX ADMIN — HYDROCODONE BITARTRATE AND ACETAMINOPHEN 1 TABLET: 10; 325 TABLET ORAL at 13:29

## 2022-01-01 RX ADMIN — PREGABALIN 150 MG: 75 CAPSULE ORAL at 20:10

## 2022-01-01 RX ADMIN — POLYETHYLENE GLYCOL 3350 17 G: 17 POWDER, FOR SOLUTION ORAL at 11:53

## 2022-01-01 RX ADMIN — Medication 10 ML: at 08:00

## 2022-01-01 RX ADMIN — ALPRAZOLAM 0.5 MG: 0.5 TABLET ORAL at 22:57

## 2022-01-01 RX ADMIN — PRAVASTATIN SODIUM 40 MG: 40 TABLET ORAL at 09:08

## 2022-01-01 RX ADMIN — ALBUTEROL SULFATE 2.5 MG: 2.5 SOLUTION RESPIRATORY (INHALATION) at 19:30

## 2022-01-01 RX ADMIN — SENNOSIDES AND DOCUSATE SODIUM 2 TABLET: 50; 8.6 TABLET ORAL at 20:26

## 2022-01-01 RX ADMIN — PRAVASTATIN SODIUM 40 MG: 40 TABLET ORAL at 08:17

## 2022-01-01 RX ADMIN — OXYBUTYNIN CHLORIDE 5 MG: 5 TABLET, EXTENDED RELEASE ORAL at 07:54

## 2022-01-01 RX ADMIN — PREGABALIN 150 MG: 75 CAPSULE ORAL at 17:15

## 2022-01-01 RX ADMIN — INSULIN DETEMIR 12 UNITS: 100 INJECTION, SOLUTION SUBCUTANEOUS at 21:03

## 2022-01-01 RX ADMIN — PREDNISONE 20 MG: 20 TABLET ORAL at 08:43

## 2022-01-01 RX ADMIN — POTASSIUM CHLORIDE 10 MEQ: 7.46 INJECTION, SOLUTION INTRAVENOUS at 08:48

## 2022-01-01 RX ADMIN — INSULIN LISPRO 13 UNITS: 100 INJECTION, SOLUTION INTRAVENOUS; SUBCUTANEOUS at 17:20

## 2022-01-01 RX ADMIN — INSULIN LISPRO 15 UNITS: 100 INJECTION, SOLUTION INTRAVENOUS; SUBCUTANEOUS at 18:19

## 2022-01-01 RX ADMIN — AZITHROMYCIN 500 MG: 500 INJECTION, POWDER, LYOPHILIZED, FOR SOLUTION INTRAVENOUS at 16:23

## 2022-01-01 RX ADMIN — PANTOPRAZOLE SODIUM 40 MG: 40 TABLET, DELAYED RELEASE ORAL at 09:15

## 2022-01-01 RX ADMIN — PREDNISONE 15 MG: 5 TABLET ORAL at 08:45

## 2022-01-01 RX ADMIN — HYDROCODONE BITARTRATE AND ACETAMINOPHEN 1 TABLET: 10; 325 TABLET ORAL at 20:10

## 2022-01-01 RX ADMIN — FLUOXETINE HYDROCHLORIDE 40 MG: 20 CAPSULE ORAL at 08:43

## 2022-01-01 RX ADMIN — GABAPENTIN 800 MG: 400 CAPSULE ORAL at 05:09

## 2022-01-01 RX ADMIN — INSULIN LISPRO 16 UNITS: 100 INJECTION, SOLUTION INTRAVENOUS; SUBCUTANEOUS at 12:25

## 2022-01-01 RX ADMIN — SENNOSIDES AND DOCUSATE SODIUM 2 TABLET: 50; 8.6 TABLET ORAL at 20:48

## 2022-01-01 RX ADMIN — METHYLPREDNISOLONE SODIUM SUCCINATE 40 MG: 40 INJECTION, POWDER, FOR SOLUTION INTRAMUSCULAR; INTRAVENOUS at 09:00

## 2022-01-01 RX ADMIN — INSULIN LISPRO 3 UNITS: 100 INJECTION, SOLUTION INTRAVENOUS; SUBCUTANEOUS at 17:21

## 2022-01-01 RX ADMIN — HYDROCODONE BITARTRATE AND ACETAMINOPHEN 1 TABLET: 10; 325 TABLET ORAL at 10:18

## 2022-01-01 RX ADMIN — VANCOMYCIN HYDROCHLORIDE 1000 MG: 1 INJECTION, SOLUTION INTRAVENOUS at 21:27

## 2022-01-01 RX ADMIN — ALBUTEROL SULFATE 2.5 MG: 2.5 SOLUTION RESPIRATORY (INHALATION) at 14:09

## 2022-01-01 RX ADMIN — PREGABALIN 150 MG: 75 CAPSULE ORAL at 20:47

## 2022-01-01 RX ADMIN — METHYLPREDNISOLONE SODIUM SUCCINATE 60 MG: 125 INJECTION, POWDER, FOR SOLUTION INTRAMUSCULAR; INTRAVENOUS at 10:18

## 2022-01-01 RX ADMIN — MICONAZOLE NITRATE 1 APPLICATION: 2 CREAM TOPICAL at 21:27

## 2022-01-01 RX ADMIN — INSULIN LISPRO 5 UNITS: 100 INJECTION, SOLUTION INTRAVENOUS; SUBCUTANEOUS at 12:35

## 2022-01-01 RX ADMIN — BUMETANIDE 2 MG: 0.25 INJECTION, SOLUTION INTRAMUSCULAR; INTRAVENOUS at 06:37

## 2022-01-01 RX ADMIN — INSULIN LISPRO 8 UNITS: 100 INJECTION, SOLUTION INTRAVENOUS; SUBCUTANEOUS at 20:12

## 2022-01-01 RX ADMIN — ENOXAPARIN SODIUM 40 MG: 40 INJECTION SUBCUTANEOUS at 16:44

## 2022-01-01 RX ADMIN — CLOTRIMAZOLE AND BETAMETHASONE DIPROPIONATE 1 APPLICATION: 10; .5 CREAM TOPICAL at 20:43

## 2022-01-01 RX ADMIN — LOSARTAN POTASSIUM 25 MG: 25 TABLET, FILM COATED ORAL at 08:04

## 2022-01-01 RX ADMIN — SENNOSIDES AND DOCUSATE SODIUM 2 TABLET: 50; 8.6 TABLET ORAL at 08:25

## 2022-01-01 RX ADMIN — METHYLPREDNISOLONE SODIUM SUCCINATE 40 MG: 40 INJECTION, POWDER, FOR SOLUTION INTRAMUSCULAR; INTRAVENOUS at 05:24

## 2022-01-01 RX ADMIN — INSULIN LISPRO 10 UNITS: 100 INJECTION, SOLUTION INTRAVENOUS; SUBCUTANEOUS at 07:53

## 2022-01-01 RX ADMIN — GUAIFENESIN 600 MG: 600 TABLET, EXTENDED RELEASE ORAL at 20:16

## 2022-01-01 RX ADMIN — PRAVASTATIN SODIUM 40 MG: 40 TABLET ORAL at 08:08

## 2022-01-01 RX ADMIN — PREGABALIN 150 MG: 75 CAPSULE ORAL at 16:05

## 2022-01-01 RX ADMIN — PREGABALIN 150 MG: 75 CAPSULE ORAL at 15:44

## 2022-01-01 RX ADMIN — FLUOXETINE HYDROCHLORIDE 40 MG: 20 CAPSULE ORAL at 08:47

## 2022-01-01 RX ADMIN — HYDROCODONE BITARTRATE AND ACETAMINOPHEN 1 TABLET: 10; 325 TABLET ORAL at 09:30

## 2022-01-01 RX ADMIN — FLUOXETINE HYDROCHLORIDE 40 MG: 20 CAPSULE ORAL at 08:46

## 2022-01-01 RX ADMIN — MORPHINE SULFATE 2 MG: 2 INJECTION, SOLUTION INTRAMUSCULAR; INTRAVENOUS at 14:52

## 2022-01-01 RX ADMIN — PREGABALIN 150 MG: 75 CAPSULE ORAL at 15:13

## 2022-01-01 RX ADMIN — IPRATROPIUM BROMIDE AND ALBUTEROL SULFATE 3 ML: .5; 3 SOLUTION RESPIRATORY (INHALATION) at 19:34

## 2022-01-01 RX ADMIN — GUAIFENESIN 600 MG: 600 TABLET, EXTENDED RELEASE ORAL at 21:01

## 2022-01-01 RX ADMIN — INSULIN LISPRO 8 UNITS: 100 INJECTION, SOLUTION INTRAVENOUS; SUBCUTANEOUS at 13:21

## 2022-01-01 RX ADMIN — ALBUTEROL SULFATE 2.5 MG: 2.5 SOLUTION RESPIRATORY (INHALATION) at 19:09

## 2022-01-01 RX ADMIN — PREGABALIN 150 MG: 75 CAPSULE ORAL at 20:38

## 2022-01-01 RX ADMIN — PREGABALIN 150 MG: 75 CAPSULE ORAL at 21:18

## 2022-01-01 RX ADMIN — INSULIN LISPRO 4 UNITS: 100 INJECTION, SOLUTION INTRAVENOUS; SUBCUTANEOUS at 11:40

## 2022-01-01 RX ADMIN — PREGABALIN 150 MG: 75 CAPSULE ORAL at 08:45

## 2022-01-01 RX ADMIN — METHYLPREDNISOLONE SODIUM SUCCINATE 40 MG: 40 INJECTION, POWDER, FOR SOLUTION INTRAMUSCULAR; INTRAVENOUS at 05:56

## 2022-01-01 RX ADMIN — VANCOMYCIN HYDROCHLORIDE 1250 MG: 10 INJECTION, POWDER, LYOPHILIZED, FOR SOLUTION INTRAVENOUS at 11:54

## 2022-01-01 RX ADMIN — VANCOMYCIN HYDROCHLORIDE 1500 MG: 10 INJECTION, POWDER, LYOPHILIZED, FOR SOLUTION INTRAVENOUS at 10:22

## 2022-01-01 RX ADMIN — TAZOBACTAM SODIUM AND PIPERACILLIN SODIUM 3.38 G: 375; 3 INJECTION, SOLUTION INTRAVENOUS at 21:37

## 2022-01-01 RX ADMIN — HYDROCODONE BITARTRATE AND ACETAMINOPHEN 1 TABLET: 10; 325 TABLET ORAL at 15:23

## 2022-01-01 RX ADMIN — INSULIN DETEMIR 17 UNITS: 100 INJECTION, SOLUTION SUBCUTANEOUS at 08:29

## 2022-01-01 RX ADMIN — INSULIN LISPRO 5 UNITS: 100 INJECTION, SOLUTION INTRAVENOUS; SUBCUTANEOUS at 08:37

## 2022-01-01 RX ADMIN — Medication 10 ML: at 08:43

## 2022-01-01 RX ADMIN — VANCOMYCIN HYDROCHLORIDE 1250 MG: 10 INJECTION, POWDER, LYOPHILIZED, FOR SOLUTION INTRAVENOUS at 11:50

## 2022-01-01 RX ADMIN — HYDROCODONE BITARTRATE AND ACETAMINOPHEN 1 TABLET: 10; 325 TABLET ORAL at 11:26

## 2022-01-01 RX ADMIN — METHYLPREDNISOLONE SODIUM SUCCINATE 40 MG: 40 INJECTION, POWDER, FOR SOLUTION INTRAMUSCULAR; INTRAVENOUS at 06:10

## 2022-01-01 RX ADMIN — PREGABALIN 150 MG: 75 CAPSULE ORAL at 05:02

## 2022-01-01 RX ADMIN — PREGABALIN 150 MG: 75 CAPSULE ORAL at 05:56

## 2022-01-01 RX ADMIN — TAZOBACTAM SODIUM AND PIPERACILLIN SODIUM 3.38 G: 375; 3 INJECTION, SOLUTION INTRAVENOUS at 14:18

## 2022-01-01 RX ADMIN — INSULIN DETEMIR 10 UNITS: 100 INJECTION, SOLUTION SUBCUTANEOUS at 08:48

## 2022-01-01 RX ADMIN — INSULIN LISPRO 10 UNITS: 100 INJECTION, SOLUTION INTRAVENOUS; SUBCUTANEOUS at 08:37

## 2022-01-01 RX ADMIN — INSULIN LISPRO 15 UNITS: 100 INJECTION, SOLUTION INTRAVENOUS; SUBCUTANEOUS at 09:05

## 2022-01-01 RX ADMIN — PANTOPRAZOLE SODIUM 40 MG: 40 TABLET, DELAYED RELEASE ORAL at 09:56

## 2022-01-01 RX ADMIN — PRAVASTATIN SODIUM 40 MG: 40 TABLET ORAL at 09:04

## 2022-01-01 RX ADMIN — MICONAZOLE NITRATE 1 APPLICATION: 2 CREAM TOPICAL at 09:55

## 2022-01-01 RX ADMIN — MICONAZOLE NITRATE 1 APPLICATION: 2 CREAM TOPICAL at 08:37

## 2022-01-01 RX ADMIN — INSULIN LISPRO 10 UNITS: 100 INJECTION, SOLUTION INTRAVENOUS; SUBCUTANEOUS at 18:08

## 2022-01-01 RX ADMIN — DIPHENHYDRAMINE HYDROCHLORIDE 25 MG: 25 CAPSULE ORAL at 16:05

## 2022-01-01 RX ADMIN — INSULIN LISPRO 4 UNITS: 100 INJECTION, SOLUTION INTRAVENOUS; SUBCUTANEOUS at 00:15

## 2022-01-01 RX ADMIN — PREGABALIN 150 MG: 75 CAPSULE ORAL at 15:43

## 2022-01-01 RX ADMIN — METHYLPREDNISOLONE SODIUM SUCCINATE 40 MG: 40 INJECTION, POWDER, FOR SOLUTION INTRAMUSCULAR; INTRAVENOUS at 20:23

## 2022-01-01 RX ADMIN — INSULIN LISPRO 10 UNITS: 100 INJECTION, SOLUTION INTRAVENOUS; SUBCUTANEOUS at 12:32

## 2022-01-01 RX ADMIN — PREGABALIN 150 MG: 75 CAPSULE ORAL at 21:11

## 2022-01-01 RX ADMIN — GABAPENTIN 800 MG: 400 CAPSULE ORAL at 21:14

## 2022-01-01 RX ADMIN — ALBUTEROL SULFATE 2.5 MG: 2.5 SOLUTION RESPIRATORY (INHALATION) at 00:57

## 2022-01-01 RX ADMIN — HYDROCODONE BITARTRATE AND ACETAMINOPHEN 1 TABLET: 10; 325 TABLET ORAL at 23:31

## 2022-01-01 RX ADMIN — INSULIN LISPRO 5 UNITS: 100 INJECTION, SOLUTION INTRAVENOUS; SUBCUTANEOUS at 12:31

## 2022-01-01 RX ADMIN — ALBUTEROL SULFATE 2.5 MG: 2.5 SOLUTION RESPIRATORY (INHALATION) at 09:16

## 2022-01-01 RX ADMIN — INSULIN LISPRO 15 UNITS: 100 INJECTION, SOLUTION INTRAVENOUS; SUBCUTANEOUS at 17:39

## 2022-01-01 RX ADMIN — LOSARTAN POTASSIUM 25 MG: 25 TABLET, FILM COATED ORAL at 09:43

## 2022-01-01 RX ADMIN — BUMETANIDE 2 MG: 0.25 INJECTION, SOLUTION INTRAMUSCULAR; INTRAVENOUS at 10:36

## 2022-01-01 RX ADMIN — PREGABALIN 150 MG: 75 CAPSULE ORAL at 05:35

## 2022-01-01 RX ADMIN — PREGABALIN 150 MG: 75 CAPSULE ORAL at 15:32

## 2022-01-01 RX ADMIN — INSULIN LISPRO 4 UNITS: 100 INJECTION, SOLUTION INTRAVENOUS; SUBCUTANEOUS at 08:36

## 2022-01-01 RX ADMIN — FUROSEMIDE 40 MG: 10 INJECTION, SOLUTION INTRAMUSCULAR; INTRAVENOUS at 10:07

## 2022-01-01 RX ADMIN — ALPRAZOLAM 0.5 MG: 0.5 TABLET ORAL at 20:57

## 2022-01-01 RX ADMIN — HYDROCODONE BITARTRATE AND ACETAMINOPHEN 1 TABLET: 10; 325 TABLET ORAL at 08:18

## 2022-01-01 RX ADMIN — HYDROCODONE BITARTRATE AND ACETAMINOPHEN 1 TABLET: 10; 325 TABLET ORAL at 18:41

## 2022-01-01 RX ADMIN — GABAPENTIN 800 MG: 400 CAPSULE ORAL at 14:37

## 2022-01-01 RX ADMIN — INSULIN DETEMIR 10 UNITS: 100 INJECTION, SOLUTION SUBCUTANEOUS at 21:47

## 2022-01-01 RX ADMIN — INSULIN DETEMIR 10 UNITS: 100 INJECTION, SOLUTION SUBCUTANEOUS at 08:56

## 2022-01-01 RX ADMIN — HYDROCODONE BITARTRATE AND ACETAMINOPHEN 1 TABLET: 10; 325 TABLET ORAL at 08:47

## 2022-01-01 RX ADMIN — INSULIN DETEMIR 10 UNITS: 100 INJECTION, SOLUTION SUBCUTANEOUS at 08:45

## 2022-01-01 RX ADMIN — PANTOPRAZOLE SODIUM 40 MG: 40 TABLET, DELAYED RELEASE ORAL at 08:37

## 2022-01-01 RX ADMIN — IPRATROPIUM BROMIDE AND ALBUTEROL SULFATE 3 ML: .5; 3 SOLUTION RESPIRATORY (INHALATION) at 12:51

## 2022-01-01 RX ADMIN — BUMETANIDE 2 MG: 0.25 INJECTION, SOLUTION INTRAMUSCULAR; INTRAVENOUS at 13:21

## 2022-01-01 RX ADMIN — MICONAZOLE NITRATE 1 APPLICATION: 2 CREAM TOPICAL at 12:53

## 2022-01-01 RX ADMIN — FLUOXETINE HYDROCHLORIDE 40 MG: 20 CAPSULE ORAL at 08:42

## 2022-01-01 RX ADMIN — HYDROCODONE BITARTRATE AND ACETAMINOPHEN 1 TABLET: 10; 325 TABLET ORAL at 22:53

## 2022-01-01 RX ADMIN — Medication 10 ML: at 21:31

## 2022-01-01 RX ADMIN — METHYLPREDNISOLONE SODIUM SUCCINATE 40 MG: 40 INJECTION, POWDER, FOR SOLUTION INTRAMUSCULAR; INTRAVENOUS at 06:55

## 2022-01-01 RX ADMIN — METHYLPREDNISOLONE SODIUM SUCCINATE 40 MG: 40 INJECTION, POWDER, FOR SOLUTION INTRAMUSCULAR; INTRAVENOUS at 05:38

## 2022-01-01 RX ADMIN — INSULIN LISPRO 10 UNITS: 100 INJECTION, SOLUTION INTRAVENOUS; SUBCUTANEOUS at 08:40

## 2022-01-01 RX ADMIN — PREGABALIN 150 MG: 75 CAPSULE ORAL at 17:10

## 2022-01-01 RX ADMIN — INSULIN LISPRO 5 UNITS: 100 INJECTION, SOLUTION INTRAVENOUS; SUBCUTANEOUS at 17:20

## 2022-01-01 RX ADMIN — INSULIN LISPRO 10 UNITS: 100 INJECTION, SOLUTION INTRAVENOUS; SUBCUTANEOUS at 09:56

## 2022-01-01 RX ADMIN — HYDROCORTISONE 1 APPLICATION: 25 CREAM TOPICAL at 21:30

## 2022-01-01 RX ADMIN — INSULIN DETEMIR 10 UNITS: 100 INJECTION, SOLUTION SUBCUTANEOUS at 09:17

## 2022-01-01 RX ADMIN — IPRATROPIUM BROMIDE AND ALBUTEROL SULFATE 3 ML: .5; 3 SOLUTION RESPIRATORY (INHALATION) at 13:38

## 2022-01-01 RX ADMIN — INSULIN DETEMIR 10 UNITS: 100 INJECTION, SOLUTION SUBCUTANEOUS at 20:02

## 2022-01-01 RX ADMIN — MORPHINE SULFATE 1 MG: 2 INJECTION, SOLUTION INTRAMUSCULAR; INTRAVENOUS at 11:59

## 2022-01-01 RX ADMIN — INSULIN LISPRO 8 UNITS: 100 INJECTION, SOLUTION INTRAVENOUS; SUBCUTANEOUS at 08:18

## 2022-01-01 RX ADMIN — ALPRAZOLAM 0.5 MG: 0.5 TABLET ORAL at 00:37

## 2022-01-01 RX ADMIN — METHYLPREDNISOLONE SODIUM SUCCINATE 40 MG: 40 INJECTION, POWDER, LYOPHILIZED, FOR SOLUTION INTRAMUSCULAR; INTRAVENOUS at 01:47

## 2022-01-01 RX ADMIN — INSULIN LISPRO 5 UNITS: 100 INJECTION, SOLUTION INTRAVENOUS; SUBCUTANEOUS at 20:23

## 2022-01-01 RX ADMIN — INSULIN HUMAN 6 UNITS: 100 INJECTION, SUSPENSION SUBCUTANEOUS at 12:11

## 2022-01-01 RX ADMIN — SENNOSIDES AND DOCUSATE SODIUM 2 TABLET: 50; 8.6 TABLET ORAL at 20:11

## 2022-01-01 RX ADMIN — HYDROCODONE BITARTRATE AND ACETAMINOPHEN 1 TABLET: 10; 325 TABLET ORAL at 08:24

## 2022-01-01 RX ADMIN — INSULIN LISPRO 10 UNITS: 100 INJECTION, SOLUTION INTRAVENOUS; SUBCUTANEOUS at 18:23

## 2022-01-01 RX ADMIN — NYSTATIN: 100000 POWDER TOPICAL at 20:32

## 2022-01-01 RX ADMIN — ENOXAPARIN SODIUM 40 MG: 40 INJECTION SUBCUTANEOUS at 17:02

## 2022-01-01 RX ADMIN — PREGABALIN 150 MG: 75 CAPSULE ORAL at 20:13

## 2022-01-01 RX ADMIN — SENNOSIDES AND DOCUSATE SODIUM 2 TABLET: 50; 8.6 TABLET ORAL at 08:16

## 2022-01-01 RX ADMIN — INSULIN LISPRO 10 UNITS: 100 INJECTION, SOLUTION INTRAVENOUS; SUBCUTANEOUS at 17:40

## 2022-01-01 RX ADMIN — PREGABALIN 150 MG: 75 CAPSULE ORAL at 07:56

## 2022-01-01 RX ADMIN — INSULIN LISPRO 8 UNITS: 100 INJECTION, SOLUTION INTRAVENOUS; SUBCUTANEOUS at 21:45

## 2022-01-01 RX ADMIN — MAGNESIUM SULFATE HEPTAHYDRATE 2 G: 2 INJECTION, SOLUTION INTRAVENOUS at 08:33

## 2022-01-01 RX ADMIN — CLOTRIMAZOLE AND BETAMETHASONE DIPROPIONATE 1 APPLICATION: 10; .5 CREAM TOPICAL at 09:55

## 2022-01-01 RX ADMIN — INSULIN LISPRO 2 UNITS: 100 INJECTION, SOLUTION INTRAVENOUS; SUBCUTANEOUS at 08:45

## 2022-01-01 RX ADMIN — Medication 5 MG: at 20:16

## 2022-01-01 RX ADMIN — INSULIN DETEMIR 20 UNITS: 100 INJECTION, SOLUTION SUBCUTANEOUS at 08:35

## 2022-01-01 RX ADMIN — PANTOPRAZOLE SODIUM 40 MG: 40 TABLET, DELAYED RELEASE ORAL at 09:08

## 2022-01-01 RX ADMIN — HYDROCODONE BITARTRATE AND ACETAMINOPHEN 1 TABLET: 10; 325 TABLET ORAL at 18:06

## 2022-01-01 RX ADMIN — SENNOSIDES AND DOCUSATE SODIUM 2 TABLET: 50; 8.6 TABLET ORAL at 08:01

## 2022-01-01 RX ADMIN — TAZOBACTAM SODIUM AND PIPERACILLIN SODIUM 3.38 G: 375; 3 INJECTION, SOLUTION INTRAVENOUS at 14:58

## 2022-01-01 RX ADMIN — ENOXAPARIN SODIUM 40 MG: 40 INJECTION SUBCUTANEOUS at 05:59

## 2022-01-01 RX ADMIN — METHYLPREDNISOLONE SODIUM SUCCINATE 40 MG: 40 INJECTION, POWDER, FOR SOLUTION INTRAMUSCULAR; INTRAVENOUS at 08:17

## 2022-01-01 RX ADMIN — IPRATROPIUM BROMIDE AND ALBUTEROL SULFATE 3 ML: .5; 3 SOLUTION RESPIRATORY (INHALATION) at 19:42

## 2022-01-01 RX ADMIN — ALBUTEROL SULFATE 2.5 MG: 2.5 SOLUTION RESPIRATORY (INHALATION) at 07:20

## 2022-01-01 RX ADMIN — Medication 10 ML: at 08:38

## 2022-01-01 RX ADMIN — MAGNESIUM SULFATE HEPTAHYDRATE 4 G: 40 INJECTION, SOLUTION INTRAVENOUS at 06:43

## 2022-01-01 RX ADMIN — DIPHENHYDRAMINE HYDROCHLORIDE 25 MG: 25 CAPSULE ORAL at 08:04

## 2022-01-01 RX ADMIN — HYDROCORTISONE 1 APPLICATION: 25 CREAM TOPICAL at 21:13

## 2022-01-01 RX ADMIN — INSULIN LISPRO 10 UNITS: 100 INJECTION, SOLUTION INTRAVENOUS; SUBCUTANEOUS at 18:29

## 2022-01-01 RX ADMIN — FUROSEMIDE 40 MG: 10 INJECTION, SOLUTION INTRAMUSCULAR; INTRAVENOUS at 14:32

## 2022-01-01 RX ADMIN — SENNOSIDES AND DOCUSATE SODIUM 2 TABLET: 50; 8.6 TABLET ORAL at 21:16

## 2022-01-01 RX ADMIN — INSULIN LISPRO 2 UNITS: 100 INJECTION, SOLUTION INTRAVENOUS; SUBCUTANEOUS at 17:39

## 2022-01-01 RX ADMIN — INSULIN LISPRO 4 UNITS: 100 INJECTION, SOLUTION INTRAVENOUS; SUBCUTANEOUS at 00:26

## 2022-01-01 RX ADMIN — PRAVASTATIN SODIUM 40 MG: 40 TABLET ORAL at 08:42

## 2022-01-01 RX ADMIN — INSULIN LISPRO 3 UNITS: 100 INJECTION, SOLUTION INTRAVENOUS; SUBCUTANEOUS at 11:45

## 2022-01-01 RX ADMIN — SENNOSIDES AND DOCUSATE SODIUM 2 TABLET: 50; 8.6 TABLET ORAL at 08:37

## 2022-01-01 RX ADMIN — ALPRAZOLAM 0.5 MG: 0.5 TABLET ORAL at 02:24

## 2022-01-01 RX ADMIN — PANTOPRAZOLE SODIUM 40 MG: 40 TABLET, DELAYED RELEASE ORAL at 06:55

## 2022-01-01 RX ADMIN — MORPHINE SULFATE 0.5 MG: 2 INJECTION, SOLUTION INTRAMUSCULAR; INTRAVENOUS at 13:21

## 2022-01-01 RX ADMIN — MORPHINE SULFATE 2 MG: 2 INJECTION, SOLUTION INTRAMUSCULAR; INTRAVENOUS at 12:14

## 2022-01-01 RX ADMIN — INSULIN LISPRO 10 UNITS: 100 INJECTION, SOLUTION INTRAVENOUS; SUBCUTANEOUS at 08:14

## 2022-01-01 RX ADMIN — MICONAZOLE NITRATE 1 APPLICATION: 2 CREAM TOPICAL at 09:24

## 2022-01-01 RX ADMIN — PREGABALIN 150 MG: 75 CAPSULE ORAL at 20:02

## 2022-01-01 RX ADMIN — HYDROCODONE BITARTRATE AND ACETAMINOPHEN 1 TABLET: 10; 325 TABLET ORAL at 02:16

## 2022-01-01 RX ADMIN — SENNOSIDES AND DOCUSATE SODIUM 2 TABLET: 50; 8.6 TABLET ORAL at 08:45

## 2022-01-01 RX ADMIN — PRAVASTATIN SODIUM 40 MG: 40 TABLET ORAL at 20:58

## 2022-01-01 RX ADMIN — HYDROCODONE BITARTRATE AND ACETAMINOPHEN 1 TABLET: 10; 325 TABLET ORAL at 22:48

## 2022-01-01 RX ADMIN — ALPRAZOLAM 0.5 MG: 0.5 TABLET ORAL at 20:59

## 2022-01-01 RX ADMIN — ALBUTEROL SULFATE 2.5 MG: 2.5 SOLUTION RESPIRATORY (INHALATION) at 19:15

## 2022-01-01 RX ADMIN — HYDROCODONE BITARTRATE AND ACETAMINOPHEN 1 TABLET: 10; 325 TABLET ORAL at 20:12

## 2022-01-01 RX ADMIN — PREGABALIN 150 MG: 75 CAPSULE ORAL at 06:40

## 2022-01-01 RX ADMIN — PREGABALIN 150 MG: 75 CAPSULE ORAL at 05:38

## 2022-01-01 RX ADMIN — PREGABALIN 150 MG: 75 CAPSULE ORAL at 20:07

## 2022-01-01 RX ADMIN — INSULIN LISPRO 5 UNITS: 100 INJECTION, SOLUTION INTRAVENOUS; SUBCUTANEOUS at 12:04

## 2022-01-01 RX ADMIN — SULFAMETHOXAZOLE AND TRIMETHOPRIM 1 TABLET: 800; 160 TABLET ORAL at 08:45

## 2022-01-01 RX ADMIN — AZITHROMYCIN 500 MG: 500 INJECTION, POWDER, LYOPHILIZED, FOR SOLUTION INTRAVENOUS at 16:26

## 2022-01-01 RX ADMIN — Medication 1 CAPSULE: at 11:28

## 2022-01-01 RX ADMIN — HYDROCODONE BITARTRATE AND ACETAMINOPHEN 1 TABLET: 10; 325 TABLET ORAL at 05:40

## 2022-01-01 RX ADMIN — FLUOXETINE HYDROCHLORIDE 40 MG: 20 CAPSULE ORAL at 10:36

## 2022-01-01 RX ADMIN — INSULIN LISPRO 10 UNITS: 100 INJECTION, SOLUTION INTRAVENOUS; SUBCUTANEOUS at 18:01

## 2022-01-01 RX ADMIN — PREGABALIN 150 MG: 75 CAPSULE ORAL at 06:00

## 2022-01-01 RX ADMIN — TAZOBACTAM SODIUM AND PIPERACILLIN SODIUM 3.38 G: 375; 3 INJECTION, SOLUTION INTRAVENOUS at 21:16

## 2022-01-01 RX ADMIN — TAZOBACTAM SODIUM AND PIPERACILLIN SODIUM 3.38 G: 375; 3 INJECTION, SOLUTION INTRAVENOUS at 05:40

## 2022-01-01 RX ADMIN — NYSTATIN: 100000 POWDER TOPICAL at 09:42

## 2022-01-01 RX ADMIN — FLUCONAZOLE 200 MG: 200 TABLET ORAL at 16:50

## 2022-01-01 RX ADMIN — GUAIFENESIN 600 MG: 600 TABLET, EXTENDED RELEASE ORAL at 09:04

## 2022-01-01 RX ADMIN — FLUCONAZOLE 400 MG: 400 INJECTION, SOLUTION INTRAVENOUS at 08:51

## 2022-01-01 RX ADMIN — HYDROCORTISONE 1 APPLICATION: 25 CREAM TOPICAL at 08:36

## 2022-01-01 RX ADMIN — PANTOPRAZOLE SODIUM 40 MG: 40 TABLET, DELAYED RELEASE ORAL at 06:02

## 2022-01-01 RX ADMIN — GABAPENTIN 800 MG: 400 CAPSULE ORAL at 14:32

## 2022-01-01 RX ADMIN — INSULIN LISPRO 4 UNITS: 100 INJECTION, SOLUTION INTRAVENOUS; SUBCUTANEOUS at 11:22

## 2022-01-01 RX ADMIN — INSULIN DETEMIR 20 UNITS: 100 INJECTION, SOLUTION SUBCUTANEOUS at 08:27

## 2022-01-01 RX ADMIN — HYDROCODONE BITARTRATE AND ACETAMINOPHEN 1 TABLET: 10; 325 TABLET ORAL at 04:00

## 2022-01-01 RX ADMIN — INSULIN LISPRO 10 UNITS: 100 INJECTION, SOLUTION INTRAVENOUS; SUBCUTANEOUS at 08:20

## 2022-01-01 RX ADMIN — SENNOSIDES AND DOCUSATE SODIUM 2 TABLET: 50; 8.6 TABLET ORAL at 08:03

## 2022-01-01 RX ADMIN — MORPHINE SULFATE 2 MG: 2 INJECTION, SOLUTION INTRAMUSCULAR; INTRAVENOUS at 07:56

## 2022-01-01 RX ADMIN — INSULIN LISPRO 8 UNITS: 100 INJECTION, SOLUTION INTRAVENOUS; SUBCUTANEOUS at 17:17

## 2022-01-01 RX ADMIN — INSULIN LISPRO 10 UNITS: 100 INJECTION, SOLUTION INTRAVENOUS; SUBCUTANEOUS at 17:59

## 2022-01-01 RX ADMIN — IPRATROPIUM BROMIDE AND ALBUTEROL SULFATE 3 ML: .5; 3 SOLUTION RESPIRATORY (INHALATION) at 10:24

## 2022-01-01 RX ADMIN — PREGABALIN 150 MG: 75 CAPSULE ORAL at 21:15

## 2022-01-01 RX ADMIN — FLUCONAZOLE 200 MG: 200 TABLET ORAL at 11:59

## 2022-01-01 RX ADMIN — SENNOSIDES AND DOCUSATE SODIUM 2 TABLET: 50; 8.6 TABLET ORAL at 08:27

## 2022-01-01 RX ADMIN — POLYETHYLENE GLYCOL 3350 17 G: 17 POWDER, FOR SOLUTION ORAL at 08:14

## 2022-01-01 RX ADMIN — PRAVASTATIN SODIUM 40 MG: 40 TABLET ORAL at 08:14

## 2022-01-01 RX ADMIN — HYDROCORTISONE 1 APPLICATION: 25 CREAM TOPICAL at 09:44

## 2022-01-01 RX ADMIN — FLUCONAZOLE 400 MG: 400 INJECTION, SOLUTION INTRAVENOUS at 09:40

## 2022-01-01 RX ADMIN — LOSARTAN POTASSIUM 25 MG: 25 TABLET, FILM COATED ORAL at 08:55

## 2022-01-01 RX ADMIN — HYDROCODONE BITARTRATE AND ACETAMINOPHEN 1 TABLET: 10; 325 TABLET ORAL at 12:04

## 2022-01-01 RX ADMIN — HYDROCODONE BITARTRATE AND ACETAMINOPHEN 1 TABLET: 10; 325 TABLET ORAL at 03:20

## 2022-01-01 RX ADMIN — PANTOPRAZOLE SODIUM 40 MG: 40 TABLET, DELAYED RELEASE ORAL at 09:05

## 2022-01-01 RX ADMIN — INSULIN LISPRO 10 UNITS: 100 INJECTION, SOLUTION INTRAVENOUS; SUBCUTANEOUS at 09:15

## 2022-01-01 RX ADMIN — INSULIN LISPRO 10 UNITS: 100 INJECTION, SOLUTION INTRAVENOUS; SUBCUTANEOUS at 16:52

## 2022-01-01 RX ADMIN — SENNOSIDES AND DOCUSATE SODIUM 2 TABLET: 50; 8.6 TABLET ORAL at 09:22

## 2022-01-01 RX ADMIN — INSULIN LISPRO 2 UNITS: 100 INJECTION, SOLUTION INTRAVENOUS; SUBCUTANEOUS at 08:34

## 2022-01-01 RX ADMIN — INSULIN LISPRO 5 UNITS: 100 INJECTION, SOLUTION INTRAVENOUS; SUBCUTANEOUS at 08:21

## 2022-01-01 RX ADMIN — HYDROCODONE BITARTRATE AND ACETAMINOPHEN 1 TABLET: 10; 325 TABLET ORAL at 10:51

## 2022-01-01 RX ADMIN — FLUOXETINE HYDROCHLORIDE 40 MG: 20 CAPSULE ORAL at 08:17

## 2022-01-01 RX ADMIN — INSULIN LISPRO 12 UNITS: 100 INJECTION, SOLUTION INTRAVENOUS; SUBCUTANEOUS at 21:15

## 2022-01-01 RX ADMIN — LOSARTAN POTASSIUM 50 MG: 50 TABLET, FILM COATED ORAL at 08:26

## 2022-01-01 RX ADMIN — PANTOPRAZOLE SODIUM 40 MG: 40 TABLET, DELAYED RELEASE ORAL at 08:22

## 2022-01-01 RX ADMIN — INSULIN LISPRO 3 UNITS: 100 INJECTION, SOLUTION INTRAVENOUS; SUBCUTANEOUS at 08:46

## 2022-01-01 RX ADMIN — TAZOBACTAM SODIUM AND PIPERACILLIN SODIUM 3.38 G: 375; 3 INJECTION, SOLUTION INTRAVENOUS at 05:24

## 2022-01-01 RX ADMIN — INSULIN LISPRO 10 UNITS: 100 INJECTION, SOLUTION INTRAVENOUS; SUBCUTANEOUS at 17:48

## 2022-01-01 RX ADMIN — HYDROCODONE BITARTRATE AND ACETAMINOPHEN 1 TABLET: 10; 325 TABLET ORAL at 10:19

## 2022-01-01 RX ADMIN — IPRATROPIUM BROMIDE AND ALBUTEROL SULFATE 3 ML: .5; 3 SOLUTION RESPIRATORY (INHALATION) at 08:10

## 2022-01-01 RX ADMIN — IPRATROPIUM BROMIDE AND ALBUTEROL SULFATE 3 ML: .5; 3 SOLUTION RESPIRATORY (INHALATION) at 11:28

## 2022-01-01 RX ADMIN — WATER 500 MG: 1 INJECTION INTRAMUSCULAR; INTRAVENOUS; SUBCUTANEOUS at 10:29

## 2022-01-01 RX ADMIN — ALPRAZOLAM 0.5 MG: 0.5 TABLET ORAL at 20:43

## 2022-01-01 RX ADMIN — SENNOSIDES AND DOCUSATE SODIUM 2 TABLET: 50; 8.6 TABLET ORAL at 08:49

## 2022-01-01 RX ADMIN — ENOXAPARIN SODIUM 40 MG: 40 INJECTION SUBCUTANEOUS at 18:25

## 2022-01-01 RX ADMIN — INSULIN DETEMIR 8 UNITS: 100 INJECTION, SOLUTION SUBCUTANEOUS at 21:19

## 2022-01-01 RX ADMIN — IPRATROPIUM BROMIDE AND ALBUTEROL SULFATE 3 ML: .5; 3 SOLUTION RESPIRATORY (INHALATION) at 16:30

## 2022-01-01 RX ADMIN — HYDROCODONE BITARTRATE AND ACETAMINOPHEN 1 TABLET: 10; 325 TABLET ORAL at 05:00

## 2022-01-01 RX ADMIN — PREDNISONE 15 MG: 5 TABLET ORAL at 09:43

## 2022-01-01 RX ADMIN — BUMETANIDE 2 MG: 0.25 INJECTION, SOLUTION INTRAMUSCULAR; INTRAVENOUS at 04:48

## 2022-01-01 RX ADMIN — PANTOPRAZOLE SODIUM 40 MG: 40 TABLET, DELAYED RELEASE ORAL at 07:57

## 2022-01-01 RX ADMIN — FLUOXETINE 40 MG: 20 CAPSULE ORAL at 08:55

## 2022-01-01 RX ADMIN — PREGABALIN 150 MG: 75 CAPSULE ORAL at 05:53

## 2022-01-01 RX ADMIN — ENOXAPARIN SODIUM 40 MG: 40 INJECTION SUBCUTANEOUS at 05:21

## 2022-01-01 RX ADMIN — SENNOSIDES AND DOCUSATE SODIUM 2 TABLET: 50; 8.6 TABLET ORAL at 09:44

## 2022-01-01 RX ADMIN — IOPAMIDOL 75 ML: 755 INJECTION, SOLUTION INTRAVENOUS at 14:14

## 2022-01-01 RX ADMIN — INSULIN LISPRO 10 UNITS: 100 INJECTION, SOLUTION INTRAVENOUS; SUBCUTANEOUS at 11:59

## 2022-01-01 RX ADMIN — INSULIN LISPRO 3 UNITS: 100 INJECTION, SOLUTION INTRAVENOUS; SUBCUTANEOUS at 17:11

## 2022-01-01 RX ADMIN — MICONAZOLE NITRATE 1 APPLICATION: 2 CREAM TOPICAL at 20:43

## 2022-01-01 RX ADMIN — Medication 5 MG: at 20:13

## 2022-01-01 RX ADMIN — METHYLPREDNISOLONE SODIUM SUCCINATE 40 MG: 40 INJECTION, POWDER, FOR SOLUTION INTRAMUSCULAR; INTRAVENOUS at 08:40

## 2022-01-01 RX ADMIN — NYSTATIN: 100000 POWDER TOPICAL at 21:16

## 2022-01-01 RX ADMIN — INSULIN DETEMIR 16 UNITS: 100 INJECTION, SOLUTION SUBCUTANEOUS at 08:38

## 2022-01-01 RX ADMIN — IPRATROPIUM BROMIDE AND ALBUTEROL SULFATE 3 ML: .5; 3 SOLUTION RESPIRATORY (INHALATION) at 13:14

## 2022-01-01 RX ADMIN — INSULIN LISPRO 4 UNITS: 100 INJECTION, SOLUTION INTRAVENOUS; SUBCUTANEOUS at 07:45

## 2022-01-01 RX ADMIN — HYDROCODONE BITARTRATE AND ACETAMINOPHEN 1 TABLET: 10; 325 TABLET ORAL at 05:47

## 2022-01-01 RX ADMIN — PREGABALIN 150 MG: 75 CAPSULE ORAL at 13:20

## 2022-01-01 RX ADMIN — BISACODYL 5 MG: 5 TABLET, COATED ORAL at 09:02

## 2022-01-01 RX ADMIN — HYDROCODONE BITARTRATE AND ACETAMINOPHEN 1 TABLET: 10; 325 TABLET ORAL at 13:25

## 2022-01-01 RX ADMIN — PANTOPRAZOLE SODIUM 40 MG: 40 TABLET, DELAYED RELEASE ORAL at 05:35

## 2022-01-01 RX ADMIN — IPRATROPIUM BROMIDE AND ALBUTEROL SULFATE 3 ML: .5; 3 SOLUTION RESPIRATORY (INHALATION) at 21:02

## 2022-01-01 RX ADMIN — METHYLPREDNISOLONE SODIUM SUCCINATE 60 MG: 125 INJECTION, POWDER, FOR SOLUTION INTRAMUSCULAR; INTRAVENOUS at 02:11

## 2022-01-01 RX ADMIN — MICONAZOLE NITRATE 1 APPLICATION: 2 CREAM TOPICAL at 08:41

## 2022-01-01 RX ADMIN — PREGABALIN 150 MG: 75 CAPSULE ORAL at 16:04

## 2022-01-01 RX ADMIN — POTASSIUM CHLORIDE 40 MEQ: 750 CAPSULE, EXTENDED RELEASE ORAL at 11:41

## 2022-01-01 RX ADMIN — GABAPENTIN 800 MG: 400 CAPSULE ORAL at 05:25

## 2022-01-01 RX ADMIN — PANTOPRAZOLE SODIUM 40 MG: 40 TABLET, DELAYED RELEASE ORAL at 10:37

## 2022-01-01 RX ADMIN — PREGABALIN 150 MG: 75 CAPSULE ORAL at 08:55

## 2022-01-01 RX ADMIN — POTASSIUM CHLORIDE 10 MEQ: 7.46 INJECTION, SOLUTION INTRAVENOUS at 12:04

## 2022-01-01 RX ADMIN — HYDROCODONE BITARTRATE AND ACETAMINOPHEN 1 TABLET: 10; 325 TABLET ORAL at 11:42

## 2022-01-01 RX ADMIN — FUROSEMIDE 60 MG: 10 INJECTION INTRAMUSCULAR; INTRAVENOUS at 08:26

## 2022-01-01 RX ADMIN — METHYLPREDNISOLONE SODIUM SUCCINATE 40 MG: 40 INJECTION, POWDER, FOR SOLUTION INTRAMUSCULAR; INTRAVENOUS at 05:21

## 2022-01-01 RX ADMIN — HYDROCODONE BITARTRATE AND ACETAMINOPHEN 1 TABLET: 10; 325 TABLET ORAL at 21:11

## 2022-01-01 RX ADMIN — PREGABALIN 150 MG: 75 CAPSULE ORAL at 08:04

## 2022-01-01 RX ADMIN — INSULIN DETEMIR 13 UNITS: 100 INJECTION, SOLUTION SUBCUTANEOUS at 08:19

## 2022-01-01 RX ADMIN — PREGABALIN 150 MG: 75 CAPSULE ORAL at 14:06

## 2022-01-01 RX ADMIN — Medication 10 ML: at 10:36

## 2022-01-01 RX ADMIN — FLUOXETINE HYDROCHLORIDE 40 MG: 20 CAPSULE ORAL at 08:29

## 2022-01-01 RX ADMIN — BUMETANIDE 0.5 MG: 0.25 INJECTION, SOLUTION INTRAMUSCULAR; INTRAVENOUS at 11:56

## 2022-01-01 RX ADMIN — PREGABALIN 150 MG: 75 CAPSULE ORAL at 00:14

## 2022-01-01 RX ADMIN — AZITHROMYCIN 500 MG: 500 INJECTION, POWDER, LYOPHILIZED, FOR SOLUTION INTRAVENOUS at 16:04

## 2022-01-01 RX ADMIN — INSULIN LISPRO 16 UNITS: 100 INJECTION, SOLUTION INTRAVENOUS; SUBCUTANEOUS at 12:52

## 2022-01-01 RX ADMIN — INSULIN DETEMIR 14 UNITS: 100 INJECTION, SOLUTION SUBCUTANEOUS at 08:37

## 2022-01-01 RX ADMIN — HYDROCODONE BITARTRATE AND ACETAMINOPHEN 1 TABLET: 10; 325 TABLET ORAL at 21:33

## 2022-01-01 RX ADMIN — IPRATROPIUM BROMIDE AND ALBUTEROL SULFATE 3 ML: .5; 3 SOLUTION RESPIRATORY (INHALATION) at 08:33

## 2022-01-01 RX ADMIN — INSULIN DETEMIR 12 UNITS: 100 INJECTION, SOLUTION SUBCUTANEOUS at 08:56

## 2022-01-01 RX ADMIN — INSULIN DETEMIR 13 UNITS: 100 INJECTION, SOLUTION SUBCUTANEOUS at 09:05

## 2022-01-01 RX ADMIN — PANTOPRAZOLE SODIUM 40 MG: 40 TABLET, DELAYED RELEASE ORAL at 08:14

## 2022-01-01 RX ADMIN — INSULIN LISPRO 5 UNITS: 100 INJECTION, SOLUTION INTRAVENOUS; SUBCUTANEOUS at 08:25

## 2022-01-01 RX ADMIN — INSULIN LISPRO 3 UNITS: 100 INJECTION, SOLUTION INTRAVENOUS; SUBCUTANEOUS at 13:09

## 2022-01-01 RX ADMIN — Medication 10 ML: at 22:09

## 2022-01-01 RX ADMIN — MORPHINE SULFATE 0.5 MG: 2 INJECTION, SOLUTION INTRAMUSCULAR; INTRAVENOUS at 18:30

## 2022-01-01 RX ADMIN — INSULIN LISPRO 3 UNITS: 100 INJECTION, SOLUTION INTRAVENOUS; SUBCUTANEOUS at 11:52

## 2022-01-01 RX ADMIN — INSULIN LISPRO 8 UNITS: 100 INJECTION, SOLUTION INTRAVENOUS; SUBCUTANEOUS at 08:55

## 2022-01-01 RX ADMIN — METHYLPREDNISOLONE SODIUM SUCCINATE 40 MG: 40 INJECTION, POWDER, FOR SOLUTION INTRAMUSCULAR; INTRAVENOUS at 16:52

## 2022-01-01 RX ADMIN — PREDNISONE 20 MG: 20 TABLET ORAL at 08:46

## 2022-01-01 RX ADMIN — INSULIN DETEMIR 14 UNITS: 100 INJECTION, SOLUTION SUBCUTANEOUS at 21:23

## 2022-01-01 RX ADMIN — INSULIN DETEMIR 10 UNITS: 100 INJECTION, SOLUTION SUBCUTANEOUS at 08:08

## 2022-01-01 RX ADMIN — INSULIN DETEMIR 5 UNITS: 100 INJECTION, SOLUTION SUBCUTANEOUS at 08:45

## 2022-01-01 RX ADMIN — METHYLPREDNISOLONE SODIUM SUCCINATE 40 MG: 40 INJECTION, POWDER, FOR SOLUTION INTRAMUSCULAR; INTRAVENOUS at 18:08

## 2022-01-01 RX ADMIN — ENOXAPARIN SODIUM 40 MG: 40 INJECTION SUBCUTANEOUS at 17:53

## 2022-01-01 RX ADMIN — GABAPENTIN 800 MG: 400 CAPSULE ORAL at 21:16

## 2022-01-01 RX ADMIN — INSULIN LISPRO 20 UNITS: 100 INJECTION, SOLUTION INTRAVENOUS; SUBCUTANEOUS at 16:31

## 2022-01-01 RX ADMIN — SODIUM CHLORIDE, POTASSIUM CHLORIDE, SODIUM LACTATE AND CALCIUM CHLORIDE 150 ML/HR: 600; 310; 30; 20 INJECTION, SOLUTION INTRAVENOUS at 17:48

## 2022-01-01 RX ADMIN — MICONAZOLE NITRATE 1 APPLICATION: 2 CREAM TOPICAL at 07:56

## 2022-01-01 RX ADMIN — Medication 5 MG: at 21:02

## 2022-01-01 RX ADMIN — INSULIN LISPRO 2 UNITS: 100 INJECTION, SOLUTION INTRAVENOUS; SUBCUTANEOUS at 20:58

## 2022-01-01 RX ADMIN — INSULIN LISPRO 12 UNITS: 100 INJECTION, SOLUTION INTRAVENOUS; SUBCUTANEOUS at 17:40

## 2022-01-01 RX ADMIN — IPRATROPIUM BROMIDE AND ALBUTEROL SULFATE 3 ML: .5; 3 SOLUTION RESPIRATORY (INHALATION) at 07:03

## 2022-01-01 RX ADMIN — ALPRAZOLAM 0.5 MG: 0.5 TABLET ORAL at 20:16

## 2022-01-01 RX ADMIN — INSULIN LISPRO 3 UNITS: 100 INJECTION, SOLUTION INTRAVENOUS; SUBCUTANEOUS at 17:24

## 2022-01-01 RX ADMIN — Medication 10 ML: at 09:58

## 2022-01-01 RX ADMIN — IPRATROPIUM BROMIDE AND ALBUTEROL SULFATE 3 ML: .5; 3 SOLUTION RESPIRATORY (INHALATION) at 08:11

## 2022-01-01 RX ADMIN — SENNOSIDES AND DOCUSATE SODIUM 2 TABLET: 50; 8.6 TABLET ORAL at 08:41

## 2022-01-01 RX ADMIN — CLOTRIMAZOLE AND BETAMETHASONE DIPROPIONATE 1 APPLICATION: 10; .5 CREAM TOPICAL at 21:27

## 2022-01-01 RX ADMIN — INSULIN LISPRO 12 UNITS: 100 INJECTION, SOLUTION INTRAVENOUS; SUBCUTANEOUS at 17:36

## 2022-01-01 RX ADMIN — PANTOPRAZOLE SODIUM 40 MG: 40 TABLET, DELAYED RELEASE ORAL at 05:21

## 2022-01-01 RX ADMIN — HYDROCODONE BITARTRATE AND ACETAMINOPHEN 1 TABLET: 10; 325 TABLET ORAL at 01:53

## 2022-01-01 RX ADMIN — INSULIN LISPRO 8 UNITS: 100 INJECTION, SOLUTION INTRAVENOUS; SUBCUTANEOUS at 17:39

## 2022-01-01 RX ADMIN — INSULIN LISPRO 3 UNITS: 100 INJECTION, SOLUTION INTRAVENOUS; SUBCUTANEOUS at 17:17

## 2022-01-01 RX ADMIN — METHYLPREDNISOLONE SODIUM SUCCINATE 40 MG: 40 INJECTION, POWDER, FOR SOLUTION INTRAMUSCULAR; INTRAVENOUS at 16:05

## 2022-01-01 RX ADMIN — INSULIN DETEMIR 20 UNITS: 100 INJECTION, SOLUTION SUBCUTANEOUS at 08:19

## 2022-01-01 RX ADMIN — ALBUTEROL SULFATE 2.5 MG: 2.5 SOLUTION RESPIRATORY (INHALATION) at 07:47

## 2022-01-01 RX ADMIN — TAZOBACTAM SODIUM AND PIPERACILLIN SODIUM 3.38 G: 375; 3 INJECTION, SOLUTION INTRAVENOUS at 21:14

## 2022-01-01 RX ADMIN — ALBUTEROL SULFATE 2.5 MG: 2.5 SOLUTION RESPIRATORY (INHALATION) at 19:49

## 2022-01-01 RX ADMIN — Medication 10 ML: at 08:10

## 2022-01-01 RX ADMIN — CLOTRIMAZOLE AND BETAMETHASONE DIPROPIONATE 1 APPLICATION: 10; .5 CREAM TOPICAL at 21:49

## 2022-01-01 RX ADMIN — ALPRAZOLAM 0.5 MG: 0.5 TABLET ORAL at 22:48

## 2022-01-01 RX ADMIN — HYDROCODONE BITARTRATE AND ACETAMINOPHEN 1 TABLET: 10; 325 TABLET ORAL at 09:17

## 2022-01-01 RX ADMIN — ENOXAPARIN SODIUM 40 MG: 40 INJECTION SUBCUTANEOUS at 05:08

## 2022-01-01 RX ADMIN — PRAVASTATIN SODIUM 40 MG: 40 TABLET ORAL at 09:06

## 2022-01-01 RX ADMIN — Medication 1 CAPSULE: at 11:59

## 2022-06-01 PROBLEM — J18.9 ATYPICAL PNEUMONIA: Status: ACTIVE | Noted: 2022-01-01

## 2022-06-01 PROBLEM — J96.01 ACUTE RESPIRATORY FAILURE WITH HYPOXIA: Status: ACTIVE | Noted: 2022-01-01

## 2022-06-27 PROBLEM — R07.9 CHEST PAIN: Status: ACTIVE | Noted: 2022-01-01

## 2022-06-27 PROBLEM — M79.7 FIBROMYALGIA: Status: ACTIVE | Noted: 2022-01-01

## 2022-06-27 PROBLEM — D72.829 LEUKOCYTOSIS: Status: ACTIVE | Noted: 2022-01-01

## 2022-06-28 PROBLEM — E83.42 HYPOMAGNESEMIA: Status: ACTIVE | Noted: 2022-01-01

## 2022-07-01 NOTE — PLAN OF CARE
Goal Outcome Evaluation:  Plan of Care Reviewed With: patient        Progress: improving  Outcome Evaluation: Pt performed supine to sit with Jermain x2. Once seated EOB pt's O2 sats decreased, but able to recover with rest. Pt able to perform STS x1 from EOB with Jermain x2 and B UE support. Further decrease in O2 sats noted with drop to 70%. Pt returned to supine position with modA x2. Deferred additional mobility. Continue to progress as appropriate.

## 2022-07-01 NOTE — CASE MANAGEMENT/SOCIAL WORK
Continued Stay Note  [facility]     Patient Name: Chikis Cuellar  MRN: [id number]  Today's Date: 7/1/2022    Admit Date: 6/27/2022     Discharge Plan     Row Name 07/01/22 1050       Plan    Plan Comments CM spoke with pt at bedside. Pt remains on High domitila oxygen at this time and is not medically ready for discharge. Pt reports she does not feel any improvement. Therapy to attempt to work with pt today if able to tolerate. Pt remains agreeable to skilled rehab at discharge and prefered [facility]. Referrals have been made to [facility], [facility] and [facility]. CM will continue to follow and assist with discharge needs.               Discharge Codes    No documentation.               Expected Discharge Date and Time     Expected Discharge Date Expected Discharge Time    Jul 1, 2022             Yamileth Mahmood RN

## 2022-07-01 NOTE — PROGRESS NOTES
Pulmonary Hospital Follow-up     Hospital:  LOS: 3 days   Ms. Chikis Cuellar, 77 y.o. female is followed for:   Acute respiratory failure with hypoxia (HCC)            History of present illness:   78 yo female with history of HTN. Dyslipidemia, GERD, diabetes mellitus admitted to the hospital on June 1 with hypoxemic respiratory failure.  Patient was initially admitted to ICU with severe hypoxemia.  Initially was thought to have atypical pneumonia.  Patient was concerned that she may have contracted COVID-19 infection as she started getting sick in the last week of April this year.  States that she was getting short of breath when she was seen by primary care and apparently a stress test was done.  However patient continued to decline and states that she could not even walk from her living room to the front of the house and came into the hospital at that time.  Prior to that she was doing well.  Denies any prolonged history of any pulmonary condition.  No frequent pneumonias or infections requiring hospitalization.  No other lung problems.  Remote smoker quit many years ago and no recent secondhand smoke exposure either.  No other unusual hobbies pets at home.  Denies any change in appetite or weight loss lately.  Denies any hemoptysis.  Denied any chest pain not admission at that time either.     Patient underwent COVID-19 testing x2 which was negative.  Viral panels were negative.  Cultures remain negative.  Patient was treated with antibiotics and steroids and started to improve and weaned down from high flow nasal cannula to regular nasal cannula.  Decision was made to keep patient on prolonged taper of steroids and she was discharged to rehab facility on June 27.  However on arrival to the facility patient was short of breath and according to patient and they had trouble hooking her up oxygen the right way and she started to feel more short of breath and started having some chest discomfort.  Patient states  that she was turning blue and then realized that her oxygen is not set up right and in the meantime EMS was called and she was brought back to hospital.  Here she was  admitted back to the floor and started on high flow nasal cannula.  Today morning patient states that she is feeling a lot better.  She is down to 55% on high flow nasal cannula saturating 92%.  Denies any chest pain currently.  Denies any cough or sputum production.  Denies any urinary complaints.  Denies any loose stool or diarrhea or abdominal pain.  No fevers noted either.      Subjective   Interval History:  Patient states she is feeling okay.  States that she does have cough but unable to produce any sputum.  Continues on high flow nasal cannula.  Discussed with nursing staff to try to wean to regular nasal cannula but apparently attempt to wean to high flow nasal cannula resulted in desaturation episodes.             The patient's past medical, surgical and social history were reviewed and updated in Epic as appropriate.       Objective     Infusions:     Medications:  enoxaparin, 40 mg, Subcutaneous, Q24H  FLUoxetine, 40 mg, Oral, Daily  insulin detemir, 12 Units, Subcutaneous, Q12H  insulin lispro, 0-7 Units, Subcutaneous, 4x Daily With Meals & Nightly  Insulin Lispro, 8 Units, Subcutaneous, TID With Meals  ipratropium-albuterol, 3 mL, Nebulization, 4x Daily - RT  losartan, 25 mg, Oral, Daily  nystatin, , Topical, Q12H  oxybutynin XL, 5 mg, Oral, Daily  pantoprazole, 40 mg, Oral, Q AM  pravastatin, 40 mg, Oral, Nightly  [START ON 7/2/2022] predniSONE, 10 mg, Oral, Daily With Breakfast   Followed by  [START ON 7/6/2022] predniSONE, 5 mg, Oral, Daily With Breakfast  pregabalin, 150 mg, Oral, TID  senna-docusate sodium, 2 tablet, Oral, BID  sodium chloride, 10 mL, Intravenous, Q12H        Vital Sign Min/Max for last 24 hours  Temp  Min: 97.7 °F (36.5 °C)  Max: 98.3 °F (36.8 °C)   BP  Min: 102/75  Max: 124/77   Pulse  Min: 83  Max: 102   Resp   "Min: 16  Max: 24   SpO2  Min: 89 %  Max: 94 %   Flow (L/min)  Min: 40  Max: 40       Input/Output for last 24 hour shift  06/30 0701 - 07/01 0700  In: 240 [P.O.:240]  Out: 2000 [Urine:2000]      Objective:  Vital signs: (most recent): Blood pressure 102/75, pulse 94, temperature 98.3 °F (36.8 °C), temperature source Oral, resp. rate 20, height 162.6 cm (64\"), weight 84.5 kg (186 lb 4.8 oz), SpO2 94 %.            General Appearance: Awake, alert, in no acute distress on high flow NC  Lungs:   B/L Breath sounds present with decreased breath sounds on bases, no wheezing heard, occ basilar inspiratory crackles.   Heart: S1 and S2 present, no murmur  Abdomen: Soft, non-tender, no guarding or rigidity, bowel sounds positive.  Extremities: Atraumatic, no cyanosis or clubbing,  no edema, warm to touch.  Neurologic:  Moving all four extremities. Good strength bilaterally.  Psychologic: Appropriate affect, Cooperative.          Results from last 7 days   Lab Units 07/01/22  0424 06/30/22  0727 06/28/22  0038   WBC 10*3/mm3 16.43* 13.36* 15.54*   HEMOGLOBIN g/dL 13.4 13.0 12.1   PLATELETS 10*3/mm3 270 243 215     Results from last 7 days   Lab Units 07/01/22  0423 06/30/22  0727 06/29/22  0827   SODIUM mmol/L 139 142 138   POTASSIUM mmol/L 3.6 3.9 4.0   CO2 mmol/L 33.0* 33.0* 32.0*   BUN mg/dL 24* 21 19   CREATININE mg/dL 0.78 0.61 0.69   MAGNESIUM mg/dL 1.5* 1.8 2.0   GLUCOSE mg/dL 130* 151* 269*     Estimated Creatinine Clearance: 62.5 mL/min (by C-G formula based on SCr of 0.78 mg/dL).          Images:   Chest x-ray done today reviewed personally and shows bilateral patchy opacities slightly worse on the right side.  No new effusions worsening infiltrate noted currently.      I reviewed the patient's results and images.     Assessment & Plan   Impression        Acute respiratory failure with hypoxia (HCC)    Type 2 diabetes mellitus, with long-term current use of insulin (HCC)    Essential hypertension    Hyperlipemia    " GERD without esophagitis    Mood disorder (HCC)    Atypical pneumonia    Leukocytosis    Chest pain    Fibromyalgia    Hypomagnesemia       Plan        1.  Patient seems to be doing fair.  Oxygen requirement slowly improving.  We will continue to wean FiO2 as tolerated to keep saturation 90% or better.  Continue to work with incentive spirometry.Will add flutter valve.  Patient apparently has been coughing when unable to bring up any sputum.  Denies any new fevers, cough or sputum production.  Sed rate checked and improving from before.  I think we are dealing with post ARDS fibrotic changes rather than any other acute pathology.  We will monitor her closely and if things are not improving will consider repeat imaging with consideration of bronchoscopy.  2.  Continue PPI and GERD precautions.  3.  BMP looks acceptable.  Check BNP in AM; Discussed with nursing to give mag replacement today.   4.  Monitor oral intake closely.  5.  Continue bronchodilators.    Will continue to follow with you.    Gold Flores MD, [facility]  Pulmonary, Critical care and Sleep Medicine

## 2022-07-01 NOTE — PROGRESS NOTES
[facility] Medicine Services  PROGRESS NOTE    Patient Name: Chikis Cuellar  : 1945  MRN: [id number]    Date of Admission: 2022  Primary Care Physician: Eduardo Gamboa MD    Subjective   Subjective     CC:  F/U acute respiratory failure with hypoxia     HPI:  Patient seen and examined. States her breathing is about the same. No new cough or fever. Having a lot of itching and pain under both armpits. Looks like yeast.     ROS:  Gen- No fevers, chills  CV- No chest pain, palpitations  Resp- No cough, dyspnea  GI- No N/V/D, abd pain  Skin-Per HPI    Objective   Objective     Vital Signs:   Temp:  [97.7 °F (36.5 °C)-98 °F (36.7 °C)] 97.7 °F (36.5 °C)  Heart Rate:  [] 96  Resp:  [16-24] 24  BP: (104-126)/(64-77) 105/64  Flow (L/min):  [40] 40     Physical Exam:  Constitutional: No acute distress, awake, alert  HENT: NCAT, mucous membranes moist  Respiratory: Diminished throughout, respiratory effort normal HFNC  Cardiovascular: RRR, no murmurs, rubs, or gallops  Gastrointestinal: Positive bowel sounds, soft, nontender, nondistended  Musculoskeletal: No bilateral ankle edema  Psychiatric: Appropriate affect, cooperative  Neurologic: Oriented x 3,  speech clear  Skin: Erythema and moisture noted to bilateral armpits     Results Reviewed:  LAB RESULTS:      Lab 22  0424 22  0727 22  0827 22  0038 22  0037 22   WBC 16.43* 13.36*  --  15.54*  --  14.70*   HEMOGLOBIN 13.4 13.0  --  12.1  --  12.8   HEMATOCRIT 41.9 40.2  --  37.7  --  39.7   PLATELETS 270 243  --  215  --  219   NEUTROS ABS  --  9.12*  --  11.73*  --  12.74*   IMMATURE GRANS (ABS)  --  0.05  --  0.31*  --  0.13*   LYMPHS ABS  --  2.11  --  1.68  --  0.75   MONOS ABS  --  0.71  --  0.95*  --  0.82   EOS ABS  --  1.33*  --  0.84*  --  0.23   MCV 84.5 84.1  --  84.3  --  84.5   SED RATE  --   --  37*  --   --   --    PROCALCITONIN  --   --   --   --   --  0.09   LACTATE   --   --   --   --  1.7  --          Lab 07/01/22  0423 06/30/22  0727 06/29/22  0827 06/28/22  0809 06/28/22  0038 06/27/22 2035   SODIUM 139 142 138  --  140 139   POTASSIUM 3.6 3.9 4.0  --  4.6 5.1   CHLORIDE 96* 99 96*  --  101 97*   CO2 33.0* 33.0* 32.0*  --  33.0* 32.0*   ANION GAP 10.0 10.0 10.0  --  6.0 10.0   BUN 24* 21 19  --  19 20   CREATININE 0.78 0.61 0.69  --  0.63 0.76   EGFR 77.9 91.6 89.0  --  90.9 80.3   GLUCOSE 130* 151* 269*  --  238* 340*   CALCIUM 9.8 9.9 9.4  --  9.1 9.1   MAGNESIUM 1.5* 1.8 2.0 1.5* 1.5*  --          Lab 06/27/22 2035   TOTAL PROTEIN 6.3   ALBUMIN 3.40*   GLOBULIN 2.9   ALT (SGPT) 18   AST (SGOT) 18   BILIRUBIN 0.2   ALK PHOS 86         Lab 06/28/22  0809 06/28/22 0038 06/27/22 2035   PROBNP  --   --  485.9   TROPONIN T 0.012 0.026 0.025                 Brief Urine Lab Results     None          Microbiology Results Abnormal     Procedure Component Value - Date/Time    COVID PRE-OP / PRE-PROCEDURE SCREENING ORDER (NO ISOLATION) - Swab, Nasopharynx [[id number]]  (Normal) Collected: 06/28/22 0027    Lab Status: Final result Specimen: Swab from Nasopharynx Updated: 06/28/22 0104    Narrative:      The following orders were created for panel order COVID PRE-OP / PRE-PROCEDURE SCREENING ORDER (NO ISOLATION) - Swab, Nasopharynx.  Procedure                               Abnormality         Status                     ---------                               -----------         ------                     COVID-19 and FLU A/B PCR...[[id number]]  Normal              Final result                 Please view results for these tests on the individual orders.    COVID-19 and FLU A/B PCR - Swab, Nasopharynx [[id number]]  (Normal) Collected: 06/28/22 0027    Lab Status: Final result Specimen: Swab from Nasopharynx Updated: 06/28/22 0104     COVID19 Not Detected     Influenza A PCR Not Detected     Influenza B PCR Not Detected    Narrative:      Fact sheet for providers:  https://www.fda.gov/media/153580/download    Fact sheet for patients: https://www.fda.gov/media/663979/download    Test performed by PCR.          XR Chest 1 View    Result Date: 7/1/2022  DATE OF EXAM: 7/1/2022 3:09 AM  PROCEDURE: XR CHEST 1 VW-  INDICATIONS: resp failure; J96.01-Acute respiratory failure with hypoxia; R06.02-Shortness of breath; J18.9-Pneumonia, unspecified organism  COMPARISON: 6/27/2020.  TECHNIQUE: Single radiographic AP view of the chest was obtained.  FINDINGS: Heart shadow is upper normal size. There is diffuse and extensive pulmonary interstitial and airspace disease, similar in extent to 6/27/2022. No effusion or pneumothorax is seen.      Impression: Diffuse pulmonary interstitial disease consistent with pneumonia, not significantly changed overall compared to 6/27/2022 exam. No evidence of pneumothorax or significant effusion.  This report was finalized on 7/1/2022 8:56 AM by Dr. Nelson Valdez MD.        Results for orders placed during the hospital encounter of 06/01/22    Adult Transthoracic Echo Complete W/ Cont if Necessary Per Protocol    Interpretation Summary  · The right atrial cavity is mildly dilated.  · Estimated right ventricular systolic pressure from tricuspid regurgitation is mildly elevated (35-45 mmHg). Calculated right ventricular systolic pressure from tricuspid regurgitation is 40 mmHg.  · Estimated left ventricular EF = 60% Estimated left ventricular EF was in agreement with the calculated left ventricular EF. Left ventricular ejection fraction appears to be 56 - 60%. Left ventricular systolic function is normal.  · Left ventricular diastolic function is consistent with age.  · Mild pulmonary hypertension is present.  · Normal right ventricular wall thickness, systolic function and septal motion noted with the right ventricular cavity mild to moderately dilated.  · There is no evidence of pericardial effusion.  · No significant structural or functional valvular  abnormalities demonstrated.      I have reviewed the medications:  Scheduled Meds:enoxaparin, 40 mg, Subcutaneous, Q24H  fluconazole, 150 mg, Oral, Once  FLUoxetine, 40 mg, Oral, Daily  insulin detemir, 12 Units, Subcutaneous, Q12H  insulin lispro, 0-7 Units, Subcutaneous, 4x Daily With Meals & Nightly  Insulin Lispro, 8 Units, Subcutaneous, TID With Meals  ipratropium-albuterol, 3 mL, Nebulization, 4x Daily - RT  losartan, 25 mg, Oral, Daily  nystatin, , Topical, Q12H  oxybutynin XL, 5 mg, Oral, Daily  pantoprazole, 40 mg, Oral, Q AM  pravastatin, 40 mg, Oral, Nightly  predniSONE, 15 mg, Oral, Daily With Breakfast   Followed by  [START ON 7/2/2022] predniSONE, 10 mg, Oral, Daily With Breakfast   Followed by  [START ON 7/6/2022] predniSONE, 5 mg, Oral, Daily With Breakfast  pregabalin, 150 mg, Oral, TID  senna-docusate sodium, 2 tablet, Oral, BID  sodium chloride, 10 mL, Intravenous, Q12H      Continuous Infusions:   PRN Meds:.•  acetaminophen  •  albuterol  •  ALPRAZolam  •  senna-docusate sodium **AND** polyethylene glycol **AND** bisacodyl **AND** bisacodyl  •  dextrose  •  dextrose  •  glucagon (human recombinant)  •  HYDROcodone-acetaminophen  •  ipratropium-albuterol  •  magnesium sulfate **OR** magnesium sulfate **OR** magnesium sulfate  •  sodium chloride  •  sodium chloride    Assessment & Plan   Assessment & Plan     Active Hospital Problems    Diagnosis  POA   • **Acute respiratory failure with hypoxia (HCC) [J96.01]  Yes   • Hypomagnesemia [E83.42]  Unknown   • Leukocytosis [D72.829]  Yes   • Chest pain [R07.9]  Yes   • Fibromyalgia [M79.7]  Yes   • Atypical pneumonia [J18.9]  Yes   • Type 2 diabetes mellitus, with long-term current use of insulin (HCC) [E11.9, Z79.4]  Not Applicable   • Essential hypertension [I10]  Yes   • Hyperlipemia [E78.5]  Yes   • GERD without esophagitis [K21.9]  Yes   • Mood disorder ([facility]) [F39]  Yes      Resolved Hospital Problems   No resolved problems to display.        Brief  "Hospital Course to date:  Chikis Cuellar is a 77 y.o. female w/ a hx of T2DM, HTN, HLD, GERD, fibromyalgia, anxiety who presented to the ED w/ c/o hypoxia.   Pt admitted to [facility] 6/1 - 6/27/22. Pt initially presented w/ c/o severe dyspnea. Pt was admitted to the ICU from the ED and placed on HFNC, IV steroids and IV antibiotics. Pt underwent an extensive work-up including infectious, autoimmune, cardiac which were predominantly unrevealing.  Advanced chest imaging was felt to be consistent with atypical pneumonia somewhat classic for the appearance of COVID-19 although COVID remained negative despite pt reports of several family members w/ recent positive COVID tests. Pt was d/c on a steroid taper and on 5 liters of supplemental oxygen. Pt was on room air prior to her admission. Pt was d/c to [facility] earlier today for inpatient rehabilitation. Pt was scheduled to f/u w/ Pulmonary (TBD) and PCP within 1-2 wks of d/c from rehab.  Pt was taken to rehab after d/c 6/27. Pt reports that there were \"issues\" with her oxygen not being hooked up upon her arrival to rehab. Per pt report, she was without supplemental oxygen for about 30 minutes. Pt reports that oxygen saturations dropped to the 70's and she then developed left sided chest pain. Despite a nebulizer treatment pt's oxygen saturations did not improve. EMS placed pt on a non-rebreather and pt's saturations responded. EMS attempted to wean pt back to 5 liters but Pt was unable to maintain saturations on 5 liters and was brought back to the ED for further evaluation.    This patient's problems and plans were partially entered by my partner and updated as appropriate by me 07/01/22.     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   -Indapamide held, as can cause pulmonary fibrosis "   -Anti-histone JANETH pending  -ESR improved from prior   -Continue Prednisone taper   -May need bronch with biopsies  -Wean HFNC as tolerated, now 40L, 40%  -Anti-reflux measures   -Diuresis as tolerated, received dose of Lasix 40mg IV yesterday   -Will need rehab at [place]  -CXR this AM without overall change      Leukocytosis   -afebrile   -CXR without new findings   -Likely secondary to steroids    B/L underarm Candidiasis   -Add Nystatin powder  -Interdry cloths  -One time dose oral Fluconazole  -WOC consulted, see note      T2DM  -hem a1c 6.2% on 6/3/22  -holding routine glipizide, novolog, metformin    -Continue Levemir 12 units q12  -Continue Humalog 8 units TID meals  -Continue LDSSI      HTN  HLD  -continue routine Lozol, losartan, pravastatin      Anxiety   -continue routine prozac, xanax      Fibromyalgia   -continue Lyrica, Norco      Hypomagnesemia  -Replace per protocol     Constipation  -No BM 3 days  -Continue bowel regimen  -On Linzess at home, not on formulary here     Expected Discharge Location and Transportation: SNF,  van vs family transport   Expected Discharge Date: 7/6/22    DVT prophylaxis:  Medical DVT prophylaxis orders are present.     AM-PAC 6 Clicks Score (PT): 14 (06/30/22 2000)    CODE STATUS:   Code Status and Medical Interventions:   Ordered at: 06/27/22 1965     Code Status (Patient has no pulse and is not breathing):    CPR (Attempt to Resuscitate)     Medical Interventions (Patient has pulse or is breathing):    Full Support       Elsie Griffith,   07/01/22

## 2022-07-01 NOTE — NURSING NOTE
New consult for bilateral armpits, MD has already ordered Diflucan and nystatin, nurse states that she was planning on using Interdry.  Nothing further for WOC to offer.  If spots open up can apply nystatin powder and then seal in with barrier spray.  Be sure to cleanse with foam soap and blue wipes before each nystatin application.  WOC will sign off, please reconsult if new needs arise.

## 2022-07-01 NOTE — PLAN OF CARE
Goal Outcome Evaluation:  Plan of Care Reviewed With: patient            VSS, SR. 02 per HF nasal cannula. Attempted to wean HF to no avail. Patient gets short of air with any activity and c/o smothering. Patient is cooperative with care but anxious at times. Patient changed to AND today per her own wishes. Rash in bilateral axilla areas washed and dried, nystatin powder , and interdry applied. Magnesium 1.5- replaced per protocol. Will continue with current plan of care

## 2022-07-01 NOTE — THERAPY TREATMENT NOTE
Patient Name: Chikis Cuellar  : 1945    MRN: [id number]                              Today's Date: 2022       Admit Date: 2022    Visit Dx:     ICD-10-CM ICD-9-CM   1. Acute respiratory failure with hypoxia (HCC)  J96.01 518.81   2. Shortness of breath  R06.02 786.05   3. Atypical pneumonia  J18.9 486     Patient Active Problem List   Diagnosis   • Type 2 diabetes mellitus, with long-term current use of insulin (HCC)   • Essential hypertension   • Hyperlipemia   • GERD without esophagitis   • Mood disorder (HCC)   • Acute respiratory failure with hypoxia (HCC)   • Atypical pneumonia   • Leukocytosis   • Chest pain   • Fibromyalgia   • Hypomagnesemia     Past Medical History:   Diagnosis Date   • Arthritis    • Diabetes mellitus (HCC)    • Fibromyalgia    • Hyperlipidemia    • Hypertension      Past Surgical History:   Procedure Laterality Date   • BACK SURGERY     •  SECTION     • CHOLECYSTECTOMY     • HYSTERECTOMY     • REPLACEMENT TOTAL KNEE        General Information     Row Name 22 1511          Physical Therapy Time and Intention    Document Type therapy note (daily note)  -KG     Mode of Treatment physical therapy  -KG     Row Name 22 1511          General Information    Existing Precautions/Restrictions fall;oxygen therapy device and L/min;other (see comments)  HFNC; monitor O2 sats  -KG     Row Name 22 1511          Cognition    Orientation Status (Cognition) oriented x 3  -KG     Row Name 22 1511          Safety Issues, Functional Mobility    Safety Issues Affecting Function (Mobility) insight into deficits/self-awareness;safety precaution awareness;safety precautions follow-through/compliance  -KG     Impairments Affecting Function (Mobility) balance;coordination;endurance/activity tolerance;postural/trunk control;shortness of breath;strength  -KG           User Key  (r) = Recorded By, (t) = Taken By, (c) = Cosigned By    Initials Name Provider Type    KG  Francia Curiel, PT Physical Therapist               Mobility     Row Name 07/01/22 1512          Bed Mobility    Bed Mobility rolling left;rolling right;scooting/bridging;supine-sit;sit-supine  -KG     Rolling Left [place] (Bed Mobility) minimum assist (75% patient effort);verbal cues  -KG     Rolling Right [place] (Bed Mobility) minimum assist (75% patient effort);verbal cues  -KG     Scooting/Bridging [place] (Bed Mobility) maximum assist (25% patient effort);2 person assist;supervision  -KG     Supine-Sit [place] (Bed Mobility) minimum assist (75% patient effort);2 person assist;verbal cues  -KG     Sit-Supine [place] (Bed Mobility) moderate assist (50% patient effort);2 person assist;verbal cues  -KG     Assistive Device (Bed Mobility) bed rails;draw sheet;head of bed elevated  -KG     Comment, (Bed Mobility) VC's for sequencing and technique. Pt required assistance at trunk and BLEs. Upon sitting EOB pt with elevated HR and decreased O2 sats to 84%. O2 sats improved with rest.  -KG     Row Name 07/01/22 1512          Transfers    Comment, (Transfers) STS x1 from EOB. VC's for sequencing and technique. Pt able to maintain standing posture for ~30 seconds. Pt's O2 sats decreased to 70% with return to seated position. Required increased time to recover. Deferred additional mobility.  -KG     Row Name 07/01/22 1512          Sit-Stand Transfer    Sit-Stand [place] (Transfers) minimum assist (75% patient effort);2 person assist;verbal cues  -KG     Assistive Device (Sit-Stand Transfers) other (see comments)  B UE support  -KG     Row Name 07/01/22 1512          Gait/Stairs (Locomotion)    [place] Level (Gait) unable to assess  -KG     Comment, (Gait/Stairs) Ambulation deferred. Not appropriate to assess.  -KG           User Key  (r) = Recorded By, (t) = Taken By, (c) = Cosigned By    Initials Name Provider Type    Francia Veronica, PT Physical Therapist                Obj/Interventions     [facility] Name 07/01/22 1514          Balance    Balance Assessment sitting static balance;standing static balance  -KG     Static Sitting Balance standby assist  -KG     Position, Sitting Balance supported;sitting edge of bed  -KG     Static Standing Balance minimal assist  -KG     Position/Device Used, Standing Balance supported  -KG           User Key  (r) = Recorded By, (t) = Taken By, (c) = Cosigned By    Initials Name Provider Type    KG Francia Curiel, PT Physical Therapist               Goals/Plan    No documentation.                Clinical Impression     [facility] Name 07/01/22 1515          Pain    Pretreatment Pain Rating 0/10 - no pain  -KG     Posttreatment Pain Rating 0/10 - no pain  -KG     [facility] Name 07/01/22 1515          Plan of Care Review    Plan of Care Reviewed With patient  -KG     Progress improving  -KG     Outcome Evaluation Pt performed supine to sit with Jermain x2. Once seated EOB pt's O2 sats decreased, but able to recover with rest. Pt able to perform STS x1 from EOB with Jermain x2 and B UE support. Further decrease in O2 sats noted with drop to 70%. Pt returned to supine position with modA x2. Deferred additional mobility. Continue to progress as appropriate.  -KG     [facility] Name 07/01/22 1515          Vital Signs    Pre Systolic BP Rehab 102  -KG     Pre Treatment Diastolic BP 75  -KG     Pretreatment Heart Rate (beats/min) 85  -KG     Posttreatment Heart Rate (beats/min) 109  -KG     Pre SpO2 (%) 91  -KG     O2 Delivery Pre Treatment hi-flow  -KG     Intra SpO2 (%) 70  -KG     O2 Delivery Intra Treatment hi-flow  -KG     Post SpO2 (%) 86  -KG     O2 Delivery Post Treatment hi-flow  -KG     Pre Patient Position Supine  -KG     Intra Patient Position Standing  -KG     Post Patient Position Supine  -KG     [facility] Name 07/01/22 1515          Positioning and Restraints    Pre-Treatment Position in bed  -KG     Post Treatment Position bed  -KG     In Bed supine;call light within  reach;encouraged to call for assist;exit alarm on;with nsg;side rails up x3  -KG           User Key  (r) = Recorded By, (t) = Taken By, (c) = Cosigned By    Initials Name Provider Type    Francia Veronica, PT Physical Therapist               Outcome Measures     Row Name 07/01/22 1518 07/01/22 0800       How much help from another person do you currently need...    Turning from your back to your side while in flat bed without using bedrails? 3  -KG 3  -EB    Moving from lying on back to sitting on the side of a flat bed without bedrails? 3  -KG 3  -EB    Moving to and from a bed to a chair (including a wheelchair)? 2  -KG 2  -EB    Standing up from a chair using your arms (e.g., wheelchair, bedside chair)? 3  -KG 2  -EB    Climbing 3-5 steps with a railing? 1  -KG 1  -EB    To walk in hospital room? 2  -KG 3  -EB    AM-PAC 6 Clicks Score (PT) 14  -KG 14  -EB    Highest level of mobility 4 --> Transferred to chair/commode  -KG 4 --> Transferred to chair/commode  -EB    Row Name 07/01/22 1518          Functional Assessment    Outcome Measure Options AM-PAC 6 Clicks Basic Mobility (PT)  -KG           User Key  (r) = Recorded By, (t) = Taken By, (c) = Cosigned By    Initials Name Provider Type    Chikis Tucker RN Registered Nurse    Francia Veronica, PT Physical Therapist                             Physical Therapy Education                 Title: PT OT SLP Therapies (In Progress)     Topic: Physical Therapy (In Progress)     Point: Mobility training (In Progress)     Learning Progress Summary           Patient Acceptance, E, NR by KG at 7/1/2022 1453    Acceptance, E, NR by KG at 6/28/2022 1438                   Point: Home exercise program (In Progress)     Learning Progress Summary           Patient Acceptance, E, NR by KG at 7/1/2022 1453                   Point: Body mechanics (In Progress)     Learning Progress Summary           Patient Acceptance, E, NR by KG at 7/1/2022 1453     Acceptance, E, NR by KG at 6/28/2022 1438                   Point: Precautions (In Progress)     Learning Progress Summary           Patient Acceptance, E, NR by KG at 7/1/2022 1453    Acceptance, E, NR by KG at 6/28/2022 1438                               User Key     Initials Effective Dates Name Provider Type Discipline    KG 05/22/20 -  Francia Curiel, PT Physical Therapist PT              PT Recommendation and Plan  Planned Therapy Interventions (PT): balance training, bed mobility training, gait training, strengthening, transfer training  Plan of Care Reviewed With: patient  Progress: improving  Outcome Evaluation: Pt performed supine to sit with Jermain x2. Once seated EOB pt's O2 sats decreased, but able to recover with rest. Pt able to perform STS x1 from EOB with Jermain x2 and B UE support. Further decrease in O2 sats noted with drop to 70%. Pt returned to supine position with modA x2. Deferred additional mobility. Continue to progress as appropriate.     Time Calculation:    PT Charges     Row Name 07/01/22 1453             Time Calculation    Start Time 1453  -KG      PT Received On 07/01/22  -KG      PT Goal Re-Cert Due Date 07/08/22  -KG              Time Calculation- PT    Total Timed Code Minutes- PT 14 minute(s)  -KG              Timed Charges    04815 - PT Therapeutic Activity Minutes 14  -KG              Total Minutes    Timed Charges Total Minutes 14  -KG       Total Minutes 14  -KG            User Key  (r) = Recorded By, (t) = Taken By, (c) = Cosigned By    Initials Name Provider Type    KG Francia Curiel, PT Physical Therapist              Therapy Charges for Today     Code Description Service Date Service Provider Modifiers Qty    [id number]  PT THERAPEUTIC ACT EA 15 MIN 7/1/2022 Francia Curiel, PT GP 1          PT G-Codes  Outcome Measure Options: AM-PAC 6 Clicks Basic Mobility (PT)  AM-PAC 6 Clicks Score (PT): 14  AM-PAC 6 Clicks Score (OT): 13    Evie Curiel  PT  7/1/2022

## 2022-07-02 NOTE — PROGRESS NOTES
"Pharmacy Consult-Vancomycin Dosing  Chikis Cuellar is a  77 y.o. female receiving vancomycin therapy.     Indication: SSTI  Consulting Provider: Dr Griffith  ID Consult: none to date  Goal AUC: 400 - 600 mg/L*hr    Current Antimicrobial Therapy  Anti-Infectives (From admission, onward)      Ordered     Dose/Rate Route Frequency Start Stop    07/02/22 0953  vancomycin 1500 mg/500 mL 0.9% NS IVPB ([facility])        Ordering Provider: Evelyn Monroy, PharmD    1,500 mg  over 90 Minutes Intravenous Every 24 Hours Scheduled 07/02/22 1045 07/09/22 0959    07/02/22 0913  cefTRIAXone (ROCEPHIN) 1 g/100 mL 0.9% NS ([facility])        Ordering Provider: Elsie Griffith DO    1 g  over 30 Minutes Intravenous Every 24 Hours 07/02/22 1000 07/05/22 0959    07/02/22 0913  fluconazole (DIFLUCAN) IVPB 400 mg        Ordering Provider: Elsie Griffith DO    400 mg  100 mL/hr over 120 Minutes Intravenous Daily 07/02/22 1000 07/07/22 0859    07/02/22 0913  Pharmacy to dose vancomycin        Ordering Provider: Elsie Griffith DO     Does not apply Continuous PRN 07/02/22 0911 07/05/22 0910    07/01/22 0809  fluconazole (DIFLUCAN) tablet 150 mg        Ordering Provider: Elsie Griffith DO    150 mg Oral Once 07/01/22 0900 07/01/22 0943          Allergies  Allergies as of 06/27/2022 - Reviewed 06/27/2022   Allergen Reaction Noted    Altace [ramipril] Palpitations 04/13/2019    Trazodone Hallucinations 04/13/2019     Labs    Results from last 7 days   Lab Units 07/01/22  0423 06/30/22  0727 06/29/22  0827   BUN mg/dL 24* 21 19   CREATININE mg/dL 0.78 0.61 0.69     Results from last 7 days   Lab Units 07/01/22  0424 06/30/22  0727 06/28/22  0038   WBC 10*3/mm3 16.43* 13.36* 15.54*     Evaluation of Dosing   Last Dose Received in the ED/Outside Facility: vancomycin 1750 mg iv x1 on 6/1, then 1250 mg iv q24h 6/2 - 6/7.    Is Patient on Dialysis or Renal Replacement: no    Ht - 162.6 cm (64\")  Wt - 84.7 kg (186 lb 12.8 oz)    Estimated " Creatinine Clearance: 62.6 mL/min (by C-G formula based on SCr of 0.78 mg/dL).    Intake & Output (last 3 days)         06/29 0701 06/30 0700 06/30 0701 07/01 0700 07/01 0701 07/02 0700 07/02 0701 07/03 0700    P.O.      I.V. (mL/kg)   150 (1.8)     Total Intake(mL/kg) 540 (6.4) 240 (2.8) 1230 (14.5)     Urine (mL/kg/hr) 1600 (0.8) 2000 (1) 550 (0.3)     Stool        Total Output 1600 2000 550     Net -1060 -1760 +680             Urine Unmeasured Occurrence   1 x             Microbiology and Radiology  Microbiology Results (last 10 days)       Procedure Component Value - Date/Time    COVID PRE-OP / PRE-PROCEDURE SCREENING ORDER (NO ISOLATION) - Swab, Nasopharynx [[id number]]  (Normal) Collected: 06/28/22 0027    Lab Status: Final result Specimen: Swab from Nasopharynx Updated: 06/28/22 0104    Narrative:      The following orders were created for panel order COVID PRE-OP / PRE-PROCEDURE SCREENING ORDER (NO ISOLATION) - Swab, Nasopharynx.  Procedure                               Abnormality         Status                     ---------                               -----------         ------                     COVID-19 and FLU A/B PCR...[[id number]]  Normal              Final result                 Please view results for these tests on the individual orders.    COVID-19 and FLU A/B PCR - Swab, Nasopharynx [[id number]]  (Normal) Collected: 06/28/22 0027    Lab Status: Final result Specimen: Swab from Nasopharynx Updated: 06/28/22 0104     COVID19 Not Detected     Influenza A PCR Not Detected     Influenza B PCR Not Detected    Narrative:      Fact sheet for providers: https://www.fda.gov/media/713090/download    Fact sheet for patients: https://www.fda.gov/media/786018/download    Test performed by PCR.            Reported Vancomycin Levels           [facility] AUC Calculation:    Current AUC:   mg/L*hr    Predicted Steady State AUC on Current Dose:    mg/L*hr  _________________________________    Predicted Steady State AUC on New Dose:  443 mg/L*hr    Assessment/Plan:       1. Pharmacy to dose vancomycin for SSTI. Goal  to 600 mg/L*hr  2. Recommend to start on a maintenance dose of vancomycin 1500 mg IV q24h (~ 17.7 mg/kg).  This correlates with predicted AUCss 443 mg/L*hr.    As noted above, pt had received vancomycin 6/1- 6/7 during prior admission.  SCr 0.78 today.  3.  Vancomycin level scheduled for 7/4 prior to the third dose.   4. Monitor renal function, cultures and sensitivities, and clinical status, and adjust regimen as necessary.  Pharmacy will continue to follow.    Thank you for consult,    Evelyn Monroy, PharmD  07/02/22  09:57 EDT

## 2022-07-02 NOTE — PROGRESS NOTES
"    [facility] Medicine Services  PROGRESS NOTE    Patient Name: Chikis Cuellar  : 1945  MRN: [id number]    Date of Admission: 2022  Primary Care Physician: Eduardo Gamboa MD    Subjective   Subjective     CC:  F/U acute respiratory failure with hypoxia     HPI:  Patient seen and examined. Not feeling great today but her respiratory status feels about the same to her. States her \"rash\" has extended to areas on her back.     ROS:  Gen- No fevers, chills  CV- No chest pain, palpitations  Resp- No cough, dyspnea  GI- No N/V/D, abd pain  Skin-Per HPI    Objective   Objective     Vital Signs:   Temp:  [97.7 °F (36.5 °C)-98.3 °F (36.8 °C)] 97.8 °F (36.6 °C)  Heart Rate:  [77-99] 93  Resp:  [16-20] 18  BP: (102-132)/(58-75) 132/74  Flow (L/min):  [40-50] 45     Physical Exam:  Constitutional: No acute distress, awake, alert  HENT: NCAT, mucous membranes moist  Respiratory: Diminished throughout, respiratory effort normal HFNC  Cardiovascular: RRR, no murmurs, rubs, or gallops  Gastrointestinal: Positive bowel sounds, soft, nontender, nondistended  Musculoskeletal: No bilateral ankle edema  Psychiatric: Appropriate affect, cooperative  Neurologic: Oriented x 3,  speech clear  Skin: Erythema under L armpit improved, Erythema R armpit without much change but has extended further around to her back, now warm to touch    Results Reviewed:  LAB RESULTS:      Lab 22  0424 22  0727 22  0827 22  0038 22  0037 22   WBC 16.43* 13.36*  --  15.54*  --  14.70*   HEMOGLOBIN 13.4 13.0  --  12.1  --  12.8   HEMATOCRIT 41.9 40.2  --  37.7  --  39.7   PLATELETS 270 243  --  215  --  219   NEUTROS ABS  --  9.12*  --  11.73*  --  12.74*   IMMATURE GRANS (ABS)  --  0.05  --  0.31*  --  0.13*   LYMPHS ABS  --  2.11  --  1.68  --  0.75   MONOS ABS  --  0.71  --  0.95*  --  0.82   EOS ABS  --  1.33*  --  0.84*  --  0.23   MCV 84.5 84.1  --  84.3  --  84.5   SED " RATE  --   --  37*  --   --   --    PROCALCITONIN  --   --   --   --   --  0.09   LACTATE  --   --   --   --  1.7  --          Lab 07/01/22  0423 06/30/22  0727 06/29/22  0827 06/28/22  0809 06/28/22  0038 06/27/22 2035   SODIUM 139 142 138  --  140 139   POTASSIUM 3.6 3.9 4.0  --  4.6 5.1   CHLORIDE 96* 99 96*  --  101 97*   CO2 33.0* 33.0* 32.0*  --  33.0* 32.0*   ANION GAP 10.0 10.0 10.0  --  6.0 10.0   BUN 24* 21 19  --  19 20   CREATININE 0.78 0.61 0.69  --  0.63 0.76   EGFR 77.9 91.6 89.0  --  90.9 80.3   GLUCOSE 130* 151* 269*  --  238* 340*   CALCIUM 9.8 9.9 9.4  --  9.1 9.1   MAGNESIUM 1.5* 1.8 2.0 1.5* 1.5*  --          Lab 06/27/22 2035   TOTAL PROTEIN 6.3   ALBUMIN 3.40*   GLOBULIN 2.9   ALT (SGPT) 18   AST (SGOT) 18   BILIRUBIN 0.2   ALK PHOS 86         Lab 06/28/22  0809 06/28/22  0038 06/27/22 2035   PROBNP  --   --  485.9   TROPONIN T 0.012 0.026 0.025                 Brief Urine Lab Results     None          Microbiology Results Abnormal     Procedure Component Value - Date/Time    COVID PRE-OP / PRE-PROCEDURE SCREENING ORDER (NO ISOLATION) - Swab, Nasopharynx [[id number]]  (Normal) Collected: 06/28/22 0027    Lab Status: Final result Specimen: Swab from Nasopharynx Updated: 06/28/22 0104    Narrative:      The following orders were created for panel order COVID PRE-OP / PRE-PROCEDURE SCREENING ORDER (NO ISOLATION) - Swab, Nasopharynx.  Procedure                               Abnormality         Status                     ---------                               -----------         ------                     COVID-19 and FLU A/B PCR...[[id number]]  Normal              Final result                 Please view results for these tests on the individual orders.    COVID-19 and FLU A/B PCR - Swab, Nasopharynx [[id number]]  (Normal) Collected: 06/28/22 0027    Lab Status: Final result Specimen: Swab from Nasopharynx Updated: 06/28/22 0104     COVID19 Not Detected     Influenza A PCR Not Detected      Influenza B PCR Not Detected    Narrative:      Fact sheet for providers: https://www.fda.gov/media/843499/download    Fact sheet for patients: https://www.fda.gov/media/782874/download    Test performed by PCR.          XR Chest 1 View    Result Date: 7/1/2022  DATE OF EXAM: 7/1/2022 3:09 AM  PROCEDURE: XR CHEST 1 VW-  INDICATIONS: resp failure; J96.01-Acute respiratory failure with hypoxia; R06.02-Shortness of breath; J18.9-Pneumonia, unspecified organism  COMPARISON: 6/27/2020.  TECHNIQUE: Single radiographic AP view of the chest was obtained.  FINDINGS: Heart shadow is upper normal size. There is diffuse and extensive pulmonary interstitial and airspace disease, similar in extent to 6/27/2022. No effusion or pneumothorax is seen.      Impression: Diffuse pulmonary interstitial disease consistent with pneumonia, not significantly changed overall compared to 6/27/2022 exam. No evidence of pneumothorax or significant effusion.  This report was finalized on 7/1/2022 8:56 AM by Dr. Nelson Valdez MD.        Results for orders placed during the hospital encounter of 06/01/22    Adult Transthoracic Echo Complete W/ Cont if Necessary Per Protocol    Interpretation Summary  · The right atrial cavity is mildly dilated.  · Estimated right ventricular systolic pressure from tricuspid regurgitation is mildly elevated (35-45 mmHg). Calculated right ventricular systolic pressure from tricuspid regurgitation is 40 mmHg.  · Estimated left ventricular EF = 60% Estimated left ventricular EF was in agreement with the calculated left ventricular EF. Left ventricular ejection fraction appears to be 56 - 60%. Left ventricular systolic function is normal.  · Left ventricular diastolic function is consistent with age.  · Mild pulmonary hypertension is present.  · Normal right ventricular wall thickness, systolic function and septal motion noted with the right ventricular cavity mild to moderately dilated.  · There is no evidence of  pericardial effusion.  · No significant structural or functional valvular abnormalities demonstrated.      I have reviewed the medications:  Scheduled Meds:enoxaparin, 40 mg, Subcutaneous, Q24H  FLUoxetine, 40 mg, Oral, Daily  insulin detemir, 12 Units, Subcutaneous, Q12H  insulin lispro, 0-7 Units, Subcutaneous, 4x Daily With Meals & Nightly  Insulin Lispro, 8 Units, Subcutaneous, TID With Meals  ipratropium-albuterol, 3 mL, Nebulization, 4x Daily - RT  losartan, 25 mg, Oral, Daily  nystatin, , Topical, Q12H  oxybutynin XL, 5 mg, Oral, Daily  pantoprazole, 40 mg, Oral, Q AM  pravastatin, 40 mg, Oral, Nightly  predniSONE, 10 mg, Oral, Daily With Breakfast   Followed by  [START ON 7/6/2022] predniSONE, 5 mg, Oral, Daily With Breakfast  pregabalin, 150 mg, Oral, TID  senna-docusate sodium, 2 tablet, Oral, BID  sodium chloride, 10 mL, Intravenous, Q12H      Continuous Infusions:   PRN Meds:.•  acetaminophen  •  albuterol  •  ALPRAZolam  •  senna-docusate sodium **AND** polyethylene glycol **AND** bisacodyl **AND** bisacodyl  •  dextrose  •  dextrose  •  glucagon (human recombinant)  •  HYDROcodone-acetaminophen  •  ipratropium-albuterol  •  magnesium sulfate **OR** magnesium sulfate **OR** magnesium sulfate  •  sodium chloride  •  sodium chloride    Assessment & Plan   Assessment & Plan     Active Hospital Problems    Diagnosis  POA   • **Acute respiratory failure with hypoxia (HCC) [J96.01]  Yes   • Hypomagnesemia [E83.42]  Unknown   • Leukocytosis [D72.829]  Yes   • Chest pain [R07.9]  Yes   • Fibromyalgia [M79.7]  Yes   • Atypical pneumonia [J18.9]  Yes   • Type 2 diabetes mellitus, with long-term current use of insulin (HCC) [E11.9, Z79.4]  Not Applicable   • Essential hypertension [I10]  Yes   • Hyperlipemia [E78.5]  Yes   • GERD without esophagitis [K21.9]  Yes   • Mood disorder (HCC) [F39]  Yes      Resolved Hospital Problems   No resolved problems to display.        Brief Hospital Course to date:  Chikis BRUMFIELD  "Polo is a 77 y.o. female w/ a hx of T2DM, HTN, HLD, GERD, fibromyalgia, anxiety who presented to the ED w/ c/o hypoxia.   Pt admitted to [facility] 6/1 - 6/27/22. Pt initially presented w/ c/o severe dyspnea. Pt was admitted to the ICU from the ED and placed on HFNC, IV steroids and IV antibiotics. Pt underwent an extensive work-up including infectious, autoimmune, cardiac which were predominantly unrevealing.  Advanced chest imaging was felt to be consistent with atypical pneumonia somewhat classic for the appearance of COVID-19 although COVID remained negative despite pt reports of several family members w/ recent positive COVID tests. Pt was d/c on a steroid taper and on 5 liters of supplemental oxygen. Pt was on room air prior to her admission. Pt was d/c to [facility] earlier today for inpatient rehabilitation. Pt was scheduled to f/u w/ Pulmonary (TBD) and PCP within 1-2 wks of d/c from rehab.  Pt was taken to rehab after d/c 6/27. Pt reports that there were \"issues\" with her oxygen not being hooked up upon her arrival to rehab. Per pt report, she was without supplemental oxygen for about 30 minutes. Pt reports that oxygen saturations dropped to the 70's and she then developed left sided chest pain. Despite a nebulizer treatment pt's oxygen saturations did not improve. EMS placed pt on a non-rebreather and pt's saturations responded. EMS attempted to wean pt back to 5 liters but Pt was unable to maintain saturations on 5 liters and was brought back to the ED for further evaluation.    This patient's problems and plans were partially entered by my partner and updated as appropriate by me 07/02/22.     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   -Indapamide held, as can cause pulmonary fibrosis   -Anti-histone JANETH WNL at 0.6  -ESR " improved from prior   -Continue Prednisone taper   -May need bronch with biopsies  -Wean HFNC as tolerated, now 45L, 50%  -Anti-reflux measures   -Diuresis as tolerated  -Will need rehab at [place]  -Consult to Palliative to assist with Sx control      Leukocytosis   -afebrile   -CXR without new findings   -Likely secondary to steroids  -AM labs pending     B/L underarm Candidiasis/R upper back cellulitis   Superimposed Cellulitis   -Continue Nystatin powder  -Interdry cloths  -Received 1x dose oral Fluconazole 7/1  -WOC consulted, see note   -Due to concern for superimposed cellulitis, will add IV ABX/anti-fungals today and see if improved. Vanc + Rocephin + IV Fluconazole   -May need ID to evaluate      T2DM  -hem a1c 6.2% on 6/3/22  -holding routine glipizide, novolog, metformin    -Increase Levemir 14 units q12  -Increase Humalog 10 units TID meals  -Continue LDSSI      HTN  HLD  -continue routine Lozol, losartan, pravastatin      Anxiety   -continue routine prozac, xanax      Fibromyalgia   -continue Lyrica, Norco      Hypomagnesemia  -Replace per protocol     Constipation  -Continue bowel regimen  -On Linzess at home, not on formulary here     Expected Discharge Location and Transportation: SNF,  van vs family transport   Expected Discharge Date: 7/6/22    DVT prophylaxis:  Medical DVT prophylaxis orders are present.     AM-PAC 6 Clicks Score (PT): 16 (07/01/22 2000)    CODE STATUS:   Code Status and Medical Interventions:   Ordered at: 07/01/22 6257     Medical Intervention Limits:    NO intubation (DNI)     Code Status (Patient has no pulse and is not breathing):    No CPR (Do Not Attempt to Resuscitate)     Medical Interventions (Patient has pulse or is breathing):    Limited Support       Elsie Griffith,   07/02/22

## 2022-07-02 NOTE — CONSULTS
Eduardo Gamboa MD  Consulting physician: Nacho    Chief Complaint   Patient presents with   • Shortness of Breath       Reason for consult: [facility]    HPI: Patient is a 77-year old female, brought to hospital due to hypoxia and dyspnea.  Patient had a prolonged hospitalization in  for dyspnea, and apparently no known cause of the patient's hypoxia was found.  Ultimately the patient was discharged to a rehab facility.  She was apparently at the rehab facility for just a few hours and was brought back to the hospital with hypoxia.  Patient has been on and off high flow oxygen and is also been evaluated by pulmonology with ongoing work-up.    Pain assesment: Denies    Dyspnea: Positive    N/V: Denies    PPS: 30      Past Medical History:   Diagnosis Date   • Arthritis    • Diabetes mellitus (HCC)    • Fibromyalgia    • Hyperlipidemia    • Hypertension      Past Surgical History:   Procedure Laterality Date   • BACK SURGERY     •  SECTION     • CHOLECYSTECTOMY     • HYSTERECTOMY     • REPLACEMENT TOTAL KNEE       Current Code Status     Date Active Code Status Order ID Comments User Context       2022 1617 No CPR (Do Not Attempt to Resuscitate) [id number]  Elsie Griffith DO Inpatient     Advance Care Planning Activity      Questions for Current Code Status     Question Answer    Code Status (Patient has no pulse and is not breathing) No CPR (Do Not Attempt to Resuscitate)    Medical Interventions (Patient has pulse or is breathing) Limited Support    Medical Intervention Limits: NO intubation (DNI)        Current Facility-Administered Medications   Medication Dose Route Frequency Provider Last Rate Last Admin   • acetaminophen (TYLENOL) tablet 650 mg  650 mg Oral Q6H PRN Sarath Gutiérrez PA-C   650 mg at 22 2132   • albuterol (PROVENTIL) nebulizer solution 0.083% 2.5 mg/3mL  2.5 mg Nebulization Q6H PRN Vanesa Enciso APRN       • ALPRAZolam (XANAX) tablet 0.5 mg  0.5 mg Oral Daily PRN  Lola Neri DO   0.5 mg at 07/01/22 2128   • sennosides-docusate (PERICOLACE) 8.6-50 MG per tablet 2 tablet  2 tablet Oral BID Elsie Griffith DO   2 tablet at 07/02/22 0855    And   • polyethylene glycol (MIRALAX) packet 17 g  17 g Oral Daily PRN Elsie Griffith DO        And   • bisacodyl (DULCOLAX) EC tablet 5 mg  5 mg Oral Daily PRN Elsie Griffith DO        And   • bisacodyl (DULCOLAX) suppository 10 mg  10 mg Rectal Daily PRN Elsie Griffith DO       • cefTRIAXone (ROCEPHIN) 1 g/100 mL 0.9% NS (MBP)  1 g Intravenous Q24H Elsie Griffith DO       • dextrose (D50W) (25 g/50 mL) IV injection 25 g  25 g Intravenous Q15 Min PRN Vanesa Enciso APRN       • dextrose (GLUTOSE) oral gel 15 g  15 g Oral Q15 Min PRN Vanesa Enciso APRN       • Enoxaparin Sodium (LOVENOX) syringe 40 mg  40 mg Subcutaneous Q24H Vanesa Enciso APRN   40 mg at 07/02/22 0535   • fluconazole (DIFLUCAN) IVPB 400 mg  400 mg Intravenous Daily Elsie Griffith DO       • FLUoxetine (PROzac) capsule 40 mg  40 mg Oral Daily Vanesa Enciso APRN   40 mg at 07/02/22 0855   • glucagon (human recombinant) (GLUCAGEN DIAGNOSTIC) injection 1 mg  1 mg Intramuscular Q15 Min PRN Vanesa Enciso APRN       • HYDROcodone-acetaminophen (NORCO)  MG per tablet 1 tablet  1 tablet Oral Q12H PRN Lola Neri DO   1 tablet at 07/01/22 1157   • insulin detemir (LEVEMIR) injection 14 Units  14 Units Subcutaneous Q12H Elsie Griffith DO       • Insulin Lispro (humaLOG) injection 0-7 Units  0-7 Units Subcutaneous 4x Daily With Meals & Nightly Vanesa Enciso APRN   2 Units at 07/02/22 0856   • Insulin Lispro (humaLOG) injection 10 Units  10 Units Subcutaneous TID With Meals Elsie Griffith DO       • ipratropium-albuterol (DUO-NEB) nebulizer solution 3 mL  3 mL Nebulization Q4H PRN Vanesa Enciso APRN       • ipratropium-albuterol (DUO-NEB) nebulizer solution 3 mL  3 mL Nebulization 4x Daily - RT Vanesa Enciso APRN   3  mL at 07/02/22 0729   • losartan (COZAAR) tablet 25 mg  25 mg Oral Daily Vanesa Enciso APRN   25 mg at 07/02/22 0855   • Magnesium Sulfate 2 gram Bolus, followed by 8 gram infusion (total Mg dose 10 grams)- Mg less than or equal to 1mg/dL  2 g Intravenous PRN Lola Neri G, DO        Or   • Magnesium Sulfate 2 gram / 50mL Infusion (GIVE X 3 BAGS TO EQUAL 6GM TOTAL DOSE) - Mg 1.1 - 1.5 mg/dl  2 g Intravenous PRN Lola Neri G, DO 25 mL/hr at 07/01/22 1848 2 g at 07/01/22 1848    Or   • Magnesium Sulfate 4 gram infusion- Mg 1.6-1.9 mg/dL  4 g Intravenous PRN Lola Neri G, DO       • nystatin (MYCOSTATIN) powder   Topical Q12H Elsie Griffith DO   1 application at 07/02/22 0856   • oxybutynin XL (DITROPAN-XL) 24 hr tablet 5 mg  5 mg Oral Daily Vanesa Enciso APRN   5 mg at 07/02/22 0855   • pantoprazole (PROTONIX) EC tablet 40 mg  40 mg Oral Q AM Vanesa Enciso APRN   40 mg at 07/02/22 0535   • Pharmacy to dose vancomycin   Does not apply Continuous PRN Elsie Griffith DO       • pravastatin (PRAVACHOL) tablet 40 mg  40 mg Oral Nightly Vanesa Enciso APRN   40 mg at 07/01/22 2016   • predniSONE (DELTASONE) tablet 10 mg  10 mg Oral Daily With Breakfast Vanesa Enciso APRN   10 mg at 07/02/22 0855    Followed by   • [START ON 7/6/2022] predniSONE (DELTASONE) tablet 5 mg  5 mg Oral Daily With Breakfast Vanesa Enciso APRN       • pregabalin (LYRICA) capsule 150 mg  150 mg Oral TID Lola Neri DO   150 mg at 07/02/22 0855   • sodium chloride 0.9 % flush 10 mL  10 mL Intravenous PRN Andrea Cabrera MD       • sodium chloride 0.9 % flush 10 mL  10 mL Intravenous Q12H Vanesa Enciso APRN   10 mL at 07/02/22 0854   • sodium chloride 0.9 % flush 10 mL  10 mL Intravenous PRN Vanesa Enciso APRN       • vancomycin 1500 mg/500 mL 0.9% NS IVPB ([facility])  1,500 mg Intravenous Q24H Evelyn Monroy, PharmD   1,500 mg at 07/02/22 1022     Pharmacy to dose vancomycin,       •  acetaminophen  •   "albuterol  •  ALPRAZolam  •  senna-docusate sodium **AND** polyethylene glycol **AND** bisacodyl **AND** bisacodyl  •  dextrose  •  dextrose  •  glucagon (human recombinant)  •  HYDROcodone-acetaminophen  •  ipratropium-albuterol  •  magnesium sulfate **OR** magnesium sulfate **OR** magnesium sulfate  •  Pharmacy to dose vancomycin  •  sodium chloride  •  sodium chloride  Allergies   Allergen Reactions   • Altace [Ramipril] Palpitations   • Trazodone Hallucinations     Family History   Problem Relation Age of Onset   • No Known Problems Mother    • No Known Problems Father      Social History     Socioeconomic History   • Marital status:    Tobacco Use   • Smoking status: Never Smoker   • Smokeless tobacco: Never Used   Substance and Sexual Activity   • Alcohol use: No   • Drug use: No   • Sexual activity: Defer     Review of Systems -all others reviewed and found negative that mentioned in the HPI      /74 (BP Location: Left arm, Patient Position: Lying)   Pulse 85   Temp 97.8 °F (36.6 °C) (Oral)   Resp 18   Ht 162.6 cm (64\")   Wt 84.7 kg (186 lb 12.8 oz)   SpO2 93%   BMI 32.06 kg/m²     Intake/Output Summary (Last 24 hours) at 7/2/2022 1121  Last data filed at 7/2/2022 1022  Gross per 24 hour   Intake 1250 ml   Output 550 ml   Net 700 ml     Physical Exam:      General Appearance:    Alert, cooperative, in no acute distress   Head:    Normocephalic, without obvious abnormality, atraumatic   Eyes:            Lids and lashes normal, conjunctivae and sclerae normal, no   icterus, no pallor, corneas clear, PERRLA   Ears:    Ears appear intact with no abnormalities noted   Throat:   No oral lesions, no thrush, oral mucosa moist   Neck:   No adenopathy, supple, trachea midline, no thyromegaly, no     carotid bruit, no JVD   Back:     No kyphosis present, no scoliosis present, no skin lesions,       erythema or scars, no tenderness to percussion or                   palpation,   range of motion normal "   Lungs:     Decreased breathe sounds     Heart:    Regular rhythm and normal rate, normal S1 and S2, no            murmur, no gallop, no rub, no click   Breast Exam:    Deferred   Abdomen:     Normal bowel sounds, no masses, no organomegaly, soft        non-tender, non-distended, no guarding, no rebound                 tenderness   Genitalia:    Deferred   Extremities:   Moves all extremities well, no edema, no cyanosis, no              redness   Pulses:   Pulses palpable and equal bilaterally   Skin:   No bleeding, bruising or rash   Lymph nodes:   No palpable adenopathy   Neurologic:   Cranial nerves 2 - 12 grossly intact, sensation intact, DTR        present and equal bilaterally       Results from last 7 days   Lab Units 07/01/22  0424   WBC 10*3/mm3 16.43*   HEMOGLOBIN g/dL 13.4   HEMATOCRIT % 41.9   PLATELETS 10*3/mm3 270     Results from last 7 days   Lab Units 07/01/22  0423 06/28/22  0038 06/27/22  2035   SODIUM mmol/L 139   < > 139   POTASSIUM mmol/L 3.6   < > 5.1   CHLORIDE mmol/L 96*   < > 97*   CO2 mmol/L 33.0*   < > 32.0*   BUN mg/dL 24*   < > 20   CREATININE mg/dL 0.78   < > 0.76   CALCIUM mg/dL 9.8   < > 9.1   BILIRUBIN mg/dL  --   --  0.2   ALK PHOS U/L  --   --  86   ALT (SGPT) U/L  --   --  18   AST (SGOT) U/L  --   --  18   GLUCOSE mg/dL 130*   < > 340*    < > = values in this interval not displayed.     Results from last 7 days   Lab Units 07/01/22 0423   SODIUM mmol/L 139   POTASSIUM mmol/L 3.6   CHLORIDE mmol/L 96*   CO2 mmol/L 33.0*   BUN mg/dL 24*   CREATININE mg/dL 0.78   GLUCOSE mg/dL 130*   CALCIUM mg/dL 9.8     Imaging Results (Last 72 Hours)     Procedure Component Value Units Date/Time    XR Chest 1 View [[id number]] Collected: 07/01/22 0854     Updated: 07/01/22 0859    Narrative:      DATE OF EXAM: 7/1/2022 3:09 AM     PROCEDURE: XR CHEST 1 VW-     INDICATIONS: resp failure; J96.01-Acute respiratory failure with  hypoxia; R06.02-Shortness of breath; J18.9-Pneumonia,  unspecified  organism     COMPARISON: 6/27/2020.     TECHNIQUE: Single radiographic AP view of the chest was obtained.     FINDINGS:  Heart shadow is upper normal size. There is diffuse and extensive  pulmonary interstitial and airspace disease, similar in extent to  6/27/2022. No effusion or pneumothorax is seen.        Impression:      Diffuse pulmonary interstitial disease consistent with pneumonia, not  significantly changed overall compared to 6/27/2022 exam. No evidence of  pneumothorax or significant effusion.     This report was finalized on 7/1/2022 8:56 AM by Dr. Nelson Valdez MD.           Impression: Resp failure  Dyspnea  Hypoxia  GOC    Plan: Does appear to the patient's ultimate underlying issue has not been found and is currently continuing to be worked up.  We will plan on touching base with the hospitalist to see what their overall plans are.  Patient not complaining of dyspnea so we will hold off on any type of medication adjustments for the time being        Amilcar Kumar DO  07/02/22  11:21 EDT

## 2022-07-02 NOTE — PLAN OF CARE
Goal Outcome Evaluation:  Plan of Care Reviewed With: patient        Progress: no change  Outcome Evaluation: Palliative consult for symptom management. Pt reported chronic pain due to fibromyalgia and DJD managed on current regimen as at home. Pt denied dyspnea when asked; observable mild accessory muscle use at rest on HF O2, documented inability to wean HF due to increase in dyspnea and drop of O2 Sat. Pt stated she had not noticed improvement of her breathing after taking her pain medication, but hadn't considered it before and would pay attention to whether it seems to help. Pt had receivied opioid medication for pain prior to Palliative RN encounter. Currently receiving course of treatment for possible fungal rash; monitor. Pt's spouse currently a resident at [facility], anticipate long-term residency. No changes to symptom management regimen; monitor effect of pain medication on severity of dyspnea, adjust as appropriate. Palliative following for management of symptoms, continued support for GOC/POC discussion pending further work up of underlying etiology per Pulmonary.    Problem: Palliative Care  Goal: Enhanced Quality of Life  Outcome: Ongoing, Progressing  Intervention: Promote Advance Care Planning  Flowsheets (Taken 7/2/2022 1738)  Life Transition/Adjustment:   palliative care discussed   palliative care initiated  Intervention: Maximize Comfort  Flowsheets (Taken 7/2/2022 1738)  Pain Management Interventions: pain management plan reviewed with patient/caregiver  Intervention: Optimize Function  Flowsheets (Taken 7/2/2022 1738)  Sensory Stimulation Regulation: television on  Fatigue Management: frequent rest breaks encouraged  Sleep/Rest Enhancement: natural light exposure provided  Intervention: Optimize Psychosocial Wellbeing  Flowsheets (Taken 7/2/2022 1738)  Supportive Measures:   active listening utilized   positive reinforcement provided   self-responsibility promoted  Spiritual Activities Assistance:  (Spiritual Care consult) other (see comments)  Family/Support System Care: (Palliative information provided; no family present at times of Palliative encounters) other (see comments)

## 2022-07-02 NOTE — CONSULTS
Placed by DELVIS Anderson RN - confirmed with 3cg.  LUE double lumen PICC with 45cm total and 0cm exposed.

## 2022-07-02 NOTE — PLAN OF CARE
Problem: Adult Inpatient Plan of Care  Goal: Absence of Hospital-Acquired Illness or Injury  Intervention: Identify and Manage Fall Risk  Description: Perform standard risk assessment on admission using a validated tool or comprehensive approach appropriate to the patient; reassess fall risk frequently, with change in status or transfer to another level of care.  Communicate fall injury risk to interprofessional healthcare team.  Determine need for increased observation, equipment and environmental modification, such as low bed, signage and supportive, nonskid footwear.  Adjust safety measures to individual developmental age, stage and identified risk factors.  Reinforce the importance of safety and physical activity with patient and family.  Perform regular intentional rounding to assess need for position change, pain assessment and personal needs, including assistance with toileting.  Recent Flowsheet Documentation  Taken 7/2/2022 0000 by Polo Xiao, RN  Safety Promotion/Fall Prevention:   activity supervised   assistive device/personal items within reach   clutter free environment maintained   elopement precautions   fall prevention program maintained   nonskid shoes/slippers when out of bed   safety round/check completed  Taken 7/1/2022 2200 by Polo Xiao, RN  Safety Promotion/Fall Prevention:   activity supervised   assistive device/personal items within reach   clutter free environment maintained   elopement precautions   fall prevention program maintained   nonskid shoes/slippers when out of bed   safety round/check completed  Taken 7/1/2022 2000 by Polo Xiao, RN  Safety Promotion/Fall Prevention:   activity supervised   assistive device/personal items within reach   clutter free environment maintained   elopement precautions   fall prevention program maintained   nonskid shoes/slippers when out of bed   safety round/check completed  Intervention: Prevent Skin Injury  Description: Perform a screening  for skin injury risk, such as pressure or moisture associated skin damage on admission and at regular intervals throughout hospital stay.  Keep all areas of skin (especially folds) clean and dry.  Maintain adequate skin hydration.  Relieve and redistribute pressure and protect bony prominences; implement measures based on patient-specific risk factors.  Match turning and repositioning schedule to clinical condition.  Encourage weight shift frequently; assist with reposition if unable to complete independently.  Float heels off bed; avoid pressure on the Achilles tendon.  Keep skin free from extended contact with medical devices.  Encourage functional activity and mobility, as early as tolerated.  Use aids (e.g., slide boards, mechanical lift) during transfer.  Recent Flowsheet Documentation  Taken 7/2/2022 0000 by Polo Xiao RN  Body Position: position changed independently  Skin Protection:   adhesive use limited   incontinence pads utilized   protective footwear used   skin sealant/moisture barrier applied   skin-to-skin areas padded   tubing/devices free from skin contact  Taken 7/1/2022 2200 by Polo Xiao RN  Body Position: position changed independently  Taken 7/1/2022 2000 by Polo Xiao RN  Body Position: position changed independently  Skin Protection:   adhesive use limited   incontinence pads utilized   protective footwear used   skin-to-skin areas padded   tubing/devices free from skin contact  Intervention: Prevent and Manage VTE (Venous Thromboembolism) Risk  Description: Assess for VTE (venous thromboembolism) risk.  Encourage and assist with early ambulation.  Initiate and maintain compression or other therapy, as indicated, based on identified risk in accordance with organizational protocol and provider order.  Encourage both active and passive leg exercises while in bed, if unable to ambulate.  Recent Flowsheet Documentation  Taken 7/2/2022 0000 by Polo Xiao RN  Activity Management: activity  adjusted per tolerance  VTE Prevention/Management:   bilateral   dorsiflexion/plantar flexion performed  Range of Motion: active ROM (range of motion) encouraged  Taken 7/1/2022 2200 by Polo Xiao RN  Activity Management: activity adjusted per tolerance  Taken 7/1/2022 2000 by Polo Xiao RN  Activity Management: activity adjusted per tolerance  VTE Prevention/Management:   bilateral   dorsiflexion/plantar flexion performed  Range of Motion: ROM (range of motion) performed  Intervention: Prevent Infection  Description: Maintain skin and mucous membrane integrity; promote hand, oral and pulmonary hygiene.  Optimize fluid balance, nutrition, sleep and glycemic control to maximize infection resistance.  Identify potential sources of infection early to prevent or mitigate progression of infection (e.g., wound, lines, devices).  Evaluate ongoing need for invasive devices; remove promptly when no longer indicated.  Recent Flowsheet Documentation  Taken 7/1/2022 2000 by Polo Xiao RN  Infection Prevention:   environmental surveillance performed   hand hygiene promoted   single patient room provided   rest/sleep promoted  Goal: Optimal Comfort and Wellbeing  Intervention: Provide Person-Centered Care  Description: Use a family-focused approach to care.  Develop trust and rapport by proactively providing information, encouraging questions, addressing concerns and offering reassurance.  Acknowledge emotional response to hospitalization.  Recognize and utilize personal coping strategies.  Honor spiritual and cultural preferences.  Recent Flowsheet Documentation  Taken 7/1/2022 2000 by Polo Xiao RN  Trust Relationship/Rapport:   care explained   choices provided   emotional support provided   empathic listening provided   questions answered   reassurance provided   questions encouraged   thoughts/feelings acknowledged     Problem: Skin Injury Risk Increased  Goal: Skin Health and Integrity  Outcome: Ongoing,  Progressing  Intervention: Promote and Optimize Oral Intake  Description: Perform a nutrition assessment; include a nutrition-focused physical exam.  Determine calorie, protein, vitamin, mineral and fluid requirements.  Assess for micronutrient deficiencies; supplement if depleted.  Assess need and assist with meal set-up and feeding.  Adjust diet or meal schedule based on preferences and tolerance.  Offer oral supplemental food or drinks to enhance calorie and protein intake.  Establish bowel elimination program to increase comfort and appetite.  Minimize unnecessary dietary restrictions to increase oral intake.  Provide and encourage oral hygiene to enhance desire to eat.  Consider enteral nutrition support if oral intake remains inadequate; provide parenteral nutrition if enteral is contraindicated.  Flowsheets (Taken 7/2/2022 0018)  Oral Nutrition Promotion:   physical activity promoted   rest periods promoted  Intervention: Optimize Skin Protection  Description: Perform a full pressure injury risk assessment, as indicated by screening, upon admission to care unit.  Reassess skin (injury risk, full inspection) frequently (e.g., scheduled interval, with change in condition) to provide optimal early detection and prevention.  Maintain adequate tissue perfusion (e.g., encourage fluid balance; avoid crossing legs, constrictive clothing or devices) to promote tissue oxygenation.  Maintain head of bed at lowest degree of elevation tolerated, considering medical condition and other restrictions.  Avoid positioning onto an area that remains reddened.  Minimize incontinence and moisture (e.g., toileting schedule; moisture-wicking pad, diaper or incontinence collection device; skin moisture barrier).  Cleanse skin promptly and gently when soiled utilizing a pH-balanced cleanser.  Relieve and redistribute pressure (e.g., scheduled position changes, weight shifts, use of support surface, medical device repositioning,  protective dressing application, use of positioning device, microclimate control, use of pressure-injury-monitor  Encourage increased activity, such as sitting in a chair at the bedside or early mobilization, when able to tolerate.  Flowsheets  Taken 7/2/2022 0000  Pressure Reduction Techniques:   frequent weight shift encouraged   heels elevated off bed   positioned off wounds   pressure points protected   weight shift assistance provided  Head of Bed (HOB) Positioning: HOB elevated  Pressure Reduction Devices:   positioning supports utilized   heel offloading device utilized   pressure-redistributing mattress utilized  Skin Protection:   adhesive use limited   incontinence pads utilized   protective footwear used   skin sealant/moisture barrier applied   skin-to-skin areas padded   tubing/devices free from skin contact  Taken 7/1/2022 2200  Head of Bed (HOB) Positioning: HOB elevated  Taken 7/1/2022 2000  Pressure Reduction Techniques:   frequent weight shift encouraged   heels elevated off bed   positioned off wounds   pressure points protected   weight shift assistance provided  Head of Bed (HOB) Positioning: HOB elevated  Pressure Reduction Devices:   pressure-redistributing mattress utilized   positioning supports utilized   heel offloading device utilized  Skin Protection:   adhesive use limited   incontinence pads utilized   protective footwear used   skin-to-skin areas padded   tubing/devices free from skin contact     Problem: Diabetes Comorbidity  Goal: Blood Glucose Level Within Targeted Range  Intervention: Monitor and Manage Glycemia  Description: Establish target blood glucose levels based on patient-specific factors, such as age, diabetes-related complications and illness severity.  Document blood glucose levels and monitor trend; advocate for adjustment to keep within targeted range.  Provide pharmacologic therapy to maintain blood glucose levels within targeted range.  Check blood glucose level if  there is a change in mental or cognitive status.  Recognize, treat and document hypoglycemia event and potential cause.  Avoid hypoglycemic episodes by advocating for insulin dose adjustment when there is a change in condition, such as illness severity, decreased oral intake, missed or refused meals and snacks, as well as medication change that may include steroid tape  Recent Flowsheet Documentation  Taken 7/1/2022 2000 by Polo Xiao RN  Glycemic Management: blood glucose monitored     Problem: Hypertension Comorbidity  Goal: Blood Pressure in Desired Range  Intervention: Maintain Blood Pressure Management  Description: Evaluate adherence to home antihypertensive regimen (e.g., exercise and activity, diet modification, medication).  Provide scheduled antihypertensive medication; consider administration time and effects (e.g., avoid giving diuretic prior to bedtime).  Monitor response to antihypertensive medication therapy (e.g., blood pressure, electrolyte levels, medication effects).  Minimize risk of orthostatic hypotension; encourage caution with position changes, particularly if elderly.  Recent Flowsheet Documentation  Taken 7/1/2022 2000 by Polo Xiao RN  Medication Review/Management:   medications reviewed   high-risk medications identified     Problem: Osteoarthritis Comorbidity  Goal: Maintenance of Osteoarthritis Symptom Control  Intervention: Maintain Osteoarthritis Symptom Control  Description: Evaluate adherence to self-management plan, such as medication, exercise and weight management.  Advocate for continuation of home regimen, such as medication, physical activity and thermal agents; monitor response.  Encourage participation in functional activities, such as mobility and ADLs (activities of daily living) to minimize decline associated with inactivity.  Facilitate use of patient-specific assistive devices, equipment or orthoses.  Evaluate effectiveness of coping skills; encourage expression of  feelings, expectations and concerns related to disease management and quality of life; reinforce education to enhance management plan and wellbeing.  Recent Flowsheet Documentation  Taken 7/2/2022 0000 by Polo Xiao, RN  Activity Management: activity adjusted per tolerance  Taken 7/1/2022 2200 by Polo Xiao, RN  Activity Management: activity adjusted per tolerance  Taken 7/1/2022 2000 by Polo Xiao, RN  Activity Management: activity adjusted per tolerance  Medication Review/Management:   medications reviewed   high-risk medications identified     Problem: Pain Chronic (Persistent) (Comorbidity Management)  Goal: Acceptable Pain Control and Functional Ability  Intervention: Manage Persistent Pain  Description: Evaluate pain level, effect of treatment and patient response at regular intervals.  Minimize pain stimuli; coordinate care and adjust environment (e.g., light, noise, unnecessary movement); promote sleep/rest.  Match pharmacologic analgesia to severity and type of pain mechanism (e.g., neuropathic, muscle, inflammatory); consider multimodal approach (e.g., nonopioid, opioid, adjuvant).  Provide medication at regular intervals; titrate to patient response.  Manage breakthrough pain with additional doses; consider rotation or switching medication.  Monitor for signs of substance tolerance (increased dose to reach desired effect, decreased effect with same dose).  Avoid abrupt withdrawal of medication, especially agents capable of causing physical dependence.  Manage medication-induced effects, such as constipation, nausea, pruritus, urinary retention, somnolence and dizziness.  Provide multimodal treatment interventions, such as physical activity, therapeutic exercise, yoga, TENS (transcutaneous electrical nerve stimulation) and manual therapy.  Train in functional activity modifications, such as body mechanics, posture, ergonomics, energy conservation and activity pacing.  Consider addition of complementary  or alternative therapy, such as acupuncture, hypnosis or therapeutic touch.  Recent Flowsheet Documentation  Taken 7/1/2022 2000 by Polo Xiao RN  Sleep/Rest Enhancement:   regular sleep/rest pattern promoted   relaxation techniques promoted  Medication Review/Management:   medications reviewed   high-risk medications identified  Intervention: Optimize Psychosocial Wellbeing  Description: Facilitate patient’s self-control over pain by providing pain information and allowing choices in treatment.  Consider and address emotional response to pain.  Explore and promote use of coping strategies; address barriers to successful coping.  Evaluate and assist with psychosocial, cultural and spiritual factors impacting pain.  Modify pain perception by using techniques, such as distraction, mindfulness, guided imagery, meditation or music.  Assess and monitor for signs and symptoms of behavioral health concerns, such as unhealthy substance use, depression and suicidal ideation.  Consider referral for ongoing coping support, such as cognitive behavioral therapy and mindfulness-based stress reduction.  Recent Flowsheet Documentation  Taken 7/1/2022 2000 by Polo Xiao RN  Supportive Measures:   active listening utilized   positive reinforcement provided   problem-solving facilitated   relaxation techniques promoted   self-care encouraged   self-reflection promoted   self-responsibility promoted   verbalization of feelings encouraged  Diversional Activities:   smartphone   television     Problem: Fall Injury Risk  Goal: Absence of Fall and Fall-Related Injury  Outcome: Ongoing, Progressing  Intervention: Identify and Manage Contributors  Description: Develop a fall prevention plan with the patient and caregiver/family.  Provide reorientation, appropriate sensory stimulation and routines with changes in mental status to decrease risk of fall.  Promote use of personal vision and auditory aids.  Assess assistance level required  for safe and effective self-care; provide support as needed, such as toileting, mobilization. For age 65 and older, implement timed toileting with assistance.  Encourage physical activity, such as performance of mobility and self-care at highest level of patient ability, multicomponent exercise program and provision of appropriate assistive devices.  If fall occurs, assess the severity of injury; implement fall injury protocol. Determine the cause and revise fall injury prevention plan.  Regularly review medication contribution to fall risk; adjust medication administration times to minimize risk of falling.  Consider risk related to polypharmacy and age.  Balance adequate pain management with potential for oversedation.  Flowsheets  Taken 7/2/2022 0018  Self-Care Promotion:   independence encouraged   BADL personal routines maintained  Taken 7/1/2022 2000  Medication Review/Management:   medications reviewed   high-risk medications identified  Intervention: Promote Injury-Free Environment  Description: Provide a safe, barrier-free environment that encourages independent activity.  Keep care area uncluttered and well-lighted.  Determine need for increased observation or monitoring.  Avoid use of devices that minimize mobility, such as restraints or indwelling urinary catheter.  Flowsheets  Taken 7/2/2022 0000  Safety Promotion/Fall Prevention:   activity supervised   assistive device/personal items within reach   clutter free environment maintained   elopement precautions   fall prevention program maintained   nonskid shoes/slippers when out of bed   safety round/check completed  Taken 7/1/2022 2200  Safety Promotion/Fall Prevention:   activity supervised   assistive device/personal items within reach   clutter free environment maintained   elopement precautions   fall prevention program maintained   nonskid shoes/slippers when out of bed   safety round/check completed  Taken 7/1/2022 2000  Safety Promotion/Fall  Prevention:   activity supervised   assistive device/personal items within reach   clutter free environment maintained   elopement precautions   fall prevention program maintained   nonskid shoes/slippers when out of bed   safety round/check completed   Goal Outcome Evaluation:

## 2022-07-03 NOTE — PLAN OF CARE
Goal Outcome Evaluation:  Plan of Care Reviewed With: patient         Pt stable overnight, no acute events. CTM

## 2022-07-03 NOTE — PROGRESS NOTES
Palliative Care Progress Note    Date of Admission: 6/27/2022    Subjective: Patient continues to complain of dyspnea as well as  Current Code Status     Date Active Code Status Order ID Comments User Context       7/1/2022 1617 No CPR (Do Not Attempt to Resuscitate) [id number]  Elsie Griffith, DO Inpatient     Advance Care Planning Activity      Questions for Current Code Status     Question Answer    Code Status (Patient has no pulse and is not breathing) No CPR (Do Not Attempt to Resuscitate)    Medical Interventions (Patient has pulse or is breathing) Limited Support    Medical Intervention Limits: NO intubation (DNI)        No current facility-administered medications on file prior to encounter.     Current Outpatient Medications on File Prior to Encounter   Medication Sig Dispense Refill   • FLUoxetine (PROzac) 20 MG capsule Take 40 mg by mouth Daily.     • HYDROcodone-acetaminophen (NORCO)  MG per tablet Take 1 tablet by mouth Every 12 (Twelve) Hours As Needed for Moderate Pain . 6 tablet 0   • indapamide (LOZOL) 2.5 MG tablet Take 2.5 mg by mouth Daily.     • insulin detemir (LEVEMIR) 100 UNIT/ML injection Inject 5 Units under the skin into the appropriate area as directed Every 12 (Twelve) Hours.  12   • losartan (COZAAR) 25 MG tablet Take 1 tablet by mouth Daily.     • omeprazole (priLOSEC) 40 MG capsule Take 40 mg by mouth Daily.     • oxybutynin XL (DITROPAN-XL) 5 MG 24 hr tablet Take 5 mg by mouth Daily.     • pravastatin (PRAVACHOL) 40 MG tablet Take 40 mg by mouth Daily.     • pregabalin (LYRICA) 150 MG capsule Take 1 capsule by mouth 3 (Three) Times a Day. 9 capsule 0   • albuterol (PROVENTIL) (2.5 MG/3ML) 0.083% nebulizer solution Take 2.5 mg by nebulization Every 6 (Six) Hours As Needed for Shortness of Air.  12   • ALPRAZolam (XANAX) 0.5 MG tablet Take 1 tablet by mouth Daily As Needed for Anxiety. 3 tablet 0   • cyanocobalamin 1000 MCG/ML injection Inject 1,000 mcg under the skin into  "the appropriate area as directed Every 14 (Fourteen) Days.     • glipiZIDE (GLUCOTROL) 10 MG tablet Take 10 mg by mouth 2 (Two) Times a Day Before Meals.     • Insulin Aspart (novoLOG) 100 UNIT/ML injection Inject  under the skin into the appropriate area as directed 3 (Three) Times a Day Before Meals. Patient states she gets it from pharmacy although they did not have record of it     • metFORMIN (GLUCOPHAGE) 500 MG tablet Take 500 mg by mouth 2 (Two) Times a Day With Meals.     • predniSONE (DELTASONE) 5 MG tablet Take 3 tablets by mouth Daily With Breakfast for 4 days, THEN 2 tablets Daily With Breakfast for 4 days, THEN 1 tablet Daily With Breakfast for 4 days. 24 tablet 0     Pharmacy to dose vancomycin,       •  acetaminophen  •  albuterol  •  ALPRAZolam  •  senna-docusate sodium **AND** polyethylene glycol **AND** bisacodyl **AND** bisacodyl  •  dextrose  •  dextrose  •  glucagon (human recombinant)  •  HYDROcodone-acetaminophen  •  ipratropium-albuterol  •  magnesium sulfate **OR** magnesium sulfate **OR** magnesium sulfate  •  Pharmacy to dose vancomycin  •  sodium chloride  •  sodium chloride  •  sodium chloride    Objective: /64 (BP Location: Right arm, Patient Position: Lying)   Pulse 79   Temp 98.2 °F (36.8 °C) (Axillary)   Resp 20   Ht 162.6 cm (64\")   Wt 84.9 kg (187 lb 3.2 oz)   SpO2 96%   BMI 32.13 kg/m²      Intake/Output Summary (Last 24 hours) at 7/3/2022 1450  Last data filed at 7/3/2022 1317  Gross per 24 hour   Intake 825 ml   Output 800 ml   Net 25 ml     Physical Exam:      General Appearance:    Alert, cooperative, in no acute distress   Head:    Normocephalic, without obvious abnormality, atraumatic   Eyes:            Lids and lashes normal, conjunctivae and sclerae normal, no   icterus, no pallor, corneas clear, PERRLA   Ears:    Ears appear intact with no abnormalities noted   Throat:   No oral lesions, no thrush, oral mucosa moist   Neck:   No adenopathy, supple, trachea " midline, no thyromegaly, no     carotid bruit, no JVD   Back:     No kyphosis present, no scoliosis present, no skin lesions,       erythema or scars, no tenderness to percussion or                   palpation,   range of motion normal   Lungs:     Diminshed breathe sounds    Heart:    Regular rhythm and normal rate, normal S1 and S2, no            murmur, no gallop, no rub, no click   Breast Exam:    Deferred   Abdomen:     Normal bowel sounds, no masses, no organomegaly, soft        non-tender, non-distended, no guarding, no rebound                 tenderness   Genitalia:    Deferred   Extremities:   Moves all extremities well, no edema, no cyanosis, no              redness   Pulses:   Pulses palpable and equal bilaterally   Skin:   No bleeding, bruising or rash   Lymph nodes:   No palpable adenopathy   Neurologic:   Cranial nerves 2 - 12 grossly intact, sensation intact, DTR        present and equal bilaterally     Results from last 7 days   Lab Units 07/03/22  0438   WBC 10*3/mm3 14.73*   HEMOGLOBIN g/dL 11.8*   HEMATOCRIT % 36.8   PLATELETS 10*3/mm3 227     Results from last 7 days   Lab Units 07/03/22  0438 06/28/22  0038 06/27/22  2035   SODIUM mmol/L 142   < > 139   POTASSIUM mmol/L 4.0   < > 5.1   CHLORIDE mmol/L 98   < > 97*   CO2 mmol/L 33.0*   < > 32.0*   BUN mg/dL 21   < > 20   CREATININE mg/dL 0.66   < > 0.76   CALCIUM mg/dL 9.0   < > 9.1   BILIRUBIN mg/dL  --   --  0.2   ALK PHOS U/L  --   --  86   ALT (SGPT) U/L  --   --  18   AST (SGOT) U/L  --   --  18   GLUCOSE mg/dL 76   < > 340*    < > = values in this interval not displayed.       Impression: Resp failure  Dyspnea  Hypoxia  GOC  Plan: Talk to the patient as well as talking to pulmonology.  Overall the plan is to see how the patient does with increasing steroids given her about 48 to maybe 72 hours to see if her respiratory status will improve.  Palliative medicine will continue to follow and support.        Amilcar Kumar,   07/03/22  14:50  EDT

## 2022-07-03 NOTE — THERAPY TREATMENT NOTE
Patient Name: Chikis Cuellar  : 1945    MRN: [id number]                              Today's Date: 7/3/2022       Admit Date: 2022    Visit Dx:     ICD-10-CM ICD-9-CM   1. Acute respiratory failure with hypoxia (HCC)  J96.01 518.81   2. Shortness of breath  R06.02 786.05   3. Atypical pneumonia  J18.9 486     Patient Active Problem List   Diagnosis   • Type 2 diabetes mellitus, with long-term current use of insulin (HCC)   • Essential hypertension   • Hyperlipemia   • GERD without esophagitis   • Mood disorder (HCC)   • Acute respiratory failure with hypoxia (HCC)   • Atypical pneumonia   • Leukocytosis   • Chest pain   • Fibromyalgia   • Hypomagnesemia     Past Medical History:   Diagnosis Date   • Arthritis    • Diabetes mellitus (HCC)    • Fibromyalgia    • Hyperlipidemia    • Hypertension      Past Surgical History:   Procedure Laterality Date   • BACK SURGERY     •  SECTION     • CHOLECYSTECTOMY     • HYSTERECTOMY     • REPLACEMENT TOTAL KNEE        General Information     Row Name 22 1335          OT Time and Intention    Document Type therapy note (daily note)  -     Mode of Treatment occupational therapy  -     Row Name 22 4854          General Information    Patient Profile Reviewed yes  -SW     Existing Precautions/Restrictions fall;oxygen therapy device and L/min;other (see comments)  HFNC; monitor O2 sats  -     Row Name 22 0490          Cognition    Orientation Status (Cognition) oriented x 4  -           User Key  (r) = Recorded By, (t) = Taken By, (c) = Cosigned By    Initials Name Provider Type    SW Anabelle Kamara OT Occupational Therapist                 Mobility/ADL's     Row Name 22 142          Bed Mobility    Bed Mobility rolling left;rolling right;supine-sit;sit-supine;scooting/bridging  -SW     Rolling Left Crooks (Bed Mobility) minimum assist (75% patient effort);verbal cues  -SW     Rolling Right Crooks (Bed Mobility)  supervision;verbal cues  -SW     Scooting/Bridging Cross Anchor (Bed Mobility) moderate assist (50% patient effort);verbal cues  -SW     Supine-Sit [place] (Bed Mobility) minimum assist (75% patient effort);2 person assist;verbal cues  -SW     Sit-Supine [place] (Bed Mobility) moderate assist (50% patient effort);2 person assist;verbal cues  -SW     Assistive Device (Bed Mobility) bed rails;draw sheet;head of bed elevated  -     Row Name 07/03/22 1428          Transfers    Transfers sit-stand transfer;toilet transfer;stand-sit transfer  -     Sit-Stand [place] (Transfers) minimum assist (75% patient effort);2 person assist;verbal cues  -SW     Stand-Sit [place] (Transfers) minimum assist (75% patient effort);verbal cues  -SW     [place] Level (Toilet Transfer) maximum assist (25% patient effort);2 person assist  -     Assistive Device (Toilet Transfer) commode, bedside without drop arms  -     Row Name 07/03/22 1428          Toilet Transfer    Type (Toilet Transfer) sit-stand;lateral  -     Row Name 07/03/22 1428          Functional Mobility    Functional Mobility- Ind. Level unable to perform  -     Row Name 07/03/22 1428          Activities of Daily Living    BADL Assessment/Intervention toileting;grooming  -     Row Name 07/03/22 1428          Grooming Assessment/Training    [place] Level (Grooming) wash face, hands;set up  -     Position (Grooming) sitting up in bed  -     Row Name 07/03/22 1428          Toileting Assessment/Training    [place] Level (Toileting) toileting skills;moderate assist (50% patient effort)  -     Position (Toileting) supported sitting;supported standing  -           User Key  (r) = Recorded By, (t) = Taken By, (c) = Cosigned By    Initials Name Provider Type    Anabelle Zavaleta OT Occupational Therapist               Obj/Interventions     Row Name 07/03/22 5732          Balance    Balance Assessment sitting static balance;sitting  dynamic balance;sit to stand dynamic balance;standing static balance  -SW     Static Sitting Balance standby assist  -SW     Dynamic Sitting Balance standby assist  -SW     Position, Sitting Balance unsupported;sitting edge of bed  -SW     Sit to Stand Dynamic Balance minimal assist  -SW     Static Standing Balance minimal assist  -SW     Dynamic Standing Balance moderate assist;2-person assist  -SW     Position/Device Used, Standing Balance supported;walker, rolling  -SW     Balance Interventions sitting;standing;sit to stand;supported;static;dynamic;minimal challenge;occupation based/functional task  -SW           User Key  (r) = Recorded By, (t) = Taken By, (c) = Cosigned By    Initials Name Provider Type    SW Anabelle Kamara, TIFFANIE Occupational Therapist               Goals/Plan    No documentation.                Clinical Impression     Row Name 07/03/22 1330          Pain Assessment    Pretreatment Pain Rating 4/10  -SW     Posttreatment Pain Rating 4/10  -SW     Pain Location generalized  -SW     Pain Location - back  -SW     Pain Intervention(s) Repositioned  -     Row Name 07/03/22 1336          Plan of Care Review    Plan of Care Reviewed With patient  -SW     Outcome Evaluation OT engaged pt in bed mob tasks with pt demo min to supervision level for rolling side to side.  She was agreeable to sit eob and O2 sat monitored.  When she would dip into the 80% range she would stop and perform breathing techniques and make a quick recovery.  Pt completed t/f to [facility] with mod assist to max assist of 2 person.  Pt soa after t/f and required extra time to recover.  Pt motivated to improve and working toward goals.  -     Row Name 07/03/22 1338          Vital Signs    Pre Systolic BP Rehab 110  -SW     Pre Treatment Diastolic BP 64  -SW     Pretreatment Heart Rate (beats/min) 75  -SW     Pre SpO2 (%) 94  -SW     O2 Delivery Pre Treatment hi-flow  -SW     O2 Delivery Intra Treatment hi-flow  -SW     O2 Delivery Post  Treatment hi-flow  -SW     Pre Patient Position Supine  -SW     Intra Patient Position Standing  -SW     Post Patient Position Supine  -SW     Row Name 07/03/22 1335          Positioning and Restraints    Pre-Treatment Position in bed  -SW     Post Treatment Position bed  -SW     In Bed notified nsg;fowlers;call light within reach;encouraged to call for assist;exit alarm on;with nsg;side rails up x2  -SW           User Key  (r) = Recorded By, (t) = Taken By, (c) = Cosigned By    Initials Name Provider Type    Anabelle Zavaleta, TIFFANIE Occupational Therapist               Outcome Measures     Row Name 07/03/22 1437          How much help from another is currently needed...    Putting on and taking off regular lower body clothing? 1  -SW     Bathing (including washing, rinsing, and drying) 2  -SW     Toileting (which includes using toilet bed pan or urinal) 2  -SW     Putting on and taking off regular upper body clothing 2  -SW     Taking care of personal grooming (such as brushing teeth) 3  -SW     Eating meals 4  -SW     AM-PAC 6 Clicks Score (OT) 14  -SW     Row Name 07/03/22 0800          How much help from another person do you currently need...    Turning from your back to your side while in flat bed without using bedrails? 3  -SEEMA     Moving from lying on back to sitting on the side of a flat bed without bedrails? 3  -SEEMA     Moving to and from a bed to a chair (including a wheelchair)? 3  -SEEMA     Standing up from a chair using your arms (e.g., wheelchair, bedside chair)? 3  -SEEMA     Climbing 3-5 steps with a railing? 2  -SEEMA     To walk in hospital room? 2  -SEEMA     AM-PAC 6 Clicks Score (PT) 16  -SEEMA     Highest level of mobility 5 --> Static standing  -SEEMA     Row Name 07/03/22 1437          Functional Assessment    Outcome Measure Options AM-PAC 6 Clicks Daily Activity (OT)  -           User Key  (r) = Recorded By, (t) = Taken By, (c) = Cosigned By    Initials Name Provider Type    Gaurav Thompson LPN Licensed  Nurse    Anabelle Zavaleta, OT Occupational Therapist                Occupational Therapy Education                 Title: PT OT SLP Therapies (In Progress)     Topic: Occupational Therapy (Done)     Point: ADL training (Done)     Description:   Instruct learner(s) on proper safety adaptation and remediation techniques during self care or transfers.   Instruct in proper use of assistive devices.              Learning Progress Summary           Patient Acceptance, E, VU by  at 7/3/2022 1438    Acceptance, E, VU by MR at 6/28/2022 1514                   Point: Home exercise program (Done)     Description:   Instruct learner(s) on appropriate technique for monitoring, assisting and/or progressing therapeutic exercises/activities.              Learning Progress Summary           Patient Acceptance, E, VU by MR at 6/28/2022 1514                   Point: Precautions (Done)     Description:   Instruct learner(s) on prescribed precautions during self-care and functional transfers.              Learning Progress Summary           Patient Acceptance, E, VU by  at 7/3/2022 1438    Acceptance, E, VU by MR at 6/28/2022 1514                   Point: Body mechanics (Done)     Description:   Instruct learner(s) on proper positioning and spine alignment during self-care, functional mobility activities and/or exercises.              Learning Progress Summary           Patient Acceptance, E, VU by MR at 6/28/2022 1514                               User Key     Initials Effective Dates Name Provider Type Discipline     06/16/21 -  Anabelle Kamara, OT Occupational Therapist OT    MR 10/06/21 -  Cassi Canchola OT Occupational Therapist OT              OT Recommendation and Plan     Plan of Care Review  Plan of Care Reviewed With: patient  Outcome Evaluation: OT engaged pt in bed mob tasks with pt demo min to supervision level for rolling side to side.  She was agreeable to sit eob and O2 sat monitored.  When she would dip into the 80%  range she would stop and perform breathing techniques and make a quick recovery.  [place] [facility] [place] to max assist of 2 person.  Pt soa after t/f and required extra time to recover.  Pt motivated to improve and working toward goals.     Time Calculation:    Time Calculation- OT     Row Name 07/03/22 1335             Time Calculation- OT    OT Start Time 1335  -SW      OT Received On 07/03/22  -SW              Timed Charges    61990 - OT Therapeutic Activity Minutes 16  -SW      96244 - OT Self Care/Mgmt Minutes 38  -SW              Total Minutes    Timed Charges Total Minutes 54  -SW       Total Minutes 54  -SW            User Key  (r) = Recorded By, (t) = Taken By, (c) = Cosigned By    Initials Name Provider Type     Anabelle Kamara OT Occupational Therapist              Therapy Charges for Today     Code Description Service Date Service Provider Modifiers Qty    68695482995  OT THERAPEUTIC ACT EA 15 MIN 7/3/2022 Anabelle Kamara OT GO 1    [id number]  OT SELF CARE/MGMT/TRAIN EA 15 MIN 7/3/2022 Anabelle Kamara OT GO 3               Anabelle Kamara OT  7/3/2022

## 2022-07-03 NOTE — PLAN OF CARE
Goal Outcome Evaluation:  Plan of Care Reviewed With: patient           Outcome Evaluation: OT engaged pt in bed mob tasks with pt demo min to supervision level for rolling side to side.  She was agreeable to sit eob and O2 sat monitored.  When she would dip into the 80% range she would stop and perform breathing techniques and make a quick recovery.  Pt completed t/f to [facility] with mod assist to max assist of 2 person.  Pt soa after t/f and required extra time to recover.  Pt motivated to improve and working toward goals.

## 2022-07-03 NOTE — PLAN OF CARE
Problem: Adult Inpatient Plan of Care  Goal: Plan of Care Review  Outcome: Ongoing, Progressing  Goal: Patient-Specific Goal (Individualized)  Outcome: Ongoing, Progressing  Goal: Absence of Hospital-Acquired Illness or Injury  Outcome: Ongoing, Progressing  Intervention: Prevent Skin Injury  Description: Perform a screening for skin injury risk, such as pressure or moisture associated skin damage on admission and at regular intervals throughout hospital stay.  Keep all areas of skin (especially folds) clean and dry.  Maintain adequate skin hydration.  Relieve and redistribute pressure and protect bony prominences; implement measures based on patient-specific risk factors.  Match turning and repositioning schedule to clinical condition.  Encourage weight shift frequently; assist with reposition if unable to complete independently.  Float heels off bed; avoid pressure on the Achilles tendon.  Keep skin free from extended contact with medical devices.  Encourage functional activity and mobility, as early as tolerated.  Use aids (e.g., slide boards, mechanical lift) during transfer.  Recent Flowsheet Documentation  Taken 7/3/2022 0800 by Gaurav Underwood LPN  Body Position: position changed independently  Goal: Optimal Comfort and Wellbeing  Outcome: Ongoing, Progressing  Goal: Readiness for Transition of Care  Outcome: Ongoing, Progressing     Problem: Skin Injury Risk Increased  Goal: Skin Health and Integrity  Outcome: Ongoing, Progressing     Problem: Diabetes Comorbidity  Goal: Blood Glucose Level Within Targeted Range  Outcome: Ongoing, Progressing     Problem: Hypertension Comorbidity  Goal: Blood Pressure in Desired Range  Outcome: Ongoing, Progressing     Problem: Osteoarthritis Comorbidity  Goal: Maintenance of Osteoarthritis Symptom Control  Outcome: Ongoing, Progressing     Problem: Pain Chronic (Persistent) (Comorbidity Management)  Goal: Acceptable Pain Control and Functional Ability  Outcome:  Ongoing, Progressing     Problem: Fall Injury Risk  Goal: Absence of Fall and Fall-Related Injury  Outcome: Ongoing, Progressing     Problem: Palliative Care  Goal: Enhanced Quality of Life  Outcome: Ongoing, Progressing   Goal Outcome Evaluation:

## 2022-07-03 NOTE — PROGRESS NOTES
Pulmonary Hospital Follow-up     Hospital:  LOS: 5 days   Ms. Chikis Cuellar, 77 y.o. female is followed for:   Acute respiratory failure with hypoxia (HCC)            History of present illness:   78 yo female with history of HTN. Dyslipidemia, GERD, diabetes mellitus admitted to the hospital on June 1 with hypoxemic respiratory failure.  Patient was initially admitted to ICU with severe hypoxemia.  Initially was thought to have atypical pneumonia.  Patient was concerned that she may have contracted COVID-19 infection as she started getting sick in the last week of April this year.  States that she was getting short of breath when she was seen by primary care and apparently a stress test was done.  However patient continued to decline and states that she could not even walk from her living room to the front of the house and came into the hospital at that time.  Prior to that she was doing well.  Denies any prolonged history of any pulmonary condition.  No frequent pneumonias or infections requiring hospitalization.  No other lung problems.  Remote smoker quit many years ago and no recent secondhand smoke exposure either.  No other unusual hobbies pets at home.  Denies any change in appetite or weight loss lately.  Denies any hemoptysis.  Denied any chest pain not admission at that time either.     Patient underwent COVID-19 testing x2 which was negative.  Viral panels were negative.  Cultures remain negative.  Patient was treated with antibiotics and steroids and started to improve and weaned down from high flow nasal cannula to regular nasal cannula.  Decision was made to keep patient on prolonged taper of steroids and she was discharged to rehab facility on June 27.  However on arrival to the facility patient was short of breath and according to patient and they had trouble hooking her up oxygen the right way and she started to feel more short of breath and started having some chest discomfort.  Patient states  that she was turning blue and then realized that her oxygen is not set up right and in the meantime EMS was called and she was brought back to hospital.  Here she was  admitted back to the floor and started on high flow nasal cannula.  Today morning patient states that she is feeling a lot better.  She is down to 55% on high flow nasal cannula saturating 92%.  Denies any chest pain currently.  Denies any cough or sputum production.  Denies any urinary complaints.  Denies any loose stool or diarrhea or abdominal pain.  No fevers noted either.      Subjective   Interval History:  Patient continues to require high flow nasal cannula oxygen at 50%.  States that she does not feel any better.  Still has episodes of dry cough.  Denies any chest pain.  No hemoptysis.  No fevers, chills or night sweats.             The patient's past medical, surgical and social history were reviewed and updated in Epic as appropriate.       Objective     Infusions:  Pharmacy to dose vancomycin,       Medications:  cefTRIAXone, 1 g, Intravenous, Q24H  enoxaparin, 40 mg, Subcutaneous, Q24H  fluconazole, 400 mg, Intravenous, Daily  FLUoxetine, 40 mg, Oral, Daily  insulin detemir, 14 Units, Subcutaneous, Q12H  insulin lispro, 0-7 Units, Subcutaneous, 4x Daily With Meals & Nightly  Insulin Lispro, 10 Units, Subcutaneous, TID With Meals  ipratropium-albuterol, 3 mL, Nebulization, 4x Daily - RT  lactobacillus acidophilus, 1 capsule, Oral, Daily  losartan, 25 mg, Oral, Daily  miconazole, 1 application, Topical, Q12H  nystatin, , Topical, Q12H  oxybutynin XL, 5 mg, Oral, Daily  pantoprazole, 40 mg, Oral, Q AM  pravastatin, 40 mg, Oral, Nightly  predniSONE, 10 mg, Oral, Daily With Breakfast   Followed by  [START ON 7/6/2022] predniSONE, 5 mg, Oral, Daily With Breakfast  pregabalin, 150 mg, Oral, TID  senna-docusate sodium, 2 tablet, Oral, BID  sodium chloride, 10 mL, Intravenous, Q12H  sodium chloride, 10 mL, Intravenous, Q12H  vancomycin, 1,500 mg,  "Intravenous, Q24H        Vital Sign Min/Max for last 24 hours  Temp  Min: 97.6 °F (36.4 °C)  Max: 98.4 °F (36.9 °C)   BP  Min: 106/63  Max: 132/76   Pulse  Min: 66  Max: 92   Resp  Min: 16  Max: 20   SpO2  Min: 90 %  Max: 96 %   Flow (L/min)  Min: 35  Max: 45       Input/Output for last 24 hour shift  07/02 0701 - 07/03 0700  In: 800   Out: 800 [Urine:800]      Objective:  Vital signs: (most recent): Blood pressure 106/63, pulse 71, temperature 97.9 °F (36.6 °C), temperature source Axillary, resp. rate 20, height 162.6 cm (64\"), weight 84.9 kg (187 lb 3.2 oz), SpO2 96 %.            General Appearance: Awake, alert, in no acute distress on high flow NC  Lungs:   B/L Breath sounds present with decreased breath sounds on bases, no wheezing heard, occ basilar inspiratory crackles.   Heart: S1 and S2 present, no murmur  Abdomen: Soft, non-tender, no guarding or rigidity, bowel sounds positive.  Extremities: Atraumatic, no cyanosis or clubbing,  no edema, warm to touch.  Neurologic:  Moving all four extremities. Good strength bilaterally.  Psychologic: Appropriate affect, Cooperative.          Results from last 7 days   Lab Units 07/03/22  0438 07/02/22  1549 07/01/22  0424   WBC 10*3/mm3 14.73* 17.50* 16.43*   HEMOGLOBIN g/dL 11.8* 12.0 13.4   PLATELETS 10*3/mm3 227 250 270     Results from last 7 days   Lab Units 07/03/22  0438 07/02/22  1549 07/01/22  0423   SODIUM mmol/L 142 137 139   POTASSIUM mmol/L 4.0 4.3 3.6   CO2 mmol/L 33.0* 31.0* 33.0*   BUN mg/dL 21 24* 24*   CREATININE mg/dL 0.66 0.73 0.78   MAGNESIUM mg/dL 1.6 2.2 1.5*   GLUCOSE mg/dL 76 223* 130*     Estimated Creatinine Clearance: 74.1 mL/min (by C-G formula based on SCr of 0.66 mg/dL).          Images:   CTA chest did not show any pulmonary embolism.  However bilateral diffuse infiltrative process and interstitial changes persisting and slightly worse compared to before.  I reviewed the patient's results and images.     Assessment & Plan   Impression  "       Acute respiratory failure with hypoxia (HCC)    Type 2 diabetes mellitus, with long-term current use of insulin (HCC)    Essential hypertension    Hyperlipemia    GERD without esophagitis    Mood disorder (HCC)    Atypical pneumonia    Leukocytosis    Chest pain    Fibromyalgia    Hypomagnesemia       Plan        1.  Patient continues to require high flow nasal cannula and we are unable to wean.  Patient on discharge was on 4 L nasal cannula oxygen but now unable to get her down to that level.  CTA chest done and does not show any pulmonary embolism.  Diffuse bilateral infiltrative process and interstitial changes persisting.  Appearing more fibrotic ARDS picture.  Organizing pneumonia is another possibility.  Does not behave infected.  Procalcitonin has been negative.  Will send fungal serologies as well.  Discussed at length with patient about further course.  We talked about bronchoscopy with biopsies but given then patient is on high flow nasal cannula oxygen, the procedure will be high risk for her.  She will end up on mechanical ventilation and given her lungs unclear if we will be able to get her off of mechanical ventilation.  Patient has decided that she does not want to go on mechanical ventilation and does not want to be resuscitated in event of cardiac arrest.  Alternatively, we talked about trying her on little higher dose of steroids and also diuresing to see if those measures will make any difference in her breathing.  She is comfortable with this plan.  Plausible that steroid weaning led to worsening of her imaging and hypoxemia with flareup of underlying lung condition which is likely steroid responsive.  We will see if things improve in the next 48 to 72 hours.  If things are not improving palliative care may be more of an option.  Dr. Kumar was present in the room with me and.  Good discussion and patient has clear understanding of goals of care at this point.   2.  Continue PPI and GERD  precautions.  3.  Continue to monitor electrolytes.  Replace per ICU protocol.  Blood sugar may get out of control with steroid use and insulin adjustments will need to be made.  Discussed with Dr. Omer.  4.  Monitor oral intake closely.  5.  Continue bronchodilators.    Will continue to follow with you.    Gold Flores MD, [facility]  Pulmonary, Critical care and Sleep Medicine

## 2022-07-03 NOTE — PROGRESS NOTES
[facility]  PROGRESS NOTE    Patient Name: Chikis Cuellar  : 1945  MRN: [id number]    Date of Admission: 2022  Primary Care Physician: Eduardo Gamboa MD    Subjective   Subjective     CC:  F/U acute respiratory failure with hypoxia     HPI:  Patient seen and examined. She complains of rash in her axillae bilaterally. She complains of pain and reports her pain medicine has been reduced. She reports her mother had crippling arthritis.    ROS:  Gen- No fevers, chills  CV- No chest pain, palpitations  Resp- cough but nonproductive  GI- No N/V/D, abd pain        Objective   Objective     Vital Signs:   Temp:  [97.6 °F (36.4 °C)-98.4 °F (36.9 °C)] 98.2 °F (36.8 °C)  Heart Rate:  [66-93] 77  Resp:  [16-20] 20  BP: (110-132)/(59-82) 110/64  Flow (L/min):  [45] 45     Physical Exam:  Constitutional: No acute distress, awake, alert  HENT: NCAT, mucous membranes moist  Respiratory: bilateral dry expiratory crackles  Cardiovascular: RRR, no murmurs, rubs, or gallops  Gastrointestinal: Positive bowel sounds, soft, nontender, nondistended  Musculoskeletal: No bilateral ankle edema  Psychiatric: Appropriate affect, cooperative  Neurologic: Oriented x 3,  speech clear  Skin: Erythema under L armpit improved, Erythema R armpit without much change but has extended further around to her back, now warm to touch    Results Reviewed:  LAB RESULTS:      Lab 22  0438 22  1549 22  0424 22  0727 22  0827 22  0038 22  0037 22   WBC 14.73* 17.50* 16.43* 13.36*  --  15.54*  --  14.70*   HEMOGLOBIN 11.8* 12.0 13.4 13.0  --  12.1  --  12.8   HEMATOCRIT 36.8 37.4 41.9 40.2  --  37.7  --  39.7   PLATELETS 227 250 270 243  --  215  --  219   NEUTROS ABS 11.18* 15.32*  --  9.12*  --  11.73*  --  12.74*   IMMATURE GRANS (ABS) 0.06* 0.08*  --  0.05  --  0.31*  --  0.13*   LYMPHS ABS 1.32 0.87  --  2.11  --  1.68  --  0.75   MONOS ABS  0.69 0.55  --  0.71  --  0.95*  --  0.82   EOS ABS 1.43* 0.64*  --  1.33*  --  0.84*  --  0.23   MCV 85.8 83.7 84.5 84.1  --  84.3  --  84.5   SED RATE  --   --   --   --  37*  --   --   --    PROCALCITONIN  --   --   --   --   --   --   --  0.09   LACTATE  --   --   --   --   --   --  1.7  --          Lab 07/03/22  0438 07/02/22  1549 07/01/22  0423 06/30/22  0727 06/29/22  0827   SODIUM 142 137 139 142 138   POTASSIUM 4.0 4.3 3.6 3.9 4.0   CHLORIDE 98 97* 96* 99 96*   CO2 33.0* 31.0* 33.0* 33.0* 32.0*   ANION GAP 11.0 9.0 10.0 10.0 10.0   BUN 21 24* 24* 21 19   CREATININE 0.66 0.73 0.78 0.61 0.69   EGFR 89.9 84.3 77.9 91.6 89.0   GLUCOSE 76 223* 130* 151* 269*   CALCIUM 9.0 9.3 9.8 9.9 9.4   MAGNESIUM 1.6 2.2 1.5* 1.8 2.0         Lab 06/27/22 2035   TOTAL PROTEIN 6.3   ALBUMIN 3.40*   GLOBULIN 2.9   ALT (SGPT) 18   AST (SGOT) 18   BILIRUBIN 0.2   ALK PHOS 86         Lab 07/03/22  0438 06/28/22  0809 06/28/22  0038 06/27/22 2035   PROBNP 198.8  --   --  485.9   TROPONIN T  --  0.012 0.026 0.025                 Brief Urine Lab Results     None          Microbiology Results Abnormal     Procedure Component Value - Date/Time    COVID PRE-OP / PRE-PROCEDURE SCREENING ORDER (NO ISOLATION) - Swab, Nasopharynx [[id number]]  (Normal) Collected: 06/28/22 0027    Lab Status: Final result Specimen: Swab from Nasopharynx Updated: 06/28/22 0104    Narrative:      The following orders were created for panel order COVID PRE-OP / PRE-PROCEDURE SCREENING ORDER (NO ISOLATION) - Swab, Nasopharynx.  Procedure                               Abnormality         Status                     ---------                               -----------         ------                     COVID-19 and FLU A/B PCR...[[id number]]  Normal              Final result                 Please view results for these tests on the individual orders.    COVID-19 and FLU A/B PCR - Swab, Nasopharynx [[id number]]  (Normal) Collected: 06/28/22 0027    Lab Status: Final  result Specimen: Swab from Nasopharynx Updated: 06/28/22 0104     COVID19 Not Detected     Influenza A PCR Not Detected     Influenza B PCR Not Detected    Narrative:      Fact sheet for providers: https://www.fda.gov/media/135195/download    Fact sheet for patients: https://www.fda.gov/media/239023/download    Test performed by PCR.          CT Angiogram Chest    Result Date: 7/2/2022  DATE OF EXAM: 7/2/2022 1:53 PM  PROCEDURE: CT ANGIOGRAM CHEST-  INDICATIONS: Hypoxemia, recent pneumonia and worsening O2 needs.; J96.01-Acute respiratory failure with hypoxia; R06.02-Shortness of breath; J18.9-Pneumonia, unspecified organism  COMPARISON: No comparisons available.  TECHNIQUE: Contiguous axial imaging was obtained from the thoracic inlet through the upper abdomen following the intravenous administration of 42 mL of Isovue 370. Reconstructed coronal and sagittal images were also obtained. Automated exposure control and iterative reconstruction methods were used.   FINDINGS: VASCULAR FINDINGS: There is no definite evidence for pulmonary embolus on this exam. There is coronary artery calcification.  NONVASCULAR FINDINGS: There are diffuse bilateral pulmonary infiltrates which have been noted. There has not been improvement in appearance of the lungs. There are no large pleural effusions. It looks like there is some underlying bronchiectasis and septal thickening. There is slight depression of the antrum endplate of T12 which is unchanged.       Impression: 1.  No definite evidence for pulmonary embolus. 2.  Diffuse bilateral pulmonary parenchymal changes similar to prior study. 3.  Coronary artery calcification is noted.  This report was finalized on 7/2/2022 2:18 PM by Salomón Cardenas MD.        Results for orders placed during the hospital encounter of 06/01/22    Adult Transthoracic Echo Complete W/ Cont if Necessary Per Protocol    Interpretation Summary  · The right atrial cavity is mildly dilated.  · Estimated right  ventricular systolic pressure from tricuspid regurgitation is mildly elevated (35-45 mmHg). Calculated right ventricular systolic pressure from tricuspid regurgitation is 40 mmHg.  · Estimated left ventricular EF = 60% Estimated left ventricular EF was in agreement with the calculated left ventricular EF. Left ventricular ejection fraction appears to be 56 - 60%. Left ventricular systolic function is normal.  · Left ventricular diastolic function is consistent with age.  · Mild pulmonary hypertension is present.  · Normal right ventricular wall thickness, systolic function and septal motion noted with the right ventricular cavity mild to moderately dilated.  · There is no evidence of pericardial effusion.  · No significant structural or functional valvular abnormalities demonstrated.      I have reviewed the medications:  Scheduled Meds:cefTRIAXone, 1 g, Intravenous, Q24H  enoxaparin, 40 mg, Subcutaneous, Q24H  fluconazole, 400 mg, Intravenous, Daily  FLUoxetine, 40 mg, Oral, Daily  insulin detemir, 14 Units, Subcutaneous, Q12H  insulin lispro, 0-7 Units, Subcutaneous, 4x Daily With Meals & Nightly  Insulin Lispro, 10 Units, Subcutaneous, TID With Meals  ipratropium-albuterol, 3 mL, Nebulization, 4x Daily - RT  lactobacillus acidophilus, 1 capsule, Oral, Daily  losartan, 25 mg, Oral, Daily  miconazole, 1 application, Topical, Q12H  nystatin, , Topical, Q12H  oxybutynin XL, 5 mg, Oral, Daily  pantoprazole, 40 mg, Oral, Q AM  pravastatin, 40 mg, Oral, Nightly  predniSONE, 10 mg, Oral, Daily With Breakfast   Followed by  [START ON 7/6/2022] predniSONE, 5 mg, Oral, Daily With Breakfast  pregabalin, 150 mg, Oral, TID  senna-docusate sodium, 2 tablet, Oral, BID  sodium chloride, 10 mL, Intravenous, Q12H  sodium chloride, 10 mL, Intravenous, Q12H  vancomycin, 1,500 mg, Intravenous, Q24H      Continuous Infusions:Pharmacy to dose vancomycin,       PRN Meds:.•  acetaminophen  •  albuterol  •  ALPRAZolam  •  senna-docusate  sodium **AND** polyethylene glycol **AND** bisacodyl **AND** bisacodyl  •  dextrose  •  dextrose  •  glucagon (human recombinant)  •  HYDROcodone-acetaminophen  •  ipratropium-albuterol  •  magnesium sulfate **OR** magnesium sulfate **OR** magnesium sulfate  •  Pharmacy to dose vancomycin  •  sodium chloride  •  sodium chloride  •  sodium chloride    Assessment & Plan   Assessment & Plan     Active Hospital Problems    Diagnosis  POA   • **Acute respiratory failure with hypoxia (HCC) [J96.01]  Yes   • Hypomagnesemia [E83.42]  Unknown   • Leukocytosis [D72.829]  Yes   • Chest pain [R07.9]  Yes   • Fibromyalgia [M79.7]  Yes   • Atypical pneumonia [J18.9]  Yes   • Type 2 diabetes mellitus, with long-term current use of insulin (HCC) [E11.9, Z79.4]  Not Applicable   • Essential hypertension [I10]  Yes   • Hyperlipemia [E78.5]  Yes   • GERD without esophagitis [K21.9]  Yes   • Mood disorder (HCC) [F39]  Yes      Resolved Hospital Problems   No resolved problems to display.        Brief Hospital Course to date:  Chikis Cuellar is a 77 y.o. female w/ a hx of T2DM, HTN, HLD, GERD, fibromyalgia, anxiety who presented to the ED w/ c/o hypoxia.   Pt admitted to [facility] 6/1 - 6/27/22. Pt initially presented w/ c/o severe dyspnea. Pt was admitted to the ICU from the ED and placed on HFNC, IV steroids and IV antibiotics. Pt underwent an extensive work-up including infectious, autoimmune, cardiac which were predominantly unrevealing.  Advanced chest imaging was felt to be consistent with atypical pneumonia somewhat classic for the appearance of COVID-19 although COVID remained negative despite pt reports of several family members w/ recent positive COVID tests. Pt was d/c on a steroid taper and on 5 liters of supplemental oxygen. Pt was on room air prior to her admission. Pt was d/c to [facility] earlier today for inpatient rehabilitation. Pt was scheduled to f/u w/ Pulmonary (TBD) and PCP within 1-2 wks of d/c from rehab.  Pt was  "taken to rehab after d/c 6/27. Pt reports that there were \"issues\" with her oxygen not being hooked up upon her arrival to rehab. Per pt report, she was without supplemental oxygen for about 30 minutes. Pt reports that oxygen saturations dropped to the 70's and she then developed left sided chest pain. Despite a nebulizer treatment pt's oxygen saturations did not improve. EMS placed pt on a non-rebreather and pt's saturations responded. EMS attempted to wean pt back to 5 liters but Pt was unable to maintain saturations on 5 liters and was brought back to the ED for further evaluation.    This patient's problems and plans were partially entered by my partner and updated as appropriate by me 07/03/22.     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  -She reports suspected COVID in April with progressively worsening shortness of breath in May. Fits with COVID fibrosis. However, she also has a +RF and ulnar deviations and what looks to early pleural and interstitial changes from a CT abd/pelvis from 4/13/2019. Appreciate pulmonology. Will discuss with them when and if they may decide on bronchoscopy. I fear this may not end well for Ms. Cuellar and agree with Palliative consult.     Leukocytosis   -afebrile   -CXR without new findings   -Likely secondary to steroids  -AM labs reviewed, continue to monitor.    B/L underarm Candidiasis/R upper back cellulitis   Superimposed Cellulitis   -Continue " Nystatin powder  -Interdry cloths  -Received 1x dose oral Fluconazole 7/1  -WOC consulted, see note   -Due to concern for superimposed cellulitis, will add IV ABX/anti-fungals today and see if improved. Vanc + Rocephin + IV Fluconazole   -May need ID to evaluate, will monitor one more day with current treatments.     T2DM  -hem a1c 6.2% on 6/3/22  -holding routine glipizide, novolog, metformin    -on Levemir 14 units q12  -on Humalog 10 units TID meals  -Continue LDSSI   -BG reviewed, is running low or normal in am is running high during the day. If persists will further increase meal time insulin.     HTN  HLD  -continue routine Lozol, losartan, pravastatin      Anxiety   -continue routine prozac, xanax      Fibromyalgia   -continue Lyrica, Norco      Hypomagnesemia  -Replace per protocol     Constipation  -Continue bowel regimen  -On Linzess at home, not on formulary here     Expected Discharge Location and Transportation: SNF,  van vs family transport   Expected Discharge Date: 7/6/22    DVT prophylaxis:  Medical DVT prophylaxis orders are present.     AM-PAC 6 Clicks Score (PT): 16 (07/03/22 0800)    CODE STATUS:   Code Status and Medical Interventions:   Ordered at: 07/01/22 6197     Medical Intervention Limits:    NO intubation (DNI)     Code Status (Patient has no pulse and is not breathing):    No CPR (Do Not Attempt to Resuscitate)     Medical Interventions (Patient has pulse or is breathing):    Limited Support       Adithya Omer,   07/03/22

## 2022-07-04 NOTE — PROGRESS NOTES
"Pharmacy Consult-Vancomycin Dosing  Chikis Cuellar is a  77 y.o. female receiving vancomycin therapy.     Indication: SSTI  Consulting Provider: Dr Griffith  ID Consult: none to date  Goal AUC: 400 - 600 mg/L*hr    Current Antimicrobial Therapy  Anti-Infectives (From admission, onward)      Ordered     Dose/Rate Route Frequency Start Stop    07/02/22 0953  vancomycin 1500 mg/500 mL 0.9% NS IVPB ([facility])        Ordering Provider: Evelyn Monroy, PharmD    1,500 mg  over 90 Minutes Intravenous Every 24 Hours Scheduled 07/02/22 1045 07/09/22 0959    07/02/22 0913  cefTRIAXone (ROCEPHIN) 1 g/100 mL 0.9% NS ([facility])        Ordering Provider: Elise Griffith DO    1 g  over 30 Minutes Intravenous Every 24 Hours 07/02/22 1000 07/05/22 0959    07/02/22 0913  fluconazole (DIFLUCAN) IVPB 400 mg        Ordering Provider: Elsie Griffith DO    400 mg  100 mL/hr over 120 Minutes Intravenous Daily 07/02/22 1000 07/07/22 0859    07/02/22 0913  Pharmacy to dose vancomycin        Ordering Provider: Elsie Griffith DO     Does not apply Continuous PRN 07/02/22 0911 07/05/22 0910    07/01/22 0809  fluconazole (DIFLUCAN) tablet 150 mg        Ordering Provider: Elsie Griffith DO    150 mg Oral Once 07/01/22 0900 07/01/22 0943          Allergies  Allergies as of 06/27/2022 - Reviewed 06/27/2022   Allergen Reaction Noted    Altace [ramipril] Palpitations 04/13/2019    Trazodone Hallucinations 04/13/2019     Labs    Results from last 7 days   Lab Units 07/04/22  0436 07/03/22  0438 07/02/22  1549   BUN mg/dL 16 21 24*   CREATININE mg/dL 0.61 0.66 0.73     Results from last 7 days   Lab Units 07/04/22  0436 07/03/22  0438 07/02/22  1549   WBC 10*3/mm3 11.45* 14.73* 17.50*     Evaluation of Dosing   Last Dose Received in the ED/Outside Facility: vancomycin 1750 mg iv x1 on 6/1, then 1250 mg iv q24h 6/2 - 6/7.    Is Patient on Dialysis or Renal Replacement: no    Ht - 162.6 cm (64\")  Wt - 86.2 kg (189 lb 15.7 oz)    Estimated " Creatinine Clearance: 80.8 mL/min (by C-G formula based on SCr of 0.61 mg/dL).    Intake & Output (last 3 days)         07/01 0701 07/02 0700 07/02 0701 07/03 0700 07/03 0701 07/04 0700 07/04 0701 07/05 0700    P.O. 1080  75     I.V. (mL/kg) 150 (1.8)       IV Piggyback  800 500     Total Intake(mL/kg) 1230 (14.5) 800 (9.4) 575 (6.7)     Urine (mL/kg/hr) 550 (0.3) 800 (0.4) 2200 (1.1)     Stool   0     Total Output      Net +680 0 -1625             Urine Unmeasured Occurrence 1 x       Stool Unmeasured Occurrence   1 x             Microbiology and Radiology  Microbiology Results (last 10 days)       Procedure Component Value - Date/Time    COVID PRE-OP / PRE-PROCEDURE SCREENING ORDER (NO ISOLATION) - Swab, Nasopharynx [[id number]]  (Normal) Collected: 06/28/22 0027    Lab Status: Final result Specimen: Swab from Nasopharynx Updated: 06/28/22 0104    Narrative:      The following orders were created for panel order COVID PRE-OP / PRE-PROCEDURE SCREENING ORDER (NO ISOLATION) - Swab, Nasopharynx.  Procedure                               Abnormality         Status                     ---------                               -----------         ------                     COVID-19 and FLU A/B PCR...[[id number]]  Normal              Final result                 Please view results for these tests on the individual orders.    COVID-19 and FLU A/B PCR - Swab, Nasopharynx [[id number]]  (Normal) Collected: 06/28/22 0027    Lab Status: Final result Specimen: Swab from Nasopharynx Updated: 06/28/22 0104     COVID19 Not Detected     Influenza A PCR Not Detected     Influenza B PCR Not Detected    Narrative:      Fact sheet for providers: https://www.fda.gov/media/302417/download    Fact sheet for patients: https://www.fda.gov/media/960309/download    Test performed by PCR.            Reported Vancomycin Levels  Results from last 7 days   Lab Units 07/04/22  0436   VANCOMYCIN RM mcg/mL 11.10          [facility] AUC  Calculation:    Current AUC:  344 mg/L*hr    Predicted Steady State AUC on Current Dose:  384 mg/L*hr  _________________________________    Predicted Steady State AUC on New Dose:  505 mg/L*hr    Assessment/Plan:       1. Pharmacy to dose vancomycin for SSTI. Goal  to 600 mg/L*hr  2. 7/4 SCr - 0.61  3. 7/4 WBC - 11.45  4. 7/4 vancomycin level - 11.10 mcg/ml @0436.  Expected AUC - 384 mg/L*hr.  Vancomycin level drawn 16hrs following 2nd dose  5. Will adjust to vancomycin 1000mg q12h  6. Obtain vancomycin level 7/6 with am labs, BMP x3 days  7. Monitor renal function, cultures and sensitivities, and clinical status, and adjust regimen as necessary.  Pharmacy will continue to follow.    Thank you for consult,    Maulik Shaikh RP  07/04/22  07:06 EDT

## 2022-07-04 NOTE — NURSING NOTE
[facility] This morning RN noticed PICC dressing had come off and wrinkled up. Upon assessment, dressing had stuck to inner-dry and pulled PICC line out about an inch or so. NP was notified, CXR placed and PICC consult placed just in case. RN unable to advance PICC line, but blood return is noted on both lumens. New dressing placed as well as stat lock.    Poor Poor

## 2022-07-04 NOTE — PLAN OF CARE
Goal Outcome Evaluation:     Pt. Became SOB this morning. RT called and increased high flow. Pts. O2 stabilized and is remaining in the mid to upper 90's. Will continue to monitor.  Problem: Adult Inpatient Plan of Care  Goal: Plan of Care Review  Outcome: Ongoing, Progressing  Goal: Patient-Specific Goal (Individualized)  Outcome: Ongoing, Progressing  Goal: Absence of Hospital-Acquired Illness or Injury  Outcome: Ongoing, Progressing  Intervention: Identify and Manage Fall Risk  Recent Flowsheet Documentation  Taken 7/3/2022 2200 by Tim Tomas RNA  Safety Promotion/Fall Prevention:   activity supervised   assistive device/personal items within reach   clutter free environment maintained   fall prevention program maintained   gait belt   lighting adjusted   mobility aid in reach   nonskid shoes/slippers when out of bed   room organization consistent   safety round/check completed  Taken 7/3/2022 2000 by Tim Tomas RNA  Safety Promotion/Fall Prevention:   activity supervised   assistive device/personal items within reach   clutter free environment maintained   fall prevention program maintained   gait belt   lighting adjusted   nonskid shoes/slippers when out of bed   room organization consistent   safety round/check completed  Intervention: Prevent Skin Injury  Recent Flowsheet Documentation  Taken 7/3/2022 2200 by Tim Tomas RNA  Body Position: position changed independently  Skin Protection:   adhesive use limited   incontinence pads utilized   transparent dressing maintained   tubing/devices free from skin contact  Taken 7/3/2022 2000 by Tim Tomas RNA  Body Position: position changed independently  Skin Protection:   adhesive use limited   incontinence pads utilized   transparent dressing maintained   tubing/devices free from skin contact  Intervention: Prevent and Manage VTE (Venous Thromboembolism) Risk  Recent Flowsheet Documentation  Taken 7/3/2022 2200 by Tim Tomas  RNA  Activity Management: activity adjusted per tolerance  Taken 7/3/2022 2000 by Tim Tomas RNA  Activity Management: activity adjusted per tolerance  VTE Prevention/Management: dorsiflexion/plantar flexion performed  Range of Motion: active ROM (range of motion) encouraged  Intervention: Prevent Infection  Recent Flowsheet Documentation  Taken 7/3/2022 2200 by Tim Tomas RNA  Infection Prevention:   environmental surveillance performed   hand hygiene promoted   personal protective equipment utilized   rest/sleep promoted   visitors restricted/screened  Taken 7/3/2022 2000 by Tim Tomas RNA  Infection Prevention:   environmental surveillance performed   hand hygiene promoted   personal protective equipment utilized   rest/sleep promoted   visitors restricted/screened  Goal: Optimal Comfort and Wellbeing  Outcome: Ongoing, Progressing  Intervention: Monitor Pain and Promote Comfort  Recent Flowsheet Documentation  Taken 7/3/2022 2200 by Tim Tomas RNA  Pain Management Interventions: see MAR  Taken 7/3/2022 2000 by Tim Tomas RNA  Pain Management Interventions: see MAR  Intervention: Provide Person-Centered Care  Recent Flowsheet Documentation  Taken 7/3/2022 2200 by Tim Tomas RNA  Trust Relationship/Rapport:   care explained   choices provided   reassurance provided   thoughts/feelings acknowledged  Taken 7/3/2022 2000 by Tim Tomas RNA  Trust Relationship/Rapport:   care explained   choices provided   reassurance provided   thoughts/feelings acknowledged  Goal: Readiness for Transition of Care  Outcome: Ongoing, Progressing     Problem: Skin Injury Risk Increased  Goal: Skin Health and Integrity  Outcome: Ongoing, Progressing  Intervention: Optimize Skin Protection  Recent Flowsheet Documentation  Taken 7/3/2022 2200 by Tim Tomas RNA  Pressure Reduction Techniques: frequent weight shift encouraged  Head of Bed (HOB) Positioning: HOB at 30-45  degrees  Pressure Reduction Devices:   positioning supports utilized   pressure-redistributing mattress utilized  Skin Protection:   adhesive use limited   incontinence pads utilized   transparent dressing maintained   tubing/devices free from skin contact  Taken 7/3/2022 2000 by Tim Tomas RNA  Pressure Reduction Techniques:   frequent weight shift encouraged   heels elevated off bed   pressure points protected  Head of Bed (HOB) Positioning: HOB at 30-45 degrees  Pressure Reduction Devices:   positioning supports utilized   pressure-redistributing mattress utilized  Skin Protection:   adhesive use limited   incontinence pads utilized   transparent dressing maintained   tubing/devices free from skin contact     Problem: Diabetes Comorbidity  Goal: Blood Glucose Level Within Targeted Range  Outcome: Ongoing, Progressing  Intervention: Monitor and Manage Glycemia  Recent Flowsheet Documentation  Taken 7/3/2022 2200 by Tim Tomas RNA  Glycemic Management: blood glucose monitored  Taken 7/3/2022 2000 by Tim Tomas RNA  Glycemic Management: blood glucose monitored     Problem: Hypertension Comorbidity  Goal: Blood Pressure in Desired Range  Outcome: Ongoing, Progressing  Intervention: Maintain Blood Pressure Management  Recent Flowsheet Documentation  Taken 7/3/2022 2200 by Tim Tomas RNA  Medication Review/Management: medications reviewed  Taken 7/3/2022 2000 by Tim Tomas RNA  Medication Review/Management: medications reviewed     Problem: Osteoarthritis Comorbidity  Goal: Maintenance of Osteoarthritis Symptom Control  Outcome: Ongoing, Progressing  Intervention: Maintain Osteoarthritis Symptom Control  Recent Flowsheet Documentation  Taken 7/3/2022 2200 by Tim Tomas RNA  Activity Management: activity adjusted per tolerance  Assistive Device Utilized: walker  Medication Review/Management: medications reviewed  Taken 7/3/2022 2000 by Tim Tomas RNA  Activity  Management: activity adjusted per tolerance  Assistive Device Utilized: walker  Medication Review/Management: medications reviewed     Problem: Pain Chronic (Persistent) (Comorbidity Management)  Goal: Acceptable Pain Control and Functional Ability  Outcome: Ongoing, Progressing  Intervention: Manage Persistent Pain  Recent Flowsheet Documentation  Taken 7/3/2022 2200 by Tim Tomas RNA  Medication Review/Management: medications reviewed  Taken 7/3/2022 2000 by Tim Tomas RNA  Medication Review/Management: medications reviewed  Intervention: Develop Pain Management Plan  Recent Flowsheet Documentation  Taken 7/3/2022 2200 by Tim Tomas RNA  Pain Management Interventions: see MAR  Taken 7/3/2022 2000 by Tim Tomas RNA  Pain Management Interventions: see MAR  Intervention: Optimize Psychosocial Wellbeing  Recent Flowsheet Documentation  Taken 7/3/2022 2200 by Tim Tomas RNA  Diversional Activities: television  Family/Support System Care:   self-care encouraged   support provided  Taken 7/3/2022 2000 by Tim Tomas RNA  Supportive Measures:   active listening utilized   self-care encouraged   verbalization of feelings encouraged  Diversional Activities: television     Problem: Fall Injury Risk  Goal: Absence of Fall and Fall-Related Injury  Outcome: Ongoing, Progressing  Intervention: Identify and Manage Contributors  Recent Flowsheet Documentation  Taken 7/3/2022 2200 by Tim Tomas RNA  Medication Review/Management: medications reviewed  Self-Care Promotion: independence encouraged  Taken 7/3/2022 2000 by Tim Tomas RNA  Medication Review/Management: medications reviewed  Self-Care Promotion: independence encouraged  Intervention: Promote Injury-Free Environment  Recent Flowsheet Documentation  Taken 7/3/2022 2200 by Tim Tomas RNA  Safety Promotion/Fall Prevention:   activity supervised   assistive device/personal items within reach   clutter free  environment maintained   fall prevention program maintained   gait belt   lighting adjusted   mobility aid in reach   nonskid shoes/slippers when out of bed   room organization consistent   safety round/check completed  Taken 7/3/2022 2000 by Tim Tomas RNA  Safety Promotion/Fall Prevention:   activity supervised   assistive device/personal items within reach   clutter free environment maintained   fall prevention program maintained   gait belt   lighting adjusted   nonskid shoes/slippers when out of bed   room organization consistent   safety round/check completed     Problem: Palliative Care  Goal: Enhanced Quality of Life  Outcome: Ongoing, Progressing  Intervention: Maximize Comfort  Recent Flowsheet Documentation  Taken 7/3/2022 2200 by Tim Tomas RNA  Pain Management Interventions: see MAR  Taken 7/3/2022 2000 by Tim Tomas RNA  Pain Management Interventions: see MAR  Intervention: Optimize Function  Recent Flowsheet Documentation  Taken 7/3/2022 2200 by Tim Tomas RNA  Fatigue Management: frequent rest breaks encouraged  Taken 7/3/2022 2000 by Tim Tomas RNA  Fatigue Management: frequent rest breaks encouraged  Intervention: Optimize Psychosocial Wellbeing  Recent Flowsheet Documentation  Taken 7/3/2022 2200 by Tim Tomas RNA  Family/Support System Care:   self-care encouraged   support provided  Taken 7/3/2022 2000 by Tim Tomas RNA  Supportive Measures:   active listening utilized   self-care encouraged   verbalization of feelings encouraged

## 2022-07-04 NOTE — PROGRESS NOTES
[facility]  PROGRESS NOTE    Patient Name: Chikis Cuellar  : 1945  MRN: [id number]    Date of Admission: 2022  Primary Care Physician: Eduardo Gamboa MD    Subjective   Subjective     CC:  F/U acute respiratory failure with hypoxia     HPI:  I reviewed notes from Pulmonology, which I have also talked with Dr. Flores by phone since my previous note. Also, reviewed Palliative care note. Plan per them is to try 48 to 72 hours high dose steroids. If no response will look at other options which I fear may be limited. BG reviewed and very high today.  Her fasting blood glucoses are lower and her prandials are much higher.  Talked with Wilver her son and updated him. He had questions about the patients disposition. I discussed her SNF options as per  note from 22.  Today Ms. Cuellar complains of pruritis in her back.        ROS:  Gen- No fevers, chills  CV- No chest pain, palpitations  Resp- cough but nonproductive  GI- No N/V/D, abd pain  Skin- itchy        Objective   Objective     Vital Signs:   Temp:  [95.3 °F (35.2 °C)-98.6 °F (37 °C)] 95.3 °F (35.2 °C)  Heart Rate:  [58-83] 69  Resp:  [18-20] 20  BP: (106-142)/(63-85) 125/85  Flow (L/min):  [35-45] 45     Physical Exam:  Constitutional: No acute distress, awake, alert  HENT: NCAT, mucous membranes moist  Respiratory: bilateral dry expiratory crackles  Cardiovascular: RRR, no murmurs, rubs, or gallops  Gastrointestinal: Positive bowel sounds, soft, nontender, nondistended  Musculoskeletal: No bilateral ankle edema  Psychiatric: Appropriate affect, cooperative  Neurologic: Oriented x 3,  speech clear  Skin: Erythema under L armpit improved, Erythema R armpit without much change but has extended further around to her back, now warm to touch    Results Reviewed:  LAB RESULTS:      Lab 22  0436 22  0438 22  1549 22  0424 22  0727 22  0827 22  0038  06/28/22  0037 06/27/22 2035   WBC 11.45* 14.73* 17.50* 16.43* 13.36*  --  15.54*  --  14.70*   HEMOGLOBIN 11.5* 11.8* 12.0 13.4 13.0  --  12.1  --  12.8   HEMATOCRIT 36.9 36.8 37.4 41.9 40.2  --  37.7  --  39.7   PLATELETS 251 227 250 270 243  --  215  --  219   NEUTROS ABS  --  11.18* 15.32*  --  9.12*  --  11.73*  --  12.74*   IMMATURE GRANS (ABS)  --  0.06* 0.08*  --  0.05  --  0.31*  --  0.13*   LYMPHS ABS  --  1.32 0.87  --  2.11  --  1.68  --  0.75   MONOS ABS  --  0.69 0.55  --  0.71  --  0.95*  --  0.82   EOS ABS  --  1.43* 0.64*  --  1.33*  --  0.84*  --  0.23   MCV 86.2 85.8 83.7 84.5 84.1  --  84.3  --  84.5   SED RATE  --   --   --   --   --  37*  --   --   --    PROCALCITONIN  --   --   --   --   --   --   --   --  0.09   LACTATE  --   --   --   --   --   --   --  1.7  --          Lab 07/04/22  0436 07/03/22  0438 07/02/22  1549 07/01/22  0423 06/30/22  0727   SODIUM 139 142 137 139 142   POTASSIUM 5.0 4.0 4.3 3.6 3.9   CHLORIDE 96* 98 97* 96* 99   CO2 36.0* 33.0* 31.0* 33.0* 33.0*   ANION GAP 7.0 11.0 9.0 10.0 10.0   BUN 16 21 24* 24* 21   CREATININE 0.61 0.66 0.73 0.78 0.61   EGFR 91.6 89.9 84.3 77.9 91.6   GLUCOSE 193* 76 223* 130* 151*   CALCIUM 9.5 9.0 9.3 9.8 9.9   MAGNESIUM 2.1 1.6 2.2 1.5* 1.8         Lab 06/27/22 2035   TOTAL PROTEIN 6.3   ALBUMIN 3.40*   GLOBULIN 2.9   ALT (SGPT) 18   AST (SGOT) 18   BILIRUBIN 0.2   ALK PHOS 86         Lab 07/03/22  0438 06/28/22  0809 06/28/22  0038 06/27/22 2035   PROBNP 198.8  --   --  485.9   TROPONIN T  --  0.012 0.026 0.025                 Brief Urine Lab Results     None          Microbiology Results Abnormal     Procedure Component Value - Date/Time    COVID PRE-OP / PRE-PROCEDURE SCREENING ORDER (NO ISOLATION) - Swab, Nasopharynx [[id number]]  (Normal) Collected: 06/28/22 0027    Lab Status: Final result Specimen: Swab from Nasopharynx Updated: 06/28/22 0104    Narrative:      The following orders were created for panel order COVID PRE-OP /  PRE-PROCEDURE SCREENING ORDER (NO ISOLATION) - Swab, Nasopharynx.  Procedure                               Abnormality         Status                     ---------                               -----------         ------                     COVID-19 and FLU A/B PCR...[[id number]]  Normal              Final result                 Please view results for these tests on the individual orders.    COVID-19 and FLU A/B PCR - Swab, Nasopharynx [[id number]]  (Normal) Collected: 06/28/22 0027    Lab Status: Final result Specimen: Swab from Nasopharynx Updated: 06/28/22 0104     COVID19 Not Detected     Influenza A PCR Not Detected     Influenza B PCR Not Detected    Narrative:      Fact sheet for providers: https://www.fda.gov/media/983348/download    Fact sheet for patients: https://www.fda.gov/media/316965/download    Test performed by PCR.          XR Chest 1 View    Result Date: 7/4/2022  EXAMINATION: XR CHEST 1 VW  DATE OF EXAM: 7/4/2022 6:15 AM HISTORY: PICC line placement. COMPARISON: None. FINDINGS: There is a new left PIC line with the tip in the SVC. There is cardiomegaly with some patchy airspace disease in the lungs that appears similar and there is a right pleural effusion. There is cardiomegaly. Bones are demineralized.     Impression: 1.  PICC line tip is in the SVC. Electronically signed by:  Blaze Bond M.D.  7/4/2022 5:08 AM Mountain Time    CT Angiogram Chest    Result Date: 7/2/2022  DATE OF EXAM: 7/2/2022 1:53 PM  PROCEDURE: CT ANGIOGRAM CHEST-  INDICATIONS: Hypoxemia, recent pneumonia and worsening O2 needs.; J96.01-Acute respiratory failure with hypoxia; R06.02-Shortness of breath; J18.9-Pneumonia, unspecified organism  COMPARISON: No comparisons available.  TECHNIQUE: Contiguous axial imaging was obtained from the thoracic inlet through the upper abdomen following the intravenous administration of 42 mL of Isovue 370. Reconstructed coronal and sagittal images were also obtained. Automated  exposure control and iterative reconstruction methods were used.   FINDINGS: VASCULAR FINDINGS: There is no definite evidence for pulmonary embolus on this exam. There is coronary artery calcification.  NONVASCULAR FINDINGS: There are diffuse bilateral pulmonary infiltrates which have been noted. There has not been improvement in appearance of the lungs. There are no large pleural effusions. It looks like there is some underlying bronchiectasis and septal thickening. There is slight depression of the antrum endplate of T12 which is unchanged.       Impression: 1.  No definite evidence for pulmonary embolus. 2.  Diffuse bilateral pulmonary parenchymal changes similar to prior study. 3.  Coronary artery calcification is noted.  This report was finalized on 7/2/2022 2:18 PM by Salomón Cardenas MD.        Results for orders placed during the hospital encounter of 06/01/22    Adult Transthoracic Echo Complete W/ Cont if Necessary Per Protocol    Interpretation Summary  · The right atrial cavity is mildly dilated.  · Estimated right ventricular systolic pressure from tricuspid regurgitation is mildly elevated (35-45 mmHg). Calculated right ventricular systolic pressure from tricuspid regurgitation is 40 mmHg.  · Estimated left ventricular EF = 60% Estimated left ventricular EF was in agreement with the calculated left ventricular EF. Left ventricular ejection fraction appears to be 56 - 60%. Left ventricular systolic function is normal.  · Left ventricular diastolic function is consistent with age.  · Mild pulmonary hypertension is present.  · Normal right ventricular wall thickness, systolic function and septal motion noted with the right ventricular cavity mild to moderately dilated.  · There is no evidence of pericardial effusion.  · No significant structural or functional valvular abnormalities demonstrated.      I have reviewed the medications:  Scheduled Meds:cefTRIAXone, 1 g, Intravenous, Q24H  enoxaparin, 40 mg,  Subcutaneous, Q24H  fluconazole, 400 mg, Intravenous, Daily  FLUoxetine, 40 mg, Oral, Daily  insulin detemir, 14 Units, Subcutaneous, Q12H  insulin lispro, 0-7 Units, Subcutaneous, 4x Daily With Meals & Nightly  Insulin Lispro, 10 Units, Subcutaneous, TID With Meals  ipratropium-albuterol, 3 mL, Nebulization, 4x Daily - RT  lactobacillus acidophilus, 1 capsule, Oral, Daily  losartan, 25 mg, Oral, Daily  methylPREDNISolone sodium succinate, 40 mg, Intravenous, Q12H  miconazole, 1 application, Topical, Q12H  nystatin, , Topical, Q12H  oxybutynin XL, 5 mg, Oral, Daily  pantoprazole, 40 mg, Oral, Q AM  pravastatin, 40 mg, Oral, Nightly  pregabalin, 150 mg, Oral, TID  senna-docusate sodium, 2 tablet, Oral, BID  sodium chloride, 10 mL, Intravenous, Q12H  sodium chloride, 10 mL, Intravenous, Q12H  vancomycin, 1,000 mg, Intravenous, Q12H      Continuous Infusions:Pharmacy to dose vancomycin,       PRN Meds:.•  acetaminophen  •  albuterol  •  ALPRAZolam  •  senna-docusate sodium **AND** polyethylene glycol **AND** bisacodyl **AND** bisacodyl  •  dextrose  •  dextrose  •  glucagon (human recombinant)  •  HYDROcodone-acetaminophen  •  ipratropium-albuterol  •  magnesium sulfate **OR** magnesium sulfate **OR** magnesium sulfate  •  Pharmacy to dose vancomycin  •  sodium chloride  •  sodium chloride  •  sodium chloride    Assessment & Plan   Assessment & Plan     Active Hospital Problems    Diagnosis  POA   • **Acute respiratory failure with hypoxia (HCC) [J96.01]  Yes   • Hypomagnesemia [E83.42]  Unknown   • Leukocytosis [D72.829]  Yes   • Chest pain [R07.9]  Yes   • Fibromyalgia [M79.7]  Yes   • Atypical pneumonia [J18.9]  Yes   • Type 2 diabetes mellitus, with long-term current use of insulin ([facility]) [E11.9, Z79.4]  Not Applicable   • Essential hypertension [I10]  Yes   • Hyperlipemia [E78.5]  Yes   • GERD without esophagitis [K21.9]  Yes   • Mood disorder ([facility]) [F39]  Yes      Resolved Hospital Problems   No resolved problems  "to display.        Brief Hospital Course to date:  Chikis Cuellar is a 77 y.o. female w/ a hx of T2DM, HTN, HLD, GERD, fibromyalgia, anxiety who presented to the ED w/ c/o hypoxia.   Pt admitted to [facility] 6/1 - 6/27/22. Pt initially presented w/ c/o severe dyspnea. Pt was admitted to the ICU from the ED and placed on HFNC, IV steroids and IV antibiotics. Pt underwent an extensive work-up including infectious, autoimmune, cardiac which were predominantly unrevealing.  Advanced chest imaging was felt to be consistent with atypical pneumonia somewhat classic for the appearance of COVID-19 although COVID remained negative despite pt reports of several family members w/ recent positive COVID tests. Pt was d/c on a steroid taper and on 5 liters of supplemental oxygen. Pt was on room air prior to her admission. Pt was d/c to [facility] earlier today for inpatient rehabilitation. Pt was scheduled to f/u w/ Pulmonary (TBD) and PCP within 1-2 wks of d/c from rehab.  Pt was taken to rehab after d/c 6/27. Pt reports that there were \"issues\" with her oxygen not being hooked up upon her arrival to rehab. Per pt report, she was without supplemental oxygen for about 30 minutes. Pt reports that oxygen saturations dropped to the 70's and she then developed left sided chest pain. Despite a nebulizer treatment pt's oxygen saturations did not improve. EMS placed pt on a non-rebreather and pt's saturations responded. EMS attempted to wean pt back to 5 liters but Pt was unable to maintain saturations on 5 liters and was brought back to the ED for further evaluation.    This patient's problems and plans were partially entered by my partner and updated as appropriate by me 07/04/22.     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  -She reports suspected COVID in April with progressively worsening shortness of breath in May. Fits with COVID fibrosis. However, she also has a +RF and ulnar deviations and what looks to early pleural and interstitial changes from a CT abd/pelvis from 4/13/2019. Appreciate pulmonology. Will discuss with them when and if they may decide on bronchoscopy. I fear this may not end well for Ms. Cuellar and agree with Palliative consult.     Leukocytosis   -afebrile   -CXR without new findings   -Likely secondary to steroids  -AM labs reviewed, continue to monitor.    B/L underarm Candidiasis/R upper back cellulitis   Superimposed Cellulitis   -Continue Nystatin powder  -Interdry cloths  -Received 1x dose oral Fluconazole 7/1  -[facility] recommendations are appreciated.   -Due to concern for superimposed cellulitis, will add IV ABX/anti-fungals today and see if improved. Vanc + Rocephin + IV Fluconazole   -Given that she is receiving IV anti-fungals and anti-biotics I feel that everything is being covered here. Will add hydrocortisone cream to help with the itching. Biggest need is to keep the area dry.     T2DM  -hem a1c 6.2% on 6/3/22  -holding routine glipizide, novolog, metformin    -on Levemir 14 units q12  -on Humalog 10 units TID meals  -Continue LDSSI   -BG reviewed, running much higher since increase in steroid doses. Will increase the sliding scale to resistant.     HTN  HLD  -continue routine Lozol, losartan, pravastatin      Anxiety   -continue routine prozac, xanax      Fibromyalgia   -continue Lyrica, Norco      Hypomagnesemia  -Replace per protocol     Constipation  -Continue bowel regimen  -On  Linzess at home, not on formulary here     Expected Discharge Location and Transportation: SNF,  van vs family transport   Expected Discharge Date: 7/6/22    DVT prophylaxis:  Medical DVT prophylaxis orders are present.     AM-PAC 6 Clicks Score (PT): 14 (07/04/22 0800)    CODE STATUS:   Code Status and Medical Interventions:   Ordered at: 07/01/22 1617     Medical Intervention Limits:    NO intubation (DNI)     Code Status (Patient has no pulse and is not breathing):    No CPR (Do Not Attempt to Resuscitate)     Medical Interventions (Patient has pulse or is breathing):    Limited Support       Adithya Omer DO  07/04/22

## 2022-07-04 NOTE — CONSULTS
Patient pulled picc out some during the night.  Repeat CXR shows placement in the SVC.  Spoke with staff, PICC is ok to use.

## 2022-07-05 NOTE — PLAN OF CARE
Problem: Adult Inpatient Plan of Care  Goal: Plan of Care Review  Outcome: Ongoing, Progressing  Goal: Patient-Specific Goal (Individualized)  Outcome: Ongoing, Progressing  Goal: Absence of Hospital-Acquired Illness or Injury  Outcome: Ongoing, Progressing  Intervention: Identify and Manage Fall Risk  Recent Flowsheet Documentation  Taken 7/5/2022 0400 by Tim Tomas RNA  Safety Promotion/Fall Prevention:   activity supervised   assistive device/personal items within reach   clutter free environment maintained   fall prevention program maintained   gait belt   lighting adjusted   mobility aid in reach   nonskid shoes/slippers when out of bed   room organization consistent   safety round/check completed  Taken 7/5/2022 0200 by Tim Tomas RNA  Safety Promotion/Fall Prevention:   activity supervised   assistive device/personal items within reach   clutter free environment maintained   fall prevention program maintained   gait belt   lighting adjusted   nonskid shoes/slippers when out of bed   room organization consistent   safety round/check completed  Taken 7/5/2022 0000 by Tim Tomas RNA  Safety Promotion/Fall Prevention:   activity supervised   assistive device/personal items within reach   clutter free environment maintained   fall prevention program maintained   gait belt   lighting adjusted   mobility aid in reach   nonskid shoes/slippers when out of bed   room organization consistent   safety round/check completed  Taken 7/4/2022 2200 by Tim Tomas RNA  Safety Promotion/Fall Prevention:   activity supervised   assistive device/personal items within reach   clutter free environment maintained   fall prevention program maintained   gait belt   lighting adjusted   mobility aid in reach   nonskid shoes/slippers when out of bed   room organization consistent   safety round/check completed  Taken 7/4/2022 2000 by Tim Tomas RNA  Safety Promotion/Fall Prevention:   activity  supervised   assistive device/personal items within reach   clutter free environment maintained   fall prevention program maintained   gait belt   lighting adjusted   mobility aid in reach   nonskid shoes/slippers when out of bed   room organization consistent   safety round/check completed  Intervention: Prevent Skin Injury  Recent Flowsheet Documentation  Taken 7/5/2022 0400 by Tim Tomas RNA  Skin Protection:   adhesive use limited   incontinence pads utilized   transparent dressing maintained   tubing/devices free from skin contact  Taken 7/5/2022 0200 by Tim Tomas RNA  Skin Protection:   adhesive use limited   incontinence pads utilized   transparent dressing maintained   tubing/devices free from skin contact  Taken 7/5/2022 0000 by Tim Tomas RNA  Skin Protection:   adhesive use limited   incontinence pads utilized   transparent dressing maintained   tubing/devices free from skin contact  Taken 7/4/2022 2200 by Tim Tomas RNA  Skin Protection:   adhesive use limited   incontinence pads utilized   transparent dressing maintained   tubing/devices free from skin contact  Taken 7/4/2022 2000 by Tim Tomas RNA  Body Position: position changed independently  Skin Protection:   adhesive use limited   incontinence pads utilized   transparent dressing maintained   tubing/devices free from skin contact  Intervention: Prevent and Manage VTE (Venous Thromboembolism) Risk  Recent Flowsheet Documentation  Taken 7/5/2022 0400 by Tim Tomas RNA  Activity Management: activity adjusted per tolerance  Taken 7/5/2022 0200 by Tim Tomas RNA  Activity Management: activity adjusted per tolerance  Taken 7/5/2022 0000 by Tim Tomas RNA  Activity Management: activity adjusted per tolerance  Taken 7/4/2022 2200 by Tim Tomas RNA  Activity Management: activity adjusted per tolerance  Taken 7/4/2022 2000 by Tim Tomas RNA  Activity Management: activity  adjusted per tolerance  VTE Prevention/Management: dorsiflexion/plantar flexion performed  Range of Motion: active ROM (range of motion) encouraged  Intervention: Prevent Infection  Recent Flowsheet Documentation  Taken 7/5/2022 0400 by Tim Tomas RNA  Infection Prevention:   environmental surveillance performed   hand hygiene promoted   personal protective equipment utilized   rest/sleep promoted   visitors restricted/screened  Taken 7/5/2022 0200 by Tim Tomas RNA  Infection Prevention:   environmental surveillance performed   hand hygiene promoted   personal protective equipment utilized   rest/sleep promoted   visitors restricted/screened  Taken 7/5/2022 0000 by Tim Tomas RNA  Infection Prevention:   environmental surveillance performed   hand hygiene promoted   personal protective equipment utilized   rest/sleep promoted   visitors restricted/screened  Taken 7/4/2022 2200 by Tim Tomas RNA  Infection Prevention:   environmental surveillance performed   hand hygiene promoted   personal protective equipment utilized   rest/sleep promoted   visitors restricted/screened  Taken 7/4/2022 2000 by Tim Tomas RNA  Infection Prevention:   environmental surveillance performed   hand hygiene promoted   personal protective equipment utilized   rest/sleep promoted   visitors restricted/screened  Goal: Optimal Comfort and Wellbeing  Outcome: Ongoing, Progressing  Intervention: Monitor Pain and Promote Comfort  Recent Flowsheet Documentation  Taken 7/4/2022 2200 by Tim Tomas RNA  Pain Management Interventions: quiet environment facilitated  Taken 7/4/2022 2000 by Tim Tomas RNA  Pain Management Interventions: see MAR  Intervention: Provide Person-Centered Care  Recent Flowsheet Documentation  Taken 7/5/2022 0400 by Tim Tomas RNA  Trust Relationship/Rapport:   care explained   choices provided   reassurance provided   thoughts/feelings acknowledged  Taken  7/4/2022 2000 by Thom, Tim, RNA  Trust Relationship/Rapport:   care explained   choices provided   reassurance provided   thoughts/feelings acknowledged  Goal: Readiness for Transition of Care  Outcome: Ongoing, Progressing     Problem: Skin Injury Risk Increased  Goal: Skin Health and Integrity  Outcome: Ongoing, Progressing  Intervention: Optimize Skin Protection  Recent Flowsheet Documentation  Taken 7/5/2022 0400 by Tim Tomas RNA  Pressure Reduction Techniques: frequent weight shift encouraged  Pressure Reduction Devices: specialty bed utilized  Skin Protection:   adhesive use limited   incontinence pads utilized   transparent dressing maintained   tubing/devices free from skin contact  Taken 7/5/2022 0200 by Tim Tomas RNA  Pressure Reduction Techniques: frequent weight shift encouraged  Pressure Reduction Devices: specialty bed utilized  Skin Protection:   adhesive use limited   incontinence pads utilized   transparent dressing maintained   tubing/devices free from skin contact  Taken 7/5/2022 0000 by Tim Tomas RNA  Pressure Reduction Techniques: frequent weight shift encouraged  Pressure Reduction Devices: specialty bed utilized  Skin Protection:   adhesive use limited   incontinence pads utilized   transparent dressing maintained   tubing/devices free from skin contact  Taken 7/4/2022 2200 by Tim Tomas RNA  Pressure Reduction Techniques: frequent weight shift encouraged  Pressure Reduction Devices: specialty bed utilized  Skin Protection:   adhesive use limited   incontinence pads utilized   transparent dressing maintained   tubing/devices free from skin contact  Taken 7/4/2022 2000 by Tim Tomas RNA  Pressure Reduction Techniques: frequent weight shift encouraged  Head of Bed (HOB) Positioning: HOB at 60-90 degrees  Pressure Reduction Devices: specialty bed utilized  Skin Protection:   adhesive use limited   incontinence pads utilized   transparent dressing  maintained   tubing/devices free from skin contact     Problem: Diabetes Comorbidity  Goal: Blood Glucose Level Within Targeted Range  Outcome: Ongoing, Progressing  Intervention: Monitor and Manage Glycemia  Recent Flowsheet Documentation  Taken 7/4/2022 2000 by Tim Tomas RNA  Glycemic Management: blood glucose monitored     Problem: Hypertension Comorbidity  Goal: Blood Pressure in Desired Range  Outcome: Ongoing, Progressing  Intervention: Maintain Blood Pressure Management  Recent Flowsheet Documentation  Taken 7/5/2022 0400 by Tim Tomas RNA  Medication Review/Management: medications reviewed  Taken 7/5/2022 0200 by Tim Tomas RNA  Medication Review/Management: medications reviewed  Taken 7/5/2022 0000 by Tim Tomas RNA  Medication Review/Management: medications reviewed  Taken 7/4/2022 2200 by Tim Tomas RNA  Medication Review/Management: medications reviewed  Taken 7/4/2022 2000 by Tim Tomas RNA  Medication Review/Management: medications reviewed     Problem: Osteoarthritis Comorbidity  Goal: Maintenance of Osteoarthritis Symptom Control  Outcome: Ongoing, Progressing  Intervention: Maintain Osteoarthritis Symptom Control  Recent Flowsheet Documentation  Taken 7/5/2022 0400 by Tim Tomas RNA  Activity Management: activity adjusted per tolerance  Assistive Device Utilized: walker  Medication Review/Management: medications reviewed  Taken 7/5/2022 0200 by Tim Tomas RNA  Activity Management: activity adjusted per tolerance  Assistive Device Utilized: walker  Medication Review/Management: medications reviewed  Taken 7/5/2022 0000 by Tim Tomas RNA  Activity Management: activity adjusted per tolerance  Assistive Device Utilized: walker  Medication Review/Management: medications reviewed  Taken 7/4/2022 2200 by Tim Tomas RNA  Activity Management: activity adjusted per tolerance  Assistive Device Utilized: walker  Medication  Review/Management: medications reviewed  Taken 7/4/2022 2000 by Tim Tomas RNA  Activity Management: activity adjusted per tolerance  Assistive Device Utilized: walker  Medication Review/Management: medications reviewed     Problem: Pain Chronic (Persistent) (Comorbidity Management)  Goal: Acceptable Pain Control and Functional Ability  Outcome: Ongoing, Progressing  Intervention: Manage Persistent Pain  Recent Flowsheet Documentation  Taken 7/5/2022 0400 by Tim Tomas RNA  Medication Review/Management: medications reviewed  Taken 7/5/2022 0200 by Tim Tomas RNA  Medication Review/Management: medications reviewed  Taken 7/5/2022 0000 by Tim Tomas RNA  Medication Review/Management: medications reviewed  Taken 7/4/2022 2200 by Tim Tomas RNA  Medication Review/Management: medications reviewed  Taken 7/4/2022 2000 by Tim Tomas RNA  Medication Review/Management: medications reviewed  Intervention: Develop Pain Management Plan  Recent Flowsheet Documentation  Taken 7/4/2022 2200 by Tim Tomas RNA  Pain Management Interventions: quiet environment facilitated  Taken 7/4/2022 2000 by Tim Tomas RNA  Pain Management Interventions: see MAR  Intervention: Optimize Psychosocial Wellbeing  Recent Flowsheet Documentation  Taken 7/5/2022 0400 by Tim Tomas RNA  Family/Support System Care:   self-care encouraged   support provided  Taken 7/4/2022 2000 by Tim Tomas RNA  Supportive Measures:   active listening utilized   self-care encouraged   verbalization of feelings encouraged  Diversional Activities: television  Family/Support System Care:   self-care encouraged   support provided     Problem: Fall Injury Risk  Goal: Absence of Fall and Fall-Related Injury  Outcome: Ongoing, Progressing  Intervention: Identify and Manage Contributors  Recent Flowsheet Documentation  Taken 7/5/2022 0400 by Tim Tomas RNA  Medication Review/Management:  medications reviewed  Taken 7/5/2022 0200 by Tim Tomas RNA  Medication Review/Management: medications reviewed  Taken 7/5/2022 0000 by Tim Tomas RNA  Medication Review/Management: medications reviewed  Taken 7/4/2022 2200 by Tim Tomas RNA  Medication Review/Management: medications reviewed  Taken 7/4/2022 2000 by Tim Tomas RNA  Medication Review/Management: medications reviewed  Self-Care Promotion: independence encouraged  Intervention: Promote Injury-Free Environment  Recent Flowsheet Documentation  Taken 7/5/2022 0400 by Tim Tomas RNA  Safety Promotion/Fall Prevention:   activity supervised   assistive device/personal items within reach   clutter free environment maintained   fall prevention program maintained   gait belt   lighting adjusted   mobility aid in reach   nonskid shoes/slippers when out of bed   room organization consistent   safety round/check completed  Taken 7/5/2022 0200 by Tim Tomas RNA  Safety Promotion/Fall Prevention:   activity supervised   assistive device/personal items within reach   clutter free environment maintained   fall prevention program maintained   gait belt   lighting adjusted   nonskid shoes/slippers when out of bed   room organization consistent   safety round/check completed  Taken 7/5/2022 0000 by Tim Tomas RNA  Safety Promotion/Fall Prevention:   activity supervised   assistive device/personal items within reach   clutter free environment maintained   fall prevention program maintained   gait belt   lighting adjusted   mobility aid in reach   nonskid shoes/slippers when out of bed   room organization consistent   safety round/check completed  Taken 7/4/2022 2200 by Tim Tomas RNA  Safety Promotion/Fall Prevention:   activity supervised   assistive device/personal items within reach   clutter free environment maintained   fall prevention program maintained   gait belt   lighting adjusted   mobility aid in  reach   nonskid shoes/slippers when out of bed   room organization consistent   safety round/check completed  Taken 7/4/2022 2000 by Tim Tomas RNA  Safety Promotion/Fall Prevention:   activity supervised   assistive device/personal items within reach   clutter free environment maintained   fall prevention program maintained   gait belt   lighting adjusted   mobility aid in reach   nonskid shoes/slippers when out of bed   room organization consistent   safety round/check completed     Problem: Palliative Care  Goal: Enhanced Quality of Life  Outcome: Ongoing, Progressing  Intervention: Maximize Comfort  Recent Flowsheet Documentation  Taken 7/4/2022 2200 by Tim Tomas RNA  Pain Management Interventions: quiet environment facilitated  Taken 7/4/2022 2000 by Tim Tomas RNA  Pain Management Interventions: see MAR  Intervention: Optimize Function  Recent Flowsheet Documentation  Taken 7/4/2022 2000 by Tim Tomas RNA  Fatigue Management: frequent rest breaks encouraged  Intervention: Optimize Psychosocial Wellbeing  Recent Flowsheet Documentation  Taken 7/5/2022 0400 by Tim Tomas RNA  Family/Support System Care:   self-care encouraged   support provided  Taken 7/4/2022 2000 by Tim Tomas RNA  Supportive Measures:   active listening utilized   self-care encouraged   verbalization of feelings encouraged  Family/Support System Care:   self-care encouraged   support provided   Goal Outcome Evaluation:

## 2022-07-05 NOTE — PROGRESS NOTES
Palliative Care Progress Note    Date of Admission: 6/27/2022    Subjective: Patient continues to complain of dyspnea as well as  Current Code Status     Date Active Code Status Order ID Comments User Context       7/1/2022 1617 No CPR (Do Not Attempt to Resuscitate) [id number]  Elsie Griffith, DO Inpatient     Advance Care Planning Activity      Questions for Current Code Status     Question Answer    Code Status (Patient has no pulse and is not breathing) No CPR (Do Not Attempt to Resuscitate)    Medical Interventions (Patient has pulse or is breathing) Limited Support    Medical Intervention Limits: NO intubation (DNI)        No current facility-administered medications on file prior to encounter.     Current Outpatient Medications on File Prior to Encounter   Medication Sig Dispense Refill   • FLUoxetine (PROzac) 20 MG capsule Take 40 mg by mouth Daily.     • HYDROcodone-acetaminophen (NORCO)  MG per tablet Take 1 tablet by mouth Every 12 (Twelve) Hours As Needed for Moderate Pain . 6 tablet 0   • indapamide (LOZOL) 2.5 MG tablet Take 2.5 mg by mouth Daily.     • insulin detemir (LEVEMIR) 100 UNIT/ML injection Inject 5 Units under the skin into the appropriate area as directed Every 12 (Twelve) Hours.  12   • losartan (COZAAR) 25 MG tablet Take 1 tablet by mouth Daily.     • omeprazole (priLOSEC) 40 MG capsule Take 40 mg by mouth Daily.     • oxybutynin XL (DITROPAN-XL) 5 MG 24 hr tablet Take 5 mg by mouth Daily.     • pravastatin (PRAVACHOL) 40 MG tablet Take 40 mg by mouth Daily.     • pregabalin (LYRICA) 150 MG capsule Take 1 capsule by mouth 3 (Three) Times a Day. 9 capsule 0   • albuterol (PROVENTIL) (2.5 MG/3ML) 0.083% nebulizer solution Take 2.5 mg by nebulization Every 6 (Six) Hours As Needed for Shortness of Air.  12   • ALPRAZolam (XANAX) 0.5 MG tablet Take 1 tablet by mouth Daily As Needed for Anxiety. 3 tablet 0   • cyanocobalamin 1000 MCG/ML injection Inject 1,000 mcg under the skin into  "the appropriate area as directed Every 14 (Fourteen) Days.     • glipiZIDE (GLUCOTROL) 10 MG tablet Take 10 mg by mouth 2 (Two) Times a Day Before Meals.     • Insulin Aspart (novoLOG) 100 UNIT/ML injection Inject  under the skin into the appropriate area as directed 3 (Three) Times a Day Before Meals. Patient states she gets it from pharmacy although they did not have record of it     • metFORMIN (GLUCOPHAGE) 500 MG tablet Take 500 mg by mouth 2 (Two) Times a Day With Meals.     • predniSONE (DELTASONE) 5 MG tablet Take 3 tablets by mouth Daily With Breakfast for 4 days, THEN 2 tablets Daily With Breakfast for 4 days, THEN 1 tablet Daily With Breakfast for 4 days. 24 tablet 0        •  acetaminophen  •  albuterol  •  ALPRAZolam  •  senna-docusate sodium **AND** polyethylene glycol **AND** bisacodyl **AND** bisacodyl  •  dextrose  •  dextrose  •  diphenhydrAMINE  •  glucagon (human recombinant)  •  HYDROcodone-acetaminophen  •  ipratropium-albuterol  •  magnesium sulfate **OR** magnesium sulfate **OR** magnesium sulfate  •  sodium chloride  •  sodium chloride  •  sodium chloride    Objective: /77 (BP Location: Right arm, Patient Position: Lying)   Pulse 69   Temp 96.5 °F (35.8 °C) (Oral)   Resp 20   Ht 162.6 cm (64\")   Wt 86.2 kg (190 lb 1.6 oz)   SpO2 90%   BMI 32.63 kg/m²      Intake/Output Summary (Last 24 hours) at 7/5/2022 1336  Last data filed at 7/5/2022 0900  Gross per 24 hour   Intake 480 ml   Output 850 ml   Net -370 ml     Physical Exam:      General Appearance:    Alert, cooperative, in no acute distress   Head:    Normocephalic, without obvious abnormality, atraumatic   Eyes:            Lids and lashes normal, conjunctivae and sclerae normal, no   icterus, no pallor, corneas clear, PERRLA   Ears:    Ears appear intact with no abnormalities noted   Throat:   No oral lesions, no thrush, oral mucosa moist   Neck:   No adenopathy, supple, trachea midline, no thyromegaly, no     carotid bruit, " no JVD   Back:     No kyphosis present, no scoliosis present, no skin lesions,       erythema or scars, no tenderness to percussion or                   palpation,   range of motion normal   Lungs:     Diminshed breathe sounds    Heart:    Regular rhythm and normal rate, normal S1 and S2, no            murmur, no gallop, no rub, no click   Breast Exam:    Deferred   Abdomen:     Normal bowel sounds, no masses, no organomegaly, soft        non-tender, non-distended, no guarding, no rebound                 tenderness   Genitalia:    Deferred   Extremities:   Moves all extremities well, no edema, no cyanosis, no              redness   Pulses:   Pulses palpable and equal bilaterally   Skin:   No bleeding, bruising or rash   Lymph nodes:   No palpable adenopathy   Neurologic:   Cranial nerves 2 - 12 grossly intact, sensation intact, DTR        present and equal bilaterally     Results from last 7 days   Lab Units 07/05/22  0735   WBC 10*3/mm3 16.22*   HEMOGLOBIN g/dL 10.8*   HEMATOCRIT % 33.1*   PLATELETS 10*3/mm3 279     Results from last 7 days   Lab Units 07/05/22  0735   SODIUM mmol/L 138   POTASSIUM mmol/L 5.3*   CHLORIDE mmol/L 97*   CO2 mmol/L 37.0*   BUN mg/dL 20   CREATININE mg/dL 0.71   CALCIUM mg/dL 9.6   GLUCOSE mg/dL 276*       Impression: Resp failure  Dyspnea  Hypoxia  GOC  Plan: Patient has been continued to have hypoxia requiring high flow oxygen.  I will plan to touch base with the hospitalist to see what the overall plan is and hopefully have some further discussions with the patient over the next couple of days.  .        Amilcar Kumar DO  07/05/22  13:36 EDT

## 2022-07-05 NOTE — PROGRESS NOTES
Pulmonary Hospital Follow-up     Hospital:  LOS: 7 days   Ms. Chikis Cuellar, 77 y.o. female is followed for:   Acute respiratory failure with hypoxia (HCC)          History of present illness:   78 yo female with history of HTN. Dyslipidemia, GERD, diabetes mellitus admitted to the hospital on June 1 with hypoxemic respiratory failure.  Patient was initially admitted to ICU with severe hypoxemia.  Initially was thought to have atypical pneumonia.  Patient was concerned that she may have contracted COVID-19 infection as she started getting sick in the last week of April this year.  States that she was getting short of breath when she was seen by primary care and apparently a stress test was done.  However patient continued to decline and states that she could not even walk from her living room to the front of the house and came into the hospital at that time.  Prior to that she was doing well.  Denies any prolonged history of any pulmonary condition.  No frequent pneumonias or infections requiring hospitalization.  No other lung problems.  Remote smoker quit many years ago and no recent secondhand smoke exposure either.  No other unusual hobbies pets at home.  Denies any change in appetite or weight loss lately.  Denies any hemoptysis.  Denied any chest pain not admission at that time either.     Patient underwent COVID-19 testing x2 which was negative.  Viral panels were negative.  Cultures remain negative.  Patient was treated with antibiotics and steroids and started to improve and weaned down from high flow nasal cannula to regular nasal cannula.  Decision was made to keep patient on prolonged taper of steroids and she was discharged to rehab facility on June 27.  However on arrival to the facility patient was short of breath and according to patient and they had trouble hooking her up oxygen the right way and she started to feel more short of breath and started having some chest discomfort.  Patient states  that she was turning blue and then realized that her oxygen is not set up right and in the meantime EMS was called and she was brought back to hospital.  Here she was  admitted back to the floor and started on high flow nasal cannula.  Today morning patient states that she is feeling a lot better.  She is down to 55% on high flow nasal cannula saturating 92%.  Denies any chest pain currently.  Denies any cough or sputum production.  Denies any urinary complaints.  Denies any loose stool or diarrhea or abdominal pain.  No fevers noted either.      Subjective   Interval History:    Comfortable.  CONDE.  (+) Cough  No sputum production.    Device (Oxygen Therapy): heated, high-flow nasal cannula (07/05/22 1317) : Oxygen Concentration (%): 45 (07/05/22 1317),Flow (L/min): 65 (07/05/22 1317)      The patient's past medical, surgical and social history were reviewed and updated in Epic as appropriate.    {History:33}  History     Last Reviewed by Leon Monahan MD on 7/5/2022 at  1:40 PM    Sections Reviewed    Medical, Family, Surgical, Tobacco, Alcohol, Drug Use, Sexual Activity,   Social Documentation    Problem list reviewed by Leno Monahan MD on 7/5/2022 at  1:40 PM  Medicines reviewed by Leon Monahan MD on 7/5/2022 at  1:40 PM  Allergies reviewed by Leon Monahan MD on 7/5/2022 at  1:40 PM        O     Vitals:  Temp: 96.5 °F (35.8 °C) (07/05/22 1100) Temp  Min: 96 °F (35.6 °C)  Max: 97 °F (36.1 °C)   Temp core:      BP: 143/77 (07/05/22 1100) BP  Min: 107/70  Max: 173/77   Pulse: 69 (07/05/22 1317) Pulse  Min: 53  Max: 76   Resp: 20 (07/05/22 1317) Resp  Min: 18  Max: 22   SpO2: 90 % (07/05/22 1317) SpO2  Min: 89 %  Max: 97 %   Device: heated, high-flow nasal cannula (07/05/22 1317)    Flow Rate: 65 (07/05/22 1317) Flow (L/min)  Min: 45  Max: 65     Intake/Ouptut 24 hrs (7:00AM - 6:59 AM)  Intake & Output (last 3 days)       07/02 0701 07/03 0700 07/03 0701 07/04 0700 07/04 0701 07/05 0700 07/05  "0701  07/06 0700    P.O.  75 960 440    I.V. (mL/kg)        IV Piggyback 800 500 300     Total Intake(mL/kg) 800 (9.4) 575 (6.7) 1260 (14.6) 440 (5.1)    Urine (mL/kg/hr) 800 (0.4) 2200 (1.1) 850 (0.4)     Stool  0      Total Output 800 2200 850     Net 0 -1625 +410 +440            Urine Unmeasured Occurrence   1 x     Stool Unmeasured Occurrence  1 x          Objective:  Vital signs: (most recent): Blood pressure 143/77, pulse 69, temperature 96.5 °F (35.8 °C), temperature source Oral, resp. rate 20, height 162.6 cm (64\"), weight 86.2 kg (190 lb 1.6 oz), SpO2 90 %.            General Appearance: Awake, alert, in no acute distress on high flow NC  Lungs:   B/L Breath sounds present with decreased breath sounds on bases, no wheezing heard, occ basilar inspiratory crackles.   Heart: S1 and S2 present, no murmur  Abdomen: Soft, non-tender, no guarding or rigidity, bowel sounds positive.  Extremities: Atraumatic, no cyanosis or clubbing,  no edema, warm to touch.  Neurologic:  Moving all four extremities. Good strength bilaterally.  Psychologic: Appropriate affect, Cooperative.      Hematology:  Results from last 7 days   Lab Units 07/05/22 0735 07/04/22  0436 07/03/22  0438 07/02/22  1549   WBC 10*3/mm3 16.22* 11.45* 14.73* 17.50*   HEMOGLOBIN g/dL 10.8* 11.5* 11.8* 12.0   MCV fL 83.6 86.2 85.8 83.7   PLATELETS 10*3/mm3 279 251 227 250   NEUTROS ABS 10*3/mm3  --   --  11.18* 15.32*   LYMPHS ABS 10*3/mm3  --   --  1.32 0.87   EOS ABS 10*3/mm3  --   --  1.43* 0.64*     Chemistry:  Estimated Creatinine Clearance: 69.4 mL/min (by C-G formula based on SCr of 0.71 mg/dL).  Results from last 7 days   Lab Units 07/05/22 0735 07/04/22  0436   SODIUM mmol/L 138 139   POTASSIUM mmol/L 5.3* 5.0   CHLORIDE mmol/L 97* 96*   CO2 mmol/L 37.0* 36.0*   BUN mg/dL 20 16   CREATININE mg/dL 0.71 0.61   GLUCOSE mg/dL 276* 193*     Results from last 7 days   Lab Units 07/05/22  0735 07/04/22  0436 07/03/22  0438   CALCIUM mg/dL 9.6 9.5 9.0 "   MAGNESIUM mg/dL  --  2.1 1.6     COVID-19  Lab Results   Component Value Date    COVID19 Not Detected 06/28/2022    COVID19 Not Detected 06/02/2022    COVID19 Not Detected 06/01/2022     Images:  XR Chest 1 View    Result Date: 7/4/2022  1.  PICC line tip is in the SVC. Electronically signed by:  Blaze Bond M.D.  7/4/2022 5:08 AM Mountain Time    Echo:  Results for orders placed during the hospital encounter of 06/01/22    Adult Transthoracic Echo Complete W/ Cont if Necessary Per Protocol    Interpretation Summary  · The right atrial cavity is mildly dilated.  · Estimated right ventricular systolic pressure from tricuspid regurgitation is mildly elevated (35-45 mmHg). Calculated right ventricular systolic pressure from tricuspid regurgitation is 40 mmHg.  · Estimated left ventricular EF = 60% Estimated left ventricular EF was in agreement with the calculated left ventricular EF. Left ventricular ejection fraction appears to be 56 - 60%. Left ventricular systolic function is normal.  · Left ventricular diastolic function is consistent with age.  · Mild pulmonary hypertension is present.  · Normal right ventricular wall thickness, systolic function and septal motion noted with the right ventricular cavity mild to moderately dilated.  · There is no evidence of pericardial effusion.  · No significant structural or functional valvular abnormalities demonstrated.      Results: Reviewed.  I reviewed the patient's new laboratory and imaging results.  I independently reviewed the patient's new images.    Medications: Reviewed.    Assessment & Plan   Impression      07/4/22       Diagnosis   • Acute respiratory failure with hypoxia ([facility]) [J96.01]  Diffuse bilateral infiltrative process and interstitial changes persisting as per CT Scan Chest   • Type 2 diabetes mellitus, with long-term current use of insulin (HCC) [E11.9, Z79.4]  With hyperglycemia at present.  Per Hospitalist    Results from last 7 days   Lab  Units 07/05/22  1121 07/05/22  0710 07/04/22  2000 07/04/22  1614 07/04/22  1108 07/04/22  0710   GLUCOSE mg/dL 322* 293* 300* 377* 322* 263*     Lab Results   Lab Value Date/Time    HGBA1C 6.20 (H) 06/03/2022 0510    HGBA1C 6.40 (H) 04/13/2019 2325      • Essential hypertension [I10]  Per Hospitalist        Plan        1. Continue HFNC and wean as tolerated.  2. Continue bronchodilators.  3. Continue steroids - 80 mg/d at present. Which will make her more hyperglycemic.  4. Palliative following    We will follow as needed. Please call back if condition changes.

## 2022-07-05 NOTE — CASE MANAGEMENT/SOCIAL WORK
Continued Stay Note  [facility]     Patient Name: Chikis Cuellar  MRN: [id number]  Today's Date: 7/5/2022    Admit Date: 6/27/2022     Discharge Plan     Row Name 07/05/22 1540       Plan    Plan discharge plan    Plan Comments I spoke with pt in room with permission regarding discharge plan and 2 adult children upon leaving pt's room . Pt states she does not want to go back to [facility] and both adult children(Wilver and Aury) confirmed this. Pt states her spouse is in a LTC bed at [facility] and pt and spouse are on a waiting list for [facility] and [facility]. .Pt and family want referrals made to [facility], [facility] and [facility]. Pt currently on high flow O2 and not medically ready for discharge.  CM will make referrals when closer to medical readiness.    Final Discharge Disposition Code 03 - skilled nursing facility (SNF)               Discharge Codes    No documentation.               Expected Discharge Date and Time     Expected Discharge Date Expected Discharge Time    Jul 8, 2022             Agata Carter RN

## 2022-07-05 NOTE — PLAN OF CARE
Problem: Adult Inpatient Plan of Care  Goal: Plan of Care Review  Outcome: Ongoing, Progressing  Goal: Patient-Specific Goal (Individualized)  Outcome: Ongoing, Progressing  Goal: Absence of Hospital-Acquired Illness or Injury  Outcome: Ongoing, Progressing  Goal: Optimal Comfort and Wellbeing  Outcome: Ongoing, Progressing  Goal: Readiness for Transition of Care  Outcome: Ongoing, Progressing     Problem: Skin Injury Risk Increased  Goal: Skin Health and Integrity  Outcome: Ongoing, Progressing     Problem: Diabetes Comorbidity  Goal: Blood Glucose Level Within Targeted Range  Outcome: Ongoing, Progressing     Problem: Hypertension Comorbidity  Goal: Blood Pressure in Desired Range  Outcome: Ongoing, Progressing     Problem: Osteoarthritis Comorbidity  Goal: Maintenance of Osteoarthritis Symptom Control  Outcome: Ongoing, Progressing     Problem: Pain Chronic (Persistent) (Comorbidity Management)  Goal: Acceptable Pain Control and Functional Ability  Outcome: Ongoing, Progressing     Problem: Fall Injury Risk  Goal: Absence of Fall and Fall-Related Injury  Outcome: Ongoing, Progressing     Problem: Palliative Care  Goal: Enhanced Quality of Life  Outcome: Ongoing, Progressing   Goal Outcome Evaluation:

## 2022-07-05 NOTE — NURSING NOTE
"Reason for Wound, Ostomy and Continence (WOC) Nursing Consult: \"Rash bilateral back and axilla\"    Patient awake, complaining of pruritus.  Family/support person not present.      Skin assessed and the following noted:    1.Wound Assessment  Wound Type: Drug-related rash vs. yeast dermatitis?  Location: Bilateral back/scapula, bilateral armpits/axilla, underside of bilateral breasts  Wound Bed: dry, pink, flaky skin, pruritis    Wound Edges: Irregular  Periwound Skin: intact, blanchable and pink   Drainage Amount: none  Pain: No   Wound Image:       Recommendation(s) for management of wound: Discussed with physician caring for patient.  Unclear whether this is fungal versus drug-induced?  Patient currently on Vanc, Rocephin and IV fluconazole.  Recommend adding p.o. Benadryl to aid with pruritus.  Will switch nystatin powder to Micotin cream.  If no improvement would consider ID consult while inpatient and dermatology appointment once DC'd.    Most recent Diony Scale score:   Sensory Perception: 3-->slightly limited  Moisture: 3-->occasionally moist  Activity: 3-->walks occasionally  Mobility: 2-->very limited  Nutrition: 3-->adequate  Friction and Shear: 2-->potential problem  Diony Score: 16 (07/04/22 2000)     Specialty support surface: Foam Mattress with Waffle Overlay       Pressure Injury Prevention Protocol (initiate for Diony Score of 18 or less):   *Keep skin dry, turn q 2 hr, keep heels elevated and offloaded with offloading heel boots.    *Apply z-guard to bottom and bony prominences daily and as needed with incontinence episodes.  *Follow C.A.R.E protocol if medical devices (Bipap, singh, Ng tube, etc) are being used.     All skin interventions in place.  Head to toe assessment completed. Heels and bottom blanchable, intact and offloaded.  Discussed plan of care with  RN.    Thank you for consulting the WOC Nurse.  WOC Team will sign off.  Please re consult if wound(s) worsens.     Stuart " Carlos RN, BSN, CCRN, CWOCN  Wound, Ostomy and Continence (WOC) Department  [facility]

## 2022-07-05 NOTE — CONSULTS
INFECTIOUS DISEASE CONSULT/INITIAL HOSPITAL VISIT    Chikis Cuellar  1945  [id number]    Date of consult: 2022    Admit date: 2022    Requesting Provider: No ref. provider found  Evaluating physician: Percy Parker MD  Reason for Consultation: Back and arm cellulitis, intertrigo, recent pneumonia  Chief Complaint: Above      Subjective   History of present illness:  Patient is a very pleasant  77 y.o.  Yr old female with diabetes mellitus type 2, essential hypertension, hyperlipidemia, arthritis, fibromyalgia, recent hospitalization 2022 until 2022 with pneumonia and ARDS, discharged to rehabilitation on  and readmitted on the same day related to worsening acute hypoxic respiratory failure.  Patient on first admission was thought to have an atypical pneumonia treated with IV antibiotics and steroids.  COVID-19 testing was negative x2.  Viral panel is negative.  Cultures were negative.  Pulmonary continues to follow and felt that her respiratory problems were related to ARDS fibrotic changes.  RICARDO and ANCA from  were negative.  Rheumatoid factor elevated at 21.0 (upper limit of normal 14).  Legionella urine antigen and streptococcal urine antigen were negative on 6/3.  MRSA screen was positive on .  The patient has been treated with steroids which have been increased because of worsening respiratory function.  Patient then developed bilateral axillary increased erythema concerning for candidiasis or cellulitis.  Antibiotics were started on 2022.  Vancomycin, ceftriaxone, and IV fluconazole.  I was consulted on 2022 for further evaluation and treatment.    Past Medical History:   Diagnosis Date   • Arthritis    • Diabetes mellitus (HCC)    • Fibromyalgia    • Hyperlipidemia    • Hypertension        Past Surgical History:   Procedure Laterality Date   • BACK SURGERY     •  SECTION     • CHOLECYSTECTOMY     • HYSTERECTOMY     • REPLACEMENT TOTAL KNEE          Pediatric History   Patient Parents   • Not on file     Other Topics Concern   • Not on file   Social History Narrative   • Not on file       family history includes No Known Problems in her father and mother.    Allergies   Allergen Reactions   • Altace [Ramipril] Palpitations   • Trazodone Hallucinations       Immunization History   Administered Date(s) Administered   • COVID-19 (PFIZER) PURPLE CAP 12/30/2020, 01/20/2021, 09/10/2021   • Covid-19 (Pfizer) Gray Cap 04/29/2022       Medication:    Current Facility-Administered Medications:   •  acetaminophen (TYLENOL) tablet 650 mg, 650 mg, Oral, Q6H PRN, Sarath Gutiérrez PA-C, 650 mg at 06/29/22 2132  •  albuterol (PROVENTIL) nebulizer solution 0.083% 2.5 mg/3mL, 2.5 mg, Nebulization, Q6H PRN, Vanesa Enciso APRN  •  ALPRAZolam (XANAX) tablet 0.5 mg, 0.5 mg, Oral, Daily PRN, Lola Neri DO, 0.5 mg at 07/04/22 2248  •  sennosides-docusate (PERICOLACE) 8.6-50 MG per tablet 2 tablet, 2 tablet, Oral, BID, 2 tablet at 07/05/22 0935 **AND** polyethylene glycol (MIRALAX) packet 17 g, 17 g, Oral, Daily PRN **AND** bisacodyl (DULCOLAX) EC tablet 5 mg, 5 mg, Oral, Daily PRN, 5 mg at 07/05/22 0539 **AND** bisacodyl (DULCOLAX) suppository 10 mg, 10 mg, Rectal, Daily PRN, Elsie Griffith DO, 10 mg at 07/03/22 1413  •  dextrose (D50W) (25 g/50 mL) IV injection 25 g, 25 g, Intravenous, Q15 Min PRN, Vanesa Enciso APRN  •  dextrose (GLUTOSE) oral gel 15 g, 15 g, Oral, Q15 Min PRN, Vanesa Enciso APRN  •  diphenhydrAMINE (BENADRYL) capsule 25 mg, 25 mg, Oral, Q6H PRN, Negra, Adithya, DO, 25 mg at 07/05/22 1100  •  Enoxaparin Sodium (LOVENOX) syringe 40 mg, 40 mg, Subcutaneous, Q24H, Vanesa Enciso APRN, 40 mg at 07/05/22 0538  •  FLUoxetine (PROzac) capsule 40 mg, 40 mg, Oral, Daily, Vanesa Enciso APRN, 40 mg at 07/05/22 0936  •  glucagon (human recombinant) (GLUCAGEN DIAGNOSTIC) injection 1 mg, 1 mg, Intramuscular, Q15 Min PRN, Vanesa Enciso APRN  •   HYDROcodone-acetaminophen (NORCO)  MG per tablet 1 tablet, 1 tablet, Oral, Q6H PRN, Adithya Omer, , 1 tablet at 07/05/22 0940  •  hydrocortisone 2.5 % cream 1 application, 1 application, Topical, Q12H, Adithya Omer, , 1 application at 07/05/22 0944  •  insulin detemir (LEVEMIR) injection 20 Units, 20 Units, Subcutaneous, Q12H, Adithya Omer, DO  •  Insulin Lispro (humaLOG) injection 0-24 Units, 0-24 Units, Subcutaneous, TID AC, Adithya Omer, DO, 16 Units at 07/05/22 1252  •  Insulin Lispro (humaLOG) injection 10 Units, 10 Units, Subcutaneous, TID With Meals, Elsie Griffith, DO, 10 Units at 07/05/22 1253  •  ipratropium-albuterol (DUO-NEB) nebulizer solution 3 mL, 3 mL, Nebulization, Q4H PRN, Vanesa Enciso APRN  •  ipratropium-albuterol (DUO-NEB) nebulizer solution 3 mL, 3 mL, Nebulization, 4x Daily - RT, Vanesa Enciso APRN, 3 mL at 07/05/22 1317  •  lactobacillus acidophilus (RISAQUAD) capsule 1 capsule, 1 capsule, Oral, Daily, Adithya Omer, , 1 capsule at 07/05/22 1253  •  losartan (COZAAR) tablet 25 mg, 25 mg, Oral, Daily, Vanesa Enciso APRN, 25 mg at 07/05/22 0935  •  Magnesium Sulfate 2 gram Bolus, followed by 8 gram infusion (total Mg dose 10 grams)- Mg less than or equal to 1mg/dL, 2 g, Intravenous, PRN **OR** Magnesium Sulfate 2 gram / 50mL Infusion (GIVE X 3 BAGS TO EQUAL 6GM TOTAL DOSE) - Mg 1.1 - 1.5 mg/dl, 2 g, Intravenous, PRN, Last Rate: 25 mL/hr at 07/01/22 1848, 2 g at 07/01/22 1848 **OR** Magnesium Sulfate 4 gram infusion- Mg 1.6-1.9 mg/dL, 4 g, Intravenous, PRN, Lola Neri G, DO, Last Rate: 25 mL/hr at 07/03/22 1320, 4 g at 07/03/22 1320  •  methylPREDNISolone sodium succinate (SOLU-Medrol) injection 40 mg, 40 mg, Intravenous, Q12H, Gold Flores MD, 40 mg at 07/05/22 0538  •  miconazole (MICOTIN) 2 % cream 1 application, 1 application, Topical, Q12H, Adithya Omer DO, 1 application at 07/05/22 1253  •  oxybutynin XL (DITROPAN-XL) 24 hr tablet 5 mg, 5 mg, Oral,  Daily, Vanesa Enciso APRN, 5 mg at 07/05/22 0936  •  pantoprazole (PROTONIX) EC tablet 40 mg, 40 mg, Oral, Q AM, Vanesa Enciso APRN, 40 mg at 07/05/22 0539  •  pravastatin (PRAVACHOL) tablet 40 mg, 40 mg, Oral, Nightly, Vanesa Enciso APRN, 40 mg at 07/04/22 2033  •  pregabalin (LYRICA) capsule 150 mg, 150 mg, Oral, TID, Lola Neri, DO, 150 mg at 07/05/22 0936  •  sodium chloride 0.9 % flush 10 mL, 10 mL, Intravenous, PRN, CabreraAndrea MD  •  sodium chloride 0.9 % flush 10 mL, 10 mL, Intravenous, Q12H, Vanesa Enciso APRN, 10 mL at 07/04/22 2035  •  sodium chloride 0.9 % flush 10 mL, 10 mL, Intravenous, PRN, Vanesa Enciso APRN  •  sodium chloride 0.9 % flush 10 mL, 10 mL, Intravenous, Q12H, Elsie Griffith DO, 10 mL at 07/04/22 2034  •  sodium chloride 0.9 % flush 10 mL, 10 mL, Intravenous, PRN, Elsie Griffith, DO    Please refer to the medical record for a full medication list    Review of Systems:    Constitutional-- No Fever, chills or sweats.  Appetite good, and no malaise. No fatigue.  HEENT-- No new vision, hearing or throat complaints.  No epistaxis or oral sores.  Denies odynophagia or dysphagia.  No odynophagia or dysphagia. No headache, photophobia or neck stiffness.  CV-- No chest pain, palpitation or syncope  Resp--continued SOB/nonproductive cough/no hemoptysis  GI- No nausea, vomiting, or diarrhea.  No hematochezia, melena, or hematemesis. Denies jaundice or chronic liver disease.  -- No dysuria, hematuria, or flank pain.  Denies hesitancy, urgency or flank pain.  Lymph- no swollen lymph nodes in neck/axilla or groin.   Heme- No active bruising or bleeding; no Hx of DVT or PE.  MS-- no swelling or pain in the bones or joints of arms/legs.  No new back pain.  Neuro-- No acute focal weakness or numbness in the arms or legs.  No seizures.  Skin--No rashes or lesions, except bilateral axillary as per HPI    Physical Exam:   Vital Signs   Temp:  [96 °F (35.6 °C)-97 °F (36.1  "°C)] 96.5 °F (35.8 °C)  Heart Rate:  [53-76] 69  Resp:  [18-22] 20  BP: (107-173)/(65-77) 143/77    Temp  Min: 96 °F (35.6 °C)  Max: 97 °F (36.1 °C)  BP  Min: 107/70  Max: 173/77  Pulse  Min: 53  Max: 76  Resp  Min: 18  Max: 22  SpO2  Min: 89 %  Max: 97 %    Blood pressure 143/77, pulse 69, temperature 96.5 °F (35.8 °C), temperature source Oral, resp. rate 20, height 162.6 cm (64\"), weight 86.2 kg (190 lb 1.6 oz), SpO2 90 %.  GENERAL: Awake and alert, in moderate distress. Appears older than stated age.  HEENT:  Normocephalic, atraumatic.  Oropharynx without thrush. Dentition in good repair. No cervical adenopathy. No neck masses.  Ears externally normal, Nose externally normal.  EYES: PERRL. No conjunctival injection. No icterus. EOM full.  LYMPHATICS: No lymphadenopathy of the neck or axillary or inguinal regions.   HEART: No murmur, gallop, or pericardial friction rub. Reg rate rhythm, No JVD at 45 degrees.  LUNGS: Bilateral rales. No respiratory distress, no use of accessory muscles.  ABDOMEN: Soft, nontender, nondistended. No appreciable HSM.  Bowel sounds normal.  GENITAL: No external lesions, breasts without masses, back straight, no CVAT, rectal external without lesions.   SKIN: Warm and dry without cutaneous eruptions, except bilateral axillary with increased erythema, no crepitus or bullae.  No nodules.  Some extension on the right side to the back.  PSYCHIATRIC: Mental status lucid. No confusion.  EXT:  No cellulitic change.  NEURO: Oriented to name, CN 2 to 12 intact, DTR 1 + and symmetric, sensory intact to LT upper and lower extremitiy, motor 5/5 upper and lower extremity, cerebellar and gait not tested.      Results Review:   I reviewed the patient's new clinical results.  I reviewed the patient's new imaging results and agree with the interpretation.  I reviewed the patient's other test results and agree with the interpretation    Results from last 7 days   Lab Units 07/05/22  0735 07/04/22  0436 " 07/03/22  0438   WBC 10*3/mm3 16.22* 11.45* 14.73*   HEMOGLOBIN g/dL 10.8* 11.5* 11.8*   HEMATOCRIT % 33.1* 36.9 36.8   PLATELETS 10*3/mm3 279 251 227     Results from last 7 days   Lab Units 07/05/22  0735   SODIUM mmol/L 138   POTASSIUM mmol/L 5.3*   CHLORIDE mmol/L 97*   CO2 mmol/L 37.0*   BUN mg/dL 20   CREATININE mg/dL 0.71   GLUCOSE mg/dL 276*   CALCIUM mg/dL 9.6         Results from last 7 days   Lab Units 06/29/22  0827   SED RATE mm/hr 37*         Results from last 7 days   Lab Units 07/04/22  0436   VANCOMYCIN RM mcg/mL 11.10         Estimated Creatinine Clearance: 69.4 mL/min (by C-G formula based on SCr of 0.71 mg/dL).    Microbiology:  Microbiology Results Abnormal       Procedure Component Value - Date/Time    COVID PRE-OP / PRE-PROCEDURE SCREENING ORDER (NO ISOLATION) - Swab, Nasopharynx [[id number]]  (Normal) Collected: 06/28/22 0027    Lab Status: Final result Specimen: Swab from Nasopharynx Updated: 06/28/22 0104    Narrative:      The following orders were created for panel order COVID PRE-OP / PRE-PROCEDURE SCREENING ORDER (NO ISOLATION) - Swab, Nasopharynx.  Procedure                               Abnormality         Status                     ---------                               -----------         ------                     COVID-19 and FLU A/B PCR...[[id number]]  Normal              Final result                 Please view results for these tests on the individual orders.    COVID-19 and FLU A/B PCR - Swab, Nasopharynx [[id number]]  (Normal) Collected: 06/28/22 0027    Lab Status: Final result Specimen: Swab from Nasopharynx Updated: 06/28/22 0104     COVID19 Not Detected     Influenza A PCR Not Detected     Influenza B PCR Not Detected    Narrative:      Fact sheet for providers: https://www.fda.gov/media/796840/download    Fact sheet for patients: https://www.fda.gov/media/775024/download    Test performed by PCR.            Radiology:  Imaging Results (Last 72 Hours)       Procedure  Component Value Units Date/Time    XR Chest 1 View [[id number]] Collected: 07/04/22 0703     Updated: 07/04/22 0709    Narrative:      EXAMINATION: XR CHEST 1 VW      DATE OF EXAM: 7/4/2022 6:15 AM    HISTORY: PICC line placement.    COMPARISON: None.    FINDINGS:     There is a new left PIC line with the tip in the SVC. There is cardiomegaly with some patchy airspace disease in the lungs that appears similar and there is a right pleural effusion. There is cardiomegaly. Bones are demineralized.        Impression:        1.  PICC line tip is in the SVC.     Electronically signed by:  Blaze Bond M.D.    7/4/2022 5:08 AM Mountain Time    CT Angiogram Chest [[id number]] Collected: 07/02/22 1414     Updated: 07/02/22 1421    Narrative:      DATE OF EXAM: 7/2/2022 1:53 PM     PROCEDURE: CT ANGIOGRAM CHEST-     INDICATIONS: Hypoxemia, recent pneumonia and worsening O2 needs.;  J96.01-Acute respiratory failure with hypoxia; R06.02-Shortness of  breath; J18.9-Pneumonia, unspecified organism     COMPARISON: No comparisons available.     TECHNIQUE: Contiguous axial imaging was obtained from the thoracic inlet  through the upper abdomen following the intravenous administration of 42  mL of Isovue 370. Reconstructed coronal and sagittal images were also  obtained. Automated exposure control and iterative reconstruction  methods were used.        FINDINGS:  VASCULAR FINDINGS: There is no definite evidence for pulmonary embolus  on this exam. There is coronary artery calcification.     NONVASCULAR FINDINGS: There are diffuse bilateral pulmonary infiltrates  which have been noted. There has not been improvement in appearance of  the lungs. There are no large pleural effusions. It looks like there is  some underlying bronchiectasis and septal thickening. There is slight  depression of the antrum endplate of T12 which is unchanged.          Impression:      1.  No definite evidence for pulmonary embolus.  2.  Diffuse bilateral  pulmonary parenchymal changes similar to prior  study.  3.  Coronary artery calcification is noted.     This report was finalized on 7/2/2022 2:18 PM by Salomón Cardenas MD.               IMPRESSION:     1.  Axillary candidiasis in the setting of diabetes mellitus type 2 on steroids for worsening respiratory failure.  May have superimposed cellulitis.  2.  Possible back cellulitis versus candidiasis.  As per above.  Previously MRSA colonized.  3.  Atypical pneumonia with likely ARDS postinfectious fibrotic changes with acute hypoxic respiratory failure.  On steroids to see how she responds.  High risk for invasive diagnostic testing, and does not want to be intubated.  4.  Leukocytosis, neutrophilic related to above issues.  Steroids likely pushing white count higher.  5.  Anemia, chronic disease related to above issues.  6.  Acute hypoxic respiratory failure.  On 65 L/min on 7/5/2022 related to above issues.  7.  Diabetes mellitus type 2 with increased risk for infection.    RECOMMENDATIONS:    1.  Diagnostically, continue to follow patient's physical exam, CBC, CMP, CRP, Aspergillus galactomannan assay, histoplasma and blastomycosis urine antigen, Fungitell, strongyloidiasis serology, and radiographic studies.  Previous Legionella and streptococcal antigen negative.  2.  Therapeutically, consider treatment with fluconazole 200 mg daily, triamcinolone/clotrimazole cream twice daily to axillary and back, duration to be determined.  Check QTc interval.  May need to consider pneumocystis prevention if continues on high-dose steroids.  3.  Oxygen support therapy.  65 L/min on 7/5/2022.    I discussed the patient's findings and my recommendations with patient, family and nursing staff    Thank you for asking me to see Chikis Cuellar.  Our group would be pleased to follow this patient over the course of their hospitalization and assist with outpatient antimicrobial therapy, as indicated.  Further recommendations depend on  the results of the cultures and clinical course.    Percy Parker MD  7/5/2022

## 2022-07-05 NOTE — PROGRESS NOTES
[facility] Medicine Services  PROGRESS NOTE    Patient Name: Chikis Cuellar  : 1945  MRN: [id number]    Date of Admission: 2022  Primary Care Physician: Eduardo Gamboa MD    Subjective   Subjective     CC:  F/U acute respiratory failure with hypoxia     HPI:    I talked with the wound care nurse and reviewed his note.  Recommends a potential infectious disease consult.  Also changed to more of a cream.  From the nystatin powders.  Her vital signs are reviewed and are stable.  Her blood sugars are still quite elevated.  We just increased her corrective insulin yesterday.  Per review of her sugars it does seem that this is being more effective in decreasing the rise in her blood sugars.  Per nursing reports she is still having itching today and requested Benadryl which I told him was fine.  I reviewed her labs this morning which shows a slightly elevated potassium.  This should come down with her improving blood glucose control.            ROS:  Gen- No fevers, chills  CV- No chest pain, palpitations  Resp- cough but nonproductive  GI- No N/V/D, abd pain  Skin- itchy        Objective   Objective     Vital Signs:   Temp:  [96 °F (35.6 °C)-97 °F (36.1 °C)] 96.5 °F (35.8 °C)  Heart Rate:  [53-81] 61  Resp:  [18-22] 20  BP: (107-173)/(65-77) 143/77  Flow (L/min):  [45-64] 64     Physical Exam:  Constitutional: No acute distress, awake, alert  HENT: NCAT, mucous membranes moist  Respiratory: bilateral dry expiratory crackles  Cardiovascular: RRR, no murmurs, rubs, or gallops  Gastrointestinal: Positive bowel sounds, soft, nontender, nondistended  Musculoskeletal: No bilateral ankle edema  Psychiatric: Appropriate affect, cooperative  Neurologic: Oriented x 3,  speech clear  Skin: Erythema under L armpit improved, Erythema R armpit without much change but has extended further around to her back.    Results Reviewed:  LAB RESULTS:      Lab 22  0735 22  0436  07/03/22 0438 07/02/22  1549 07/01/22  0424 06/30/22  0727 06/30/22  0727 06/29/22  0827   WBC 16.22* 11.45* 14.73* 17.50* 16.43*   < > 13.36*  --    HEMOGLOBIN 10.8* 11.5* 11.8* 12.0 13.4   < > 13.0  --    HEMATOCRIT 33.1* 36.9 36.8 37.4 41.9   < > 40.2  --    PLATELETS 279 251 227 250 270   < > 243  --    NEUTROS ABS  --   --  11.18* 15.32*  --   --  9.12*  --    IMMATURE GRANS (ABS)  --   --  0.06* 0.08*  --   --  0.05  --    LYMPHS ABS  --   --  1.32 0.87  --   --  2.11  --    MONOS ABS  --   --  0.69 0.55  --   --  0.71  --    EOS ABS  --   --  1.43* 0.64*  --   --  1.33*  --    MCV 83.6 86.2 85.8 83.7 84.5   < > 84.1  --    SED RATE  --   --   --   --   --   --   --  37*    < > = values in this interval not displayed.         Lab 07/05/22  0735 07/04/22 0436 07/03/22 0438 07/02/22  1549 07/01/22 0423 06/30/22  0727   SODIUM 138 139 142 137 139 142   POTASSIUM 5.3* 5.0 4.0 4.3 3.6 3.9   CHLORIDE 97* 96* 98 97* 96* 99   CO2 37.0* 36.0* 33.0* 31.0* 33.0* 33.0*   ANION GAP 4.0* 7.0 11.0 9.0 10.0 10.0   BUN 20 16 21 24* 24* 21   CREATININE 0.71 0.61 0.66 0.73 0.78 0.61   EGFR 87.2 91.6 89.9 84.3 77.9 91.6   GLUCOSE 276* 193* 76 223* 130* 151*   CALCIUM 9.6 9.5 9.0 9.3 9.8 9.9   MAGNESIUM  --  2.1 1.6 2.2 1.5* 1.8             Lab 07/03/22  0438   PROBNP 198.8                 Brief Urine Lab Results     None          Microbiology Results Abnormal     Procedure Component Value - Date/Time    COVID PRE-OP / PRE-PROCEDURE SCREENING ORDER (NO ISOLATION) - Swab, Nasopharynx [[id number]]  (Normal) Collected: 06/28/22 0027    Lab Status: Final result Specimen: Swab from Nasopharynx Updated: 06/28/22 0104    Narrative:      The following orders were created for panel order COVID PRE-OP / PRE-PROCEDURE SCREENING ORDER (NO ISOLATION) - Swab, Nasopharynx.  Procedure                               Abnormality         Status                     ---------                               -----------         ------                      COVID-19 and FLU A/B PCR...[[id number]]  Normal              Final result                 Please view results for these tests on the individual orders.    COVID-19 and FLU A/B PCR - Swab, Nasopharynx [[id number]]  (Normal) Collected: 06/28/22 0027    Lab Status: Final result Specimen: Swab from Nasopharynx Updated: 06/28/22 0104     COVID19 Not Detected     Influenza A PCR Not Detected     Influenza B PCR Not Detected    Narrative:      Fact sheet for providers: https://www.fda.gov/media/713963/download    Fact sheet for patients: https://www.fda.gov/media/642754/download    Test performed by PCR.          XR Chest 1 View    Result Date: 7/4/2022  EXAMINATION: XR CHEST 1 VW  DATE OF EXAM: 7/4/2022 6:15 AM HISTORY: PICC line placement. COMPARISON: None. FINDINGS: There is a new left PIC line with the tip in the SVC. There is cardiomegaly with some patchy airspace disease in the lungs that appears similar and there is a right pleural effusion. There is cardiomegaly. Bones are demineralized.     Impression: 1.  PICC line tip is in the SVC. Electronically signed by:  Blaze Bond M.D.  7/4/2022 5:08 AM Mountain Time      Results for orders placed during the hospital encounter of 06/01/22    Adult Transthoracic Echo Complete W/ Cont if Necessary Per Protocol    Interpretation Summary  · The right atrial cavity is mildly dilated.  · Estimated right ventricular systolic pressure from tricuspid regurgitation is mildly elevated (35-45 mmHg). Calculated right ventricular systolic pressure from tricuspid regurgitation is 40 mmHg.  · Estimated left ventricular EF = 60% Estimated left ventricular EF was in agreement with the calculated left ventricular EF. Left ventricular ejection fraction appears to be 56 - 60%. Left ventricular systolic function is normal.  · Left ventricular diastolic function is consistent with age.  · Mild pulmonary hypertension is present.  · Normal right ventricular wall thickness, systolic  function and septal motion noted with the right ventricular cavity mild to moderately dilated.  · There is no evidence of pericardial effusion.  · No significant structural or functional valvular abnormalities demonstrated.      I have reviewed the medications:  Scheduled Meds:enoxaparin, 40 mg, Subcutaneous, Q24H  fluconazole, 400 mg, Intravenous, Daily  FLUoxetine, 40 mg, Oral, Daily  hydrocortisone, 1 application, Topical, Q12H  insulin detemir, 14 Units, Subcutaneous, Q12H  insulin lispro, 0-24 Units, Subcutaneous, TID AC  Insulin Lispro, 10 Units, Subcutaneous, TID With Meals  ipratropium-albuterol, 3 mL, Nebulization, 4x Daily - RT  lactobacillus acidophilus, 1 capsule, Oral, Daily  losartan, 25 mg, Oral, Daily  methylPREDNISolone sodium succinate, 40 mg, Intravenous, Q12H  miconazole, 1 application, Topical, Q12H  oxybutynin XL, 5 mg, Oral, Daily  pantoprazole, 40 mg, Oral, Q AM  pravastatin, 40 mg, Oral, Nightly  pregabalin, 150 mg, Oral, TID  senna-docusate sodium, 2 tablet, Oral, BID  sodium chloride, 10 mL, Intravenous, Q12H  sodium chloride, 10 mL, Intravenous, Q12H  vancomycin, 1,000 mg, Intravenous, Q12H      Continuous Infusions:   PRN Meds:.•  acetaminophen  •  albuterol  •  ALPRAZolam  •  senna-docusate sodium **AND** polyethylene glycol **AND** bisacodyl **AND** bisacodyl  •  dextrose  •  dextrose  •  diphenhydrAMINE  •  glucagon (human recombinant)  •  HYDROcodone-acetaminophen  •  ipratropium-albuterol  •  magnesium sulfate **OR** magnesium sulfate **OR** magnesium sulfate  •  sodium chloride  •  sodium chloride  •  sodium chloride    Assessment & Plan   Assessment & Plan     Active Hospital Problems    Diagnosis  POA   • **Acute respiratory failure with hypoxia (HCC) [J96.01]  Yes   • Hypomagnesemia [E83.42]  Unknown   • Leukocytosis [D72.829]  Yes   • Chest pain [R07.9]  Yes   • Fibromyalgia [M79.7]  Yes   • Atypical pneumonia [J18.9]  Yes   • Type 2 diabetes mellitus, with long-term current  "use of insulin (HCC) [E11.9, Z79.4]  Not Applicable   • Essential hypertension [I10]  Yes   • Hyperlipemia [E78.5]  Yes   • GERD without esophagitis [K21.9]  Yes   • Mood disorder (HCC) [F39]  Yes      Resolved Hospital Problems   No resolved problems to display.        Brief Hospital Course to date:  Chikis Cuellar is a 77 y.o. female w/ a hx of T2DM, HTN, HLD, GERD, fibromyalgia, anxiety who presented to the ED w/ c/o hypoxia.   Pt admitted to [facility] 6/1 - 6/27/22. Pt initially presented w/ c/o severe dyspnea. Pt was admitted to the ICU from the ED and placed on HFNC, IV steroids and IV antibiotics. Pt underwent an extensive work-up including infectious, autoimmune, cardiac which were predominantly unrevealing.  Advanced chest imaging was felt to be consistent with atypical pneumonia somewhat classic for the appearance of COVID-19 although COVID remained negative despite pt reports of several family members w/ recent positive COVID tests. Pt was d/c on a steroid taper and on 5 liters of supplemental oxygen. Pt was on room air prior to her admission. Pt was d/c to [facility] earlier today for inpatient rehabilitation. Pt was scheduled to f/u w/ Pulmonary (TBD) and PCP within 1-2 wks of d/c from rehab.  Pt was taken to rehab after d/c 6/27. Pt reports that there were \"issues\" with her oxygen not being hooked up upon her arrival to rehab. Per pt report, she was without supplemental oxygen for about 30 minutes. Pt reports that oxygen saturations dropped to the 70's and she then developed left sided chest pain. Despite a nebulizer treatment pt's oxygen saturations did not improve. EMS placed pt on a non-rebreather and pt's saturations responded. EMS attempted to wean pt back to 5 liters but Pt was unable to maintain saturations on 5 liters and was brought back to the ED for further evaluation.    This patient's problems and plans were partially entered by my partner and updated as appropriate by me 07/05/22.     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  -continuing steroids per Pulm.     Leukocytosis   -afebrile   -CXR without new findings   -Likely secondary to steroids  -AM labs reviewed, continue to monitor.    B/L underarm Candidiasis/R upper back cellulitis   Superimposed Cellulitis   -Continue Nystatin powder  -Interdry cloths  -Received 1x dose oral Fluconazole 7/1  -WO recommendations are appreciated.   -Due to concern for superimposed cellulitis, will add IV ABX/anti-fungals today and see if improved. Vanc + Rocephin + IV Fluconazole   -Given that she is receiving IV anti-fungals and anti-biotics I feel that everything is being covered here. Will add hydrocortisone cream to help with the itching. Biggest need is to keep the area dry.  -consult to infectious disease     T2DM  -hem a1c 6.2% on 6/3/22  -holding routine glipizide, novolog, metformin    -increased levemir to 20 units bid from, Levemir 14 units q12  -on Humalog 10 units TID meals  -BG reviewed, running much higher since increase in steroid doses. Will increase the sliding scale to resistant.     HTN  HLD  -continue routine Lozol, losartan, pravastatin      Anxiety   -continue routine prozac, xanax      Fibromyalgia   -continue Lyrica, Norco      Hypomagnesemia  -Replace per protocol     Constipation  -Continue bowel regimen  -On Linzess at home, not on formulary here     Expected Discharge Location and  Transportation: SNF,  van vs family transport   Expected Discharge Date: 7/6/22    DVT prophylaxis:  Medical DVT prophylaxis orders are present.     AM-PAC 6 Clicks Score (PT): 14 (07/04/22 2000)    CODE STATUS:   Code Status and Medical Interventions:   Ordered at: 07/01/22 1617     Medical Intervention Limits:    NO intubation (DNI)     Code Status (Patient has no pulse and is not breathing):    No CPR (Do Not Attempt to Resuscitate)     Medical Interventions (Patient has pulse or is breathing):    Limited Support       Adithya Omer DO  07/05/22

## 2022-07-05 NOTE — PLAN OF CARE
Goal Outcome Evaluation:  Plan of Care Reviewed With: patient        Progress: no change  Outcome Evaluation: 2 rounds of sitting EOB, each attempt pt performed diaphramatic breathing and O2 sats first attempt dropped to 85% quickly, 2nd attempt pt did better and able to sit for 2 minutes working on posture and scapular retraction before O2 sats dropped to 85%, quickly recovered to mid-90s once lying back down

## 2022-07-06 NOTE — CASE MANAGEMENT/SOCIAL WORK
Continued Stay Note  [facility]     Patient Name: Chikis Cuellar  MRN: [id number]  Today's Date: 7/6/2022    Admit Date: 6/27/2022     Discharge Plan     Row Name 07/06/22 1501       Plan    Plan discharge plan    Plan Comments ID following and pt remains on high flow O2. CM will make referrals to SNF when no longer on high flow and will cont to follow    Final Discharge Disposition Code 03 - skilled nursing facility (SNF)               Discharge Codes    No documentation.               Expected Discharge Date and Time     Expected Discharge Date Expected Discharge Time    Jul 8, 2022             Agata Carter RN

## 2022-07-06 NOTE — PLAN OF CARE
Goal Outcome Evaluation:  Plan of Care Reviewed With: patient      SLP evaluation completed. Will  r/o pharyngeal dysphagia w/ plan for FEES tomorrow as appropriate. Please see note for further details and recommendations.

## 2022-07-06 NOTE — PLAN OF CARE
Goal Outcome Evaluation:  Plan of Care Reviewed With: patient           Outcome Evaluation: Pt sitting up in bed on HFNC during palliative nursing visit.  Pt. stated that her breathing feels a bit better today than yesterday.  Denied pain, nausea and other symptoms.  Pt. endorsed anxiety and depression which are chronic in nature.  Pt. stated her appetite is not very good.  Pt stated she normally takes linzess at home for bowels.  No BM since 7/2 per pt.  Bowel regimen initiated.  Palliative care to follow for support, POC and symptom management.      1330 Palliative IDT meeting: ROMEL Garcia RN, CHPN; NOHEMY Salazar, RN, CHPN; SARAH Zamarripa, APRN; CELIO Kumar, DO; WANDER Aparicio, ASADW, [facility]; GHANSHYAM Hilliard MDiv, [facility]; GHANSHYAM Olson RN, CHPN; VIDHI White, RN

## 2022-07-06 NOTE — PROGRESS NOTES
INFECTIOUS DISEASE Progress Note    Chikis Cuellar  1945  [id number]    Consult: 7/5/22  Admit date: 6/27/2022    Requesting Provider: No ref. provider found  Evaluating physician: Percy Parker MD  Reason for Consultation: Back and arm cellulitis, intertrigo, recent pneumonia, candidiasis axillary  Chief Complaint: Above      Subjective   History of present illness:  Patient is a very pleasant  77 y.o.  Yr old female with diabetes mellitus type 2, essential hypertension, hyperlipidemia, arthritis, fibromyalgia, recent hospitalization 6/1/2022 until 6/27/2022 with pneumonia and ARDS, discharged to rehabilitation on 6/27 and readmitted on the same day related to worsening acute hypoxic respiratory failure.  Patient on first admission was thought to have an atypical pneumonia treated with IV antibiotics and steroids.  COVID-19 testing was negative x2.  Viral panel is negative.  Cultures were negative.  Pulmonary continues to follow and felt that her respiratory problems were related to ARDS fibrotic changes.  RICARDO and ANCA from 6/8 were negative.  Rheumatoid factor elevated at 21.0 (upper limit of normal 14).  Legionella urine antigen and streptococcal urine antigen were negative on 6/3.  MRSA screen was positive on 6/2.  The patient has been treated with steroids which have been increased because of worsening respiratory function.  Patient then developed bilateral axillary increased erythema concerning for candidiasis or cellulitis.  Antibiotics were started on 7/4/2022.  Vancomycin, ceftriaxone, and IV fluconazole.  I was consulted on 7/5/2022 for further evaluation and treatment.    7/6/2022 history reviewed.  Tolerating fluconazole and Lotrisone for axillary candidiasis also involving the back.  No fever.  Still on high flow oxygen at 45 L/min.  Being treated for ARDS related inflammation of the lungs.  Also on high-dose steroids.    Past Medical History:   Diagnosis Date   • Arthritis    • Diabetes  mellitus (HCC)    • Fibromyalgia    • Hyperlipidemia    • Hypertension        Past Surgical History:   Procedure Laterality Date   • BACK SURGERY     •  SECTION     • CHOLECYSTECTOMY     • HYSTERECTOMY     • REPLACEMENT TOTAL KNEE         Pediatric History   Patient Parents   • Not on file     Other Topics Concern   • Not on file   Social History Narrative   • Not on file       family history includes No Known Problems in her father and mother.    Allergies   Allergen Reactions   • Altace [Ramipril] Palpitations   • Trazodone Hallucinations       Immunization History   Administered Date(s) Administered   • COVID-19 (PFIZER) PURPLE CAP 2020, 2021, 09/10/2021   • Covid-19 (Pfizer) Gray Cap 2022       Medication:    Current Facility-Administered Medications:   •  acetaminophen (TYLENOL) tablet 650 mg, 650 mg, Oral, Q6H PRN, Sarath Gutiérrez PA-C, 650 mg at 22  •  albuterol (PROVENTIL) nebulizer solution 0.083% 2.5 mg/3mL, 2.5 mg, Nebulization, Q6H PRN, Vanesa Enciso APRN  •  ALPRAZolam (XANAX) tablet 0.5 mg, 0.5 mg, Oral, Daily PRN, Lola Neri DO, 0.5 mg at 228  •  sennosides-docusate (PERICOLACE) 8.6-50 MG per tablet 2 tablet, 2 tablet, Oral, BID, 2 tablet at 22 **AND** polyethylene glycol (MIRALAX) packet 17 g, 17 g, Oral, Daily PRN **AND** bisacodyl (DULCOLAX) EC tablet 5 mg, 5 mg, Oral, Daily PRN, 5 mg at 22 0539 **AND** bisacodyl (DULCOLAX) suppository 10 mg, 10 mg, Rectal, Daily PRN, Elsie Griffith DO, 10 mg at 22 1413  •  clotrimazole-betamethasone (LOTRISONE) 1-0.05 % cream 1 application, 1 application, Topical, Q12H, Percy Parker MD, 1 application at 22 2133  •  dextrose (D50W) (25 g/50 mL) IV injection 25 g, 25 g, Intravenous, Q15 Min PRN, Vanesa Enciso APRN  •  dextrose (GLUTOSE) oral gel 15 g, 15 g, Oral, Q15 Min PRN, Vanesa Enciso APRN  •  diphenhydrAMINE (BENADRYL) capsule 25 mg, 25 mg, Oral, Q6H PRN,  Adithya Omer DO, 25 mg at 07/05/22 1100  •  Enoxaparin Sodium (LOVENOX) syringe 40 mg, 40 mg, Subcutaneous, Q24H, Vanesa Enciso APRN, 40 mg at 07/06/22 0508  •  fluconazole (DIFLUCAN) tablet 200 mg, 200 mg, Oral, Q24H, Percy Parker MD, 200 mg at 07/05/22 1754  •  FLUoxetine (PROzac) capsule 40 mg, 40 mg, Oral, Daily, Vanesa Enciso APRN, 40 mg at 07/05/22 0936  •  glucagon (human recombinant) (GLUCAGEN DIAGNOSTIC) injection 1 mg, 1 mg, Intramuscular, Q15 Min PRN, Vanesa Enciso APRN  •  HYDROcodone-acetaminophen (NORCO)  MG per tablet 1 tablet, 1 tablet, Oral, Q6H PRN, Adithya Omer DO, 1 tablet at 07/06/22 0515  •  hydrocortisone 2.5 % cream 1 application, 1 application, Topical, Q12H, Adithya Omer DO, 1 application at 07/05/22 2130  •  insulin detemir (LEVEMIR) injection 20 Units, 20 Units, Subcutaneous, Q12H, Adithya Omer DO, 20 Units at 07/05/22 2132  •  Insulin Lispro (humaLOG) injection 0-24 Units, 0-24 Units, Subcutaneous, TID AC, Adithya Omer DO, 16 Units at 07/05/22 1252  •  Insulin Lispro (humaLOG) injection 10 Units, 10 Units, Subcutaneous, TID With Meals, Elsie Griffith DO, 10 Units at 07/05/22 1759  •  ipratropium-albuterol (DUO-NEB) nebulizer solution 3 mL, 3 mL, Nebulization, Q4H PRN, Vanesa Enciso APRN  •  ipratropium-albuterol (DUO-NEB) nebulizer solution 3 mL, 3 mL, Nebulization, 4x Daily - RT, Vanesa Enciso APRN, 3 mL at 07/06/22 0643  •  lactobacillus acidophilus (RISAQUAD) capsule 1 capsule, 1 capsule, Oral, Daily, Adithya Omer DO, 1 capsule at 07/05/22 1253  •  losartan (COZAAR) tablet 25 mg, 25 mg, Oral, Daily, Vanesa Enciso APRN, 25 mg at 07/05/22 0903  •  Magnesium Sulfate 2 gram Bolus, followed by 8 gram infusion (total Mg dose 10 grams)- Mg less than or equal to 1mg/dL, 2 g, Intravenous, PRN **OR** Magnesium Sulfate 2 gram / 50mL Infusion (GIVE X 3 BAGS TO EQUAL 6GM TOTAL DOSE) - Mg 1.1 - 1.5 mg/dl, 2 g, Intravenous, PRN, Last Rate: 25 mL/hr at  07/01/22 1848, 2 g at 07/01/22 1848 **OR** Magnesium Sulfate 4 gram infusion- Mg 1.6-1.9 mg/dL, 4 g, Intravenous, PRN, Lola Neri, DO, Last Rate: 25 mL/hr at 07/03/22 1320, 4 g at 07/03/22 1320  •  methylPREDNISolone sodium succinate (SOLU-Medrol) injection 40 mg, 40 mg, Intravenous, Q12H, Gold Flores MD, 40 mg at 07/06/22 0508  •  miconazole (MICOTIN) 2 % cream 1 application, 1 application, Topical, Q12H, Adithya Omer, , 1 application at 07/05/22 2130  •  oxybutynin XL (DITROPAN-XL) 24 hr tablet 5 mg, 5 mg, Oral, Daily, Vanesa Enciso APRN, 5 mg at 07/05/22 0936  •  pantoprazole (PROTONIX) EC tablet 40 mg, 40 mg, Oral, Q AM, Vanesa Enciso APRN, 40 mg at 07/06/22 0508  •  pravastatin (PRAVACHOL) tablet 40 mg, 40 mg, Oral, Nightly, Vanesa Enciso APRN, 40 mg at 07/05/22 2130  •  pregabalin (LYRICA) capsule 150 mg, 150 mg, Oral, TID, Lola Neri, DO, 150 mg at 07/05/22 2129  •  sodium chloride 0.9 % flush 10 mL, 10 mL, Intravenous, PRN, Andrea Cabrera MD  •  sodium chloride 0.9 % flush 10 mL, 10 mL, Intravenous, Q12H, Vanesa Enciso APRN, 10 mL at 07/04/22 2035  •  sodium chloride 0.9 % flush 10 mL, 10 mL, Intravenous, PRN, Vanesa Enciso APRN  •  sodium chloride 0.9 % flush 10 mL, 10 mL, Intravenous, Q12H, Elsie Griffith DO, 10 mL at 07/05/22 2130  •  sodium chloride 0.9 % flush 10 mL, 10 mL, Intravenous, PRN, Elsie Griffith DO    Please refer to the medical record for a full medication list    Review of Systems:    Constitutional-- No Fever, chills or sweats.  Appetite good, and no malaise. No fatigue.  HEENT-- No new vision, hearing or throat complaints.  No epistaxis or oral sores.  Denies odynophagia or dysphagia.  No odynophagia or dysphagia. No headache, photophobia or neck stiffness.  CV-- No chest pain, palpitation or syncope  Resp--continued SOB/nonproductive cough/no hemoptysis  GI- No nausea, vomiting, or diarrhea.  No hematochezia, melena, or hematemesis. Denies  "jaundice or chronic liver disease.  -- No dysuria, hematuria, or flank pain.  Denies hesitancy, urgency or flank pain.  Lymph- no swollen lymph nodes in neck/axilla or groin.   Heme- No active bruising or bleeding; no Hx of DVT or PE.  MS-- no swelling or pain in the bones or joints of arms/legs.  No new back pain.  Neuro-- No acute focal weakness or numbness in the arms or legs.  No seizures.  Skin--No rashes or lesions, except bilateral axillary as per HPI, no nodules    Physical Exam:   Vital Signs   Temp:  [96.1 °F (35.6 °C)-98.4 °F (36.9 °C)] 98.3 °F (36.8 °C)  Heart Rate:  [50-76] 54  Resp:  [18-20] 18  BP: (134-179)/(65-83) 179/79    Temp  Min: 96.1 °F (35.6 °C)  Max: 98.4 °F (36.9 °C)  BP  Min: 134/65  Max: 179/79  Pulse  Min: 50  Max: 76  Resp  Min: 18  Max: 20  SpO2  Min: 90 %  Max: 97 %    Blood pressure 179/79, pulse 54, temperature 98.3 °F (36.8 °C), temperature source Oral, resp. rate 18, height 162.6 cm (64\"), weight 86.2 kg (190 lb 1.6 oz), SpO2 93 %.  GENERAL: Awake and alert, in moderate distress. Appears older than stated age.  Resting in bed.  HEENT:  Normocephalic, atraumatic.  Oropharynx without thrush. Dentition in good repair. No cervical adenopathy. No neck masses.  Ears externally normal, Nose externally normal.  EYES: No conjunctival injection. No icterus. EOM full.  LYMPHATICS: No lymphadenopathy of the neck or axillary or inguinal regions.   HEART: No murmur, gallop, or pericardial friction rub. Reg rate rhythm, No JVD at 45 degrees.  LUNGS: Bilateral rales. No respiratory distress, no use of accessory muscles.  ABDOMEN: Soft, nontender, nondistended. No appreciable HSM.  Bowel sounds normal.  Obese.  SKIN: Warm and dry without cutaneous eruptions, except bilateral axillary with increased erythema that has improved, no crepitus or bullae.  No nodules.  Some extension on the right side to the back.  PSYCHIATRIC: Mental status lucid. No confusion.  EXT:  No cellulitic change.  NEURO: " Oriented to name, nonfocal    Results Review:   I reviewed the patient's new clinical results.  I reviewed the patient's new imaging results and agree with the interpretation.  I reviewed the patient's other test results and agree with the interpretation    Results from last 7 days   Lab Units 07/05/22  0735 07/04/22  0436 07/03/22  0438   WBC 10*3/mm3 16.22* 11.45* 14.73*   HEMOGLOBIN g/dL 10.8* 11.5* 11.8*   HEMATOCRIT % 33.1* 36.9 36.8   PLATELETS 10*3/mm3 279 251 227     Results from last 7 days   Lab Units 07/05/22  0735   SODIUM mmol/L 138   POTASSIUM mmol/L 5.3*   CHLORIDE mmol/L 97*   CO2 mmol/L 37.0*   BUN mg/dL 20   CREATININE mg/dL 0.71   GLUCOSE mg/dL 276*   CALCIUM mg/dL 9.6         Results from last 7 days   Lab Units 06/29/22  0827   SED RATE mm/hr 37*         Results from last 7 days   Lab Units 07/04/22 0436   VANCOMYCIN RM mcg/mL 11.10         Estimated Creatinine Clearance: 69.4 mL/min (by C-G formula based on SCr of 0.71 mg/dL).    Microbiology:  Microbiology Results Abnormal     Procedure Component Value - Date/Time    COVID PRE-OP / PRE-PROCEDURE SCREENING ORDER (NO ISOLATION) - Swab, Nasopharynx [[id number]]  (Normal) Collected: 06/28/22 0027    Lab Status: Final result Specimen: Swab from Nasopharynx Updated: 06/28/22 0104    Narrative:      The following orders were created for panel order COVID PRE-OP / PRE-PROCEDURE SCREENING ORDER (NO ISOLATION) - Swab, Nasopharynx.  Procedure                               Abnormality         Status                     ---------                               -----------         ------                     COVID-19 and FLU A/B PCR...[[id number]]  Normal              Final result                 Please view results for these tests on the individual orders.    COVID-19 and FLU A/B PCR - Swab, Nasopharynx [[id number]]  (Normal) Collected: 06/28/22 0027    Lab Status: Final result Specimen: Swab from Nasopharynx Updated: 06/28/22 0104     COVID19 Not Detected      Influenza A PCR Not Detected     Influenza B PCR Not Detected    Narrative:      Fact sheet for providers: https://www.fda.gov/media/781860/download    Fact sheet for patients: https://www.fda.gov/media/775049/download    Test performed by PCR.          Radiology:  Imaging Results (Last 72 Hours)     Procedure Component Value Units Date/Time    XR Chest 1 View [[id number]] Collected: 07/04/22 0703     Updated: 07/04/22 0709    Narrative:      EXAMINATION: XR CHEST 1 VW      DATE OF EXAM: 7/4/2022 6:15 AM    HISTORY: PICC line placement.    COMPARISON: None.    FINDINGS:     There is a new left PIC line with the tip in the SVC. There is cardiomegaly with some patchy airspace disease in the lungs that appears similar and there is a right pleural effusion. There is cardiomegaly. Bones are demineralized.        Impression:        1.  PICC line tip is in the SVC.     Electronically signed by:  Blaze Bond M.D.    7/4/2022 5:08 AM Mountain Time          IMPRESSION:     1.  Axillary candidiasis in the setting of diabetes mellitus type 2 on steroids for worsening respiratory failure.  May have superimposed cellulitis but seems more like Candida and yeast and has been on antibiotics.  2.  Possible back cellulitis versus candidiasis.  As per above.  Previously MRSA colonized.  3.  Atypical pneumonia with likely ARDS postinfectious fibrotic changes with acute hypoxic respiratory failure.  On steroids to see how she responds.  High risk for invasive diagnostic testing, and does not want to be intubated.  4.  Leukocytosis, neutrophilic related to above issues.  Steroids likely pushing white count higher.  5.  Anemia, chronic disease related to above issues.  6.  Acute hypoxic respiratory failure.  On 65 L/min on 7/5/2022 related to above issues.  45 L/min on 7/6.  7.  Diabetes mellitus type 2 with increased risk for infection.    Plan:    1.  Diagnostically, continue to follow patient's physical exam, CBC, CMP, CRP,  Aspergillus galactomannan assay, histoplasma and blastomycosis urine antigen, Fungitell, strongyloidiasis serology, and radiographic studies.  Previous Legionella and streptococcal antigen negative.  2.  Therapeutically, continue fluconazole 200 mg daily, triamcinolone/clotrimazole cream twice daily to axillary and back, duration to be determined.  QTc interval 422 ms on 6/27.  May need to consider pneumocystis prevention if continues on high-dose steroids.  3.  Oxygen support therapy.  65 L/min on 7/5/2022.  45 L/min on 7/6.    I discussed the patient's findings and my recommendations with patient, family and nursing staff    Our group would be pleased to follow this patient over the course of their hospitalization and assist with outpatient antimicrobial therapy, as indicated.  Further recommendations depend on the results of the cultures and clinical course.    Percy Parker MD  7/6/2022

## 2022-07-06 NOTE — PLAN OF CARE
Goal Outcome Evaluation:  Plan of Care Reviewed With: patient        Progress: no change  Outcome Evaluation: Pt mod A supine to sit,  EOB ~ 2min and O2 sat dropped from 92% to 70% on HFNC,  pt returned to supine with min A and completed deep breathing ex and O2 sat recovered to 90% after ~ 3min.  Pt completed 10 reps of 3 ex with O2 sat 88-90% throughout.

## 2022-07-06 NOTE — PLAN OF CARE
Problem: Adult Inpatient Plan of Care  Goal: Plan of Care Review  Outcome: Ongoing, Progressing  Goal: Patient-Specific Goal (Individualized)  Outcome: Ongoing, Progressing  Goal: Absence of Hospital-Acquired Illness or Injury  Outcome: Ongoing, Progressing  Goal: Optimal Comfort and Wellbeing  Outcome: Ongoing, Progressing  Intervention: Provide Person-Centered Care  Recent Flowsheet Documentation  Taken 7/6/2022 0800 by Renetta Vance RN  Trust Relationship/Rapport:   care explained   choices provided   emotional support provided  Goal: Readiness for Transition of Care  Outcome: Ongoing, Progressing     Problem: Skin Injury Risk Increased  Goal: Skin Health and Integrity  Outcome: Ongoing, Progressing     Problem: Diabetes Comorbidity  Goal: Blood Glucose Level Within Targeted Range  Outcome: Ongoing, Progressing     Problem: Hypertension Comorbidity  Goal: Blood Pressure in Desired Range  Outcome: Ongoing, Progressing     Problem: Osteoarthritis Comorbidity  Goal: Maintenance of Osteoarthritis Symptom Control  Outcome: Ongoing, Progressing     Problem: Pain Chronic (Persistent) (Comorbidity Management)  Goal: Acceptable Pain Control and Functional Ability  Outcome: Ongoing, Progressing  Intervention: Optimize Psychosocial Wellbeing  Recent Flowsheet Documentation  Taken 7/6/2022 0800 by Renetta Vance RN  Supportive Measures:   active listening utilized   decision-making supported   goal-setting facilitated   self-care encouraged  Diversional Activities:   television   smartphone  Family/Support System Care: self-care encouraged     Problem: Fall Injury Risk  Goal: Absence of Fall and Fall-Related Injury  Outcome: Ongoing, Progressing     Problem: Palliative Care  Goal: Enhanced Quality of Life  Outcome: Ongoing, Progressing  Intervention: Optimize Psychosocial Wellbeing  Recent Flowsheet Documentation  Taken 7/6/2022 0800 by Renetta Vance RN  Supportive Measures:   active listening utilized    decision-making supported   goal-setting facilitated   self-care encouraged  Family/Support System Care: self-care encouraged   Goal Outcome Evaluation:

## 2022-07-06 NOTE — THERAPY EVALUATION
Acute Care - Speech Language Pathology   Swallow Initial Evaluation [facility]   Clinical Swallow Evaluation     Patient Name: Chikis Cuellar  : 1945  MRN: [id number]  Today's Date: 2022               Admit Date: 2022    Visit Dx:     ICD-10-CM ICD-9-CM   1. Acute respiratory failure with hypoxia (HCC)  J96.01 518.81   2. Shortness of breath  R06.02 786.05   3. Atypical pneumonia  J18.9 486   4. Dysphagia, unspecified type  R13.10 787.20     Patient Active Problem List   Diagnosis   • Type 2 diabetes mellitus, with long-term current use of insulin (HCC)   • Essential hypertension   • Hyperlipemia   • GERD without esophagitis   • Mood disorder (HCC)   • Acute respiratory failure with hypoxia (HCC)   • Leukocytosis   • Chest pain   • Fibromyalgia   • Hypomagnesemia     Past Medical History:   Diagnosis Date   • Arthritis    • Diabetes mellitus (HCC)    • Fibromyalgia    • Hyperlipidemia    • Hypertension      Past Surgical History:   Procedure Laterality Date   • BACK SURGERY     •  SECTION     • CHOLECYSTECTOMY     • HYSTERECTOMY     • REPLACEMENT TOTAL KNEE         SLP Recommendation and Plan  SLP Swallowing Diagnosis: R/O pharyngeal dysphagia (22)  SLP Diet Recommendation: regular textures, thin liquids (22)  Recommended Precautions and Strategies: upright posture during/after eating, small bites of food and sips of liquid, no straw, general aspiration precautions, reflux precautions, fatigue precautions (22)  SLP Rec. for Method of Medication Administration: meds whole, with pudding or applesauce, as tolerated (22)     Monitor for Signs of Aspiration: yes, notify SLP if any concerns, other (see comments) (make NPO if any further choking concerns) (22)  Recommended Diagnostics: FEES (22)  Swallow Criteria for Skilled Therapeutic Interventions Met: demonstrates skilled criteria (22)  Anticipated Discharge  Disposition (SLP): unknown, anticipate therapy at next level of care (07/06/22 1130)  Rehab Potential/Prognosis, Swallowing: good, to achieve stated therapy goals (07/06/22 1130)  Therapy Frequency (Swallow): PRN (07/06/22 1130)  Predicted Duration Therapy Intervention (Days): until discharge (07/06/22 1130)                                  Plan of Care Reviewed With: patient      SWALLOW EVALUATION (last 72 hours)     SLP Adult Swallow Evaluation     Row Name 07/06/22 1130                   Rehab Evaluation    Document Type evaluation  -RD        Subjective Information no complaints  -RD        Patient Observations alert;cooperative;agree to therapy  -RD        Patient/Family/Caregiver Comments/Observations none  -RD        Patient Effort good  -RD                  General Information    Patient Profile Reviewed yes  -RD        Pertinent History Of Current Problem Adm w/ Resp failure w/ hypozia. DM 2 w/ long term insulin, HTN, GERD, mood problem, fibromyalgia. choking episode witneessed by physician on boiled eggs this morning. Swallowing consult received. Pt currently on HFNC 45%. Recent adm w/ PNA & ARDS 6/1-6/27/22, covid negative x2.  -RD        Current Method of Nutrition regular textures;thin liquids  -RD        Precautions/Limitations, Vision WFL;for purposes of eval  -RD        Precautions/Limitations, Hearing WFL;for purposes of eval  -RD        Prior Level of Function-Communication unknown  -RD        Prior Level of Function-Swallowing no diet consistency restrictions  -RD        Plans/Goals Discussed with patient;agreed upon  -RD        Barriers to Rehab none identified  -RD        Patient's Goals for Discharge eat/drink without coughing/choking  -RD                  Pain Scale: Numbers Pre/Post-Treatment    Pretreatment Pain Rating 0/10 - no pain  -RD        Posttreatment Pain Rating 0/10 - no pain  -RD                  Oral Motor Structure and Function    Dentition Assessment upper dentures/partial in  place;lower dentures/partial in place  -RD        Secretion Management WNL/WFL  -RD        Mucosal Quality moist, healthy  -RD        Volitional Swallow WFL  -RD        Volitional Cough WFL  -RD                  Oral Musculature and Cranial Nerve Assessment    Oral Motor General Assessment WFL  -RD                  General Eating/Swallowing Observations    Respiratory Support Currently in Use high-flow nasal cannula;other (see comments)  45%  -RD        Eating/Swallowing Skills fed by SLP;self-fed;appropriate self-feeding skills observed  -RD        Positioning During Eating upright 90 degree;upright in bed  -RD        Utensils Used spoon;cup;straw  -RD        Consistencies Trialed thin liquids;pureed;regular textures  -RD                  Clinical Swallow Eval    Oral Prep Phase WFL  -RD        Oral Residue WFL  -RD        Pharyngeal Phase no overt signs/symptoms of pharyngeal impairment  -RD        Esophageal Phase suspected esophageal impairment  -RD        Clinical Swallow Evaluation Summary Consult for swallowing after physician witnessed choking episode on boiled eggs w/ desat & hypoxia. Pt currently on HFNC 45% and reports that this was an isolated incident, although has had PNA & respiratory failure. No immediate s/s of aspiration observed. Just burping x1 w/ thins. High risk of silent aspiration given O2 requirements. Need to r/o pharyngeal dysphagia. May continue diet w/ small bolus, no straws, & general precautions as well as fatigue precautions, education provided. FEES to r/o aspiration. pt agreeable. If any further choking concerns, make NPO until FEES.  -RD                  Esophageal Phase Concerns    Esophageal Phase Concerns belching  -RD        Belching thin  -RD        Esophageal Phase Concerns, Comment hx of GERD per chart, but not reported by pt  -RD                  SLP Evaluation Clinical Impression    SLP Swallowing Diagnosis R/O pharyngeal dysphagia  -RD        Functional Impact risk of  aspiration/pneumonia  -RD        Rehab Potential/Prognosis, Swallowing good, to achieve stated therapy goals  -RD        Swallow Criteria for Skilled Therapeutic Interventions Met demonstrates skilled criteria  -RD                  Recommendations    Therapy Frequency (Swallow) PRN  -RD        Predicted Duration Therapy Intervention (Days) until discharge  -RD        SLP Diet Recommendation regular textures;thin liquids  -RD        Recommended Diagnostics FEES  -RD        Recommended Precautions and Strategies upright posture during/after eating;small bites of food and sips of liquid;no straw;general aspiration precautions;reflux precautions;fatigue precautions  -RD        Oral Care Recommendations Oral Care BID/PRN  -RD        SLP Rec. for Method of Medication Administration meds whole;with pudding or applesauce;as tolerated  -RD        Monitor for Signs of Aspiration yes;notify SLP if any concerns;other (see comments)  make NPO if any further choking concerns  -RD        Anticipated Discharge Disposition (SLP) unknown;anticipate therapy at next level of care  -RD              User Key  (r) = Recorded By, (t) = Taken By, (c) = Cosigned By    Initials Name Effective Dates    Leny Shaw MS CCC-SLP 06/16/21 -                 EDUCATION  The patient has been educated in the following areas:   Dysphagia (Swallowing Impairment) Oral Care/Hydration NPO rationale Modified Diet Instruction.              Time Calculation:    Time Calculation- SLP     Row Name 07/06/22 1205             Time Calculation- SLP    SLP Start Time 1130  -RD      SLP Received On 07/06/22  -RD              Untimed Charges    75176-EW Eval Oral Pharyng Swallow Minutes 45  -RD              Total Minutes    Untimed Charges Total Minutes 45  -RD       Total Minutes 45  -RD            User Key  (r) = Recorded By, (t) = Taken By, (c) = Cosigned By    Initials Name Provider Type    Leny Shaw MS CCC-SLP Speech and Language Pathologist                 Therapy Charges for Today     Code Description Service Date Service Provider Modifiers Qty    07470332539 HC ST EVAL ORAL PHARYNG SWALLOW 3 7/6/2022 Leny Shirley, MS CCC-SLP GN 1        Patient was not wearing a face mask and did exhibit coughing during this therapy encounter.  Procedure performed was aerosolizing, involved close contact (within 6 feet for at least 15 minutes or longer), and did not involve contact with infectious secretions or specimens.  Therapist used appropriate personal protective equipment including gloves, standard procedure mask and eye protection.  Appropriate PPE was worn during the entire therapy session.  Hand hygiene was completed before and after therapy session.          Leny Shirley MS CCC-SLP  7/6/2022

## 2022-07-06 NOTE — THERAPY TREATMENT NOTE
Patient Name: Chikis Cuellar  : 1945    MRN: [id number]                              Today's Date: 2022       Admit Date: 2022    Visit Dx:     ICD-10-CM ICD-9-CM   1. Acute respiratory failure with hypoxia (HCC)  J96.01 518.81   2. Shortness of breath  R06.02 786.05   3. Atypical pneumonia  J18.9 486   4. Dysphagia, unspecified type  R13.10 787.20     Patient Active Problem List   Diagnosis   • Type 2 diabetes mellitus, with long-term current use of insulin (HCC)   • Essential hypertension   • Hyperlipemia   • GERD without esophagitis   • Mood disorder (HCC)   • Acute respiratory failure with hypoxia (HCC)   • Leukocytosis   • Chest pain   • Fibromyalgia   • Hypomagnesemia     Past Medical History:   Diagnosis Date   • Arthritis    • Diabetes mellitus (HCC)    • Fibromyalgia    • Hyperlipidemia    • Hypertension      Past Surgical History:   Procedure Laterality Date   • BACK SURGERY     •  SECTION     • CHOLECYSTECTOMY     • HYSTERECTOMY     • REPLACEMENT TOTAL KNEE        General Information     Row Name 22 [facility]5          OT Time and Intention    Document Type therapy note (daily note)  -     Mode of Treatment occupational therapy  -AC     Row Name 22 [facility]5          General Information    Patient Profile Reviewed yes  -AC     Existing Precautions/Restrictions fall;oxygen therapy device and L/min;other (see comments)  HFNC, desats easily  -AC     Row Name 22 1415          Cognition    Orientation Status (Cognition) oriented x 4  -AC     Row Name 22 1415          Safety Issues, Functional Mobility    Safety Issues Affecting Function (Mobility) safety precaution awareness  -AC     Impairments Affecting Function (Mobility) balance;endurance/activity tolerance;postural/trunk control;shortness of breath;strength  -AC           User Key  (r) = Recorded By, (t) = Taken By, (c) = Cosigned By    Initials Name Provider Type    AC Rosmery Myers, OT Occupational Therapist                  Mobility/ADL's     Row Name 07/06/22 1459          Bed Mobility    Bed Mobility supine-sit;sit-supine  -     Supine-Sit [place] (Bed Mobility) verbal cues;moderate assist (50% patient effort)  -     Sit-Supine [place] (Bed Mobility) verbal cues;minimum assist (75% patient effort)  -     Assistive Device (Bed Mobility) bed rails;head of bed elevated  -     Comment, (Bed Mobility) pt sat EOB ~ 2min and O2 sat dropped from 92% to 70% on HFNC,  pt returned to supine and completed deep breathing ex and O2 sat recovered to 90% after ~ 3min  -     Row Name 07/06/22 1459          Transfers    Comment, (Transfers) unable to attempt d/t low O2 sat  -     Row Name 07/06/22 1459          Activities of Daily Living    BADL Assessment/Intervention lower body dressing  -     Row Name 07/06/22 1459          Lower Body Dressing Assessment/Training    [place] Level (Lower Body Dressing) don;socks;dependent (less than 25% patient effort)  -     Position (Lower Body Dressing) sitting up in bed  -     Row Name 07/06/22 1459          Grooming Assessment/Training    Comment, (Grooming) pt declined stating she already completed today  -           User Key  (r) = Recorded By, (t) = Taken By, (c) = Cosigned By    Initials Name Provider Type    Rosmery Casillas, OT Occupational Therapist               Obj/Interventions     Row Name 07/06/22 1503          Shoulder (Therapeutic Exercise)    Shoulder AROM (Therapeutic Exercise) bilateral;flexion;extension;horizontal aBduction/aDduction;10 repetitions  -     Row Name 07/06/22 1503          Elbow/Forearm (Therapeutic Exercise)    Elbow/Forearm (Therapeutic Exercise) AROM (active range of motion)  -     Elbow/Forearm AROM (Therapeutic Exercise) bilateral;flexion;extension;10 repetitions  -           User Key  (r) = Recorded By, (t) = Taken By, (c) = Cosigned By    Initials Name Provider Type    Rosmery Casillas, OT Occupational Therapist                Goals/Plan    No documentation.                Clinical Impression     Row Name 07/06/22 1503          Pain Assessment    Pretreatment Pain Rating 0/10 - no pain  -AC     Posttreatment Pain Rating 0/10 - no pain  -AC     Row Name 07/06/22 1503          Plan of Care Review    Plan of Care Reviewed With patient  -AC     Progress no change  -     Outcome Evaluation Pt mod A supine to sit,  EOB ~ 2min and O2 sat dropped from 92% to 70% on HFNC,  pt returned to supine with min A and completed deep breathing ex and O2 sat recovered to 90% after ~ 3min.  Pt completed 10 reps of 3 ex with O2 sat 88-90% throughout.  -AC     Row Name 07/06/22 1503          Vital Signs    Pre SpO2 (%) 92  -AC     O2 Delivery Pre Treatment hi-flow  -AC     Intra SpO2 (%) 70  RN notified  -AC     O2 Delivery Intra Treatment hi-flow  -AC     Post SpO2 (%) 90  -AC     O2 Delivery Post Treatment hi-flow  -AC     Pre Patient Position Supine  -AC     Intra Patient Position Sitting  -AC     Post Patient Position Supine  -AC     Row Name 07/06/22 1503          Positioning and Restraints    Pre-Treatment Position in bed  -AC     Post Treatment Position bed  -AC     In Bed notified nsg;supine;call light within reach;encouraged to call for assist;exit alarm on;heels elevated  -AC           User Key  (r) = Recorded By, (t) = Taken By, (c) = Cosigned By    Initials Name Provider Type    Rosmery Casillas, OT Occupational Therapist               Outcome Measures     Row Name 07/06/22 8968          How much help from another is currently needed...    Putting on and taking off regular lower body clothing? 1  -AC     Bathing (including washing, rinsing, and drying) 2  -AC     Toileting (which includes using toilet bed pan or urinal) 1  -AC     Putting on and taking off regular upper body clothing 2  -AC     Taking care of personal grooming (such as brushing teeth) 3  -AC     Eating meals 3  -AC     AM-PAC 6 Clicks Score (OT) 12  -AC     Row Name  07/06/22 1415          Functional Assessment    Outcome Measure Options AM-PAC 6 Clicks Daily Activity (OT)  -AC           User Key  (r) = Recorded By, (t) = Taken By, (c) = Cosigned By    Initials Name Provider Type    AC Rosmery Myers, OT Occupational Therapist                Occupational Therapy Education                 Title: PT OT SLP Therapies (Done)     Topic: Occupational Therapy (Done)     Point: ADL training (Done)     Description:   Instruct learner(s) on proper safety adaptation and remediation techniques during self care or transfers.   Instruct in proper use of assistive devices.              Learning Progress Summary           Patient Acceptance, E, VU by  at 7/6/2022 1507    Acceptance, E, VU by  at 7/3/2022 1438    Acceptance, E, VU by  at 6/28/2022 1514                   Point: Home exercise program (Done)     Description:   Instruct learner(s) on appropriate technique for monitoring, assisting and/or progressing therapeutic exercises/activities.              Learning Progress Summary           Patient Acceptance, E, VU by MR at 6/28/2022 1514                   Point: Precautions (Done)     Description:   Instruct learner(s) on prescribed precautions during self-care and functional transfers.              Learning Progress Summary           Patient Acceptance, E, VU by  at 7/3/2022 1438    Acceptance, E, VU by  at 6/28/2022 1514                   Point: Body mechanics (Done)     Description:   Instruct learner(s) on proper positioning and spine alignment during self-care, functional mobility activities and/or exercises.              Learning Progress Summary           Patient Acceptance, E, VU by MR at 6/28/2022 1514                               User Key     Initials Effective Dates Name Provider Type Discipline     06/16/21 -  Rosmery Myers, OT Occupational Therapist OT     06/16/21 -  Anabelle Kamara, OT Occupational Therapist OT    MR 10/06/21 -  Cassi Canchola OT Occupational  Therapist OT              OT Recommendation and Plan     Plan of Care Review  Plan of Care Reviewed With: patient  Progress: no change  Outcome Evaluation: Pt mod A supine to sit,  EOB ~ 2min and O2 sat dropped from 92% to 70% on HFNC,  pt returned to supine with min A and completed deep breathing ex and O2 sat recovered to 90% after ~ 3min.  Pt completed 10 reps of 3 ex with O2 sat 88-90% throughout.     Time Calculation:    Time Calculation- OT     Row Name 07/06/22 1415             Time Calculation- OT    OT Start Time 1415  -AC      OT Received On 07/06/22  -AC      OT Goal Re-Cert Due Date 07/08/22  -AC              Timed Charges    37447 - OT Therapeutic Exercise Minutes 8  -AC      44240 - OT Therapeutic Activity Minutes 10  -AC              Total Minutes    Timed Charges Total Minutes 18  -AC       Total Minutes 18  -AC            User Key  (r) = Recorded By, (t) = Taken By, (c) = Cosigned By    Initials Name Provider Type    AC Rosmery Myers OT Occupational Therapist              Therapy Charges for Today     Code Description Service Date Service Provider Modifiers Qty    [id number]  OT THERAPEUTIC ACT EA 15 MIN 7/6/2022 Rosmery Myers OT GO 1               Rosmery Myers OT  7/6/2022

## 2022-07-06 NOTE — PROGRESS NOTES
[facility] Medicine Services  PROGRESS NOTE    Patient Name: Chikis Cuellar  : 1945  MRN: [id number]    Date of Admission: 2022  Primary Care Physician: Eduardo Gamboa MD    Subjective   Subjective     CC:  F/U acute respiratory failure with hypoxia     HPI:  Had an episode of choking on egg piece this AM with increased hypoxia. D/w respiratory therapy who increased O2 on high flow    ROS:  Gen- No fevers, chills  CV- No chest pain, palpitations  Resp- cough, dyspnea today bc of aspiration event  GI- No N/V/D, abd pain  Skin- itchy        Objective   Objective     Vital Signs:   Temp:  [96.1 °F (35.6 °C)-98.4 °F (36.9 °C)] 98.3 °F (36.8 °C)  Heart Rate:  [50-76] 54  Resp:  [18-20] 18  BP: (134-179)/(65-83) 179/79  Flow (L/min):  [45-65] 45     Physical Exam:  Constitutional: in mild distress after this am's episode  HENT: NCAT, mucous membranes moist  Respiratory: bilateral dry expiratory crackles, coughing episode  Cardiovascular: RRR, no murmurs, rubs, or gallops  Gastrointestinal: Positive bowel sounds, soft, nontender, nondistended  Musculoskeletal: No bilateral ankle edema  Psychiatric: Appropriate affect, cooperative  Neurologic: Oriented x 3,  speech clear  Skin: Erythema under L armpit improved, Erythema R armpit without much change but has extended further around to her back.    Results Reviewed:  LAB RESULTS:      Lab 22  0702 22  0735 22  0436 22  0438 22  1549 22  0424 22  0727   WBC 13.87* 16.22* 11.45* 14.73* 17.50*   < > 13.36*   HEMOGLOBIN 11.0* 10.8* 11.5* 11.8* 12.0   < > 13.0   HEMATOCRIT 35.8 33.1* 36.9 36.8 37.4   < > 40.2   PLATELETS 316 279 251 227 250   < > 243   NEUTROS ABS  --   --   --  11.18* 15.32*  --  9.12*   IMMATURE GRANS (ABS)  --   --   --  0.06* 0.08*  --  0.05   LYMPHS ABS  --   --   --  1.32 0.87  --  2.11   MONOS ABS  --   --   --  0.69 0.55  --  0.71   EOS ABS  --   --   --  1.43* 0.64*  --   1.33*   MCV 88.2 83.6 86.2 85.8 83.7   < > 84.1    < > = values in this interval not displayed.         Lab 07/05/22  0735 07/04/22  0436 07/03/22  0438 07/02/22  1549 07/01/22  0423 06/30/22  0727   SODIUM 138 139 142 137 139 142   POTASSIUM 5.3* 5.0 4.0 4.3 3.6 3.9   CHLORIDE 97* 96* 98 97* 96* 99   CO2 37.0* 36.0* 33.0* 31.0* 33.0* 33.0*   ANION GAP 4.0* 7.0 11.0 9.0 10.0 10.0   BUN 20 16 21 24* 24* 21   CREATININE 0.71 0.61 0.66 0.73 0.78 0.61   EGFR 87.2 91.6 89.9 84.3 77.9 91.6   GLUCOSE 276* 193* 76 223* 130* 151*   CALCIUM 9.6 9.5 9.0 9.3 9.8 9.9   MAGNESIUM  --  2.1 1.6 2.2 1.5* 1.8             Lab 07/03/22  0438   PROBNP 198.8                 Brief Urine Lab Results     None          Microbiology Results Abnormal     Procedure Component Value - Date/Time    COVID PRE-OP / PRE-PROCEDURE SCREENING ORDER (NO ISOLATION) - Swab, Nasopharynx [[id number]]  (Normal) Collected: 06/28/22 0027    Lab Status: Final result Specimen: Swab from Nasopharynx Updated: 06/28/22 0104    Narrative:      The following orders were created for panel order COVID PRE-OP / PRE-PROCEDURE SCREENING ORDER (NO ISOLATION) - Swab, Nasopharynx.  Procedure                               Abnormality         Status                     ---------                               -----------         ------                     COVID-19 and FLU A/B PCR...[[id number]]  Normal              Final result                 Please view results for these tests on the individual orders.    COVID-19 and FLU A/B PCR - Swab, Nasopharynx [[id number]]  (Normal) Collected: 06/28/22 0027    Lab Status: Final result Specimen: Swab from Nasopharynx Updated: 06/28/22 0104     COVID19 Not Detected     Influenza A PCR Not Detected     Influenza B PCR Not Detected    Narrative:      Fact sheet for providers: https://www.fda.gov/media/528635/download    Fact sheet for patients: https://www.fda.gov/media/541825/download    Test performed by PCR.          No radiology results  from the last 24 hrs    Results for orders placed during the hospital encounter of 06/01/22    Adult Transthoracic Echo Complete W/ Cont if Necessary Per Protocol    Interpretation Summary  · The right atrial cavity is mildly dilated.  · Estimated right ventricular systolic pressure from tricuspid regurgitation is mildly elevated (35-45 mmHg). Calculated right ventricular systolic pressure from tricuspid regurgitation is 40 mmHg.  · Estimated left ventricular EF = 60% Estimated left ventricular EF was in agreement with the calculated left ventricular EF. Left ventricular ejection fraction appears to be 56 - 60%. Left ventricular systolic function is normal.  · Left ventricular diastolic function is consistent with age.  · Mild pulmonary hypertension is present.  · Normal right ventricular wall thickness, systolic function and septal motion noted with the right ventricular cavity mild to moderately dilated.  · There is no evidence of pericardial effusion.  · No significant structural or functional valvular abnormalities demonstrated.      I have reviewed the medications:  Scheduled Meds:clotrimazole-betamethasone, 1 application, Topical, Q12H  enoxaparin, 40 mg, Subcutaneous, Q24H  fluconazole, 200 mg, Oral, Q24H  FLUoxetine, 40 mg, Oral, Daily  hydrocortisone, 1 application, Topical, Q12H  insulin detemir, 20 Units, Subcutaneous, Q12H  insulin lispro, 0-24 Units, Subcutaneous, TID AC  Insulin Lispro, 10 Units, Subcutaneous, TID With Meals  ipratropium-albuterol, 3 mL, Nebulization, 4x Daily - RT  lactobacillus acidophilus, 1 capsule, Oral, Daily  losartan, 25 mg, Oral, Daily  methylPREDNISolone sodium succinate, 40 mg, Intravenous, Q12H  miconazole, 1 application, Topical, Q12H  oxybutynin XL, 5 mg, Oral, Daily  pantoprazole, 40 mg, Oral, Q AM  pravastatin, 40 mg, Oral, Nightly  pregabalin, 150 mg, Oral, TID  senna-docusate sodium, 2 tablet, Oral, BID  sodium chloride, 10 mL, Intravenous, Q12H  sodium chloride, 10  mL, Intravenous, Q12H      Continuous Infusions:   PRN Meds:.•  acetaminophen  •  albuterol  •  ALPRAZolam  •  senna-docusate sodium **AND** polyethylene glycol **AND** bisacodyl **AND** bisacodyl  •  dextrose  •  dextrose  •  diphenhydrAMINE  •  glucagon (human recombinant)  •  HYDROcodone-acetaminophen  •  ipratropium-albuterol  •  magnesium sulfate **OR** magnesium sulfate **OR** magnesium sulfate  •  sodium chloride  •  sodium chloride  •  sodium chloride    Assessment & Plan   Assessment & Plan     Active Hospital Problems    Diagnosis  POA   • **Acute respiratory failure with hypoxia (HCC) [J96.01]  Yes   • Hypomagnesemia [E83.42]  Unknown   • Leukocytosis [D72.829]  Yes   • Chest pain [R07.9]  Yes   • Fibromyalgia [M79.7]  Yes   • Type 2 diabetes mellitus, with long-term current use of insulin (HCC) [E11.9, Z79.4]  Not Applicable   • Essential hypertension [I10]  Yes   • Hyperlipemia [E78.5]  Yes   • GERD without esophagitis [K21.9]  Yes   • Mood disorder (HCC) [F39]  Yes      Resolved Hospital Problems   No resolved problems to display.        Brief Hospital Course to date:  Chikis Cuellar is a 77 y.o. female w/ a hx of T2DM, HTN, HLD, GERD, fibromyalgia, anxiety who presented to the ED w/ c/o hypoxia.   Pt admitted to [facility] 6/1 - 6/27/22. Pt initially presented w/ c/o severe dyspnea. Pt was admitted to the ICU from the ED and placed on HFNC, IV steroids and IV antibiotics. Pt underwent an extensive work-up including infectious, autoimmune, cardiac which were predominantly unrevealing.  Advanced chest imaging was felt to be consistent with atypical pneumonia somewhat classic for the appearance of COVID-19 although COVID remained negative despite pt reports of several family members w/ recent positive COVID tests. Pt was d/c on a steroid taper and on 5 liters of supplemental oxygen.      This patient's problems and plans were partially entered by my partner and updated as appropriate by me 07/06/22.      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 Given duration of steroids may need to consider pneumocystis prevention  -Wean HFNC as tolerated, recent increased this AM bc of choking episode. Asked speech to evaluate, plans for FEES tmrw. Reviewed CXR this AM, no new pathology  -Anti-reflux measures   -Diuresis as tolerated  -Will need rehab at [facility]  -palliative following, prognosis guarded given persistent HFNC requirements     Leukocytosis   -afebrile   -CXR without new findings   -Likely secondary to steroids  -AM labs reviewed, continue to monitor.    B/L underarm Candidiasis/R upper back cellulitis   Superimposed Cellulitis   -Continue Nystatin powder  -Interdry cloths  -Received 1x dose oral Fluconazole 7/1  -WOC recommendations are appreciated.   -ID consulted. Recommends aspergillus galactomannan assay, histo and blasto urine Ag, Fungitell, strongyloidiasis serology and radiographic studies  - fulconazole 200 daily, triamcinolone/clotrimazole cream BID, following QTc       T2DM  -hem a1c 6.2% on 6/3/22  -holding routine glipizide, novolog, metformin    -increased levemir to 20 units bid from, Levemir 14 units q12  -on Humalog 10 units TID meals  -hyperglycemia exacerbated by steroid use, SSI adjusted     HTN  HLD  -continue routine Lozol, losartan, pravastatin      Anxiety   -continue routine prozac, xanax      Fibromyalgia   -continue Lyrica, Norco      Hypomagnesemia  -Replace per protocol     Constipation  -Continue bowel regimen  -On Linzess at home, not on formulary here     Expected Discharge Location and Transportation: SNF,  van vs family transport   Expected Discharge  Date: TBD, still on HFNC    DVT prophylaxis:  Medical DVT prophylaxis orders are present.     AM-PAC 6 Clicks Score (PT): 11 (07/05/22 1356)    CODE STATUS:   Code Status and Medical Interventions:   Ordered at: 07/01/22 8268     Medical Intervention Limits:    NO intubation (DNI)     Code Status (Patient has no pulse and is not breathing):    No CPR (Do Not Attempt to Resuscitate)     Medical Interventions (Patient has pulse or is breathing):    Limited Support       Ketty Bethea MD  07/06/22

## 2022-07-06 NOTE — PROGRESS NOTES
Palliative Care Progress Note    Date of Admission: 6/27/2022    Subjective: Patient continues to complain of dyspnea as well as noted that she had an episode today that required her high flow oxygen to be increased.  Current Code Status     Date Active Code Status Order ID Comments User Context       7/1/2022 1617 No CPR (Do Not Attempt to Resuscitate) [id number]  Elsie Griffith, DO Inpatient     Advance Care Planning Activity      Questions for Current Code Status     Question Answer    Code Status (Patient has no pulse and is not breathing) No CPR (Do Not Attempt to Resuscitate)    Medical Interventions (Patient has pulse or is breathing) Limited Support    Medical Intervention Limits: NO intubation (DNI)        No current facility-administered medications on file prior to encounter.     Current Outpatient Medications on File Prior to Encounter   Medication Sig Dispense Refill   • FLUoxetine (PROzac) 20 MG capsule Take 40 mg by mouth Daily.     • HYDROcodone-acetaminophen (NORCO)  MG per tablet Take 1 tablet by mouth Every 12 (Twelve) Hours As Needed for Moderate Pain . 6 tablet 0   • indapamide (LOZOL) 2.5 MG tablet Take 2.5 mg by mouth Daily.     • insulin detemir (LEVEMIR) 100 UNIT/ML injection Inject 5 Units under the skin into the appropriate area as directed Every 12 (Twelve) Hours.  12   • losartan (COZAAR) 25 MG tablet Take 1 tablet by mouth Daily.     • omeprazole (priLOSEC) 40 MG capsule Take 40 mg by mouth Daily.     • oxybutynin XL (DITROPAN-XL) 5 MG 24 hr tablet Take 5 mg by mouth Daily.     • pravastatin (PRAVACHOL) 40 MG tablet Take 40 mg by mouth Daily.     • pregabalin (LYRICA) 150 MG capsule Take 1 capsule by mouth 3 (Three) Times a Day. 9 capsule 0   • albuterol (PROVENTIL) (2.5 MG/3ML) 0.083% nebulizer solution Take 2.5 mg by nebulization Every 6 (Six) Hours As Needed for Shortness of Air.  12   • ALPRAZolam (XANAX) 0.5 MG tablet Take 1 tablet by mouth Daily As Needed for Anxiety. 3  "tablet 0   • cyanocobalamin 1000 MCG/ML injection Inject 1,000 mcg under the skin into the appropriate area as directed Every 14 (Fourteen) Days.     • glipiZIDE (GLUCOTROL) 10 MG tablet Take 10 mg by mouth 2 (Two) Times a Day Before Meals.     • Insulin Aspart (novoLOG) 100 UNIT/ML injection Inject  under the skin into the appropriate area as directed 3 (Three) Times a Day Before Meals. Patient states she gets it from pharmacy although they did not have record of it     • metFORMIN (GLUCOPHAGE) 500 MG tablet Take 500 mg by mouth 2 (Two) Times a Day With Meals.     • predniSONE (DELTASONE) 5 MG tablet Take 3 tablets by mouth Daily With Breakfast for 4 days, THEN 2 tablets Daily With Breakfast for 4 days, THEN 1 tablet Daily With Breakfast for 4 days. 24 tablet 0        •  acetaminophen  •  albuterol  •  ALPRAZolam  •  senna-docusate sodium **AND** polyethylene glycol **AND** bisacodyl **AND** bisacodyl  •  dextrose  •  dextrose  •  diphenhydrAMINE  •  glucagon (human recombinant)  •  HYDROcodone-acetaminophen  •  ipratropium-albuterol  •  magnesium sulfate **OR** magnesium sulfate **OR** magnesium sulfate  •  sodium chloride  •  sodium chloride  •  sodium chloride    Objective: /74 (BP Location: Right arm, Patient Position: Lying)   Pulse 63   Temp 97.6 °F (36.4 °C) (Oral)   Resp 16   Ht 162.6 cm (64\")   Wt 86.2 kg (190 lb 1.6 oz)   SpO2 93%   BMI 32.63 kg/m²      Intake/Output Summary (Last 24 hours) at 7/6/2022 1356  Last data filed at 7/6/2022 0700  Gross per 24 hour   Intake 240 ml   Output 1700 ml   Net -1460 ml     Physical Exam:      General Appearance:    Alert, cooperative, in no acute distress   Head:    Normocephalic, without obvious abnormality, atraumatic   Eyes:            Lids and lashes normal, conjunctivae and sclerae normal, no   icterus, no pallor, corneas clear, PERRLA   Ears:    Ears appear intact with no abnormalities noted   Throat:   No oral lesions, no thrush, oral mucosa moist "   Neck:   No adenopathy, supple, trachea midline, no thyromegaly, no     carotid bruit, no JVD   Back:     No kyphosis present, no scoliosis present, no skin lesions,       erythema or scars, no tenderness to percussion or                   palpation,   range of motion normal   Lungs:     Diminshed breathe sounds    Heart:    Regular rhythm and normal rate, normal S1 and S2, no            murmur, no gallop, no rub, no click   Breast Exam:    Deferred   Abdomen:     Normal bowel sounds, no masses, no organomegaly, soft        non-tender, non-distended, no guarding, no rebound                 tenderness   Genitalia:    Deferred   Extremities:   Moves all extremities well, no edema, no cyanosis, no              redness   Pulses:   Pulses palpable and equal bilaterally   Skin:   No bleeding, bruising or rash   Lymph nodes:   No palpable adenopathy   Neurologic:   Cranial nerves 2 - 12 grossly intact, sensation intact, DTR        present and equal bilaterally     Results from last 7 days   Lab Units 07/06/22  0702   WBC 10*3/mm3 13.87*   HEMOGLOBIN g/dL 11.0*   HEMATOCRIT % 35.8   PLATELETS 10*3/mm3 316     Results from last 7 days   Lab Units 07/06/22  0702   SODIUM mmol/L 142   POTASSIUM mmol/L 5.2   CHLORIDE mmol/L 99   CO2 mmol/L 37.0*   BUN mg/dL 23   CREATININE mg/dL 0.65   CALCIUM mg/dL 9.7   GLUCOSE mg/dL 221*       Impression: Resp failure  Dyspnea  Hypoxia  GOC  Plan: Continue current plan of care for the time being, however it it is somewhat concerning the patient has required her high flow oxygen to be increased..        Amilcar Kumar DO  07/06/22  13:56 EDT

## 2022-07-07 NOTE — THERAPY TREATMENT NOTE
Patient Name: Chkiis Cuellar  : 1945    MRN: [id number]                              Today's Date: 2022       Admit Date: 2022    Visit Dx:     ICD-10-CM ICD-9-CM   1. Acute respiratory failure with hypoxia (HCC)  J96.01 518.81   2. Shortness of breath  R06.02 786.05   3. Atypical pneumonia  J18.9 486   4. Dysphagia, unspecified type  R13.10 787.20     Patient Active Problem List   Diagnosis   • Type 2 diabetes mellitus, with long-term current use of insulin (HCC)   • Essential hypertension   • Hyperlipemia   • GERD without esophagitis   • Mood disorder (HCC)   • Acute respiratory failure with hypoxia (HCC)   • Leukocytosis   • Chest pain   • Fibromyalgia   • Hypomagnesemia     Past Medical History:   Diagnosis Date   • Arthritis    • Diabetes mellitus (HCC)    • Fibromyalgia    • Hyperlipidemia    • Hypertension      Past Surgical History:   Procedure Laterality Date   • BACK SURGERY     •  SECTION     • CHOLECYSTECTOMY     • HYSTERECTOMY     • REPLACEMENT TOTAL KNEE        General Information     Row Name 22 0855          Physical Therapy Time and Intention    Document Type therapy note (daily note)  -DM     Mode of Treatment physical therapy  -DM     Row Name 22 0855          General Information    Existing Precautions/Restrictions fall;oxygen therapy device and L/min;other (see comments)  HFNC ; Desats quickly; fibromyal.;MRSA  -DM           User Key  (r) = Recorded By, (t) = Taken By, (c) = Cosigned By    Initials Name Provider Type    DM Katherine Rojas, PT Physical Therapist               Mobility     Row Name 22 0855          Bed Mobility    Bed Mobility rolling left;rolling right;scooting/bridging;supine-sit;sit-supine  -DM     Rolling Left [place] (Bed Mobility) verbal cues;minimum assist (75% patient effort)  -DM     Rolling Right [place] (Bed Mobility) verbal cues;minimum assist (75% patient effort)  -DM     Scooting/Bridging [place] (Bed  Mobility) verbal cues;maximum assist (25% patient effort);2 person assist  scooted to HOB w/ draw sheet  -DM     Supine-Sit [place] (Bed Mobility) verbal cues;maximum assist (25% patient effort)  -DM     Sit-Supine [place] (Bed Mobility) verbal cues;maximum assist (25% patient effort)  -DM     Assistive Device (Bed Mobility) bed rails;draw sheet;head of bed elevated  -DM     Comment, (Bed Mobility) nsg premed w/ pain pill (Norco) & BP med (elev /92 sup.initially,then 164/84);stating she has no lung capacity; reviewed PLB & relaxation techniques;andi EOB sitting x 1 1/2 min.(desat. to 78% w/ LAQ exer, & mod SOB; req. returning to supine);decr to 76% s/p positioned in gutierrez's, & c/o signif SOB;once scooted to HOB & propped w/ 2 pillows, o2 sat.slowly incr to 89% over 6 min.period while PT fanned pt  -DM     Row Name 07/07/22 0855          Transfers    Comment, (Transfers) def. d/t desat.  -DM     Row Name 07/07/22 0855          Bed-Chair Transfer    Bed-Chair [place] (Transfers) unable to assess  -DM     Row Name 07/07/22 0855          Sit-Stand Transfer    Sit-Stand [place] (Transfers) unable to assess  -DM     Row Name 07/07/22 0855          Gait/Stairs (Locomotion)    [place] Level (Gait) unable to assess  -DM     Comment, (Gait/Stairs) def.  -DM           User Key  (r) = Recorded By, (t) = Taken By, (c) = Cosigned By    Initials Name Provider Type    DM Katherine Rojas, PT Physical Therapist               Obj/Interventions     Row Name 07/07/22 0855          Motor Skills    Therapeutic Exercise shoulder;hip;knee;ankle  -DM     Row Name 07/07/22 0855          Shoulder (Therapeutic Exercise)    Shoulder (Therapeutic Exercise) AROM (active range of motion)  -DM     Shoulder AROM (Therapeutic Exercise) bilateral;flexion;extension;aBduction;aDduction;external rotation;internal rotation;horizontal aBduction/aDduction;supine;10 repetitions;other (see comments)  biceps curls; rests btwn  sets d/t desat 87%  -DM     Row Name 07/07/22 0855          Hip (Therapeutic Exercise)    Hip (Therapeutic Exercise) AROM (active range of motion);AAROM (active assistive range of motion);isometric exercises  -DM     Hip AROM (Therapeutic Exercise) bilateral;flexion;extension;external rotation;internal rotation;10 repetitions;supine  hip & knee F/E w/ occ. rests d/t desat 86%  -DM     Hip AAROM (Therapeutic Exercise) bilateral;aBduction;aDduction;supine;10 repetitions  -DM     Hip Isometrics (Therapeutic Exercise) bilateral;gluteal sets;supine;10 repetitions  -DM     Row Name 07/07/22 0855          Knee (Therapeutic Exercise)    Knee (Therapeutic Exercise) AROM (active range of motion);isometric exercises  -DM     Knee AROM (Therapeutic Exercise) bilateral;flexion;extension;SAQ (short arc quad);LAQ (long arc quad);heel slides;supine;sidelying;sitting;10 repetitions  LAQ x 2 sets (in sidely,then @ EOB); H.slides & SAQ in sup.w/ 4 rests d/t desat 87%  -DM     Knee Isometrics (Therapeutic Exercise) bilateral;hamstring sets;quad sets;supine;10 repetitions  rests q 5 reps d/t desat 87%  -DM     Row Name 07/07/22 0855          Ankle (Therapeutic Exercise)    Ankle (Therapeutic Exercise) AROM (active range of motion)  -DM     Ankle AROM (Therapeutic Exercise) bilateral;dorsiflexion;plantarflexion;supine;10 repetitions;other (see comments)  AC  -DM     Row Name 07/07/22 0855          Balance    Balance Assessment sitting static balance;sitting dynamic balance  -DM     Static Sitting Balance supervision  -DM     Dynamic Sitting Balance standby assist  recip scooting; HR to 124  -DM     Position, Sitting Balance unsupported;sitting edge of bed  -DM     Balance Interventions sitting;static;dynamic;weight shifting activity  -DM           User Key  (r) = Recorded By, (t) = Taken By, (c) = Cosigned By    Initials Name Provider Type    Katherine Clark, PT Physical Therapist               Goals/Plan    No documentation.                 Clinical Impression     Row Name 07/07/22 0855          Pain    Pre/Posttreatment Pain Comment Norco  -DM     Pain Intervention(s) Medication (See MAR);Repositioned;Elevated;Rest  -DM     Additional Documentation Pain Scale: FACES Pre/Post-Treatment (Group)  -DM     Row Name 07/07/22 0855          Pain Scale: FACES Pre/Post-Treatment    Pain: FACES Scale, Pretreatment 0-->no hurt  -DM     Posttreatment Pain Rating 2-->hurts little bit  -DM     Pain Location - Side/Orientation Bilateral  -DM     Pain Location lower  -DM     Pain Location - extremity;back  -DM     Pre/Posttreatment Pain Comment chr. LE & back pain  -DM     Row Name 07/07/22 0855          Plan of Care Review    Plan of Care Reviewed With patient  -DM     Progress no change  -DM     Outcome Evaluation Rolling L/R w/ min A, transf to sitting EOB w/ max A, andi.EOB x 1 1/2 min. (LAQ x 10;desat. 78% on HFNC & mod SOB, w/ ), transf to sup. w/ max A, desat to 76% & signif SOB, scooted to HOB w/ draw sheet & max 2A, & o2 sat slowly incr to 89% over 6 min.period while PT fanned pt. in gutierrez's (propped w/ 2 pillows). Pt. desat to 87% during ther exer sup. & sidely. Noted elev BP's throughout session (170/93 s/p EOB).  -DM     Row Name 07/07/22 0855          Vital Signs    Pre Systolic BP Rehab 167  -DM     Pre Treatment Diastolic BP 92  -DM     Intra Systolic BP Rehab 164  -DM     Intra Treatment Diastolic BP 84  -DM     Post Systolic BP Rehab 170  -DM     Post Treatment Diastolic BP 93  -DM     Pretreatment Heart Rate (beats/min) 88  -DM     Intratreatment Heart Rate (beats/min) 124  -DM     Posttreatment Heart Rate (beats/min) 120  -DM     Pre SpO2 (%) 91  -DM     O2 Delivery Pre Treatment hi-flow  -DM     Intra SpO2 (%) 76  -DM     O2 Delivery Intra Treatment hi-flow  -DM     Post SpO2 (%) 89  -DM     O2 Delivery Post Treatment hi-flow  -DM     Pre Patient Position Supine  -DM     Intra Patient Position Sitting  -DM     Post Patient Position  Supine  -DM     Row Name 07/07/22 0855          Positioning and Restraints    Pre-Treatment Position in bed  -DM     Post Treatment Position bed  -DM     In Bed notified nsg;fowlers;call light within reach;encouraged to call for assist;exit alarm on;heels elevated  -DM           User Key  (r) = Recorded By, (t) = Taken By, (c) = Cosigned By    Initials Name Provider Type    Katherine Clark, PT Physical Therapist               Outcome Measures     Row Name 07/07/22 0855          How much help from another person do you currently need...    Turning from your back to your side while in flat bed without using bedrails? 3  -DM     Moving from lying on back to sitting on the side of a flat bed without bedrails? 2  -DM     Moving to and from a bed to a chair (including a wheelchair)? 2  -DM     Standing up from a chair using your arms (e.g., wheelchair, bedside chair)? 2  -DM     Climbing 3-5 steps with a railing? 1  -DM     To walk in hospital room? 1  -DM     AM-PAC 6 Clicks Score (PT) 11  -DM     Highest level of mobility 4 --> Transferred to chair/commode  -DM     Row Name 07/07/22 0855          Functional Assessment    Outcome Measure Options AM-PAC 6 Clicks Basic Mobility (PT)  -DM           User Key  (r) = Recorded By, (t) = Taken By, (c) = Cosigned By    Initials Name Provider Type    Katherine Clark, PT Physical Therapist                             Physical Therapy Education                 Title: PT OT SLP Therapies (In Progress)     Topic: Physical Therapy (In Progress)     Point: Mobility training (In Progress)     Learning Progress Summary           Patient Acceptance, E,D, NR by DM at 7/7/2022 1005    Acceptance, E, VU by JM at 7/6/2022 1632    Acceptance, E,TB, VU,NR by KG at 7/5/2022 1357    Acceptance, E, NR by KG1 at 7/1/2022 1453    Acceptance, E, NR by KG1 at 6/28/2022 1438                   Point: Home exercise program (In Progress)     Learning Progress Summary           Patient Acceptance,  E,D, NR by DM at 7/7/2022 1005    Acceptance, E, VU by JM at 7/6/2022 1632    Acceptance, E,TB, VU,NR by KG at 7/5/2022 1357    Acceptance, E, NR by KG1 at 7/1/2022 1453                   Point: Body mechanics (In Progress)     Learning Progress Summary           Patient Acceptance, E,D, NR by DM at 7/7/2022 1005    Acceptance, E, VU by JM at 7/6/2022 1632    Acceptance, E,TB, VU,NR by KG at 7/5/2022 1357    Acceptance, E, NR by KG1 at 7/1/2022 1453    Acceptance, E, NR by KG1 at 6/28/2022 1438                   Point: Precautions (In Progress)     Learning Progress Summary           Patient Acceptance, E,D, NR by DM at 7/7/2022 1005    Acceptance, E, VU by JM at 7/6/2022 1632    Acceptance, E,TB, VU,NR by KG at 7/5/2022 1357    Acceptance, E, NR by KG1 at 7/1/2022 1453    Acceptance, E, NR by KG1 at 6/28/2022 1438                               User Key     Initials Effective Dates Name Provider Type Discipline     06/16/21 -  Katherine Rojas, PT Physical Therapist PT    [facility] 05/22/20 -  Francia Curiel, PT Physical Therapist PT     06/16/21 -  Tish العلي Physical Therapist PT     06/23/22 -  Renetta Vance, RN Registered Nurse Nurse              PT Recommendation and Plan     Plan of Care Reviewed With: patient  Progress: no change  Outcome Evaluation: Rolling L/R w/ min A, transf to sitting EOB w/ max A, andi.EOB x 1 1/2 min. (LAQ x 10;desat. 78% on HFNC & mod SOB, w/ ), transf to sup. w/ max A, desat to 76% & signif SOB, scooted to HOB w/ draw sheet & max 2A, & o2 sat slowly incr to 89% over 6 min.period while PT fanned pt. in gutierrez's (propped w/ 2 pillows). Pt. desat to 87% during ther exer sup. & sidely. Noted elev BP's throughout session (170/93 s/p EOB).     Time Calculation:    PT Charges     Row Name 07/07/22 1006             Time Calculation    Start Time 0855  -DM      PT Received On 07/07/22  -DM      PT Goal Re-Cert Due Date 07/08/22  -DM              Time Calculation- PT     Total Timed Code Minutes- PT 39 minute(s)  -DM              Timed Charges    20462 - PT Therapeutic Exercise Minutes 15  -DM      74826 - PT Therapeutic Activity Minutes 24  -DM              Total Minutes    Timed Charges Total Minutes 39  -DM       Total Minutes 39  -DM            User Key  (r) = Recorded By, (t) = Taken By, (c) = Cosigned By    Initials Name Provider Type    DM Katherine Rojas, PT Physical Therapist              Therapy Charges for Today     Code Description Service Date Service Provider Modifiers Qty    92409554388 HC PT THER PROC EA 15 MIN 7/7/2022 Katherine Rojas, PT GP 1    10413107897 HC PT THERAPEUTIC ACT EA 15 MIN 7/7/2022 Katherine Rojas, PT GP 2          PT G-Codes  Outcome Measure Options: AM-PAC 6 Clicks Basic Mobility (PT)  AM-PAC 6 Clicks Score (PT): 11  AM-PAC 6 Clicks Score (OT): 12    Katherine Rojas, PT  7/7/2022

## 2022-07-07 NOTE — PLAN OF CARE
Goal Outcome Evaluation:  Plan of Care Reviewed With: patient           Outcome Evaluation: Pt. had FEES today and passed.  Nectar thick liquids and cardiac consistent carb diet ordered.  Pt. remains on HFNC.  Plan for pt to dc to SNF.  Palliative care to continue to follow for support, POC and ongoing GOC conversations.    1330 Palliative IDT meeting: GHANSHYAM Olson RN, CHPN; NOHEMY Salazar RN, CHPN; CELIO Kumar, DO; SARAH Zamarripa, APRN; WANDER Aparicio, ELIZABET, [facility]; SARAVANAN Parson, RN, CHPN; GHANSHYAM Hilliard MDiv, [facility]

## 2022-07-07 NOTE — PLAN OF CARE
Problem: Adult Inpatient Plan of Care  Goal: Plan of Care Review  Outcome: Ongoing, Progressing  Goal: Patient-Specific Goal (Individualized)  Outcome: Ongoing, Progressing  Goal: Absence of Hospital-Acquired Illness or Injury  Outcome: Ongoing, Progressing  Intervention: Identify and Manage Fall Risk  Recent Flowsheet Documentation  Taken 7/7/2022 0800 by Renetta Vance RN  Safety Promotion/Fall Prevention:   activity supervised   assistive device/personal items within reach   clutter free environment maintained   fall prevention program maintained   nonskid shoes/slippers when out of bed   room organization consistent   safety round/check completed   toileting scheduled  Intervention: Prevent Skin Injury  Recent Flowsheet Documentation  Taken 7/7/2022 0800 by Renetta Vance RN  Body Position: position changed independently  Skin Protection:   adhesive use limited   tubing/devices free from skin contact   transparent dressing maintained   skin-to-skin areas padded  Intervention: Prevent and Manage VTE (Venous Thromboembolism) Risk  Recent Flowsheet Documentation  Taken 7/7/2022 0800 by Renetta Vance RN  Activity Management: activity adjusted per tolerance  Range of Motion:   active ROM (range of motion) encouraged   ROM (range of motion) performed  Goal: Optimal Comfort and Wellbeing  Outcome: Ongoing, Progressing  Intervention: Monitor Pain and Promote Comfort  Recent Flowsheet Documentation  Taken 7/7/2022 0800 by Renetta Vance RN  Pain Management Interventions:   care clustered   pillow support provided   quiet environment facilitated  Intervention: Provide Person-Centered Care  Recent Flowsheet Documentation  Taken 7/7/2022 0800 by Renetta Vance RN  Trust Relationship/Rapport:   care explained   choices provided   emotional support provided  Goal: Readiness for Transition of Care  Outcome: Ongoing, Progressing     Problem: Skin Injury Risk Increased  Goal: Skin Health and Integrity  Outcome:  Ongoing, Progressing  Intervention: Optimize Skin Protection  Recent Flowsheet Documentation  Taken 7/7/2022 0800 by Renetta Vance RN  Pressure Reduction Techniques:   frequent weight shift encouraged   rest period provided between sit times   weight shift assistance provided  Head of Bed (HOB) Positioning: HOB at 30-45 degrees  Pressure Reduction Devices:   positioning supports utilized   pressure-redistributing mattress utilized  Skin Protection:   adhesive use limited   tubing/devices free from skin contact   transparent dressing maintained   skin-to-skin areas padded     Problem: Diabetes Comorbidity  Goal: Blood Glucose Level Within Targeted Range  Outcome: Ongoing, Progressing     Problem: Hypertension Comorbidity  Goal: Blood Pressure in Desired Range  Outcome: Ongoing, Progressing  Intervention: Maintain Blood Pressure Management  Recent Flowsheet Documentation  Taken 7/7/2022 0800 by Renetta Vance RN  Medication Review/Management: medications reviewed     Problem: Osteoarthritis Comorbidity  Goal: Maintenance of Osteoarthritis Symptom Control  Outcome: Ongoing, Progressing  Intervention: Maintain Osteoarthritis Symptom Control  Recent Flowsheet Documentation  Taken 7/7/2022 0800 by Renetta Vance RN  Activity Management: activity adjusted per tolerance  Medication Review/Management: medications reviewed     Problem: Pain Chronic (Persistent) (Comorbidity Management)  Goal: Acceptable Pain Control and Functional Ability  Outcome: Ongoing, Progressing  Intervention: Manage Persistent Pain  Recent Flowsheet Documentation  Taken 7/7/2022 0800 by Renetta Vance RN  Bowel Elimination Promotion: adequate fluid intake promoted  Medication Review/Management: medications reviewed  Intervention: Develop Pain Management Plan  Recent Flowsheet Documentation  Taken 7/7/2022 0800 by Renetta Vnace RN  Pain Management Interventions:   care clustered   pillow support provided   quiet environment  facilitated  Intervention: Optimize Psychosocial Wellbeing  Recent Flowsheet Documentation  Taken 7/7/2022 0800 by Renetta Vance RN  Supportive Measures: active listening utilized  Diversional Activities:   television   smartphone  Family/Support System Care: support provided     Problem: Fall Injury Risk  Goal: Absence of Fall and Fall-Related Injury  Outcome: Ongoing, Progressing  Intervention: Identify and Manage Contributors  Recent Flowsheet Documentation  Taken 7/7/2022 0800 by Renetta Vance RN  Medication Review/Management: medications reviewed  Intervention: Promote Injury-Free Environment  Recent Flowsheet Documentation  Taken 7/7/2022 0800 by Renetta Vance RN  Safety Promotion/Fall Prevention:   activity supervised   assistive device/personal items within reach   clutter free environment maintained   fall prevention program maintained   nonskid shoes/slippers when out of bed   room organization consistent   safety round/check completed   toileting scheduled     Problem: Palliative Care  Goal: Enhanced Quality of Life  Outcome: Ongoing, Progressing  Intervention: Promote Advance Care Planning  Recent Flowsheet Documentation  Taken 7/7/2022 0800 by Renetta Vance RN  Life Transition/Adjustment: palliative care initiated  Intervention: Maximize Comfort  Recent Flowsheet Documentation  Taken 7/7/2022 0800 by Renetta Vance RN  Pain Management Interventions:   care clustered   pillow support provided   quiet environment facilitated  Intervention: Optimize Function  Recent Flowsheet Documentation  Taken 7/7/2022 0800 by Renetta Vance RN  Fatigue Management: frequent rest breaks encouraged  Intervention: Optimize Psychosocial Wellbeing  Recent Flowsheet Documentation  Taken 7/7/2022 0800 by Renetta Vance RN  Supportive Measures: active listening utilized  Family/Support System Care: support provided   Goal Outcome Evaluation:

## 2022-07-07 NOTE — PLAN OF CARE
Goal Outcome Evaluation:  Plan of Care Reviewed With: patient        Progress: no change  Outcome Evaluation: Rolling L/R w/ min A, transf to sitting EOB w/ max A, andi.EOB x 1 1/2 min. (LAQ x 10;desat. 78% on HFNC & mod SOB, w/ ), transf to sup. w/ max A, desat to 76% & signif SOB, scooted to HOB w/ draw sheet & max 2A, & o2 sat slowly incr to 89% over 6 min.period while PT fanned pt. in gutierrez's (propped w/ 2 pillows). Pt. desat to 87% during ther exer sup. & sidely. Noted elev BP's throughout session (170/93 s/p EOB).

## 2022-07-07 NOTE — DISCHARGE INSTR - DIET
FEES   7/7/22  Reason for Referral  Patient was referred for a FEES to assess the efficiency of his/her swallow function, rule out aspiration and make recommendations regarding safe dietary consistencies, effective compensatory strategies, and safe eating environment.         Recommendations/Treatment  SLP Swallowing Diagnosis: mild-moderate, pharyngeal dysphagia (07/07/22 1015)  Functional Impact: risk of aspiration/pneumonia (07/07/22 1015)  Rehab Potential/Prognosis, Swallowing: good, to achieve stated therapy goals (07/07/22 1015)  Swallow Criteria for Skilled Therapeutic Interventions Met: demonstrates skilled criteria (07/07/22 1015)  Therapy Frequency (Swallow): 5 days per week (07/07/22 1015)  Predicted Duration Therapy Intervention (Days): until discharge (07/07/22 1015)  SLP Diet Recommendation: regular textures, nectar thick liquids, ice chips between meals after oral care, with supervision (07/07/22 1015)  Recommended Precautions and Strategies: upright posture during/after eating, small bites of food and sips of liquid, general aspiration precautions, fatigue precautions (07/07/22 1015)  SLP Rec. for Method of Medication Administration: meds whole, with thick liquids, with pudding or applesauce, as tolerated (07/07/22 1015)  Monitor for Signs of Aspiration: yes, notify SLP if any concerns (07/07/22 1015)  Anticipated Discharge Disposition (SLP): unknown, anticipate therapy at next level of care (07/07/22 1015)    Instrumental Set-up  Risks/Benefits Reviewed: risks/benefits explained, patient, agreed to eval (07/07/22 1015)  Nasal Entry: right: (07/07/22 1015)  Anatomic Considerations: no anatomic structural deviation (07/07/22 1015)  Utensils Used: Spoon, Cup, Straw (07/07/22 1015)  Consistencies Trialed: thin liquids, nectar-thick liquids, pudding/puree, regular textures (07/07/22 1015)    Oral Preparation/ Oral Phase  Oral Phase: WFL (07/07/22 1015)    Pharyngeal Phase  Initiation of Pharyngeal  Swallow: bolus in pyriform sinuses (07/07/22 1015)  Pharyngeal Phase: impaired pharyngeal phase of swallowing (07/07/22 1015)  Penetration Before the Swallow: thin liquids, secondary to delayed swallow initiation or mistiming (07/07/22 1015)  Penetration During the Swallow: thin liquids, secondary to delayed swallow initiation or mistiming, secondary to reduced vestibular closure (07/07/22 1015)  Aspiration After the Swallow: thin liquids, secondary to residue, in laryngeal vestibule (07/07/22 1015)  Response to Aspiration: no response, silent aspiration (07/07/22 1015)  Rosenbek's Scale: thin:, 8-->Level 8 (07/07/22 1015)  Attempted Compensatory Maneuvers: bolus size, bolus presentation style (07/07/22 1015)  Response to Attempted Compensatory Maneuvers: did not prevent penetration, did not prevent aspiration (07/07/22 1015)  FEES Summary: Mild-moderate pharyngeal dysphagia. Initially no penetration/aspiration w/ thin liquids. However, pt fatigued & when returned to thin following trials of pudding/solid, pt began to penetrated before/during & aspirate after w/ thin. No penetration/aspiration w/ nectar-thick via cup/straw, pudding, or solid. Rec regular diet, nectar-thick liquids, meds whole w/ nectar-thick or puree/pudding, OK for ice chips b/w meals. (07/07/22 1015)

## 2022-07-07 NOTE — PROGRESS NOTES
INFECTIOUS DISEASE Progress Note    Chikis Cuellar  1945  [id number]    Consult: 7/5/22  Admit date: 6/27/2022    Requesting Provider: No ref. provider found  Evaluating physician: Percy Parker MD  Reason for Consultation: Back and arm cellulitis, intertrigo, recent pneumonia, candidiasis axillary  Chief Complaint: Above      Subjective   History of present illness:  Patient is a very pleasant  77 y.o.  Yr old female with diabetes mellitus type 2, essential hypertension, hyperlipidemia, arthritis, fibromyalgia, recent hospitalization 6/1/2022 until 6/27/2022 with pneumonia and ARDS, discharged to rehabilitation on 6/27 and readmitted on the same day related to worsening acute hypoxic respiratory failure.  Patient on first admission was thought to have an atypical pneumonia treated with IV antibiotics and steroids.  COVID-19 testing was negative x2.  Viral panel is negative.  Cultures were negative.  Pulmonary continues to follow and felt that her respiratory problems were related to ARDS fibrotic changes.  RICARDO and ANCA from 6/8 were negative.  Rheumatoid factor elevated at 21.0 (upper limit of normal 14).  Legionella urine antigen and streptococcal urine antigen were negative on 6/3.  MRSA screen was positive on 6/2.  The patient has been treated with steroids which have been increased because of worsening respiratory function.  Patient then developed bilateral axillary increased erythema concerning for candidiasis or cellulitis.  Antibiotics were started on 7/4/2022.  Vancomycin, ceftriaxone, and IV fluconazole.  I was consulted on 7/5/2022 for further evaluation and treatment.    7/6/2022 history reviewed.  Tolerating fluconazole and Lotrisone for axillary candidiasis also involving the back.  No fever.  Still on high flow oxygen at 45 L/min.  Being treated for ARDS related inflammation of the lungs.  Also on high-dose steroids.    7/7/2022 history reviewed.  Continues on fluconazole for severe  axillary candidiasis as well as Lotrisone.  No high fever.  Being treated for ARDS with steroids.  Continues on high flow oxygen at 45 L/min.  75% O2 concentration.  Infection work-up in lungs negative to date including negative Fungitell.    Past Medical History:   Diagnosis Date   • Arthritis    • Diabetes mellitus (HCC)    • Fibromyalgia    • Hyperlipidemia    • Hypertension        Past Surgical History:   Procedure Laterality Date   • BACK SURGERY     •  SECTION     • CHOLECYSTECTOMY     • HYSTERECTOMY     • REPLACEMENT TOTAL KNEE         Pediatric History   Patient Parents   • Not on file     Other Topics Concern   • Not on file   Social History Narrative   • Not on file       family history includes No Known Problems in her father and mother.    Allergies   Allergen Reactions   • Altace [Ramipril] Palpitations   • Trazodone Hallucinations       Immunization History   Administered Date(s) Administered   • COVID-19 (PFIZER) PURPLE CAP 2020, 2021, 09/10/2021   • Covid-19 (Pfizer) Gray Cap 2022       Medication:    Current Facility-Administered Medications:   •  acetaminophen (TYLENOL) tablet 650 mg, 650 mg, Oral, Q6H PRN, Sarath Gutiérrez PA-C, 650 mg at 22  •  albuterol (PROVENTIL) nebulizer solution 0.083% 2.5 mg/3mL, 2.5 mg, Nebulization, Q6H PRN, Vanesa Enciso APRN  •  ALPRAZolam (XANAX) tablet 0.5 mg, 0.5 mg, Oral, Daily PRN, Lola Neri, DO, 0.5 mg at 22  •  sennosides-docusate (PERICOLACE) 8.6-50 MG per tablet 2 tablet, 2 tablet, Oral, BID, 2 tablet at 22 0835 **AND** polyethylene glycol (MIRALAX) packet 17 g, 17 g, Oral, Daily PRN **AND** bisacodyl (DULCOLAX) EC tablet 5 mg, 5 mg, Oral, Daily PRN, 5 mg at 22 0835 **AND** bisacodyl (DULCOLAX) suppository 10 mg, 10 mg, Rectal, Daily PRN, Elsie Griffith, DO, 10 mg at 22 1413  •  clotrimazole-betamethasone (LOTRISONE) 1-0.05 % cream 1 application, 1 application, Topical, Q12H,  Percy Parker MD, 1 application at 07/07/22 0836  •  dextrose (D50W) (25 g/50 mL) IV injection 25 g, 25 g, Intravenous, Q15 Min PRN, Vanesa Enciso APRN  •  dextrose (GLUTOSE) oral gel 15 g, 15 g, Oral, Q15 Min PRN, Vanesa Enciso APRN  •  diphenhydrAMINE (BENADRYL) capsule 25 mg, 25 mg, Oral, Q6H PRN, Adithya Omer DO, 25 mg at 07/06/22 1605  •  Enoxaparin Sodium (LOVENOX) syringe 40 mg, 40 mg, Subcutaneous, Q24H, Vanesa Enciso APRN, 40 mg at 07/07/22 0610  •  fluconazole (DIFLUCAN) tablet 200 mg, 200 mg, Oral, Q24H, Percy Parker MD, 200 mg at 07/06/22 1605  •  FLUoxetine (PROzac) capsule 40 mg, 40 mg, Oral, Daily, Vanesa Enciso APRN, 40 mg at 07/07/22 0835  •  glucagon (human recombinant) (GLUCAGEN DIAGNOSTIC) injection 1 mg, 1 mg, Intramuscular, Q15 Min PRN, Vanesa Enciso APRN  •  HYDROcodone-acetaminophen (NORCO)  MG per tablet 1 tablet, 1 tablet, Oral, Q6H PRN, Adithya Omer DO, 1 tablet at 07/07/22 0610  •  hydrocortisone 2.5 % cream 1 application, 1 application, Topical, Q12H, Adithya Omer DO, 1 application at 07/07/22 0836  •  insulin detemir (LEVEMIR) injection 20 Units, 20 Units, Subcutaneous, Q12H, Adithya Omer DO, 20 Units at 07/07/22 0837  •  Insulin Lispro (humaLOG) injection 0-24 Units, 0-24 Units, Subcutaneous, TID AC, Adithya Omer DO, 4 Units at 07/07/22 0836  •  Insulin Lispro (humaLOG) injection 10 Units, 10 Units, Subcutaneous, TID With Meals, Elsie Griffith, DO, 10 Units at 07/07/22 0835  •  ipratropium-albuterol (DUO-NEB) nebulizer solution 3 mL, 3 mL, Nebulization, Q4H PRN, Vanesa Enciso APRN  •  ipratropium-albuterol (DUO-NEB) nebulizer solution 3 mL, 3 mL, Nebulization, 4x Daily - RT, Vanesa Enciso APRN, 3 mL at 07/07/22 0833  •  lactobacillus acidophilus (RISAQUAD) capsule 1 capsule, 1 capsule, Oral, Daily, Adithya Omer, , 1 capsule at 07/06/22 1223  •  losartan (COZAAR) tablet 25 mg, 25 mg, Oral, Daily, Vanesa Enciso APRN, 25 mg at 07/07/22  0835  •  Magnesium Sulfate 2 gram Bolus, followed by 8 gram infusion (total Mg dose 10 grams)- Mg less than or equal to 1mg/dL, 2 g, Intravenous, PRN **OR** Magnesium Sulfate 2 gram / 50mL Infusion (GIVE X 3 BAGS TO EQUAL 6GM TOTAL DOSE) - Mg 1.1 - 1.5 mg/dl, 2 g, Intravenous, PRN, Last Rate: 25 mL/hr at 07/01/22 1848, 2 g at 07/01/22 1848 **OR** Magnesium Sulfate 4 gram infusion- Mg 1.6-1.9 mg/dL, 4 g, Intravenous, PRN, Daron, Lola G, DO, Last Rate: 25 mL/hr at 07/03/22 1320, 4 g at 07/03/22 1320  •  methylPREDNISolone sodium succinate (SOLU-Medrol) injection 40 mg, 40 mg, Intravenous, Q12H, Gold Flores MD, 40 mg at 07/07/22 0610  •  miconazole (MICOTIN) 2 % cream 1 application, 1 application, Topical, Q12H, Adithya Omer, DO, 1 application at 07/07/22 0836  •  oxybutynin XL (DITROPAN-XL) 24 hr tablet 5 mg, 5 mg, Oral, Daily, Vanesa Enciso APRN, 5 mg at 07/07/22 0835  •  pantoprazole (PROTONIX) EC tablet 40 mg, 40 mg, Oral, Q AM, Vanesa Enciso APRN, 40 mg at 07/07/22 0610  •  pravastatin (PRAVACHOL) tablet 40 mg, 40 mg, Oral, Nightly, Vanesa Enciso APRN, 40 mg at 07/06/22 2126  •  pregabalin (LYRICA) capsule 150 mg, 150 mg, Oral, TID, Daron, Lola G, DO, 150 mg at 07/07/22 0835  •  sodium chloride 0.9 % flush 10 mL, 10 mL, Intravenous, PRN, Cabrera, Andrea Clark MD  •  sodium chloride 0.9 % flush 10 mL, 10 mL, Intravenous, Q12H, Vanesa Enciso APRN, 10 mL at 07/06/22 2127  •  sodium chloride 0.9 % flush 10 mL, 10 mL, Intravenous, PRN, Vanesa Enciso, JULIANNA  •  sodium chloride 0.9 % flush 10 mL, 10 mL, Intravenous, Q12H, Elsie Griffith DO, 10 mL at 07/06/22 2127  •  sodium chloride 0.9 % flush 10 mL, 10 mL, Intravenous, PRN, Elsie Griffith DO    Please refer to the medical record for a full medication list    Review of Systems:    Constitutional-- No Fever, chills or sweats.  Appetite good, and no malaise. No fatigue.  HEENT-- No new vision, hearing or throat complaints.  No epistaxis or oral  "sores.  Denies odynophagia or dysphagia.  No odynophagia or dysphagia. No headache, photophobia or neck stiffness.  CV-- No chest pain, palpitation or syncope  Resp--continued SOB/nonproductive cough/no hemoptysis  GI- No nausea, vomiting, or diarrhea.  No hematochezia, melena, or hematemesis. Denies jaundice or chronic liver disease.  -- No dysuria, hematuria, or flank pain.  Denies hesitancy, urgency or flank pain.  Lymph- no swollen lymph nodes in neck/axilla or groin.   Heme- No active bruising or bleeding; no Hx of DVT or PE.  MS-- no swelling or pain in the bones or joints of arms/legs.  No new back pain.  Neuro-- No acute focal weakness or numbness in the arms or legs.  No seizures.  Skin--No rashes or lesions, except bilateral axillary as per HPI, feels it is less itchy    Physical Exam:   Vital Signs   Temp:  [96 °F (35.6 °C)-97.6 °F (36.4 °C)] 97.3 °F (36.3 °C)  Heart Rate:  [51-86] 86  Resp:  [18-24] 20  BP: (113-175)/(69-92) 113/69    Temp  Min: 96 °F (35.6 °C)  Max: 97.6 °F (36.4 °C)  BP  Min: 113/69  Max: 175/90  Pulse  Min: 51  Max: 86  Resp  Min: 18  Max: 24  SpO2  Min: 88 %  Max: 95 %    Blood pressure 113/69, pulse 86, temperature 97.3 °F (36.3 °C), temperature source Axillary, resp. rate 20, height 162.6 cm (64\"), weight 94.9 kg (209 lb 3.2 oz), SpO2 92 %.  GENERAL: Awake and alert, in moderate distress. Appears older than stated age.  Resting in bed.  HEENT:  Normocephalic, atraumatic.  Oropharynx without thrush. Dentition in good repair. No cervical adenopathy. No neck masses.  Ears externally normal, Nose externally normal.  EYES: No conjunctival injection. No icterus. EOM full.  LYMPHATICS: No lymphadenopathy of the neck or axillary or inguinal regions.   HEART: No murmur, gallop, or pericardial friction rub. Reg rate rhythm, No JVD at 45 degrees.  LUNGS: Bilateral rales. No respiratory distress, no use of accessory muscles.  ABDOMEN: Soft, nontender, nondistended. No appreciable HSM.  Bowel " sounds normal.  Obese.  SKIN: Warm and dry without cutaneous eruptions, except bilateral axillary with improved erythema, no crepitus or bullae.  No nodules.  Some extension on the right side to the back.  PSYCHIATRIC: Mental status lucid. No confusion.  EXT:  No cellulitic change.  NEURO: Oriented to name, nonfocal    Results Review:   I reviewed the patient's new clinical results.  I reviewed the patient's new imaging results and agree with the interpretation.  I reviewed the patient's other test results and agree with the interpretation    Results from last 7 days   Lab Units 07/07/22  0742 07/06/22  0702 07/05/22  0735   WBC 10*3/mm3 14.51* 13.87* 16.22*   HEMOGLOBIN g/dL 11.6* 11.0* 10.8*   HEMATOCRIT % 36.9 35.8 33.1*   PLATELETS 10*3/mm3 333 316 279     Results from last 7 days   Lab Units 07/07/22  0742   SODIUM mmol/L 136   POTASSIUM mmol/L 5.2   CHLORIDE mmol/L 93*   CO2 mmol/L 37.0*   BUN mg/dL 24*   CREATININE mg/dL 0.69   GLUCOSE mg/dL 181*   CALCIUM mg/dL 9.9     Results from last 7 days   Lab Units 07/07/22  0742   ALK PHOS U/L 79   BILIRUBIN mg/dL 0.2   ALT (SGPT) U/L 13   AST (SGOT) U/L 16             Results from last 7 days   Lab Units 07/04/22  0436   VANCOMYCIN RM mcg/mL 11.10     Results from last 7 days   Lab Units 07/07/22  0742   LACTATE mmol/L 2.0     Estimated Creatinine Clearance: 75.1 mL/min (by C-G formula based on SCr of 0.69 mg/dL).    Microbiology:  Microbiology Results Abnormal     Procedure Component Value - Date/Time    COVID PRE-OP / PRE-PROCEDURE SCREENING ORDER (NO ISOLATION) - Swab, Nasopharynx [[id number]]  (Normal) Collected: 06/28/22 0027    Lab Status: Final result Specimen: Swab from Nasopharynx Updated: 06/28/22 0104    Narrative:      The following orders were created for panel order COVID PRE-OP / PRE-PROCEDURE SCREENING ORDER (NO ISOLATION) - Swab, Nasopharynx.  Procedure                               Abnormality         Status                     ---------                                -----------         ------                     COVID-19 and FLU A/B PCR...[[id number]]  Normal              Final result                 Please view results for these tests on the individual orders.    COVID-19 and FLU A/B PCR - Swab, Nasopharynx [[id number]]  (Normal) Collected: 06/28/22 0027    Lab Status: Final result Specimen: Swab from Nasopharynx Updated: 06/28/22 0104     COVID19 Not Detected     Influenza A PCR Not Detected     Influenza B PCR Not Detected    Narrative:      Fact sheet for providers: https://www.fda.gov/media/382998/download    Fact sheet for patients: https://www.fda.gov/media/242941/download    Test performed by PCR.          Radiology:  Imaging Results (Last 72 Hours)     Procedure Component Value Units Date/Time    SLP FEES - Fiberoptic Endo Eval Swallow [[id number]] Resulted: 07/07/22 1108     Updated: 07/07/22 1108    Narrative:      This procedure was auto-finalized with no dictation required.    XR Chest 1 View [[id number]] Collected: 07/06/22 1019     Updated: 07/06/22 1023    Narrative:      DATE OF EXAM: 7/6/2022 9:55 AM     PROCEDURE: XR CHEST 1 VW-     INDICATIONS: aspiration event, increased hypoxia; J96.01-Acute  respiratory failure with hypoxia; R06.02-Shortness of breath;  J18.9-Pneumonia, unspecified organism     COMPARISON: 7/5/2022     TECHNIQUE: Single radiographic AP view of the chest was obtained.     FINDINGS:  Left-sided PICC line is again seen in the mid SVC. Heart shadow is  normal in size. Prominence of the mediastinal silhouette is likely due  to rotation of patient to the right again on today's exam. This may be  due to the patient's scoliosis. Extensive and diffuse pulmonary  interstitial disease appears mildly improved on the right, significantly  improved on the left. No effusion or pneumothorax is seen.        Impression:      Improving pneumonia. No new chest pathology is seen.     This report was finalized on 7/6/2022 10:20 AM by Dr. Damon  MD José Miguel.             IMPRESSION:     1.  Axillary candidiasis in the setting of diabetes mellitus type 2 on steroids for worsening respiratory failure.  May have superimposed cellulitis but seems more like Candida and yeast and has been on antibiotics.  2.  Possible back cellulitis versus candidiasis.  As per above.  Previously MRSA colonized.  Favor candidiasis.  3.  Atypical pneumonia with likely ARDS postinfectious fibrotic changes with acute hypoxic respiratory failure.  On steroids to see how she responds.  High risk for invasive diagnostic testing, and does not want to be intubated.  4.  Leukocytosis, neutrophilic related to above issues.  Worse.  Steroids likely pushing white count higher.  5.  Anemia, chronic disease related to above issues.  6.  Acute hypoxic respiratory failure.  On 65 L/min on 7/5/2022 related to above issues.  45 L/min on 7/6, 7/7.  7.  Diabetes mellitus type 2 with increased risk for infection.    Plan:    1.  Diagnostically, continue to follow patient's physical exam, CBC, CMP, CRP, Aspergillus galactomannan assay, histoplasma and blastomycosis urine antigen, Fungitell, strongyloidiasis serology, and radiographic studies.  Previous Legionella and streptococcal antigen negative.  2.  Therapeutically, continue fluconazole 200 mg daily, triamcinolone/clotrimazole cream twice daily to axillary and back, duration to be determined.  QTc interval 422 ms on 6/27.  May need to consider pneumocystis prevention if continues on high-dose steroids.  3.  Oxygen support therapy.  65 L/min on 7/5/2022.  45 L/min on 7/6, 7/7.    I discussed the patient's findings and my recommendations with patient, family and nursing staff    Our group would be pleased to follow this patient over the course of their hospitalization and assist with outpatient antimicrobial therapy, as indicated.  Further recommendations depend on the results of the cultures and clinical course.    Percy Parker MD  7/7/2022

## 2022-07-07 NOTE — MBS/VFSS/FEES
Acute Care - Speech Language Pathology   Swallow Initial Evaluation [facility]   Fiberoptic Endoscopic Evaluation of Swallowing (FEES)     Patient Name: Chikis Cuellar  : 1945  MRN: [id number]  Today's Date: 2022               Admit Date: 2022    Visit Dx:     ICD-10-CM ICD-9-CM   1. Acute respiratory failure with hypoxia (HCC)  J96.01 518.81   2. Shortness of breath  R06.02 786.05   3. Atypical pneumonia  J18.9 486   4. Dysphagia, unspecified type  R13.10 787.20     Patient Active Problem List   Diagnosis   • Type 2 diabetes mellitus, with long-term current use of insulin (HCC)   • Essential hypertension   • Hyperlipemia   • GERD without esophagitis   • Mood disorder (HCC)   • Acute respiratory failure with hypoxia (HCC)   • Leukocytosis   • Chest pain   • Fibromyalgia   • Hypomagnesemia     Past Medical History:   Diagnosis Date   • Arthritis    • Diabetes mellitus (HCC)    • Fibromyalgia    • Hyperlipidemia    • Hypertension      Past Surgical History:   Procedure Laterality Date   • BACK SURGERY     •  SECTION     • CHOLECYSTECTOMY     • HYSTERECTOMY     • REPLACEMENT TOTAL KNEE         SLP Recommendation and Plan  SLP Swallowing Diagnosis: mild-moderate, pharyngeal dysphagia (22)  SLP Diet Recommendation: regular textures, nectar thick liquids, ice chips between meals after oral care, with supervision (22)  Recommended Precautions and Strategies: upright posture during/after eating, small bites of food and sips of liquid, general aspiration precautions, fatigue precautions (22)  SLP Rec. for Method of Medication Administration: meds whole, with thick liquids, with pudding or applesauce, as tolerated (22)     Monitor for Signs of Aspiration: yes, notify SLP if any concerns (22)     Swallow Criteria for Skilled Therapeutic Interventions Met: demonstrates skilled criteria (22)  Anticipated Discharge Disposition (SLP):  unknown, anticipate therapy at next level of care (07/07/22 1015)  Rehab Potential/Prognosis, Swallowing: good, to achieve stated therapy goals (07/07/22 1015)  Therapy Frequency (Swallow): 5 days per week (07/07/22 1015)  Predicted Duration Therapy Intervention (Days): until discharge (07/07/22 1015)                                  Plan of Care Reviewed With: patient      SWALLOW EVALUATION (last 72 hours)     SLP Adult Swallow Evaluation     Row Name 07/07/22 1015 07/06/22 1130                Rehab Evaluation    Document Type evaluation  -MP evaluation  -RD       Subjective Information no complaints  -MP no complaints  -RD       Patient Observations alert;cooperative  -MP alert;cooperative;agree to therapy  -RD       Patient/Family/Caregiver Comments/Observations No family present  -MP none  -RD       Patient Effort good  -MP good  -RD                General Information    Patient Profile Reviewed yes  -MP yes  -RD       Pertinent History Of Current Problem See previous eval. FEES to r/o aspiration  -MP Adm w/ Resp failure w/ hypozia. DM 2 w/ long term insulin, HTN, GERD, mood problem, fibromyalgia. choking episode witneessed by physician on boiled eggs this morning. Swallowing consult received. Pt currently on HFNC 45%. Recent adm w/ PNA & ARDS 6/1-6/27/22, covid negative x2.  -RD       Current Method of Nutrition regular textures;thin liquids  -MP regular textures;thin liquids  -RD       Precautions/Limitations, Vision WFL;for purposes of eval  -MP WFL;for purposes of eval  -RD       Precautions/Limitations, Hearing WFL;for purposes of eval  -MP WFL;for purposes of eval  -RD       Prior Level of Function-Communication unknown  -MP unknown  -RD       Prior Level of Function-Swallowing no diet consistency restrictions  -MP no diet consistency restrictions  -RD       Plans/Goals Discussed with patient;agreed upon  -MP patient;agreed upon  -RD       Barriers to Rehab none identified  -MP none identified  -RD        Patient's Goals for Discharge patient did not state  -MP eat/drink without coughing/choking  -RD                Pain    Additional Documentation Pain Scale: FACES Pre/Post-Treatment (Group)  -MP --                Pain Scale: Numbers Pre/Post-Treatment    Pretreatment Pain Rating -- 0/10 - no pain  -RD       Posttreatment Pain Rating -- 0/10 - no pain  -RD                Pain Scale: FACES Pre/Post-Treatment    Pain: FACES Scale, Pretreatment 2-->hurts little bit  -MP --       Posttreatment Pain Rating 2-->hurts little bit  -MP --                Oral Motor Structure and Function    Dentition Assessment -- upper dentures/partial in place;lower dentures/partial in place  -RD       Secretion Management -- WNL/WFL  -RD       Mucosal Quality -- moist, healthy  -RD       Volitional Swallow -- WFL  -RD       Volitional Cough -- WFL  -RD                Oral Musculature and Cranial Nerve Assessment    Oral Motor General Assessment -- WFL  -RD                General Eating/Swallowing Observations    Respiratory Support Currently in Use high-flow nasal cannula  -MP high-flow nasal cannula;other (see comments)  45%  -RD       Eating/Swallowing Skills self-fed  -MP fed by SLP;self-fed;appropriate self-feeding skills observed  -RD       Positioning During Eating upright in bed  -MP upright 90 degree;upright in bed  -RD       Utensils Used -- spoon;cup;straw  -RD       Consistencies Trialed -- thin liquids;pureed;regular textures  -RD                Clinical Swallow Eval    Oral Prep Phase -- WFL  -RD       Oral Residue -- WFL  -RD       Pharyngeal Phase -- no overt signs/symptoms of pharyngeal impairment  -RD       Esophageal Phase -- suspected esophageal impairment  -RD       Clinical Swallow Evaluation Summary -- Consult for swallowing after physician witnessed choking episode on boiled eggs w/ desat & hypoxia. Pt currently on HFNC 45% and reports that this was an isolated incident, although has had PNA & respiratory failure. No  immediate s/s of aspiration observed. Just burping x1 w/ thins. High risk of silent aspiration given O2 requirements. Need to r/o pharyngeal dysphagia. May continue diet w/ small bolus, no straws, & general precautions as well as fatigue precautions, education provided. FEES to r/o aspiration. pt agreeable. If any further choking concerns, make NPO until FEES.  -RD                Esophageal Phase Concerns    Esophageal Phase Concerns -- belching  -RD       Belching -- thin  -RD       Esophageal Phase Concerns, Comment -- hx of GERD per chart, but not reported by pt  -RD                Fiberoptic Endoscopic Evaluation of Swallowing (FEES)    Risks/Benefits Reviewed risks/benefits explained;patient;agreed to eval  -MP --       Nasal Entry right:  -MP --       Scope serial number/identification [id number]  -MP --                Anatomy and Physiology    Anatomic Considerations no anatomic structural deviation  -MP --       Velopharyngeal WFL  -MP --       Base of Tongue symmetrical  -MP --       Epiglottis WFL  -MP --       Laryngeal Function Breathing symmetrical  -MP --       Laryngeal Function Phonation symmetrical  -MP --       Laryngeal Function to Breath Hold TVF/FVF/Arytenoid;sustained closure  -MP --       Secretion Rating Scale (Mathieu et al. 1996) 0- normal, no visible secretions  -MP --       Ice Chips DNA  -MP --       Spontaneous Swallow frequency adequate  -MP --       Sensory sensed scope  -MP --       Utensils Used Spoon;Cup;Straw  -MP --       Consistencies Trialed thin liquids;nectar-thick liquids;pudding/puree;regular textures  -MP --                FEES Interpretation    Oral Phase WFL  -MP --                Initiation of Pharyngeal Swallow    Initiation of Pharyngeal Swallow bolus in pyriform sinuses  -MP --       Pharyngeal Phase impaired pharyngeal phase of swallowing  -MP --       Penetration Before the Swallow thin liquids;secondary to delayed swallow initiation or mistiming  -MP --       Penetration  During the Swallow thin liquids;secondary to delayed swallow initiation or mistiming;secondary to reduced vestibular closure  -MP --       Aspiration After the Swallow thin liquids;secondary to residue;in laryngeal vestibule  -MP --       Response to Aspiration no response, silent aspiration  -MP --       Rosenbek's Scale thin:;8-->Level 8  -MP --       Attempted Compensatory Maneuvers bolus size;bolus presentation style  -MP --       Response to Attempted Compensatory Maneuvers did not prevent penetration;did not prevent aspiration  -MP --       FEES Summary Mild-moderate pharyngeal dysphagia. Initially no penetration/aspiration w/ thin liquids. However, pt fatigued & when returned to thin following trials of pudding/solid, pt began to penetrated before/during & aspirate after w/ thin. No penetration/aspiration w/ nectar-thick via cup/straw, pudding, or solid. Rec regular diet, nectar-thick liquids, meds whole w/ nectar-thick or puree/pudding, OK for ice chips b/w meals.  -MP --                SLP Evaluation Clinical Impression    SLP Swallowing Diagnosis mild-moderate;pharyngeal dysphagia  -MP R/O pharyngeal dysphagia  -RD       Functional Impact risk of aspiration/pneumonia  -MP risk of aspiration/pneumonia  -RD       Rehab Potential/Prognosis, Swallowing good, to achieve stated therapy goals  -MP good, to achieve stated therapy goals  -RD       Swallow Criteria for Skilled Therapeutic Interventions Met demonstrates skilled criteria  -MP demonstrates skilled criteria  -RD                Recommendations    Therapy Frequency (Swallow) 5 days per week  -MP PRN  -RD       Predicted Duration Therapy Intervention (Days) until discharge  -MP until discharge  -RD       SLP Diet Recommendation regular textures;nectar thick liquids;ice chips between meals after oral care, with supervision  -MP regular textures;thin liquids  -RD       Recommended Diagnostics -- FEES  -RD       Recommended Precautions and Strategies upright  posture during/after eating;small bites of food and sips of liquid;general aspiration precautions;fatigue precautions  -MP upright posture during/after eating;small bites of food and sips of liquid;no straw;general aspiration precautions;reflux precautions;fatigue precautions  -RD       Oral Care Recommendations Oral Care BID/PRN  -MP Oral Care BID/PRN  -RD       SLP Rec. for Method of Medication Administration meds whole;with thick liquids;with pudding or applesauce;as tolerated  -MP meds whole;with pudding or applesauce;as tolerated  -RD       Monitor for Signs of Aspiration yes;notify SLP if any concerns  -MP yes;notify SLP if any concerns;other (see comments)  make NPO if any further choking concerns  -RD       Anticipated Discharge Disposition (SLP) unknown;anticipate therapy at next level of care  -MP unknown;anticipate therapy at next level of care  -RD             User Key  (r) = Recorded By, (t) = Taken By, (c) = Cosigned By    Initials Name Effective Dates    Leny Shaw MS CCC-SLP 06/16/21 -     Jose Coombs MS CCC-SLP 12/28/21 -                 EDUCATION  The patient has been educated in the following areas:   Dysphagia (Swallowing Impairment) Modified Diet Instruction.        SLP GOALS     Row Name 07/07/22 1015             (LTG) Patient will demonstrate functional swallow for    Diet Texture (Demonstrate functional swallow) regular textures  -MP      Liquid viscosity (Demonstrate functional swallow) thin liquids  -MP      [place] (Demonstrate functional swallow) independently (over 90% accuracy)  -MP      Time Frame (Demonstrate functional swallow) by discharge  -MP              (STG) Patient will tolerate trials of    Consistencies Trialed (Tolerate trials) regular textures;nectar/ mildly thick liquids  -MP      Desired Outcome (Tolerate trials) without signs/symptoms of aspiration;without signs of distress  -MP      [place] (Tolerate trials) independently (over 90%  accuracy)  -MP      Time Frame (Tolerate trials) 1 week  -MP              (STG) Patient will tolerate therapeutic trials of    Consistencies Trialed (Tolerate therapeutic trials) thin liquids  -MP      Desired Outcome (Tolerate therapeutic trials) without signs/symptoms of aspiration;without signs of distress  -MP      [place] (Tolerate therapeutic trials) independently (over 90% accuracy)  -MP      Time Frame (Tolerate therapeutic trials) 1 week  -MP              (STG) Lingual Strengthening Goal 1 (SLP)    Activity (Lingual Strengthening Goal 1, SLP) increase tongue back strength  -MP      Increase Tongue Back Strength lingual resistance exercises;swallow trials  -MP      [place]/Accuracy (Lingual Strengthening Goal 1, SLP) with minimal cues (75-90% accuracy)  -MP      Time Frame (Lingual Strengthening Goal 1, SLP) by discharge  -MP              (STG) Pharyngeal Strengthening Exercise Goal 1 (SLP)    Activity (Pharyngeal Strengthening Goal 1, SLP) increase timing;increase closure at entrance to airway/closure of airway at glottis  -MP      Increase Timing prepping - 3 second prep or suck swallow or 3-step swallow  -MP      Increase Closure at Entrance to Airway/Closure of Airway at Glottis supraglottic swallow;hard effortful swallow  -MP      [place]/Accuracy (Pharyngeal Strengthening Goal 1, SLP) with minimal cues (75-90% accuracy)  -MP      Time Frame (Pharyngeal Strengthening Goal 1, SLP) by discharge  -MP            User Key  (r) = Recorded By, (t) = Taken By, (c) = Cosigned By    Initials Name Provider Type    Jose Coombs, MS CCC-SLP Speech and Language Pathologist                   Time Calculation:    Time Calculation- SLP     Row Name 07/07/22 1125             Time Calculation- SLP    SLP Start Time 1025  -MP      SLP Received On 07/07/22  -MP              Untimed Charges    04683-DI Fiberoptic Endo Eval Swallow Minutes 85  -MP              Total Minutes    Untimed Charges Total  Minutes 85  -MP       Total Minutes 85  -MP            User Key  (r) = Recorded By, (t) = Taken By, (c) = Cosigned By    Initials Name Provider Type    Jose Coombs MS CCC-SLP Speech and Language Pathologist                Therapy Charges for Today     Code Description Service Date Service Provider Modifiers Qty    [id number] HC ST FIBEROPTIC ENDO EVAL SWALL 6 7/7/2022 Jose Govea MS CCC-SLP GN 1          Patient was not wearing a face mask and did exhibit coughing during this therapy encounter.  Procedure performed was aerosolizing, involved close contact (within 6 feet for at least 15 minutes or longer), and did not involve contact with infectious secretions or specimens.  Therapist used appropriate personal protective equipment including gloves, standard procedure mask and eye protection.  Appropriate PPE was worn during the entire therapy session.  Hand hygiene was completed before and after therapy session.        MS NAVIN Ochoa  7/7/2022

## 2022-07-07 NOTE — PLAN OF CARE
Problem: Adult Inpatient Plan of Care  Goal: Absence of Hospital-Acquired Illness or Injury  Intervention: Identify and Manage Fall Risk  Recent Flowsheet Documentation  Taken 7/7/2022 0200 by Renetta Bennett RN  Safety Promotion/Fall Prevention:   activity supervised   assistive device/personal items within reach   clutter free environment maintained  Taken 7/7/2022 0000 by Renetta Bennett RN  Safety Promotion/Fall Prevention:   activity supervised   assistive device/personal items within reach   clutter free environment maintained  Taken 7/6/2022 2200 by Renetta Bennett RN  Safety Promotion/Fall Prevention:   activity supervised   assistive device/personal items within reach   clutter free environment maintained  Taken 7/6/2022 2000 by Renetta Bennett RN  Safety Promotion/Fall Prevention:   activity supervised   assistive device/personal items within reach   clutter free environment maintained  Intervention: Prevent Skin Injury  Recent Flowsheet Documentation  Taken 7/7/2022 0200 by Renetta Bennett RN  Skin Protection:   adhesive use limited   tubing/devices free from skin contact   skin-to-device areas padded   incontinence pads utilized  Taken 7/7/2022 0000 by Renetta Bennett RN  Skin Protection:   adhesive use limited   tubing/devices free from skin contact   skin-to-device areas padded   incontinence pads utilized  Taken 7/6/2022 2200 by Renetta Bennett RN  Skin Protection:   adhesive use limited   tubing/devices free from skin contact   skin-to-device areas padded   incontinence pads utilized  Taken 7/6/2022 2000 by Renetta Bennett RN  Skin Protection:   adhesive use limited   tubing/devices free from skin contact   skin-to-device areas padded   incontinence pads utilized  Intervention: Prevent and Manage VTE (Venous Thromboembolism) Risk  Recent Flowsheet Documentation  Taken 7/7/2022 0200 by Renetta Bennett RN  Activity Management: activity adjusted per tolerance  Taken 7/7/2022 0000 by Renetta Bennett  RN  Activity Management: activity adjusted per tolerance  Taken 7/6/2022 2200 by Renetta Bennett RN  Activity Management: activity adjusted per tolerance  Taken 7/6/2022 2000 by Renetta Bennett RN  Activity Management: activity adjusted per tolerance  Intervention: Prevent Infection  Recent Flowsheet Documentation  Taken 7/7/2022 0200 by Renetta Bennett RN  Infection Prevention: rest/sleep promoted  Taken 7/7/2022 0000 by Renetta Bennett RN  Infection Prevention: rest/sleep promoted  Taken 7/6/2022 2200 by Renetta Bennett RN  Infection Prevention: rest/sleep promoted  Taken 7/6/2022 2000 by Renetta Bennett RN  Infection Prevention: rest/sleep promoted     Problem: Skin Injury Risk Increased  Goal: Skin Health and Integrity  Intervention: Optimize Skin Protection  Recent Flowsheet Documentation  Taken 7/7/2022 0200 by Renetta Bennett RN  Pressure Reduction Techniques: frequent weight shift encouraged  Pressure Reduction Devices:   positioning supports utilized   pressure-redistributing mattress utilized  Skin Protection:   adhesive use limited   tubing/devices free from skin contact   skin-to-device areas padded   incontinence pads utilized  Taken 7/7/2022 0000 by Renetta Bennett RN  Pressure Reduction Techniques: frequent weight shift encouraged  Pressure Reduction Devices:   pressure-redistributing mattress utilized   positioning supports utilized  Skin Protection:   adhesive use limited   tubing/devices free from skin contact   skin-to-device areas padded   incontinence pads utilized  Taken 7/6/2022 2200 by Renetta Bennett RN  Pressure Reduction Techniques: frequent weight shift encouraged  Pressure Reduction Devices:   positioning supports utilized   pressure-redistributing mattress utilized  Skin Protection:   adhesive use limited   tubing/devices free from skin contact   skin-to-device areas padded   incontinence pads utilized  Taken 7/6/2022 2000 by Renetta Bennett RN  Pressure Reduction Techniques: frequent  weight shift encouraged  Pressure Reduction Devices:   pressure-redistributing mattress utilized   positioning supports utilized  Skin Protection:   adhesive use limited   tubing/devices free from skin contact   skin-to-device areas padded   incontinence pads utilized     Problem: Hypertension Comorbidity  Goal: Blood Pressure in Desired Range  Intervention: Maintain Blood Pressure Management  Recent Flowsheet Documentation  Taken 7/7/2022 0200 by Renetta Bennett RN  Medication Review/Management: medications reviewed  Taken 7/7/2022 0000 by Renetta Bennett RN  Medication Review/Management: medications reviewed  Taken 7/6/2022 2200 by Renetta Bennett RN  Medication Review/Management: medications reviewed     Problem: Osteoarthritis Comorbidity  Goal: Maintenance of Osteoarthritis Symptom Control  Intervention: Maintain Osteoarthritis Symptom Control  Recent Flowsheet Documentation  Taken 7/7/2022 0200 by Renetta Bennett RN  Activity Management: activity adjusted per tolerance  Medication Review/Management: medications reviewed  Taken 7/7/2022 0000 by Renetta Bennett RN  Activity Management: activity adjusted per tolerance  Medication Review/Management: medications reviewed  Taken 7/6/2022 2200 by Renetta Bennett RN  Activity Management: activity adjusted per tolerance  Medication Review/Management: medications reviewed  Taken 7/6/2022 2000 by Renetta Bennett RN  Activity Management: activity adjusted per tolerance     Problem: Pain Chronic (Persistent) (Comorbidity Management)  Goal: Acceptable Pain Control and Functional Ability  Intervention: Manage Persistent Pain  Recent Flowsheet Documentation  Taken 7/7/2022 0200 by Renetta Bennett RN  Medication Review/Management: medications reviewed  Taken 7/7/2022 0000 by Renetta Bennett RN  Medication Review/Management: medications reviewed  Taken 7/6/2022 2200 by Renetta Bennett RN  Medication Review/Management: medications reviewed     Problem: Fall Injury Risk  Goal:  Absence of Fall and Fall-Related Injury  Intervention: Identify and Manage Contributors  Recent Flowsheet Documentation  Taken 7/7/2022 0200 by Renetta Bennett RN  Medication Review/Management: medications reviewed  Taken 7/7/2022 0000 by Renetta Bennett RN  Medication Review/Management: medications reviewed  Taken 7/6/2022 2200 by Renetta Bennett RN  Medication Review/Management: medications reviewed  Intervention: Promote Injury-Free Environment  Recent Flowsheet Documentation  Taken 7/7/2022 0200 by Renetta Bennett RN  Safety Promotion/Fall Prevention:   activity supervised   assistive device/personal items within reach   clutter free environment maintained  Taken 7/7/2022 0000 by Renetta Bennett RN  Safety Promotion/Fall Prevention:   activity supervised   assistive device/personal items within reach   clutter free environment maintained  Taken 7/6/2022 2200 by Renetta Bennett RN  Safety Promotion/Fall Prevention:   activity supervised   assistive device/personal items within reach   clutter free environment maintained  Taken 7/6/2022 2000 by Renetta Bennett RN  Safety Promotion/Fall Prevention:   activity supervised   assistive device/personal items within reach   clutter free environment maintained   Goal Outcome Evaluation:

## 2022-07-07 NOTE — PLAN OF CARE
Goal Outcome Evaluation:  Plan of Care Reviewed With: patient            SLP FEES evaluation completed. Will address dysphagia. Please see note for further details and recommendations.

## 2022-07-07 NOTE — PROGRESS NOTES
[facility] Medicine Services  PROGRESS NOTE    Patient Name: Chikis Cuellar  : 1945  MRN: [id number]    Date of Admission: 2022  Primary Care Physician: Eduardo Gamboa MD    Subjective   Subjective     CC:  F/U acute respiratory failure with hypoxia     HPI:  She feels better this morning, however is still intermittently coughing.  She is eating well.    ROS:  Gen- No fevers, chills  CV- No chest pain, palpitations  Resp- cough, dyspnea improved today, so far has tolerated breakfast  GI- No N/V/D, abd pain  Skin- itchy        Objective   Objective     Vital Signs:   Temp:  [96 °F (35.6 °C)-97.6 °F (36.4 °C)] 96.4 °F (35.8 °C)  Heart Rate:  [] 56  Resp:  [16-24] 19  BP: (122-175)/(74-92) 167/92  Flow (L/min):  [45] 45     Physical Exam:  Constitutional: In no acute distress, nontoxic, very pleasant  HENT: NCAT, mucous membranes moist  Respiratory: bilateral dry expiratory crackles, still intermittently coughing her saturations decreased when she coughs  Cardiovascular: RRR, no murmurs, rubs, or gallops  Gastrointestinal: Positive bowel sounds, soft, nontender, nondistended  Musculoskeletal: No bilateral ankle edema  Psychiatric: Appropriate affect, cooperative  Neurologic: Oriented x 3,  speech clear  Skin: Erythema under L armpit improved, Erythema R armpit without much change but has extended further around to her back.    Results Reviewed:  LAB RESULTS:      Lab 22  0742 22  0702 22  0735 22  0436 22  0438 22  1549   WBC 14.51* 13.87* 16.22* 11.45* 14.73* 17.50*   HEMOGLOBIN 11.6* 11.0* 10.8* 11.5* 11.8* 12.0   HEMATOCRIT 36.9 35.8 33.1* 36.9 36.8 37.4   PLATELETS 333 316 279 251 227 250   NEUTROS ABS 12.73*  --   --   --  11.18* 15.32*   IMMATURE GRANS (ABS) 0.11*  --   --   --  0.06* 0.08*   LYMPHS ABS 0.90  --   --   --  1.32 0.87   MONOS ABS 0.69  --   --   --  0.69 0.55   EOS ABS 0.05  --   --   --  1.43* 0.64*   MCV 85.6  88.2 83.6 86.2 85.8 83.7         Lab 07/06/22  0702 07/05/22  0735 07/04/22  0436 07/03/22  0438 07/02/22  1549 07/01/22  0423   SODIUM 142 138 139 142 137 139   POTASSIUM 5.2 5.3* 5.0 4.0 4.3 3.6   CHLORIDE 99 97* 96* 98 97* 96*   CO2 37.0* 37.0* 36.0* 33.0* 31.0* 33.0*   ANION GAP 6.0 4.0* 7.0 11.0 9.0 10.0   BUN 23 20 16 21 24* 24*   CREATININE 0.65 0.71 0.61 0.66 0.73 0.78   EGFR 90.2 87.2 91.6 89.9 84.3 77.9   GLUCOSE 221* 276* 193* 76 223* 130*   CALCIUM 9.7 9.6 9.5 9.0 9.3 9.8   MAGNESIUM  --   --  2.1 1.6 2.2 1.5*             Lab 07/03/22  0438   PROBNP 198.8                 Brief Urine Lab Results     None          Microbiology Results Abnormal     Procedure Component Value - Date/Time    COVID PRE-OP / PRE-PROCEDURE SCREENING ORDER (NO ISOLATION) - Swab, Nasopharynx [[id number]]  (Normal) Collected: 06/28/22 0027    Lab Status: Final result Specimen: Swab from Nasopharynx Updated: 06/28/22 0104    Narrative:      The following orders were created for panel order COVID PRE-OP / PRE-PROCEDURE SCREENING ORDER (NO ISOLATION) - Swab, Nasopharynx.  Procedure                               Abnormality         Status                     ---------                               -----------         ------                     COVID-19 and FLU A/B PCR...[[id number]]  Normal              Final result                 Please view results for these tests on the individual orders.    COVID-19 and FLU A/B PCR - Swab, Nasopharynx [[id number]]  (Normal) Collected: 06/28/22 0027    Lab Status: Final result Specimen: Swab from Nasopharynx Updated: 06/28/22 0104     COVID19 Not Detected     Influenza A PCR Not Detected     Influenza B PCR Not Detected    Narrative:      Fact sheet for providers: https://www.fda.gov/media/714700/download    Fact sheet for patients: https://www.fda.gov/media/346163/download    Test performed by PCR.          XR Chest 1 View    Result Date: 7/6/2022  DATE OF EXAM: 7/6/2022 9:55 AM  PROCEDURE: XR CHEST 1  VW-  INDICATIONS: aspiration event, increased hypoxia; J96.01-Acute respiratory failure with hypoxia; R06.02-Shortness of breath; J18.9-Pneumonia, unspecified organism  COMPARISON: 7/5/2022  TECHNIQUE: Single radiographic AP view of the chest was obtained.  FINDINGS: Left-sided PICC line is again seen in the mid SVC. Heart shadow is normal in size. Prominence of the mediastinal silhouette is likely due to rotation of patient to the right again on today's exam. This may be due to the patient's scoliosis. Extensive and diffuse pulmonary interstitial disease appears mildly improved on the right, significantly improved on the left. No effusion or pneumothorax is seen.      Impression: Improving pneumonia. No new chest pathology is seen.  This report was finalized on 7/6/2022 10:20 AM by Dr. Nelson Valdez MD.        Results for orders placed during the hospital encounter of 06/01/22    Adult Transthoracic Echo Complete W/ Cont if Necessary Per Protocol    Interpretation Summary  · The right atrial cavity is mildly dilated.  · Estimated right ventricular systolic pressure from tricuspid regurgitation is mildly elevated (35-45 mmHg). Calculated right ventricular systolic pressure from tricuspid regurgitation is 40 mmHg.  · Estimated left ventricular EF = 60% Estimated left ventricular EF was in agreement with the calculated left ventricular EF. Left ventricular ejection fraction appears to be 56 - 60%. Left ventricular systolic function is normal.  · Left ventricular diastolic function is consistent with age.  · Mild pulmonary hypertension is present.  · Normal right ventricular wall thickness, systolic function and septal motion noted with the right ventricular cavity mild to moderately dilated.  · There is no evidence of pericardial effusion.  · No significant structural or functional valvular abnormalities demonstrated.      I have reviewed the medications:  Scheduled Meds:clotrimazole-betamethasone, 1 application,  Topical, Q12H  enoxaparin, 40 mg, Subcutaneous, Q24H  fluconazole, 200 mg, Oral, Q24H  FLUoxetine, 40 mg, Oral, Daily  hydrocortisone, 1 application, Topical, Q12H  insulin detemir, 20 Units, Subcutaneous, Q12H  insulin lispro, 0-24 Units, Subcutaneous, TID AC  Insulin Lispro, 10 Units, Subcutaneous, TID With Meals  ipratropium-albuterol, 3 mL, Nebulization, 4x Daily - RT  lactobacillus acidophilus, 1 capsule, Oral, Daily  losartan, 25 mg, Oral, Daily  methylPREDNISolone sodium succinate, 40 mg, Intravenous, Q12H  miconazole, 1 application, Topical, Q12H  oxybutynin XL, 5 mg, Oral, Daily  pantoprazole, 40 mg, Oral, Q AM  pravastatin, 40 mg, Oral, Nightly  pregabalin, 150 mg, Oral, TID  senna-docusate sodium, 2 tablet, Oral, BID  sodium chloride, 10 mL, Intravenous, Q12H  sodium chloride, 10 mL, Intravenous, Q12H      Continuous Infusions:   PRN Meds:.•  acetaminophen  •  albuterol  •  ALPRAZolam  •  senna-docusate sodium **AND** polyethylene glycol **AND** bisacodyl **AND** bisacodyl  •  dextrose  •  dextrose  •  diphenhydrAMINE  •  glucagon (human recombinant)  •  HYDROcodone-acetaminophen  •  ipratropium-albuterol  •  magnesium sulfate **OR** magnesium sulfate **OR** magnesium sulfate  •  sodium chloride  •  sodium chloride  •  sodium chloride    Assessment & Plan   Assessment & Plan     Active Hospital Problems    Diagnosis  POA   • **Acute respiratory failure with hypoxia (HCC) [J96.01]  Yes   • Hypomagnesemia [E83.42]  Unknown   • Leukocytosis [D72.829]  Yes   • Chest pain [R07.9]  Yes   • Fibromyalgia [M79.7]  Yes   • Type 2 diabetes mellitus, with long-term current use of insulin (HCC) [E11.9, Z79.4]  Not Applicable   • Essential hypertension [I10]  Yes   • Hyperlipemia [E78.5]  Yes   • GERD without esophagitis [K21.9]  Yes   • Mood disorder (HCC) [F39]  Yes      Resolved Hospital Problems   No resolved problems to display.        Brief Hospital Course to date:  Chikis Cuellar is a 77 y.o. female w/ a hx  of T2DM, HTN, HLD, GERD, fibromyalgia, anxiety who presented to the ED w/ c/o hypoxia.   Pt admitted to [facility] 6/1 - 6/27/22. Pt initially presented w/ c/o severe dyspnea. Pt was admitted to the ICU from the ED and placed on HFNC, IV steroids and IV antibiotics. Pt underwent an extensive work-up including infectious, autoimmune, cardiac which were predominantly unrevealing.  Advanced chest imaging was felt to be consistent with atypical pneumonia somewhat classic for the appearance of COVID-19 although COVID remained negative despite pt reports of several family members w/ recent positive COVID tests. Pt was d/c on a steroid taper and on 5 liters of supplemental oxygen.      This patient's problems and plans were partially entered by my partner and updated as appropriate by me 07/07/22.     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 Given duration of steroids may need to consider pneumocystis prevention  -Wean HFNC as tolerated, recent increased  bc of choking episode. Asked speech to evaluate, plans for FEES today. Reviewed CXR, no new pathology.  May try weaning again today  -Anti-reflux measures   -Diuresis as tolerated  -Will need rehab at [place]  -palliative following, prognosis guarded given persistent HFNC requirements     Leukocytosis   -afebrile   -CXR without new findings   -Likely secondary to steroids  -AM labs reviewed, continue to monitor.    B/L underarm Candidiasis/R upper back cellulitis   Superimposed Cellulitis   -Continue Nystatin powder  -Interdry cloths  -Received 1x dose oral Fluconazole 7/1  -WOC following  -ID consulted. Recommends aspergillus galactomannan assay, histo  and blasto urine Ag, Fungitell, strongyloidiasis serology and radiographic studies  - fulconazole 200 daily, triamcinolone/clotrimazole cream BID, following QTc       T2DM  -hem a1c 6.2% on 6/3/22  -holding routine glipizide, novolog, metformin    -increased levemir to 20 units bid from, Levemir 14 units q12  -on Humalog 10 units TID meals  -hyperglycemia exacerbated by steroid use, SSI adjusted     HTN  HLD  -continue routine Lozol, losartan, pravastatin      Anxiety   -continue routine prozac, xanax      Fibromyalgia   -continue Lyrica, Norco      Hypomagnesemia  -Replace per protocol     Constipation  -Continue bowel regimen  -On Linzess at home, not on formulary here     Expected Discharge Location and Transportation: [facility],  van vs family transport   Expected Discharge Date: TBD, still on HFNC    DVT prophylaxis:  Medical DVT prophylaxis orders are present.     AM-PAC 6 Clicks Score (PT): 11 (07/06/22 2000)    CODE STATUS:   Code Status and Medical Interventions:   Ordered at: 07/01/22 1617     Medical Intervention Limits:    NO intubation (DNI)     Code Status (Patient has no pulse and is not breathing):    No CPR (Do Not Attempt to Resuscitate)     Medical Interventions (Patient has pulse or is breathing):    Limited Support       Ketty Bethea MD  07/07/22

## 2022-07-08 NOTE — PLAN OF CARE
Problem: Adult Inpatient Plan of Care  Goal: Plan of Care Review  Outcome: Ongoing, Progressing  Goal: Patient-Specific Goal (Individualized)  Outcome: Ongoing, Progressing  Goal: Absence of Hospital-Acquired Illness or Injury  Outcome: Ongoing, Progressing  Goal: Optimal Comfort and Wellbeing  Outcome: Ongoing, Progressing  Intervention: Monitor Pain and Promote Comfort  Recent Flowsheet Documentation  Taken 7/8/2022 0800 by Renetta Vance RN  Pain Management Interventions:   care clustered   quiet environment facilitated   pillow support provided  Intervention: Provide Person-Centered Care  Recent Flowsheet Documentation  Taken 7/8/2022 0800 by Renetta Vance RN  Trust Relationship/Rapport:   care explained   choices provided   emotional support provided   empathic listening provided  Goal: Readiness for Transition of Care  Outcome: Ongoing, Progressing     Problem: Skin Injury Risk Increased  Goal: Skin Health and Integrity  Outcome: Ongoing, Progressing     Problem: Diabetes Comorbidity  Goal: Blood Glucose Level Within Targeted Range  Outcome: Ongoing, Progressing     Problem: Hypertension Comorbidity  Goal: Blood Pressure in Desired Range  Outcome: Ongoing, Progressing     Problem: Osteoarthritis Comorbidity  Goal: Maintenance of Osteoarthritis Symptom Control  Outcome: Ongoing, Progressing     Problem: Pain Chronic (Persistent) (Comorbidity Management)  Goal: Acceptable Pain Control and Functional Ability  Outcome: Ongoing, Progressing  Intervention: Manage Persistent Pain  Recent Flowsheet Documentation  Taken 7/8/2022 0800 by Renetta Vance RN  Sleep/Rest Enhancement: awakenings minimized  Intervention: Develop Pain Management Plan  Recent Flowsheet Documentation  Taken 7/8/2022 0800 by Renetta Vance RN  Pain Management Interventions:   care clustered   quiet environment facilitated   pillow support provided  Intervention: Optimize Psychosocial Wellbeing  Recent Flowsheet Documentation  Taken  7/8/2022 0800 by Renetta Vance RN  Supportive Measures:   active listening utilized   decision-making supported   goal-setting facilitated  Diversional Activities:   television   smartphone  Family/Support System Care: support provided     Problem: Fall Injury Risk  Goal: Absence of Fall and Fall-Related Injury  Outcome: Ongoing, Progressing     Problem: Palliative Care  Goal: Enhanced Quality of Life  Outcome: Ongoing, Progressing  Intervention: Maximize Comfort  Recent Flowsheet Documentation  Taken 7/8/2022 0800 by Renetta Vance RN  Pain Management Interventions:   care clustered   quiet environment facilitated   pillow support provided  Intervention: Optimize Function  Recent Flowsheet Documentation  Taken 7/8/2022 0800 by Renetta Vance RN  Sensory Stimulation Regulation: television on  Sleep/Rest Enhancement: awakenings minimized  Intervention: Optimize Psychosocial Wellbeing  Recent Flowsheet Documentation  Taken 7/8/2022 0800 by Renetta Vance RN  Supportive Measures:   active listening utilized   decision-making supported   goal-setting facilitated  Family/Support System Care: support provided   Goal Outcome Evaluation:

## 2022-07-08 NOTE — PROGRESS NOTES
INFECTIOUS DISEASE Progress Note    Chikis Cuellar  1945  [id number]    Consult: 7/5/22  Admit date: 6/27/2022    Requesting Provider: No ref. provider found  Evaluating physician: Percy Parker MD  Reason for Consultation: Back and arm cellulitis, intertrigo, recent pneumonia, candidiasis axillary  Chief Complaint: Above      Subjective   History of present illness:  Patient is a very pleasant  77 y.o.  Yr old female with diabetes mellitus type 2, essential hypertension, hyperlipidemia, arthritis, fibromyalgia, recent hospitalization 6/1/2022 until 6/27/2022 with pneumonia and ARDS, discharged to rehabilitation on 6/27 and readmitted on the same day related to worsening acute hypoxic respiratory failure.  Patient on first admission was thought to have an atypical pneumonia treated with IV antibiotics and steroids.  COVID-19 testing was negative x2.  Viral panel is negative.  Cultures were negative.  Pulmonary continues to follow and felt that her respiratory problems were related to ARDS fibrotic changes.  RICARDO and ANCA from 6/8 were negative.  Rheumatoid factor elevated at 21.0 (upper limit of normal 14).  Legionella urine antigen and streptococcal urine antigen were negative on 6/3.  MRSA screen was positive on 6/2.  The patient has been treated with steroids which have been increased because of worsening respiratory function.  Patient then developed bilateral axillary increased erythema concerning for candidiasis or cellulitis.  Antibiotics were started on 7/4/2022.  Vancomycin, ceftriaxone, and IV fluconazole.  I was consulted on 7/5/2022 for further evaluation and treatment.    7/6/2022 history reviewed.  Tolerating fluconazole and Lotrisone for axillary candidiasis also involving the back.  No fever.  Still on high flow oxygen at 45 L/min.  Being treated for ARDS related inflammation of the lungs.  Also on high-dose steroids.    7/7/2022 history reviewed.  Continues on fluconazole for severe  axillary candidiasis as well as Lotrisone.  No high fever.  Being treated for ARDS with steroids.  Continues on high flow oxygen at 45 L/min.  75% O2 concentration.  Infection work-up in lungs negative to date including negative Fungitell.    2022 history reviewed.  Her skin candidiasis improved.  Breathing is still a problem from ARDS.  Axillary and back decreased itching and pain and erythema.  Remains on high flow oxygen at 45 L/min, 65% O2 concentration.  On fluconazole and Lotrisone for her candidiasis, steroids for her ARDS.    Past Medical History:   Diagnosis Date   • Arthritis    • Diabetes mellitus (HCC)    • Fibromyalgia    • Hyperlipidemia    • Hypertension        Past Surgical History:   Procedure Laterality Date   • BACK SURGERY     •  SECTION     • CHOLECYSTECTOMY     • HYSTERECTOMY     • REPLACEMENT TOTAL KNEE         Pediatric History   Patient Parents   • Not on file     Other Topics Concern   • Not on file   Social History Narrative   • Not on file       family history includes No Known Problems in her father and mother.    Allergies   Allergen Reactions   • Altace [Ramipril] Palpitations   • Trazodone Hallucinations       Immunization History   Administered Date(s) Administered   • COVID-19 (PFIZER) PURPLE CAP 2020, 2021, 09/10/2021   • Covid-19 (Pfizer) Gray Cap 2022       Medication:    Current Facility-Administered Medications:   •  acetaminophen (TYLENOL) tablet 650 mg, 650 mg, Oral, Q6H PRN, Sarath Gutiérrez PA-C, 650 mg at 22  •  albuterol (PROVENTIL) nebulizer solution 0.083% 2.5 mg/3mL, 2.5 mg, Nebulization, Q6H PRN, Vanesa Enciso APRN  •  ALPRAZolam (XANAX) tablet 0.5 mg, 0.5 mg, Oral, Daily PRN, Lola Neri DO, 0.5 mg at 22 2206  •  sennosides-docusate (PERICOLACE) 8.6-50 MG per tablet 2 tablet, 2 tablet, Oral, BID, 2 tablet at 22 0803 **AND** polyethylene glycol (MIRALAX) packet 17 g, 17 g, Oral, Daily PRN **AND** bisacodyl  (DULCOLAX) EC tablet 5 mg, 5 mg, Oral, Daily PRN, 5 mg at 07/08/22 0804 **AND** bisacodyl (DULCOLAX) suppository 10 mg, 10 mg, Rectal, Daily PRN, Elsie Griffith DO, 10 mg at 07/03/22 1413  •  clotrimazole-betamethasone (LOTRISONE) 1-0.05 % cream 1 application, 1 application, Topical, Q12H, Percy Parker MD, 1 application at 07/08/22 0955  •  dextrose (D50W) (25 g/50 mL) IV injection 25 g, 25 g, Intravenous, Q15 Min PRN, Vanesa Enciso APRN  •  dextrose (GLUTOSE) oral gel 15 g, 15 g, Oral, Q15 Min PRN, Vanesa Enciso APRN  •  diphenhydrAMINE (BENADRYL) capsule 25 mg, 25 mg, Oral, Q6H PRN, Adithya Omer DO, 25 mg at 07/08/22 0804  •  Enoxaparin Sodium (LOVENOX) syringe 40 mg, 40 mg, Subcutaneous, Q24H, Vanesa Enciso APRN, 40 mg at 07/08/22 0513  •  fluconazole (DIFLUCAN) tablet 200 mg, 200 mg, Oral, Q24H, Percy Parker MD, 200 mg at 07/07/22 1650  •  FLUoxetine (PROzac) capsule 40 mg, 40 mg, Oral, Daily, Vanesa Enciso APRN, 40 mg at 07/08/22 0803  •  glucagon (human recombinant) (GLUCAGEN DIAGNOSTIC) injection 1 mg, 1 mg, Intramuscular, Q15 Min PRN, Vanesa Enciso APRN  •  HYDROcodone-acetaminophen (NORCO)  MG per tablet 1 tablet, 1 tablet, Oral, Q6H PRN, Adithya Omer DO, 1 tablet at 07/08/22 0513  •  hydrocortisone 2.5 % cream 1 application, 1 application, Topical, Q12H, Adithya Omer DO, 1 application at 07/08/22 0955  •  insulin detemir (LEVEMIR) injection 20 Units, 20 Units, Subcutaneous, Q12H, Negra, Adithya, DO, 20 Units at 07/08/22 0804  •  Insulin Lispro (humaLOG) injection 0-24 Units, 0-24 Units, Subcutaneous, TID AC, Adithya Omer, DO, 12 Units at 07/07/22 1829  •  Insulin Lispro (humaLOG) injection 10 Units, 10 Units, Subcutaneous, TID With Meals, Elsie Griffith, DO, 10 Units at 07/08/22 0803  •  ipratropium-albuterol (DUO-NEB) nebulizer solution 3 mL, 3 mL, Nebulization, Q4H PRN, Vanesa Enciso, JULIANNA  •  ipratropium-albuterol (DUO-NEB) nebulizer solution 3 mL, 3 mL,  Nebulization, 4x Daily - RT, Vanesa Enciso APRN, 3 mL at 07/08/22 0811  •  lactobacillus acidophilus (RISAQUAD) capsule 1 capsule, 1 capsule, Oral, Daily, Adithya Omer DO, 1 capsule at 07/06/22 1223  •  losartan (COZAAR) tablet 25 mg, 25 mg, Oral, Daily, Vanesa Enciso, APRN, 25 mg at 07/08/22 0804  •  Magnesium Sulfate 2 gram Bolus, followed by 8 gram infusion (total Mg dose 10 grams)- Mg less than or equal to 1mg/dL, 2 g, Intravenous, PRN **OR** Magnesium Sulfate 2 gram / 50mL Infusion (GIVE X 3 BAGS TO EQUAL 6GM TOTAL DOSE) - Mg 1.1 - 1.5 mg/dl, 2 g, Intravenous, PRN, Last Rate: 25 mL/hr at 07/01/22 1848, 2 g at 07/01/22 1848 **OR** Magnesium Sulfate 4 gram infusion- Mg 1.6-1.9 mg/dL, 4 g, Intravenous, PRN, Daron, Lola G, DO, Last Rate: 25 mL/hr at 07/03/22 1320, 4 g at 07/03/22 1320  •  methylPREDNISolone sodium succinate (SOLU-Medrol) injection 40 mg, 40 mg, Intravenous, Q12H, Gold Flores MD, 40 mg at 07/08/22 0513  •  miconazole (MICOTIN) 2 % cream 1 application, 1 application, Topical, Q12H, Adithya Omer DO, 1 application at 07/08/22 0955  •  oxybutynin XL (DITROPAN-XL) 24 hr tablet 5 mg, 5 mg, Oral, Daily, Vanesa Enciso APRN, 5 mg at 07/08/22 0804  •  pantoprazole (PROTONIX) EC tablet 40 mg, 40 mg, Oral, Q AM, Vanesa Enciso APRN, 40 mg at 07/08/22 0513  •  pravastatin (PRAVACHOL) tablet 40 mg, 40 mg, Oral, Nightly, Vanesa Enciso APRN, 40 mg at 07/07/22 2206  •  pregabalin (LYRICA) capsule 150 mg, 150 mg, Oral, TID, Daron, Lola G, DO, 150 mg at 07/08/22 0804  •  sodium chloride 0.9 % flush 10 mL, 10 mL, Intravenous, PRN, Andrea Cabrera MD  •  sodium chloride 0.9 % flush 10 mL, 10 mL, Intravenous, Q12H, Vanesa Enciso APRN, 10 mL at 07/08/22 0956  •  sodium chloride 0.9 % flush 10 mL, 10 mL, Intravenous, PRN, Vanesa Enciso APRN  •  sodium chloride 0.9 % flush 10 mL, 10 mL, Intravenous, Q12H, Elsie Griffith, DO, 10 mL at 07/08/22 0954  •  sodium chloride 0.9 % flush 10 mL, 10  "mL, Intravenous, PRN, Elsie Griffith D, DO    Please refer to the medical record for a full medication list    Review of Systems:    Constitutional-- No Fever, chills or sweats.  Appetite good, and no malaise. No fatigue.  HEENT-- No new vision, hearing or throat complaints.  No epistaxis or oral sores.  Denies odynophagia or dysphagia.  No odynophagia or dysphagia. No headache, photophobia or neck stiffness.  CV-- No chest pain, palpitation or syncope  Resp--continued SOB/nonproductive cough/no hemoptysis  GI- No nausea, vomiting, or diarrhea.  No hematochezia, melena, or hematemesis. Denies jaundice or chronic liver disease.  -- No dysuria, hematuria, or flank pain.  Denies hesitancy, urgency or flank pain.  Lymph- no swollen lymph nodes in neck/axilla or groin.   Heme- No active bruising or bleeding; no Hx of DVT or PE.  MS-- no swelling or pain in the bones or joints of arms/legs.  No new back pain.  Neuro-- No acute focal weakness or numbness in the arms or legs.  No seizures.  Skin--No rashes or lesions, except bilateral axillary as per HPI, feels it is less itchy    Physical Exam:   Vital Signs   Temp:  [97.1 °F (36.2 °C)-98.6 °F (37 °C)] 97.4 °F (36.3 °C)  Heart Rate:  [58-77] 65  Resp:  [18-24] 18  BP: (134-168)/() 168/94    Temp  Min: 97.1 °F (36.2 °C)  Max: 98.6 °F (37 °C)  BP  Min: 134/83  Max: 168/94  Pulse  Min: 58  Max: 77  Resp  Min: 18  Max: 24  SpO2  Min: 90 %  Max: 95 %    Blood pressure 168/94, pulse 65, temperature 97.4 °F (36.3 °C), temperature source Oral, resp. rate 18, height 162.6 cm (64\"), weight 96 kg (211 lb 9.6 oz), SpO2 93 %.  GENERAL: Awake and alert, in moderate distress. Appears older than stated age.  Resting in bed.  HEENT:  Normocephalic, atraumatic.  Oropharynx without thrush. Dentition in good repair. No cervical adenopathy. No neck masses.  Ears externally normal, Nose externally normal.  EYES: No conjunctival injection. No icterus. EOM full.  LYMPHATICS: No " lymphadenopathy of the neck or axillary or inguinal regions.   HEART: No murmur, gallop, or pericardial friction rub. Reg rate rhythm.  LUNGS: Bilateral crackles. No respiratory distress, no use of accessory muscles.  No wheezes.  ABDOMEN: Soft, nontender, nondistended. No appreciable HSM.  Bowel sounds normal.  Obese.  SKIN: Warm and dry without cutaneous eruptions, except bilateral axillary with improved erythema, no crepitus or bullae.  No nodules.  Some extension on the right side to the back.  PSYCHIATRIC: Mental status lucid. No confusion.  EXT:  No cellulitic change.  NEURO: Oriented to name, nonfocal    Results Review:   I reviewed the patient's new clinical results.  I reviewed the patient's new imaging results and agree with the interpretation.  I reviewed the patient's other test results and agree with the interpretation    Results from last 7 days   Lab Units 07/08/22  0503 07/07/22  0742 07/06/22  0702   WBC 10*3/mm3 13.00* 14.51* 13.87*   HEMOGLOBIN g/dL 12.1 11.6* 11.0*   HEMATOCRIT % 37.8 36.9 35.8   PLATELETS 10*3/mm3 367 333 316     Results from last 7 days   Lab Units 07/08/22  0503   SODIUM mmol/L 141   POTASSIUM mmol/L 4.5   CHLORIDE mmol/L 98   CO2 mmol/L 35.0*   BUN mg/dL 24*   CREATININE mg/dL 0.64   GLUCOSE mg/dL 162*   CALCIUM mg/dL 9.2     Results from last 7 days   Lab Units 07/08/22  0503   ALK PHOS U/L 92   BILIRUBIN mg/dL 0.2   ALT (SGPT) U/L 13   AST (SGOT) U/L 18             Results from last 7 days   Lab Units 07/04/22  0436   VANCOMYCIN RM mcg/mL 11.10     Results from last 7 days   Lab Units 07/07/22  0742   LACTATE mmol/L 2.0     Estimated Creatinine Clearance: 81.4 mL/min (by C-G formula based on SCr of 0.64 mg/dL).    Microbiology:  Microbiology Results Abnormal     Procedure Component Value - Date/Time    COVID PRE-OP / PRE-PROCEDURE SCREENING ORDER (NO ISOLATION) - Swab, Nasopharynx [[id number]]  (Normal) Collected: 06/28/22 0027    Lab Status: Final result Specimen: Swab from  Nasopharynx Updated: 06/28/22 0104    Narrative:      The following orders were created for panel order COVID PRE-OP / PRE-PROCEDURE SCREENING ORDER (NO ISOLATION) - Swab, Nasopharynx.  Procedure                               Abnormality         Status                     ---------                               -----------         ------                     COVID-19 and FLU A/B PCR...[[id number]]  Normal              Final result                 Please view results for these tests on the individual orders.    COVID-19 and FLU A/B PCR - Swab, Nasopharynx [[id number]]  (Normal) Collected: 06/28/22 0027    Lab Status: Final result Specimen: Swab from Nasopharynx Updated: 06/28/22 0104     COVID19 Not Detected     Influenza A PCR Not Detected     Influenza B PCR Not Detected    Narrative:      Fact sheet for providers: https://www.fda.gov/media/461075/download    Fact sheet for patients: https://www.fda.gov/media/689243/download    Test performed by PCR.          Radiology:  Imaging Results (Last 72 Hours)     Procedure Component Value Units Date/Time    SLP FEES - Fiberoptic Endo Eval Swallow [[id number]] Resulted: 07/07/22 1108     Updated: 07/07/22 1108    Narrative:      This procedure was auto-finalized with no dictation required.    XR Chest 1 View [[id number]] Collected: 07/06/22 1019     Updated: 07/06/22 1023    Narrative:      DATE OF EXAM: 7/6/2022 9:55 AM     PROCEDURE: XR CHEST 1 VW-     INDICATIONS: aspiration event, increased hypoxia; J96.01-Acute  respiratory failure with hypoxia; R06.02-Shortness of breath;  J18.9-Pneumonia, unspecified organism     COMPARISON: 7/5/2022     TECHNIQUE: Single radiographic AP view of the chest was obtained.     FINDINGS:  Left-sided PICC line is again seen in the mid SVC. Heart shadow is  normal in size. Prominence of the mediastinal silhouette is likely due  to rotation of patient to the right again on today's exam. This may be  due to the patient's scoliosis. Extensive and  diffuse pulmonary  interstitial disease appears mildly improved on the right, significantly  improved on the left. No effusion or pneumothorax is seen.        Impression:      Improving pneumonia. No new chest pathology is seen.     This report was finalized on 7/6/2022 10:20 AM by Dr. Nelson Valdez MD.             IMPRESSION:     1.  Axillary candidiasis in the setting of diabetes mellitus type 2 on steroids for worsening respiratory failure.  May have superimposed cellulitis but seems more like Candida and yeast and has been on antibiotics.  2.  Possible back cellulitis versus candidiasis.  As per above.  Previously MRSA colonized.  Favor candidiasis.  3.  Atypical pneumonia with likely ARDS postinfectious fibrotic changes with acute hypoxic respiratory failure.  On steroids to see how she responds.  High risk for invasive diagnostic testing, and does not want to be intubated.  4.  Leukocytosis, neutrophilic related to above issues.  Ongoing.  Steroids likely pushing white count higher.  5.  Anemia, chronic disease related to above issues.  6.  Acute hypoxic respiratory failure.  On 65 L/min on 7/5/2022 related to above issues.  45 L/min on 7/6, 7/7, 7/8.  7.  Diabetes mellitus type 2 with increased risk for infection.    Plan:    1.  Diagnostically, continue to follow patient's physical exam, CBC, CMP, CRP, Aspergillus galactomannan assay, histoplasma and blastomycosis urine antigen, Fungitell, strongyloidiasis serology, and radiographic studies.  Previous Legionella and streptococcal antigen negative.  2.  Therapeutically, continue fluconazole 200 mg daily, triamcinolone/clotrimazole cream twice daily to axillary and back, duration to be determined, but likely to be for an additional 10 to 14 days given the ongoing need for steroids and her diabetes.  QTc interval 422 ms on 6/27.  May need to consider pneumocystis prevention if continues on high-dose steroids of greater than 20 mg a day of equivalent prednisone,  for longer than 2 to 3 weeks.  3.  Oxygen support therapy.  65 L/min on 7/5/2022.  45 L/min on 7/6, 7/7.    I discussed the patient's findings and my recommendations with patient, family and nursing staff    Our group would be pleased to follow this patient over the course of their hospitalization and assist with outpatient antimicrobial therapy, as indicated.  Further recommendations depend on the results of the cultures and clinical course.    Percy Parker MD  7/8/2022

## 2022-07-08 NOTE — PROGRESS NOTES
[facility] Medicine Services  PROGRESS NOTE    Patient Name: Chikis Cuellar  : 1945  MRN: [id number]    Date of Admission: 2022  Primary Care Physician: Eduardo Gamboa MD    Subjective   Subjective     CC:  F/U acute respiratory failure with hypoxia     HPI:  Coughing less, still hypoxic. Willing to try sitting up in chair later today. D/w nursing, O2 sats very sensitive to movement/cough/stress    ROS:  Gen- No fevers, chills  CV- No chest pain, palpitations  Resp- cough improved, dyspnea about the same  GI- No N/V/D, abd pain  Skin- itchy        Objective   Objective     Vital Signs:   Temp:  [97.1 °F (36.2 °C)-98.6 °F (37 °C)] 98.3 °F (36.8 °C)  Heart Rate:  [58-77] 76  Resp:  [18-24] 22  BP: (134-168)/() 160/99  Flow (L/min):  [45] 45     Physical Exam:  Constitutional: In no acute distress, nontoxic, very pleasant  HENT: NCAT, mucous membranes moist  Respiratory: bilateral dry expiratory crackles,less coughing  Cardiovascular: RRR, no murmurs, rubs, or gallops  Gastrointestinal: Positive bowel sounds, soft, nontender, nondistended  Musculoskeletal: No bilateral ankle edema  Psychiatric: Appropriate affect, cooperative  Neurologic: Oriented x 3,  speech clear  Skin: Erythema under L armpit improved, Erythema R armpit improving as well.    Results Reviewed:  LAB RESULTS:      Lab 22  0503 22  0742 22  0702 22  0735 22  0436 22  0438 22  1549   WBC 13.00* 14.51* 13.87* 16.22* 11.45* 14.73* 17.50*   HEMOGLOBIN 12.1 11.6* 11.0* 10.8* 11.5* 11.8* 12.0   HEMATOCRIT 37.8 36.9 35.8 33.1* 36.9 36.8 37.4   PLATELETS 367 333 316 279 251 227 250   NEUTROS ABS 11.17* 12.73*  --   --   --  11.18* 15.32*   IMMATURE GRANS (ABS) 0.17* 0.11*  --   --   --  0.06* 0.08*   LYMPHS ABS 0.95 0.90  --   --   --  1.32 0.87   MONOS ABS 0.65 0.69  --   --   --  0.69 0.55   EOS ABS 0.03 0.05  --   --   --  1.43* 0.64*   MCV 84.9 85.6 88.2 83.6 86.2  85.8 83.7   PROCALCITONIN  --  0.06  --   --   --   --   --    LACTATE  --  2.0  --   --   --   --   --          Lab 07/08/22  0503 07/07/22  0742 07/06/22  0702 07/05/22  0735 07/04/22  0436 07/03/22  0438 07/02/22  1549   SODIUM 141 136 142 138 139 142 137   POTASSIUM 4.5 5.2 5.2 5.3* 5.0 4.0 4.3   CHLORIDE 98 93* 99 97* 96* 98 97*   CO2 35.0* 37.0* 37.0* 37.0* 36.0* 33.0* 31.0*   ANION GAP 8.0 6.0 6.0 4.0* 7.0 11.0 9.0   BUN 24* 24* 23 20 16 21 24*   CREATININE 0.64 0.69 0.65 0.71 0.61 0.66 0.73   EGFR 90.6 89.0 90.2 87.2 91.6 89.9 84.3   GLUCOSE 162* 181* 221* 276* 193* 76 223*   CALCIUM 9.2 9.9 9.7 9.6 9.5 9.0 9.3   MAGNESIUM  --   --   --   --  2.1 1.6 2.2         Lab 07/08/22  0503 07/07/22  0742   TOTAL PROTEIN 5.9* 5.6*   ALBUMIN 3.20* 3.10*   GLOBULIN 2.7 2.5   ALT (SGPT) 13 13   AST (SGOT) 18 16   BILIRUBIN 0.2 0.2   ALK PHOS 92 79         Lab 07/03/22  0438   PROBNP 198.8                 Brief Urine Lab Results     None          Microbiology Results Abnormal     Procedure Component Value - Date/Time    COVID PRE-OP / PRE-PROCEDURE SCREENING ORDER (NO ISOLATION) - Swab, Nasopharynx [[id number]]  (Normal) Collected: 06/28/22 0027    Lab Status: Final result Specimen: Swab from Nasopharynx Updated: 06/28/22 0104    Narrative:      The following orders were created for panel order COVID PRE-OP / PRE-PROCEDURE SCREENING ORDER (NO ISOLATION) - Swab, Nasopharynx.  Procedure                               Abnormality         Status                     ---------                               -----------         ------                     COVID-19 and FLU A/B PCR...[[id number]]  Normal              Final result                 Please view results for these tests on the individual orders.    COVID-19 and FLU A/B PCR - Swab, Nasopharynx [[id number]]  (Normal) Collected: 06/28/22 0027    Lab Status: Final result Specimen: Swab from Nasopharynx Updated: 06/28/22 0104     COVID19 Not Detected     Influenza A PCR Not  Detected     Influenza B PCR Not Detected    Narrative:      Fact sheet for providers: https://www.fda.gov/media/882762/download    Fact sheet for patients: https://www.fda.gov/media/430751/download    Test performed by PCR.          SLP FEES - Fiberoptic Endo Eval Swallow    Result Date: 7/7/2022  This procedure was auto-finalized with no dictation required.      Results for orders placed during the hospital encounter of 06/01/22    Adult Transthoracic Echo Complete W/ Cont if Necessary Per Protocol    Interpretation Summary  · The right atrial cavity is mildly dilated.  · Estimated right ventricular systolic pressure from tricuspid regurgitation is mildly elevated (35-45 mmHg). Calculated right ventricular systolic pressure from tricuspid regurgitation is 40 mmHg.  · Estimated left ventricular EF = 60% Estimated left ventricular EF was in agreement with the calculated left ventricular EF. Left ventricular ejection fraction appears to be 56 - 60%. Left ventricular systolic function is normal.  · Left ventricular diastolic function is consistent with age.  · Mild pulmonary hypertension is present.  · Normal right ventricular wall thickness, systolic function and septal motion noted with the right ventricular cavity mild to moderately dilated.  · There is no evidence of pericardial effusion.  · No significant structural or functional valvular abnormalities demonstrated.      I have reviewed the medications:  Scheduled Meds:clotrimazole-betamethasone, 1 application, Topical, Q12H  enoxaparin, 40 mg, Subcutaneous, Q24H  fluconazole, 200 mg, Oral, Q24H  FLUoxetine, 40 mg, Oral, Daily  hydrocortisone, 1 application, Topical, Q12H  insulin detemir, 20 Units, Subcutaneous, Q12H  insulin lispro, 0-24 Units, Subcutaneous, TID AC  Insulin Lispro, 10 Units, Subcutaneous, TID With Meals  ipratropium-albuterol, 3 mL, Nebulization, 4x Daily - RT  lactobacillus acidophilus, 1 capsule, Oral, Daily  losartan, 25 mg, Oral,  Daily  methylPREDNISolone sodium succinate, 40 mg, Intravenous, Q12H  miconazole, 1 application, Topical, Q12H  oxybutynin XL, 5 mg, Oral, Daily  pantoprazole, 40 mg, Oral, Q AM  pravastatin, 40 mg, Oral, Nightly  pregabalin, 150 mg, Oral, TID  senna-docusate sodium, 2 tablet, Oral, BID  sodium chloride, 10 mL, Intravenous, Q12H  sodium chloride, 10 mL, Intravenous, Q12H      Continuous Infusions:   PRN Meds:.•  acetaminophen  •  albuterol  •  ALPRAZolam  •  senna-docusate sodium **AND** polyethylene glycol **AND** bisacodyl **AND** bisacodyl  •  dextrose  •  dextrose  •  diphenhydrAMINE  •  glucagon (human recombinant)  •  HYDROcodone-acetaminophen  •  ipratropium-albuterol  •  magnesium sulfate **OR** magnesium sulfate **OR** magnesium sulfate  •  sodium chloride  •  sodium chloride  •  sodium chloride    Assessment & Plan   Assessment & Plan     Active Hospital Problems    Diagnosis  POA   • **Acute respiratory failure with hypoxia ([facility]) [J96.01]  Yes   • Hypomagnesemia [E83.42]  Unknown   • Leukocytosis [D72.829]  Yes   • Chest pain [R07.9]  Yes   • Fibromyalgia [M79.7]  Yes   • Type 2 diabetes mellitus, with long-term current use of insulin (HCC) [E11.9, Z79.4]  Not Applicable   • Essential hypertension [I10]  Yes   • Hyperlipemia [E78.5]  Yes   • GERD without esophagitis [K21.9]  Yes   • Mood disorder ([facility]) [F39]  Yes      Resolved Hospital Problems   No resolved problems to display.        Brief Hospital Course to date:  Chikis Cuellar is a 77 y.o. female w/ a hx of T2DM, HTN, HLD, GERD, fibromyalgia, anxiety who presented to the ED w/ c/o hypoxia.   Pt admitted to [facility] 6/1 - 6/27/22. Pt initially presented w/ c/o severe dyspnea. Pt was admitted to the ICU from the ED and placed on HFNC, IV steroids and IV antibiotics. Pt underwent an extensive work-up including infectious, autoimmune, cardiac which were predominantly unrevealing.  Advanced chest imaging was felt to be consistent with atypical pneumonia  somewhat classic for the appearance of COVID-19 although COVID remained negative despite pt reports of several family members w/ recent positive COVID tests. Pt was d/c on a steroid taper and on 5 liters of supplemental oxygen.      This patient's problems and plans were partially entered by my partner and updated as appropriate by me 07/08/22.     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 Given duration of steroids may need to consider pneumocystis prevention  -Wean HFNC as tolerated, recent increased  bc of choking episode. FEES wnl, needs supervision at times during eating. Reviewed CXR, no new pathology.   -Anti-reflux measures   -Diuresis as tolerated  -Will need rehab at [place]  -palliative following, prognosis guarded given persistent HFNC requirements     Leukocytosis   -afebrile   -CXR without new findings   -Likely secondary to steroids. improving  -AM labs reviewed, continue to monitor.    B/L underarm Candidiasis/R upper back cellulitis   Superimposed Cellulitis   -Continue Nystatin powder  -Interdry cloths  -Received 1x dose oral Fluconazole 7/1  -WOC following  -ID consulted. Recommends aspergillus galactomannan assay, histo and blasto urine Ag, Fungitell, strongyloidiasis serology and radiographic studies  - fulconazole 200 daily, triamcinolone/clotrimazole cream BID, following QTc       T2DM  -hem a1c 6.2% on 6/3/22  -holding routine glipizide, novolog, metformin    -increased levemir to 20 units bid from, Levemir 14 units q12  -on Humalog 10 units TID meals  -hyperglycemia exacerbated by steroid use, SSI adjusted     HTN  HLD  -continue routine Lozol, losartan, pravastatin       Anxiety   -continue routine prozac, xanax      Fibromyalgia   -continue Lyrica, Norco      Hypomagnesemia  -Replace per protocol     Constipation  -Continue bowel regimen  -On Linzess at home, not on formulary here     Expected Discharge Location and Transportation: SNF,  van vs family transport   Expected Discharge Date: TBD, still on HFNC    DVT prophylaxis:  Medical DVT prophylaxis orders are present.     AM-PAC 6 Clicks Score (PT): 11 (07/07/22 0834)    CODE STATUS:   Code Status and Medical Interventions:   Ordered at: 07/01/22 1617     Medical Intervention Limits:    NO intubation (DNI)     Code Status (Patient has no pulse and is not breathing):    No CPR (Do Not Attempt to Resuscitate)     Medical Interventions (Patient has pulse or is breathing):    Limited Support       Ketty Bethea MD  07/08/22

## 2022-07-08 NOTE — PROGRESS NOTES
Palliative Care Progress Note    Date of Admission: 6/27/2022    Subjective: Patient continues to complain of dyspnea as well as noted that she had an episode today that required her high flow oxygen to be increased.  Current Code Status     Date Active Code Status Order ID Comments User Context       7/1/2022 1617 No CPR (Do Not Attempt to Resuscitate) [id number]  Elsie Griffith, DO Inpatient     Advance Care Planning Activity      Questions for Current Code Status     Question Answer    Code Status (Patient has no pulse and is not breathing) No CPR (Do Not Attempt to Resuscitate)    Medical Interventions (Patient has pulse or is breathing) Limited Support    Medical Intervention Limits: NO intubation (DNI)        No current facility-administered medications on file prior to encounter.     Current Outpatient Medications on File Prior to Encounter   Medication Sig Dispense Refill   • FLUoxetine (PROzac) 20 MG capsule Take 40 mg by mouth Daily.     • HYDROcodone-acetaminophen (NORCO)  MG per tablet Take 1 tablet by mouth Every 12 (Twelve) Hours As Needed for Moderate Pain . 6 tablet 0   • indapamide (LOZOL) 2.5 MG tablet Take 2.5 mg by mouth Daily.     • insulin detemir (LEVEMIR) 100 UNIT/ML injection Inject 5 Units under the skin into the appropriate area as directed Every 12 (Twelve) Hours.  12   • losartan (COZAAR) 25 MG tablet Take 1 tablet by mouth Daily.     • omeprazole (priLOSEC) 40 MG capsule Take 40 mg by mouth Daily.     • oxybutynin XL (DITROPAN-XL) 5 MG 24 hr tablet Take 5 mg by mouth Daily.     • pravastatin (PRAVACHOL) 40 MG tablet Take 40 mg by mouth Daily.     • pregabalin (LYRICA) 150 MG capsule Take 1 capsule by mouth 3 (Three) Times a Day. 9 capsule 0   • albuterol (PROVENTIL) (2.5 MG/3ML) 0.083% nebulizer solution Take 2.5 mg by nebulization Every 6 (Six) Hours As Needed for Shortness of Air.  12   • ALPRAZolam (XANAX) 0.5 MG tablet Take 1 tablet by mouth Daily As Needed for Anxiety. 3  "tablet 0   • cyanocobalamin 1000 MCG/ML injection Inject 1,000 mcg under the skin into the appropriate area as directed Every 14 (Fourteen) Days.     • glipiZIDE (GLUCOTROL) 10 MG tablet Take 10 mg by mouth 2 (Two) Times a Day Before Meals.     • Insulin Aspart (novoLOG) 100 UNIT/ML injection Inject  under the skin into the appropriate area as directed 3 (Three) Times a Day Before Meals. Patient states she gets it from pharmacy although they did not have record of it     • metFORMIN (GLUCOPHAGE) 500 MG tablet Take 500 mg by mouth 2 (Two) Times a Day With Meals.     • predniSONE (DELTASONE) 5 MG tablet Take 3 tablets by mouth Daily With Breakfast for 4 days, THEN 2 tablets Daily With Breakfast for 4 days, THEN 1 tablet Daily With Breakfast for 4 days. 24 tablet 0        •  acetaminophen  •  albuterol  •  ALPRAZolam  •  senna-docusate sodium **AND** polyethylene glycol **AND** bisacodyl **AND** bisacodyl  •  dextrose  •  dextrose  •  diphenhydrAMINE  •  glucagon (human recombinant)  •  HYDROcodone-acetaminophen  •  ipratropium-albuterol  •  magnesium sulfate **OR** magnesium sulfate **OR** magnesium sulfate  •  sodium chloride  •  sodium chloride  •  sodium chloride    Objective: /99 (BP Location: Right arm, Patient Position: Lying)   Pulse 76   Temp 98.3 °F (36.8 °C) (Oral)   Resp 22   Ht 162.6 cm (64\")   Wt 96 kg (211 lb 9.6 oz)   SpO2 91%   BMI 36.32 kg/m²      Intake/Output Summary (Last 24 hours) at 7/8/2022 1257  Last data filed at 7/8/2022 0900  Gross per 24 hour   Intake --   Output 1200 ml   Net -1200 ml     Physical Exam:      General Appearance:    Alert, cooperative, in no acute distress   Head:    Normocephalic, without obvious abnormality, atraumatic   Eyes:            Lids and lashes normal, conjunctivae and sclerae normal, no   icterus, no pallor, corneas clear, PERRLA   Ears:    Ears appear intact with no abnormalities noted   Throat:   No oral lesions, no thrush, oral mucosa moist "   Neck:   No adenopathy, supple, trachea midline, no thyromegaly, no     carotid bruit, no JVD   Back:     No kyphosis present, no scoliosis present, no skin lesions,       erythema or scars, no tenderness to percussion or                   palpation,   range of motion normal   Lungs:     Diminshed breathe sounds    Heart:    Regular rhythm and normal rate, normal S1 and S2, no            murmur, no gallop, no rub, no click   Breast Exam:    Deferred   Abdomen:     Normal bowel sounds, no masses, no organomegaly, soft        non-tender, non-distended, no guarding, no rebound                 tenderness   Genitalia:    Deferred   Extremities:   Moves all extremities well, no edema, no cyanosis, no              redness   Pulses:   Pulses palpable and equal bilaterally   Skin:   No bleeding, bruising or rash   Lymph nodes:   No palpable adenopathy   Neurologic:   Cranial nerves 2 - 12 grossly intact, sensation intact, DTR        present and equal bilaterally     Results from last 7 days   Lab Units 07/08/22  0503   WBC 10*3/mm3 13.00*   HEMOGLOBIN g/dL 12.1   HEMATOCRIT % 37.8   PLATELETS 10*3/mm3 367     Results from last 7 days   Lab Units 07/08/22  0503   SODIUM mmol/L 141   POTASSIUM mmol/L 4.5   CHLORIDE mmol/L 98   CO2 mmol/L 35.0*   BUN mg/dL 24*   CREATININE mg/dL 0.64   CALCIUM mg/dL 9.2   BILIRUBIN mg/dL 0.2   ALK PHOS U/L 92   ALT (SGPT) U/L 13   AST (SGOT) U/L 18   GLUCOSE mg/dL 162*       Impression: Resp failure  Dyspnea  Hypoxia  GOC  Plan: Feels that she might be doing somewhat better today.  We will reevaluate on Monday.      Amilcar Kumar DO  07/08/22  12:57 EDT

## 2022-07-08 NOTE — PLAN OF CARE
Problem: Palliative Care  Goal: Enhanced Quality of Life  Intervention: Optimize Psychosocial Wellbeing  Recent Flowsheet Documentation  Taken 7/8/2022 1118 by Cassi Aparicio, MSW  Supportive Measures: (symptom assessment)   active listening utilized   positive reinforcement provided   verbalization of feelings encouraged   other (see comments)  Visit with patient at bedside, patient awake in bed on HFNC, observable mild labor in breathing with talking - pt denies dyspnea, pain, nausea - acknowledges anxiety and depression.  Patient receptive to active listening, supportive/empathic presence, validation of feelings. Palliative Care continues to follow for support and assist with plan of care.    1300 Palliative IDT - Palliative Team members present:  CELIO Kumar DO; SARAH Zamarripa APRN; GHANSHYAM Aparicio LCSW, ACHP-SW; GHANSHYAM Olson RN, CHPN; WANDER Ibarra MDiv

## 2022-07-08 NOTE — CASE MANAGEMENT/SOCIAL WORK
Continued Stay Note  [facility]     Patient Name: Chikis Cuellar  MRN: [id number]  Today's Date: 7/8/2022    Admit Date: 6/27/2022     Discharge Plan     Row Name 07/08/22 1600       Plan    Plan discharge plan    Plan Comments ID and palliative following..  Per discussion in MDR, pt remains on high flow O2 and CM unable to make referrals to skilled nursing facilities until pt off high flow O2. CM will follow up Monday.    Final Discharge Disposition Code 03 - skilled nursing facility (SNF)               Discharge Codes    No documentation.               Expected Discharge Date and Time     Expected Discharge Date Expected Discharge Time    Jul 11, 2022             Agata Carter RN

## 2022-07-09 NOTE — PROGRESS NOTES
[facility] Medicine Services  PROGRESS NOTE    Patient Name: Chikis Cuellar  : 1945  MRN: [id number]    Date of Admission: 2022  Primary Care Physician: Eduardo Gamboa MD    Subjective   Subjective     CC:  F/U acute respiratory failure with hypoxia     HPI:  Respiratory status stable, but slow to improve. She feels tired and frustrated    ROS:  Gen- No fevers, chills  CV- No chest pain, palpitations  Resp- cough improved, dyspnea about the same  GI- No N/V/D, abd pain          Objective   Objective     Vital Signs:   Temp:  [96.7 °F (35.9 °C)-98.6 °F (37 °C)] 98.6 °F (37 °C)  Heart Rate:  [57-86] 84  Resp:  [18-22] 22  BP: (146-175)/() 167/98  Flow (L/min):  [45] 45     Physical Exam:  Constitutional: In no acute distress, nontoxic, very pleasant, tired appearing  HENT: NCAT, mucous membranes moist  Respiratory: bilateral dry expiratory crackles,less coughing  Cardiovascular: RRR, no murmurs, rubs, or gallops  Gastrointestinal: Positive bowel sounds, soft, nontender, nondistended  Musculoskeletal: No bilateral ankle edema  Psychiatric: Appropriate affect, cooperative  Neurologic: Oriented x 3,  speech clear  Skin: Erythema under L armpit improved, Erythema R armpit improving as well.    Results Reviewed:  LAB RESULTS:      Lab 22  0503 22  0742 22  0702 22  0735 22  0436 22  0438 22  1549   WBC 13.00* 14.51* 13.87* 16.22* 11.45* 14.73* 17.50*   HEMOGLOBIN 12.1 11.6* 11.0* 10.8* 11.5* 11.8* 12.0   HEMATOCRIT 37.8 36.9 35.8 33.1* 36.9 36.8 37.4   PLATELETS 367 333 316 279 251 227 250   NEUTROS ABS 11.17* 12.73*  --   --   --  11.18* 15.32*   IMMATURE GRANS (ABS) 0.17* 0.11*  --   --   --  0.06* 0.08*   LYMPHS ABS 0.95 0.90  --   --   --  1.32 0.87   MONOS ABS 0.65 0.69  --   --   --  0.69 0.55   EOS ABS 0.03 0.05  --   --   --  1.43* 0.64*   MCV 84.9 85.6 88.2 83.6 86.2 85.8 83.7   PROCALCITONIN  --  0.06  --   --   --   --    --    LACTATE  --  2.0  --   --   --   --   --          Lab 07/09/22  0921 07/08/22  0503 07/07/22  0742 07/06/22  0702 07/05/22  0735 07/04/22  0436 07/03/22  0438 07/02/22  1549   SODIUM 140 141 136 142 138 139 142 137   POTASSIUM 4.7 4.5 5.2 5.2 5.3* 5.0 4.0 4.3   CHLORIDE 96* 98 93* 99 97* 96* 98 97*   CO2 33.0* 35.0* 37.0* 37.0* 37.0* 36.0* 33.0* 31.0*   ANION GAP 11.0 8.0 6.0 6.0 4.0* 7.0 11.0 9.0   BUN 27* 24* 24* 23 20 16 21 24*   CREATININE 0.59 0.64 0.69 0.65 0.71 0.61 0.66 0.73   EGFR 92.4 90.6 89.0 90.2 87.2 91.6 89.9 84.3   GLUCOSE 165* 162* 181* 221* 276* 193* 76 223*   CALCIUM 9.2 9.2 9.9 9.7 9.6 9.5 9.0 9.3   MAGNESIUM  --   --   --   --   --  2.1 1.6 2.2         Lab 07/09/22  0921 07/08/22  0503 07/07/22  0742   TOTAL PROTEIN 5.9* 5.9* 5.6*   ALBUMIN 3.20* 3.20* 3.10*   GLOBULIN 2.7 2.7 2.5   ALT (SGPT) 14 13 13   AST (SGOT) 17 18 16   BILIRUBIN 0.2 0.2 0.2   ALK PHOS 83 92 79         Lab 07/03/22  0438   PROBNP 198.8                 Brief Urine Lab Results     None          Microbiology Results Abnormal     Procedure Component Value - Date/Time    COVID PRE-OP / PRE-PROCEDURE SCREENING ORDER (NO ISOLATION) - Swab, Nasopharynx [[id number]]  (Normal) Collected: 06/28/22 0027    Lab Status: Final result Specimen: Swab from Nasopharynx Updated: 06/28/22 0104    Narrative:      The following orders were created for panel order COVID PRE-OP / PRE-PROCEDURE SCREENING ORDER (NO ISOLATION) - Swab, Nasopharynx.  Procedure                               Abnormality         Status                     ---------                               -----------         ------                     COVID-19 and FLU A/B PCR...[[id number]]  Normal              Final result                 Please view results for these tests on the individual orders.    COVID-19 and FLU A/B PCR - Swab, Nasopharynx [[id number]]  (Normal) Collected: 06/28/22 0027    Lab Status: Final result Specimen: Swab from Nasopharynx Updated: 06/28/22 0104      COVID19 Not Detected     Influenza A PCR Not Detected     Influenza B PCR Not Detected    Narrative:      Fact sheet for providers: https://www.fda.gov/media/482634/download    Fact sheet for patients: https://www.fda.gov/media/101582/download    Test performed by PCR.          No radiology results from the last 24 hrs    Results for orders placed during the hospital encounter of 06/01/22    Adult Transthoracic Echo Complete W/ Cont if Necessary Per Protocol    Interpretation Summary  · The right atrial cavity is mildly dilated.  · Estimated right ventricular systolic pressure from tricuspid regurgitation is mildly elevated (35-45 mmHg). Calculated right ventricular systolic pressure from tricuspid regurgitation is 40 mmHg.  · Estimated left ventricular EF = 60% Estimated left ventricular EF was in agreement with the calculated left ventricular EF. Left ventricular ejection fraction appears to be 56 - 60%. Left ventricular systolic function is normal.  · Left ventricular diastolic function is consistent with age.  · Mild pulmonary hypertension is present.  · Normal right ventricular wall thickness, systolic function and septal motion noted with the right ventricular cavity mild to moderately dilated.  · There is no evidence of pericardial effusion.  · No significant structural or functional valvular abnormalities demonstrated.      I have reviewed the medications:  Scheduled Meds:clotrimazole-betamethasone, 1 application, Topical, Q12H  enoxaparin, 40 mg, Subcutaneous, Q24H  fluconazole, 200 mg, Oral, Q24H  FLUoxetine, 40 mg, Oral, Daily  insulin detemir, 20 Units, Subcutaneous, Q12H  insulin lispro, 0-24 Units, Subcutaneous, TID AC  Insulin Lispro, 10 Units, Subcutaneous, TID With Meals  ipratropium-albuterol, 3 mL, Nebulization, 4x Daily - RT  lactobacillus acidophilus, 1 capsule, Oral, Daily  losartan, 25 mg, Oral, Daily  methylPREDNISolone sodium succinate, 40 mg, Intravenous, Q12H  miconazole, 1 application,  Topical, Q12H  oxybutynin XL, 5 mg, Oral, Daily  pantoprazole, 40 mg, Oral, Q AM  pravastatin, 40 mg, Oral, Nightly  pregabalin, 150 mg, Oral, TID  senna-docusate sodium, 2 tablet, Oral, BID  sodium chloride, 10 mL, Intravenous, Q12H  sodium chloride, 10 mL, Intravenous, Q12H      Continuous Infusions:   PRN Meds:.•  acetaminophen  •  albuterol  •  ALPRAZolam  •  senna-docusate sodium **AND** polyethylene glycol **AND** bisacodyl **AND** bisacodyl  •  dextrose  •  dextrose  •  diphenhydrAMINE  •  glucagon (human recombinant)  •  HYDROcodone-acetaminophen  •  ipratropium-albuterol  •  magnesium sulfate **OR** magnesium sulfate **OR** magnesium sulfate  •  sodium chloride  •  sodium chloride  •  sodium chloride    Assessment & Plan   Assessment & Plan     Active Hospital Problems    Diagnosis  POA   • **Acute respiratory failure with hypoxia (HCC) [J96.01]  Yes   • Hypomagnesemia [E83.42]  Unknown   • Leukocytosis [D72.829]  Yes   • Chest pain [R07.9]  Yes   • Fibromyalgia [M79.7]  Yes   • Type 2 diabetes mellitus, with long-term current use of insulin (HCC) [E11.9, Z79.4]  Not Applicable   • Essential hypertension [I10]  Yes   • Hyperlipemia [E78.5]  Yes   • GERD without esophagitis [K21.9]  Yes   • Mood disorder (HCC) [F39]  Yes      Resolved Hospital Problems   No resolved problems to display.        Brief Hospital Course to date:  Chikis Cuellar is a 77 y.o. female w/ a hx of T2DM, HTN, HLD, GERD, fibromyalgia, anxiety who presented to the ED w/ c/o hypoxia.   Pt admitted to [facility] 6/1 - 6/27/22. Pt initially presented w/ c/o severe dyspnea. Pt was admitted to the ICU from the ED and placed on HFNC, IV steroids and IV antibiotics. Pt underwent an extensive work-up including infectious, autoimmune, cardiac which were predominantly unrevealing.  Advanced chest imaging was felt to be consistent with atypical pneumonia somewhat classic for the appearance of COVID-19 although COVID remained negative despite pt reports  of several family members w/ recent positive COVID tests. Pt was d/c on a steroid taper and on 5 liters of supplemental oxygen.      This patient's problems and plans were partially entered by my partner and updated as appropriate by me 07/09/22.     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 Given duration of steroids may need to consider pneumocystis prevention  -Wean HFNC as tolerated, recent increased  bc of choking episode. FEES wnl, needs supervision at times during eating. Reviewed CXR, no new pathology.   -Anti-reflux measures   -Diuresis as tolerated  -Will need rehab at [place]  -palliative following, prognosis guarded given persistent HFNC requirements. Repeat CXR today given minimal improvement of respiratory symptoms. May need pulm re-evaluation if we cant make significant progress of respiratory status     Leukocytosis   -afebrile   -CXR without new findings   -Likely secondary to steroids. improving  -AM labs reviewed, continue to monitor.    B/L underarm Candidiasis/R upper back cellulitis   Superimposed Cellulitis   -Continue Nystatin powder  -Interdry cloths  -Received 1x dose oral Fluconazole 7/1  -WOC following  -ID consulted. Recommends aspergillus galactomannan assay, histo and blasto urine Ag, Fungitell, strongyloidiasis serology and radiographic studies  - fulconazole 200 daily, triamcinolone/clotrimazole cream BID, following QTc       T2DM  -hem a1c 6.2% on 6/3/22  -holding routine glipizide, novolog, metformin    -increased levemir to 20 units bid from, Levemir 14 units q12  -on Humalog 10 units TID meals  -hyperglycemia exacerbated by steroid use, SSI  adjusted     HTN  HLD  -continue routine Lozol, losartan, pravastatin      Anxiety   -continue routine prozac, xanax      Fibromyalgia   -continue Lyrica, Norco      Hypomagnesemia  -Replace per protocol     Constipation  -Continue bowel regimen  -On Linzess at home, not on formulary here     Expected Discharge Location and Transportation: SNF,  van vs family transport   Expected Discharge Date: TBD, still on HFNC    DVT prophylaxis:  Medical DVT prophylaxis orders are present.     AM-PAC 6 Clicks Score (PT): 14 (07/09/22 0800)    CODE STATUS:   Code Status and Medical Interventions:   Ordered at: 07/01/22 1617     Medical Intervention Limits:    NO intubation (DNI)     Code Status (Patient has no pulse and is not breathing):    No CPR (Do Not Attempt to Resuscitate)     Medical Interventions (Patient has pulse or is breathing):    Limited Support       Ketty Bethea MD  07/09/22

## 2022-07-09 NOTE — PLAN OF CARE
Problem: Adult Inpatient Plan of Care  Goal: Plan of Care Review  Outcome: Ongoing, Progressing  Flowsheets (Taken 7/9/2022 1717)  Progress: improving  Goal: Patient-Specific Goal (Individualized)  Outcome: Ongoing, Progressing  Goal: Absence of Hospital-Acquired Illness or Injury  Outcome: Ongoing, Progressing  Intervention: Identify and Manage Fall Risk  Recent Flowsheet Documentation  Taken 7/9/2022 1600 by Nany Lynn RN  Safety Promotion/Fall Prevention:   activity supervised   assistive device/personal items within reach   clutter free environment maintained   fall prevention program maintained   nonskid shoes/slippers when out of bed   room organization consistent   safety round/check completed   toileting scheduled  Taken 7/9/2022 1400 by Nany Lynn RN  Safety Promotion/Fall Prevention:   activity supervised   assistive device/personal items within reach   clutter free environment maintained   fall prevention program maintained   nonskid shoes/slippers when out of bed   room organization consistent   safety round/check completed   toileting scheduled  Taken 7/9/2022 1200 by Nany Lynn RN  Safety Promotion/Fall Prevention:   activity supervised   assistive device/personal items within reach   clutter free environment maintained   fall prevention program maintained   nonskid shoes/slippers when out of bed   safety round/check completed   room organization consistent   toileting scheduled  Taken 7/9/2022 1000 by Nany Lynn RN  Safety Promotion/Fall Prevention:   activity supervised   assistive device/personal items within reach   clutter free environment maintained   fall prevention program maintained   nonskid shoes/slippers when out of bed   room organization consistent   safety round/check completed   toileting scheduled  Taken 7/9/2022 0800 by Nany Lynn RN  Safety Promotion/Fall Prevention:   activity supervised   assistive device/personal items within reach   clutter  free environment maintained   fall prevention program maintained   nonskid shoes/slippers when out of bed   safety round/check completed   toileting scheduled   room organization consistent  Intervention: Prevent Skin Injury  Recent Flowsheet Documentation  Taken 7/9/2022 1600 by Nany Lynn RN  Body Position:   weight shifting   upper extremity elevated   tilted   supine, legs elevated   right  Skin Protection:   adhesive use limited   tubing/devices free from skin contact   transparent dressing maintained   skin-to-skin areas padded   skin-to-device areas padded  Taken 7/9/2022 1400 by Nany Lynn RN  Body Position:   weight shifting   upper extremity elevated   supine, legs elevated  Skin Protection:   adhesive use limited   incontinence pads utilized   tubing/devices free from skin contact   transparent dressing maintained   skin-to-skin areas padded  Taken 7/9/2022 1200 by Nany Lynn RN  Body Position:   weight shifting   upper extremity elevated   tilted   supine, legs elevated  Skin Protection:   adhesive use limited   tubing/devices free from skin contact   transparent dressing maintained   skin-to-skin areas padded   skin-to-device areas padded  Taken 7/9/2022 1000 by Nany Lynn RN  Body Position:   weight shifting   upper extremity elevated   tilted   supine, legs elevated   left  Skin Protection:   adhesive use limited   tubing/devices free from skin contact   transparent dressing maintained   skin-to-device areas padded   skin-to-skin areas padded  Taken 7/9/2022 0800 by Nany Lynn RN  Body Position:   weight shifting   upper extremity elevated   tilted   supine, legs elevated   right  Skin Protection:   adhesive use limited   tubing/devices free from skin contact   transparent dressing maintained   skin-to-skin areas padded   skin-to-device areas padded  Intervention: Prevent and Manage VTE (Venous Thromboembolism) Risk  Recent Flowsheet Documentation  Taken 7/9/2022  1600 by Nany Lynn RN  Activity Management:   activity encouraged   activity adjusted per tolerance  Taken 7/9/2022 1400 by Nany Lynn RN  Activity Management:   activity adjusted per tolerance   activity encouraged   up in chair  Taken 7/9/2022 1200 by Nany Lynn RN  Activity Management:   activity adjusted per tolerance   activity encouraged   up in chair  Taken 7/9/2022 1000 by Nany Lynn RN  Activity Management:   activity adjusted per tolerance   activity encouraged  Taken 7/9/2022 0800 by Nany Lynn RN  Activity Management:   activity adjusted per tolerance   activity encouraged  VTE Prevention/Management:   bleeding risk factor(s) identified   dorsiflexion/plantar flexion performed  Intervention: Prevent Infection  Recent Flowsheet Documentation  Taken 7/9/2022 1600 by Nany Lynn RN  Infection Prevention:   environmental surveillance performed   hand hygiene promoted   rest/sleep promoted   personal protective equipment utilized  Taken 7/9/2022 1400 by Nany Lynn RN  Infection Prevention:   environmental surveillance performed   hand hygiene promoted   personal protective equipment utilized   rest/sleep promoted  Taken 7/9/2022 1200 by Nany Lynn RN  Infection Prevention:   environmental surveillance performed   hand hygiene promoted  Taken 7/9/2022 1000 by Nany Lynn RN  Infection Prevention:   environmental surveillance performed   hand hygiene promoted   personal protective equipment utilized   rest/sleep promoted  Taken 7/9/2022 0800 by Nany Lynn RN  Infection Prevention:   environmental surveillance performed   hand hygiene promoted   personal protective equipment utilized   rest/sleep promoted  Goal: Optimal Comfort and Wellbeing  Outcome: Ongoing, Progressing  Intervention: Monitor Pain and Promote Comfort  Recent Flowsheet Documentation  Taken 7/9/2022 1600 by Nany Lynn RN  Pain Management Interventions:   quiet environment  facilitated   relaxation techniques promoted  Taken 7/9/2022 1500 by Nany Lynn RN  Pain Management Interventions:   quiet environment facilitated   relaxation techniques promoted  Taken 7/9/2022 1400 by Nany Lynn RN  Pain Management Interventions:   see MAR   quiet environment facilitated   relaxation techniques promoted   pillow support provided   position adjusted  Taken 7/9/2022 1200 by Nany Lynn RN  Pain Management Interventions:   quiet environment facilitated   relaxation techniques promoted  Taken 7/9/2022 1000 by Nany Lynn RN  Pain Management Interventions:   quiet environment facilitated   relaxation techniques promoted  Taken 7/9/2022 0800 by Nany Lynn RN  Pain Management Interventions:   quiet environment facilitated   relaxation techniques promoted  Intervention: Provide Person-Centered Care  Recent Flowsheet Documentation  Taken 7/9/2022 1600 by Nany Lynn RN  Trust Relationship/Rapport:   care explained   choices provided   emotional support provided   empathic listening provided   reassurance provided   thoughts/feelings acknowledged  Taken 7/9/2022 1400 by Nany Lynn RN  Trust Relationship/Rapport:   care explained   choices provided   emotional support provided   empathic listening provided   questions answered   questions encouraged   reassurance provided   thoughts/feelings acknowledged  Taken 7/9/2022 1200 by Nany Lynn RN  Trust Relationship/Rapport:   care explained   choices provided   emotional support provided   empathic listening provided   thoughts/feelings acknowledged   reassurance provided   questions encouraged   questions answered  Taken 7/9/2022 1000 by Nany Lynn RN  Trust Relationship/Rapport:   care explained   emotional support provided   questions answered   questions encouraged   reassurance provided   thoughts/feelings acknowledged   empathic listening provided   choices provided  Taken 7/9/2022 0800 by  Nany Lynn RN  Trust Relationship/Rapport:   choices provided   emotional support provided   reassurance provided   thoughts/feelings acknowledged   empathic listening provided   care explained  Goal: Readiness for Transition of Care  Outcome: Ongoing, Progressing     Problem: Skin Injury Risk Increased  Goal: Skin Health and Integrity  Outcome: Ongoing, Progressing  Intervention: Optimize Skin Protection  Recent Flowsheet Documentation  Taken 7/9/2022 1600 by Nany Lynn RN  Pressure Reduction Techniques:   heels elevated off bed   positioned off wounds   pressure points protected   rest period provided between sit times   frequent weight shift encouraged  Head of Bed (HOB) Positioning: HOB at 45 degrees  Pressure Reduction Devices:   pressure-redistributing mattress utilized   positioning supports utilized   heel offloading device utilized   foam padding utilized  Skin Protection:   adhesive use limited   tubing/devices free from skin contact   transparent dressing maintained   skin-to-skin areas padded   skin-to-device areas padded  Taken 7/9/2022 1400 by Nany Lynn RN  Pressure Reduction Techniques:   heels elevated off bed   positioned off wounds   rest period provided between sit times   pressure points protected   frequent weight shift encouraged  Head of Bed (HOB) Positioning: HOB at 45 degrees  Pressure Reduction Devices:   pressure-redistributing mattress utilized   positioning supports utilized   heel offloading device utilized   foam padding utilized   chair cushion utilized  Skin Protection:   adhesive use limited   incontinence pads utilized   tubing/devices free from skin contact   transparent dressing maintained   skin-to-skin areas padded  Taken 7/9/2022 1200 by Nany Lynn RN  Pressure Reduction Techniques:   heels elevated off bed   pressure points protected   rest period provided between sit times   positioned off wounds   frequent weight shift encouraged  Head of Bed  (HOB) Positioning: HOB at 45 degrees  Pressure Reduction Devices:   pressure-redistributing mattress utilized   positioning supports utilized   foam padding utilized   heel offloading device utilized  Skin Protection:   adhesive use limited   tubing/devices free from skin contact   transparent dressing maintained   skin-to-skin areas padded   skin-to-device areas padded  Taken 7/9/2022 1000 by Nany Lynn RN  Pressure Reduction Techniques:   frequent weight shift encouraged   heels elevated off bed   positioned off wounds   pressure points protected   rest period provided between sit times  Head of Bed ([facility]) Positioning: [facility] at 60 degrees  Pressure Reduction Devices:   pressure-redistributing mattress utilized   positioning supports utilized   heel offloading device utilized  Skin Protection:   adhesive use limited   tubing/devices free from skin contact   transparent dressing maintained   skin-to-device areas padded   skin-to-skin areas padded  Taken 7/9/2022 0800 by Nany Lynn RN  Pressure Reduction Techniques:   frequent weight shift encouraged   heels elevated off bed   positioned off wounds   pressure points protected   rest period provided between sit times   weight shift assistance provided  Head of Bed ([facility]) Positioning: [facility] at 60 degrees  Pressure Reduction Devices:   pressure-redistributing mattress utilized   positioning supports utilized   heel offloading device utilized   foam padding utilized  Skin Protection:   adhesive use limited   tubing/devices free from skin contact   transparent dressing maintained   skin-to-skin areas padded   skin-to-device areas padded     Problem: Diabetes Comorbidity  Goal: Blood Glucose Level Within Targeted Range  Outcome: Ongoing, Progressing     Problem: Hypertension Comorbidity  Goal: Blood Pressure in Desired Range  Outcome: Ongoing, Progressing  Intervention: Maintain Blood Pressure Management  Recent Flowsheet Documentation  Taken 7/9/2022 1200 by  Nany Lynn RN  Medication Review/Management: medications reviewed  Taken 7/9/2022 0800 by Nany Lynn RN  Medication Review/Management: medications reviewed     Problem: Osteoarthritis Comorbidity  Goal: Maintenance of Osteoarthritis Symptom Control  Outcome: Ongoing, Progressing  Intervention: Maintain Osteoarthritis Symptom Control  Recent Flowsheet Documentation  Taken 7/9/2022 1600 by Nany Lynn RN  Activity Management:   activity encouraged   activity adjusted per tolerance  Assistive Device Utilized: lift device  Taken 7/9/2022 1400 by Nany Lynn RN  Activity Management:   activity adjusted per tolerance   activity encouraged   up in chair  Assistive Device Utilized: lift device  Taken 7/9/2022 1200 by Nany Lynn RN  Activity Management:   activity adjusted per tolerance   activity encouraged   up in chair  Assistive Device Utilized: lift device  Medication Review/Management: medications reviewed  Taken 7/9/2022 1000 by Nany Lynn RN  Activity Management:   activity adjusted per tolerance   activity encouraged  Taken 7/9/2022 0800 by Nany Lynn RN  Activity Management:   activity adjusted per tolerance   activity encouraged  Medication Review/Management: medications reviewed     Problem: Pain Chronic (Persistent) (Comorbidity Management)  Goal: Acceptable Pain Control and Functional Ability  Outcome: Ongoing, Progressing  Intervention: Manage Persistent Pain  Recent Flowsheet Documentation  Taken 7/9/2022 1200 by Nany Lynn RN  Medication Review/Management: medications reviewed  Taken 7/9/2022 0800 by Nany Lynn RN  Medication Review/Management: medications reviewed  Intervention: Develop Pain Management Plan  Recent Flowsheet Documentation  Taken 7/9/2022 1600 by Nany Lynn RN  Pain Management Interventions:   quiet environment facilitated   relaxation techniques promoted  Taken 7/9/2022 1500 by Nany Lynn RN  Pain Management  Interventions:   quiet environment facilitated   relaxation techniques promoted  Taken 7/9/2022 1400 by Nany Lynn RN  Pain Management Interventions:   see MAR   quiet environment facilitated   relaxation techniques promoted   pillow support provided   position adjusted  Taken 7/9/2022 1200 by Nany Lynn RN  Pain Management Interventions:   quiet environment facilitated   relaxation techniques promoted  Taken 7/9/2022 1000 by Nany Lynn RN  Pain Management Interventions:   quiet environment facilitated   relaxation techniques promoted  Taken 7/9/2022 0800 by Nany Lynn RN  Pain Management Interventions:   quiet environment facilitated   relaxation techniques promoted  Intervention: Optimize Psychosocial Wellbeing  Recent Flowsheet Documentation  Taken 7/9/2022 1600 by Nany Lynn RN  Diversional Activities: television  Family/Support System Care:   self-care encouraged   support provided  Taken 7/9/2022 1400 by Nany Lynn RN  Diversional Activities: television  Family/Support System Care:   self-care encouraged   support provided  Taken 7/9/2022 1200 by Nany Lynn RN  Diversional Activities: television  Family/Support System Care:   self-care encouraged   support provided  Taken 7/9/2022 1000 by Nany Lynn RN  Diversional Activities: television  Family/Support System Care:   self-care encouraged   support provided  Taken 7/9/2022 0800 by Nany Lynn RN  Diversional Activities: television  Family/Support System Care:   self-care encouraged   support provided     Problem: Fall Injury Risk  Goal: Absence of Fall and Fall-Related Injury  Outcome: Ongoing, Progressing  Intervention: Identify and Manage Contributors  Recent Flowsheet Documentation  Taken 7/9/2022 1200 by Nany Lynn RN  Medication Review/Management: medications reviewed  Taken 7/9/2022 0800 by Nany Lynn RN  Medication Review/Management: medications reviewed  Intervention: Promote  Injury-Free Environment  Recent Flowsheet Documentation  Taken 7/9/2022 1600 by Nany Lynn RN  Safety Promotion/Fall Prevention:   activity supervised   assistive device/personal items within reach   clutter free environment maintained   fall prevention program maintained   nonskid shoes/slippers when out of bed   room organization consistent   safety round/check completed   toileting scheduled  Taken 7/9/2022 1400 by Nany Lynn RN  Safety Promotion/Fall Prevention:   activity supervised   assistive device/personal items within reach   clutter free environment maintained   fall prevention program maintained   nonskid shoes/slippers when out of bed   room organization consistent   safety round/check completed   toileting scheduled  Taken 7/9/2022 1200 by Nany Lynn RN  Safety Promotion/Fall Prevention:   activity supervised   assistive device/personal items within reach   clutter free environment maintained   fall prevention program maintained   nonskid shoes/slippers when out of bed   safety round/check completed   room organization consistent   toileting scheduled  Taken 7/9/2022 1000 by Nany Lynn RN  Safety Promotion/Fall Prevention:   activity supervised   assistive device/personal items within reach   clutter free environment maintained   fall prevention program maintained   nonskid shoes/slippers when out of bed   room organization consistent   safety round/check completed   toileting scheduled  Taken 7/9/2022 0800 by Nany Lynn RN  Safety Promotion/Fall Prevention:   activity supervised   assistive device/personal items within reach   clutter free environment maintained   fall prevention program maintained   nonskid shoes/slippers when out of bed   safety round/check completed   toileting scheduled   room organization consistent     Problem: Palliative Care  Goal: Enhanced Quality of Life  Outcome: Ongoing, Progressing  Intervention: Maximize Comfort  Recent Flowsheet  Documentation  Taken 7/9/2022 1600 by Nany Lynn RN  Pain Management Interventions:   quiet environment facilitated   relaxation techniques promoted  Taken 7/9/2022 1500 by Nany Lynn RN  Pain Management Interventions:   quiet environment facilitated   relaxation techniques promoted  Taken 7/9/2022 1400 by Nany Lynn RN  Pain Management Interventions:   see MAR   quiet environment facilitated   relaxation techniques promoted   pillow support provided   position adjusted  Taken 7/9/2022 1200 by Nany Lynn RN  Pain Management Interventions:   quiet environment facilitated   relaxation techniques promoted  Taken 7/9/2022 1000 by Nany Lynn RN  Pain Management Interventions:   quiet environment facilitated   relaxation techniques promoted  Taken 7/9/2022 0800 by Nany Lynn RN  Pain Management Interventions:   quiet environment facilitated   relaxation techniques promoted  Intervention: Optimize Psychosocial Wellbeing  Recent Flowsheet Documentation  Taken 7/9/2022 1600 by Nany Lynn RN  Family/Support System Care:   self-care encouraged   support provided  Taken 7/9/2022 1400 by Nany Lynn RN  Family/Support System Care:   self-care encouraged   support provided  Taken 7/9/2022 1200 by Nany Lynn RN  Family/Support System Care:   self-care encouraged   support provided  Taken 7/9/2022 1000 by Nany Lynn RN  Family/Support System Care:   self-care encouraged   support provided  Taken 7/9/2022 0800 by Nany Lynn RN  Family/Support System Care:   self-care encouraged   support provided   Goal Outcome Evaluation:           Progress: improving

## 2022-07-10 NOTE — PLAN OF CARE
Problem: Adult Inpatient Plan of Care  Goal: Plan of Care Review  Outcome: Ongoing, Progressing  Flowsheets  Taken 7/10/2022 1506 by Adrianna Mcpherson RN  Progress: no change  Taken 7/7/2022 1514 by Lolita Salazar RN  Plan of Care Reviewed With: patient  Goal: Patient-Specific Goal (Individualized)  Outcome: Ongoing, Progressing  Goal: Absence of Hospital-Acquired Illness or Injury  Outcome: Ongoing, Progressing  Intervention: Identify and Manage Fall Risk  Recent Flowsheet Documentation  Taken 7/10/2022 1400 by Adrianna Mcpherson RN  Safety Promotion/Fall Prevention: safety round/check completed  Taken 7/10/2022 1200 by Adrianna Mcpherson RN  Safety Promotion/Fall Prevention: safety round/check completed  Taken 7/10/2022 1000 by Adrianna Mcpherson RN  Safety Promotion/Fall Prevention: safety round/check completed  Taken 7/10/2022 0800 by Adrianna Mcpherson RN  Safety Promotion/Fall Prevention:   activity supervised   assistive device/personal items within reach   clutter free environment maintained   fall prevention program maintained   mobility aid in reach   muscle strengthening facilitated   nonskid shoes/slippers when out of bed   room organization consistent   safety round/check completed   toileting scheduled  Intervention: Prevent Skin Injury  Recent Flowsheet Documentation  Taken 7/10/2022 1400 by Adrianna Mcpherson RN  Skin Protection:   adhesive use limited   incontinence pads utilized  Taken 7/10/2022 1200 by Adrianna Mcpherson RN  Skin Protection:   adhesive use limited   incontinence pads utilized  Taken 7/10/2022 1000 by Adrianna Mcpherson RN  Skin Protection:   adhesive use limited   incontinence pads utilized  Taken 7/10/2022 0800 by Adrianna Mcpherson RN  Body Position: position changed independently  Skin Protection:   adhesive use limited   incontinence pads utilized  Intervention: Prevent and Manage VTE (Venous Thromboembolism) Risk  Recent Flowsheet Documentation  Taken 7/10/2022 0800 by Adrianna Mcpherson RN  Activity  Management: activity adjusted per tolerance  VTE Prevention/Management:   bilateral   dorsiflexion/plantar flexion performed   bleeding risk factor(s) identified  Goal: Optimal Comfort and Wellbeing  Outcome: Ongoing, Progressing  Intervention: Provide Person-Centered Care  Recent Flowsheet Documentation  Taken 7/10/2022 0800 by Adrianna Mcpherson RN  Trust Relationship/Rapport:   care explained   choices provided  Goal: Readiness for Transition of Care  Outcome: Ongoing, Progressing     Problem: Skin Injury Risk Increased  Goal: Skin Health and Integrity  Outcome: Ongoing, Progressing  Intervention: Optimize Skin Protection  Recent Flowsheet Documentation  Taken 7/10/2022 1400 by Adrianna Mcpherson RN  Pressure Reduction Techniques:   frequent weight shift encouraged   weight shift assistance provided  Pressure Reduction Devices:   positioning supports utilized   pressure-redistributing mattress utilized  Skin Protection:   adhesive use limited   incontinence pads utilized  Taken 7/10/2022 1200 by Adrianna Mcpherson RN  Pressure Reduction Techniques:   frequent weight shift encouraged   weight shift assistance provided  Pressure Reduction Devices:   positioning supports utilized   pressure-redistributing mattress utilized  Skin Protection:   adhesive use limited   incontinence pads utilized  Taken 7/10/2022 1000 by Adrianna Mcpherson RN  Pressure Reduction Techniques:   frequent weight shift encouraged   weight shift assistance provided  Pressure Reduction Devices:   positioning supports utilized   pressure-redistributing mattress utilized  Skin Protection:   adhesive use limited   incontinence pads utilized  Taken 7/10/2022 0800 by Adrianna Mcpherson RN  Pressure Reduction Techniques:   frequent weight shift encouraged   weight shift assistance provided  Pressure Reduction Devices:   positioning supports utilized   pressure-redistributing mattress utilized  Skin Protection:   adhesive use limited   incontinence pads utilized      Problem: Diabetes Comorbidity  Goal: Blood Glucose Level Within Targeted Range  Outcome: Ongoing, Progressing     Problem: Hypertension Comorbidity  Goal: Blood Pressure in Desired Range  Outcome: Ongoing, Progressing  Intervention: Maintain Blood Pressure Management  Recent Flowsheet Documentation  Taken 7/10/2022 0800 by Adrianna Mcpherson RN  Medication Review/Management: medications reviewed     Problem: Osteoarthritis Comorbidity  Goal: Maintenance of Osteoarthritis Symptom Control  Outcome: Ongoing, Progressing  Intervention: Maintain Osteoarthritis Symptom Control  Recent Flowsheet Documentation  Taken 7/10/2022 0800 by Adrianna Mcpherson, RN  Activity Management: activity adjusted per tolerance  Assistive Device Utilized: lift device  Medication Review/Management: medications reviewed     Problem: Pain Chronic (Persistent) (Comorbidity Management)  Goal: Acceptable Pain Control and Functional Ability  Outcome: Ongoing, Progressing  Intervention: Manage Persistent Pain  Recent Flowsheet Documentation  Taken 7/10/2022 0800 by Adrianna Mcpherson RN  Medication Review/Management: medications reviewed     Problem: Fall Injury Risk  Goal: Absence of Fall and Fall-Related Injury  Outcome: Ongoing, Progressing  Intervention: Identify and Manage Contributors  Recent Flowsheet Documentation  Taken 7/10/2022 0800 by Adrianna Mcpherson RN  Medication Review/Management: medications reviewed  Intervention: Promote Injury-Free Environment  Recent Flowsheet Documentation  Taken 7/10/2022 1400 by Adrianna Mcpherson RN  Safety Promotion/Fall Prevention: safety round/check completed  Taken 7/10/2022 1200 by Adrianna Mcpherson RN  Safety Promotion/Fall Prevention: safety round/check completed  Taken 7/10/2022 1000 by Adrianna Mcpherson RN  Safety Promotion/Fall Prevention: safety round/check completed  Taken 7/10/2022 0800 by Adrianna Mcpherson RN  Safety Promotion/Fall Prevention:   activity supervised   assistive device/personal items within reach    clutter free environment maintained   fall prevention program maintained   mobility aid in reach   muscle strengthening facilitated   nonskid shoes/slippers when out of bed   room organization consistent   safety round/check completed   toileting scheduled     Problem: Palliative Care  Goal: Enhanced Quality of Life  Outcome: Ongoing, Progressing   Goal Outcome Evaluation:           Progress: no change

## 2022-07-10 NOTE — PROGRESS NOTES
[facility] Medicine Services  PROGRESS NOTE    Patient Name: Chikis Cuellar  : 1945  MRN: [id number]    Date of Admission: 2022  Primary Care Physician: Eduardo Gamboa MD    Subjective   Subjective     CC:  F/U acute respiratory failure with hypoxia     HPI:  Respiratory status about the same, maybe a little better. Discussed again the risks of aspirating w thin liquids, she understands the risks    ROS:  Gen- No fevers, chills  CV- No chest pain, palpitations  Resp- cough improved, dyspnea about the same  GI- No N/V/D, abd pain          Objective   Objective     Vital Signs:   Temp:  [95.6 °F (35.3 °C)-98.1 °F (36.7 °C)] 97.6 °F (36.4 °C)  Heart Rate:  [64-92] 91  Resp:  [18-22] 22  BP: (145-171)/() 162/106  Flow (L/min):  [45] 45     Physical Exam:  Constitutional: In no acute distress, nontoxic, very pleasant, tired appearing  HENT: NCAT, mucous membranes moist  Respiratory: bilateral dry expiratory crackles,less coughing  Cardiovascular: RRR, no murmurs, rubs, or gallops  Gastrointestinal: Positive bowel sounds, soft, nontender, nondistended  Musculoskeletal: No bilateral ankle edema  Psychiatric: Appropriate affect, cooperative  Neurologic: Oriented x 3,  speech clear  Skin: Erythema under L armpit improved, Erythema R armpit improving as well.    Results Reviewed:  LAB RESULTS:      Lab 22  0503 22  0742 22  0702 22  0735 22  0436   WBC 13.00* 14.51* 13.87* 16.22* 11.45*   HEMOGLOBIN 12.1 11.6* 11.0* 10.8* 11.5*   HEMATOCRIT 37.8 36.9 35.8 33.1* 36.9   PLATELETS 367 333 316 279 251   NEUTROS ABS 11.17* 12.73*  --   --   --    IMMATURE GRANS (ABS) 0.17* 0.11*  --   --   --    LYMPHS ABS 0.95 0.90  --   --   --    MONOS ABS 0.65 0.69  --   --   --    EOS ABS 0.03 0.05  --   --   --    MCV 84.9 85.6 88.2 83.6 86.2   PROCALCITONIN  --  0.06  --   --   --    LACTATE  --  2.0  --   --   --          Lab 22  0921 22  0509  07/07/22  0742 07/06/22  0702 07/05/22  0735 07/04/22  0436   SODIUM 140 141 136 142 138 139   POTASSIUM 4.7 4.5 5.2 5.2 5.3* 5.0   CHLORIDE 96* 98 93* 99 97* 96*   CO2 33.0* 35.0* 37.0* 37.0* 37.0* 36.0*   ANION GAP 11.0 8.0 6.0 6.0 4.0* 7.0   BUN 27* 24* 24* 23 20 16   CREATININE 0.59 0.64 0.69 0.65 0.71 0.61   EGFR 92.4 90.6 89.0 90.2 87.2 91.6   GLUCOSE 165* 162* 181* 221* 276* 193*   CALCIUM 9.2 9.2 9.9 9.7 9.6 9.5   MAGNESIUM  --   --   --   --   --  2.1         Lab 07/09/22  0921 07/08/22  0503 07/07/22  0742   TOTAL PROTEIN 5.9* 5.9* 5.6*   ALBUMIN 3.20* 3.20* 3.10*   GLOBULIN 2.7 2.7 2.5   ALT (SGPT) 14 13 13   AST (SGOT) 17 18 16   BILIRUBIN 0.2 0.2 0.2   ALK PHOS 83 92 79                     Brief Urine Lab Results     None          Microbiology Results Abnormal     Procedure Component Value - Date/Time    COVID PRE-OP / PRE-PROCEDURE SCREENING ORDER (NO ISOLATION) - Swab, Nasopharynx [[id number]]  (Normal) Collected: 06/28/22 0027    Lab Status: Final result Specimen: Swab from Nasopharynx Updated: 06/28/22 0104    Narrative:      The following orders were created for panel order COVID PRE-OP / PRE-PROCEDURE SCREENING ORDER (NO ISOLATION) - Swab, Nasopharynx.  Procedure                               Abnormality         Status                     ---------                               -----------         ------                     COVID-19 and FLU A/B PCR...[[id number]]  Normal              Final result                 Please view results for these tests on the individual orders.    COVID-19 and FLU A/B PCR - Swab, Nasopharynx [[id number]]  (Normal) Collected: 06/28/22 0027    Lab Status: Final result Specimen: Swab from Nasopharynx Updated: 06/28/22 0104     COVID19 Not Detected     Influenza A PCR Not Detected     Influenza B PCR Not Detected    Narrative:      Fact sheet for providers: https://www.fda.gov/media/230148/download    Fact sheet for patients: https://www.fda.gov/media/243717/download    Test  performed by PCR.          XR Chest 1 View    Result Date: 7/9/2022  DATE OF EXAM: 7/9/2022 1:43 PM  PROCEDURE: XR CHEST 1 VW-  INDICATIONS: persistent dyspnea; J96.01-Acute respiratory failure with hypoxia; R06.02-Shortness of breath; J18.9-Pneumonia, unspecified organism; R13.10-Dysphagia, unspecified  COMPARISON: 07/06/2022, and prior  TECHNIQUE: Single radiographic view of the chest was obtained.  FINDINGS: Diffuse interstitial and airspace opacities appear grossly unchanged compared to the prior exam. No significant new focal consolidation.   The heart appears enlarged, as before. Mediastinal contour appears grossly unchanged.  No significant new pleural effusion or pneumothorax.      Impression: 1.  Diffuse interstitial and airspace opacities, similar to recent prior studies. Findings again may be related to pneumonia. 2.  Stable cardiomegaly.  This report was finalized on 7/9/2022 3:51 PM by Fadi Saunders MD.        Results for orders placed during the hospital encounter of 06/01/22    Adult Transthoracic Echo Complete W/ Cont if Necessary Per Protocol    Interpretation Summary  · The right atrial cavity is mildly dilated.  · Estimated right ventricular systolic pressure from tricuspid regurgitation is mildly elevated (35-45 mmHg). Calculated right ventricular systolic pressure from tricuspid regurgitation is 40 mmHg.  · Estimated left ventricular EF = 60% Estimated left ventricular EF was in agreement with the calculated left ventricular EF. Left ventricular ejection fraction appears to be 56 - 60%. Left ventricular systolic function is normal.  · Left ventricular diastolic function is consistent with age.  · Mild pulmonary hypertension is present.  · Normal right ventricular wall thickness, systolic function and septal motion noted with the right ventricular cavity mild to moderately dilated.  · There is no evidence of pericardial effusion.  · No significant structural or functional valvular abnormalities  demonstrated.      I have reviewed the medications:  Scheduled Meds:clotrimazole-betamethasone, 1 application, Topical, Q12H  enoxaparin, 40 mg, Subcutaneous, Q24H  fluconazole, 200 mg, Oral, Q24H  FLUoxetine, 40 mg, Oral, Daily  hydroCHLOROthiazide Oral, 25 mg, Oral, Daily  insulin detemir, 20 Units, Subcutaneous, Q12H  insulin lispro, 0-24 Units, Subcutaneous, TID AC  Insulin Lispro, 10 Units, Subcutaneous, TID With Meals  ipratropium-albuterol, 3 mL, Nebulization, 4x Daily - RT  lactobacillus acidophilus, 1 capsule, Oral, Daily  losartan, 25 mg, Oral, Daily  methylPREDNISolone sodium succinate, 40 mg, Intravenous, Q12H  miconazole, 1 application, Topical, Q12H  oxybutynin XL, 5 mg, Oral, Daily  pantoprazole, 40 mg, Oral, Q AM  pravastatin, 40 mg, Oral, Nightly  pregabalin, 150 mg, Oral, TID  senna-docusate sodium, 2 tablet, Oral, BID  sodium chloride, 10 mL, Intravenous, Q12H  sodium chloride, 10 mL, Intravenous, Q12H      Continuous Infusions:   PRN Meds:.•  acetaminophen  •  albuterol  •  ALPRAZolam  •  senna-docusate sodium **AND** polyethylene glycol **AND** bisacodyl **AND** bisacodyl  •  dextrose  •  dextrose  •  diphenhydrAMINE  •  glucagon (human recombinant)  •  HYDROcodone-acetaminophen  •  hydrOXYzine  •  ipratropium-albuterol  •  magnesium sulfate **OR** magnesium sulfate **OR** magnesium sulfate  •  sodium chloride  •  sodium chloride  •  sodium chloride    Assessment & Plan   Assessment & Plan     Active Hospital Problems    Diagnosis  POA   • **Acute respiratory failure with hypoxia (HCC) [J96.01]  Yes   • Hypomagnesemia [E83.42]  Unknown   • Leukocytosis [D72.829]  Yes   • Chest pain [R07.9]  Yes   • Fibromyalgia [M79.7]  Yes   • Type 2 diabetes mellitus, with long-term current use of insulin (HCC) [E11.9, Z79.4]  Not Applicable   • Essential hypertension [I10]  Yes   • Hyperlipemia [E78.5]  Yes   • GERD without esophagitis [K21.9]  Yes   • Mood disorder (HCC) [F39]  Yes      Resolved Hospital  Problems   No resolved problems to display.        Brief Hospital Course to date:  Chikis Cuellar is a 77 y.o. female w/ a hx of T2DM, HTN, HLD, GERD, fibromyalgia, anxiety who presented to the ED w/ c/o hypoxia.   Pt admitted to [facility] 6/1 - 6/27/22. Pt initially presented w/ c/o severe dyspnea. Pt was admitted to the ICU from the ED and placed on HFNC, IV steroids and IV antibiotics. Pt underwent an extensive work-up including infectious, autoimmune, cardiac which were predominantly unrevealing.  Advanced chest imaging was felt to be consistent with atypical pneumonia somewhat classic for the appearance of COVID-19 although COVID remained negative despite pt reports of several family members w/ recent positive COVID tests. Pt was d/c on a steroid taper and on 5 liters of supplemental oxygen.      This patient's problems and plans were partially entered by my partner and updated as appropriate by me 07/10/22.     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 Given duration of steroids may need to consider pneumocystis prevention  -Wean HFNC as tolerated, recent increased  bc of choking episode. FEES wnl, needs supervision at times during eating. Reviewed CXR, no new pathology.   -Anti-reflux measures   -Diuresis as tolerated  -Will need rehab at [place]  -palliative following, prognosis guarded given persistent HFNC requirements. Repeat CXR 7/9 shows stable process. May need to ask pulm to re-eval on Mon for any additional recs     Leukocytosis   -afebrile   -CXR without new findings   -Likely secondary to steroids. improving  -cbc tmrw am      B/L underarm Candidiasis/R upper back cellulitis    Superimposed Cellulitis   -Continue Nystatin powder  -Interdry cloths  -Received 1x dose oral Fluconazole 7/1  -WOC following  -ID consulted. Recommends aspergillus galactomannan assay, histo and blasto urine Ag, Fungitell, strongyloidiasis serology and radiographic studies  - fulconazole 200 daily, triamcinolone/clotrimazole cream BID, following QTc       T2DM  -hem a1c 6.2% on 6/3/22  -holding routine glipizide, novolog, metformin    -increased levemir to 20 units bid from, Levemir 14 units q12  -on Humalog 10 units TID meals  -hyperglycemia exacerbated by steroid use, SSI adjusted     HTN  HLD  -continue routine Lozol, losartan, pravastatin   -added hctz today, might help respiratory issues as well     Anxiety   -continue routine prozac, xanax      Fibromyalgia   -continue Lyrica, Norco      Hypomagnesemia  -Replace per protocol     Constipation  -Continue bowel regimen  -On Linzess at home, not on formulary here     Expected Discharge Location and Transportation: SNF,  van vs family transport   Expected Discharge Date: TBD, still on HFNC    DVT prophylaxis:  Medical DVT prophylaxis orders are present.     AM-PAC 6 Clicks Score (PT): 14 (07/10/22 0800)    CODE STATUS:   Code Status and Medical Interventions:   Ordered at: 07/01/22 1617     Medical Intervention Limits:    NO intubation (DNI)     Code Status (Patient has no pulse and is not breathing):    No CPR (Do Not Attempt to Resuscitate)     Medical Interventions (Patient has pulse or is breathing):    Limited Support       Ketty Bethea MD  07/10/22

## 2022-07-11 NOTE — THERAPY TREATMENT NOTE
Patient Name: Chikis Cuellar  : 1945    MRN: [id number]                              Today's Date: 2022       Admit Date: 2022    Visit Dx:     ICD-10-CM ICD-9-CM   1. Acute respiratory failure with hypoxia (HCC)  J96.01 518.81   2. Shortness of breath  R06.02 786.05   3. Atypical pneumonia  J18.9 486   4. Dysphagia, unspecified type  R13.10 787.20     Patient Active Problem List   Diagnosis   • Type 2 diabetes mellitus, with long-term current use of insulin (HCC)   • Essential hypertension   • Hyperlipemia   • GERD without esophagitis   • Mood disorder (HCC)   • Acute respiratory failure with hypoxia (HCC)   • Leukocytosis   • Chest pain   • Fibromyalgia   • Hypomagnesemia     Past Medical History:   Diagnosis Date   • Arthritis    • Diabetes mellitus (HCC)    • Fibromyalgia    • Hyperlipidemia    • Hypertension      Past Surgical History:   Procedure Laterality Date   • BACK SURGERY     •  SECTION     • CHOLECYSTECTOMY     • HYSTERECTOMY     • REPLACEMENT TOTAL KNEE        General Information     Row Name 22          Physical Therapy Time and Intention    Document Type therapy note (daily note)  -AS     Mode of Treatment physical therapy  -AS     Row Name 22          General Information    Patient Profile Reviewed yes  -AS     Existing Precautions/Restrictions fall;oxygen therapy device and L/min;other (see comments)  HFNC, destats quickly, fibromyalgia, MRSA  -AS     Barriers to Rehab medically complex;previous functional deficit  -AS     Row Name 22          Cognition    Orientation Status (Cognition) oriented x 4  -AS     Row Name 22          Safety Issues, Functional Mobility    Safety Issues Affecting Function (Mobility) safety precaution awareness;safety precautions follow-through/compliance  -AS     Impairments Affecting Function (Mobility) balance;endurance/activity tolerance;postural/trunk control;shortness of breath;strength  -AS      Comment, Safety Issues/Impairments (Mobility) alert and following commands, monitor O2 sats  -AS           User Key  (r) = Recorded By, (t) = Taken By, (c) = Cosigned By    Initials Name Provider Type    AS Suha Dumont PTA Physical Therapist Assistant               Mobility     Row Name 07/11/22 0948          Bed Mobility    Supine-Sit [place] (Bed Mobility) verbal cues;minimum assist (75% patient effort);1 person assist  -AS     Assistive Device (Bed Mobility) head of bed elevated;draw sheet  -AS     Comment, (Bed Mobility) increased time and effort to reach EOB do to weakness and SOA., completed seated B LE exercises with frequent rest breaks. O2 sats 78%-90% with activity  -AS     Row Name 07/11/22 0948          Transfers    Comment, (Transfers) bed>chair with B UE support, Mod assist x2  -AS     Row Name 07/11/22 0948          Bed-Chair Transfer    Bed-Chair [place] (Transfers) verbal cues;moderate assist (50% patient effort);2 person assist  -AS     Assistive Device (Bed-Chair Transfers) other (see comments)  -AS     Comment, (Bed-Chair Transfer) B UE support, paitent with c/o decreased sensation bilateral feet  -AS     Row Name 07/11/22 0948          Sit-Stand Transfer    Sit-Stand [place] (Transfers) verbal cues;moderate assist (50% patient effort);2 person assist  -AS     Comment, (Sit-Stand Transfer) BUE support  -AS     Row Name 07/11/22 0948          Gait/Stairs (Locomotion)    [place] Level (Gait) verbal cues;moderate assist (50% patient effort);2 person assist  -AS     Distance in Feet (Gait) 4  -AS     Deviations/Abnormal Patterns (Gait) mamie decreased;festinating/shuffling;gait speed decreased;weight shifting decreased;stride length decreased  -AS     Bilateral Gait Deviations forward flexed posture;heel strike decreased  -AS     Comment, (Gait/Stairs) steps to chair only do to weakness and SOA.  -AS           User Key  (r) = Recorded By, (t) = Taken By, (c) =  Cosigned By    Initials Name Provider Type    AS Suha Dumont PTA Physical Therapist Assistant               Obj/Interventions     Row Name 07/11/22 0950          Motor Skills    Therapeutic Exercise shoulder;knee;ankle  -AS     [facility] Name 07/11/22 0950          Shoulder (Therapeutic Exercise)    Shoulder (Therapeutic Exercise) AROM (active range of motion)  -AS     Shoulder AROM (Therapeutic Exercise) bilateral;flexion;extension;aDduction;aBduction;sitting;10 repetitions  bicep curls  -AS     [facility] Name 07/11/22 0950          Knee (Therapeutic Exercise)    Knee (Therapeutic Exercise) strengthening exercise  -AS     Knee Strengthening (Therapeutic Exercise) bilateral;marching while seated;LAQ (long arc quad);sitting;10 repetitions  -AS     [facility] Name 07/11/22 0950          Ankle (Therapeutic Exercise)    Ankle (Therapeutic Exercise) AROM (active range of motion)  -AS     Ankle AROM (Therapeutic Exercise) bilateral;dorsiflexion;plantarflexion;sitting;10 repetitions  -AS     Row Name 07/11/22 0950          Balance    Dynamic Standing Balance verbal cues;moderate assist;2-person assist  -AS     Position/Device Used, Standing Balance supported;other (see comments)  BUE support  -AS           User Key  (r) = Recorded By, (t) = Taken By, (c) = Cosigned By    Initials Name Provider Type    AS Suha Dumont PTA Physical Therapist Assistant               Goals/Plan    No documentation.                Clinical Impression     Row Name 07/11/22 0953          Pain    Pretreatment Pain Rating 0/10 - no pain  -AS     Posttreatment Pain Rating 0/10 - no pain  -AS     Pain Intervention(s) Repositioned;Ambulation/increased activity  -AS     Row Name 07/11/22 0953          Plan of Care Review    Plan of Care Reviewed With patient  -AS     Progress improving  -AS     Outcome Evaluation patient able to take steps to recliner with mod assist x2 and B UE support, completed B UE/LE exercise with frequent rest breaks and cues for  breathing technique. SaO2 79%-90% on HFNC, monitor closely.  -AS     [facility] Name 07/11/22 0953          Vital Signs    Pre SpO2 (%) 90  -AS     O2 Delivery Pre Treatment hi-flow  -AS     Intra SpO2 (%) 79  -AS     O2 Delivery Intra Treatment hi-flow  -AS     Post SpO2 (%) 90  -AS     O2 Delivery Post Treatment hi-flow  -AS     Pre Patient Position Supine  -AS     Intra Patient Position Sitting  -AS     Post Patient Position Sitting  -AS     [facility] Name 07/11/22 0953          Positioning and Restraints    Pre-Treatment Position in bed  -AS     Post Treatment Position chair  -AS     In Chair reclined;call light within reach;encouraged to call for assist;exit alarm on;waffle cushion;on mechanical lift sling;legs elevated  -AS           User Key  (r) = Recorded By, (t) = Taken By, (c) = Cosigned By    Initials Name Provider Type    AS Suha Dumont PTA Physical Therapist Assistant               Outcome Measures     Row Name 07/11/22 0954          How much help from another person do you currently need...    Turning from your back to your side while in flat bed without using bedrails? 3  -AS     Moving from lying on back to sitting on the side of a flat bed without bedrails? 3  -AS     Moving to and from a bed to a chair (including a wheelchair)? 3  -AS     Standing up from a chair using your arms (e.g., wheelchair, bedside chair)? 2  -AS     Climbing 3-5 steps with a railing? 1  -AS     To walk in hospital room? 2  -AS     AM-PAC 6 Clicks Score (PT) 14  -AS     Highest level of mobility 4 --> Transferred to chair/commode  -AS     [facility] Name 07/11/22 0954          Functional Assessment    Outcome Measure Options AM-PAC 6 Clicks Basic Mobility (PT)  -AS           User Key  (r) = Recorded By, (t) = Taken By, (c) = Cosigned By    Initials Name Provider Type    AS Suha Dumont PTA Physical Therapist Assistant                             Physical Therapy Education                 Title: PT OT SLP Therapies (In  Progress)     Topic: Physical Therapy (In Progress)     Point: Mobility training (In Progress)     Learning Progress Summary           Patient Acceptance, E, NR by AS at 7/11/2022 0955    Acceptance, E, NR by AS at 7/11/2022 0955    Acceptance, E, VU by RELL at 7/8/2022 1157    Acceptance, E, VU by RELL at 7/7/2022 1119    Acceptance, E,D, NR by DM at 7/7/2022 1005    Acceptance, E, VU by RELL at 7/6/2022 1632    Acceptance, E,TB, VU,NR by KG at 7/5/2022 1357    Acceptance, E, NR by KG1 at 7/1/2022 1453    Acceptance, E, NR by KG1 at 6/28/2022 1438                   Point: Home exercise program (In Progress)     Learning Progress Summary           Patient Acceptance, E, NR by AS at 7/11/2022 0955    Acceptance, E, NR by AS at 7/11/2022 0955    Acceptance, E, VU by RELL at 7/8/2022 1157    Acceptance, E, VU by RELL at 7/7/2022 1119    Acceptance, E,D, NR by DM at 7/7/2022 1005    Acceptance, E, VU by RELL at 7/6/2022 1632    Acceptance, E,TB, VU,NR by KG at 7/5/2022 1357    Acceptance, E, NR by KG1 at 7/1/2022 1453                   Point: Body mechanics (In Progress)     Learning Progress Summary           Patient Acceptance, E, NR by AS at 7/11/2022 0955    Acceptance, E, NR by AS at 7/11/2022 0955    Acceptance, E, VU by RELL at 7/8/2022 1157    Acceptance, E, VU by RELL at 7/7/2022 1119    Acceptance, E,D, NR by DM at 7/7/2022 1005    Acceptance, E, VU by RELL at 7/6/2022 1632    Acceptance, E,TB, VU,NR by KG at 7/5/2022 1357    Acceptance, E, NR by KG1 at 7/1/2022 1453    Acceptance, E, NR by KG1 at 6/28/2022 1438                   Point: Precautions (In Progress)     Learning Progress Summary           Patient Acceptance, E, NR by AS at 7/11/2022 0955    Acceptance, E, NR by AS at 7/11/2022 0955    Acceptance, E, VU by JM at 7/8/2022 1157    Acceptance, E, VU by JM at 7/7/2022 1119    Acceptance, E,D, NR by DM at 7/7/2022 1005    Acceptance, E, VU by RELL at 7/6/2022 1632    Acceptance, E,TB, VU,NR by KG at 7/5/2022 1357     Acceptance, E, NR by KG1 at 7/1/2022 1453    Acceptance, E, NR by KG1 at 6/28/2022 1438                               User Key     Initials Effective Dates Name Provider Type Discipline    DM 06/16/21 -  Katherine Rojas, PT Physical Therapist PT    AS 06/16/21 -  Suha Dumont PTA Physical Therapist Assistant PT    KG1 05/22/20 -  Francia Curiel, PT Physical Therapist PT     06/16/21 -  Tish العلي Physical Therapist PT     06/23/22 -  Renetta Vance RN Registered Nurse Nurse              PT Recommendation and Plan     Plan of Care Reviewed With: patient  Progress: improving  Outcome Evaluation: patient able to take steps to recliner with mod assist x2 and B UE support, completed B UE/LE exercise with frequent rest breaks and cues for breathing technique. SaO2 79%-90% on HFNC, monitor closely.     Time Calculation:    PT Charges     Row Name 07/11/22 0955             Time Calculation    Start Time 0855  -AS      PT Received On 07/11/22  -AS      PT Goal Re-Cert Due Date 07/12/22  -AS              Timed Charges    86767 - PT Therapeutic Exercise Minutes 15  -AS      11339 - Gait Training Minutes  8  -AS              Total Minutes    Timed Charges Total Minutes 23  -AS       Total Minutes 23  -AS            User Key  (r) = Recorded By, (t) = Taken By, (c) = Cosigned By    Initials Name Provider Type    AS Suha Dumont PTA Physical Therapist Assistant              Therapy Charges for Today     Code Description Service Date Service Provider Modifiers Qty    97820191495 HC PT THER PROC EA 15 MIN 7/11/2022 Suha Dumont PTA GP 1    92001235256 HC GAIT TRAINING EA 15 MIN 7/11/2022 Suha Dumont PTA GP 1    09452288561 HC PT THER SUPP EA 15 MIN 7/11/2022 Suha Dumont PTA GP 2          PT G-Codes  Outcome Measure Options: AM-PAC 6 Clicks Basic Mobility (PT)  AM-PAC 6 Clicks Score (PT): 14  AM-PAC 6 Clicks Score (OT): 12    Suha Dumont PTA  7/11/2022

## 2022-07-11 NOTE — PLAN OF CARE
Goal Outcome Evaluation:  Plan of Care Reviewed With: patient        Progress: no change  Outcome Evaluation: VSS, denies pain or discomfort, remains on Hi flow NC, sats > 92%,  pt slept well most of the night, no new concerns, will continue to monitor.        Problem: Adult Inpatient Plan of Care  Goal: Absence of Hospital-Acquired Illness or Injury  Intervention: Identify and Manage Fall Risk  Recent Flowsheet Documentation  Taken 7/10/2022 2000 by Robyn Ford RN  Safety Promotion/Fall Prevention: activity supervised  Intervention: Prevent Skin Injury  Recent Flowsheet Documentation  Taken 7/10/2022 2000 by Robyn Ford RN  Body Position: position changed independently  Intervention: Prevent and Manage VTE (Venous Thromboembolism) Risk  Recent Flowsheet Documentation  Taken 7/10/2022 2000 by Robyn Ford RN  Activity Management: activity adjusted per tolerance     Problem: Adult Inpatient Plan of Care  Goal: Absence of Hospital-Acquired Illness or Injury  Intervention: Prevent Skin Injury  Recent Flowsheet Documentation  Taken 7/10/2022 2000 by Robyn Ford RN  Body Position: position changed independently     Problem: Adult Inpatient Plan of Care  Goal: Absence of Hospital-Acquired Illness or Injury  Intervention: Prevent and Manage VTE (Venous Thromboembolism) Risk  Recent Flowsheet Documentation  Taken 7/10/2022 2000 by Robyn Ford RN  Activity Management: activity adjusted per tolerance

## 2022-07-11 NOTE — PROGRESS NOTES
INFECTIOUS DISEASE Progress Note    Chikis Cuellar  1945  [id number]    Consult: 7/5/22  Admit date: 6/27/2022    Requesting Provider: No ref. provider found  Evaluating physician: Percy Parker MD  Reason for Consultation: Back and arm cellulitis, intertrigo, recent pneumonia, candidiasis axillary  Chief Complaint: Above      Subjective   History of present illness:  Patient is a very pleasant  77 y.o.  Yr old female with diabetes mellitus type 2, essential hypertension, hyperlipidemia, arthritis, fibromyalgia, recent hospitalization 6/1/2022 until 6/27/2022 with pneumonia and ARDS, discharged to rehabilitation on 6/27 and readmitted on the same day related to worsening acute hypoxic respiratory failure.  Patient on first admission was thought to have an atypical pneumonia treated with IV antibiotics and steroids.  COVID-19 testing was negative x2.  Viral panel is negative.  Cultures were negative.  Pulmonary continues to follow and felt that her respiratory problems were related to ARDS fibrotic changes.  RICARDO and ANCA from 6/8 were negative.  Rheumatoid factor elevated at 21.0 (upper limit of normal 14).  Legionella urine antigen and streptococcal urine antigen were negative on 6/3.  MRSA screen was positive on 6/2.  The patient has been treated with steroids which have been increased because of worsening respiratory function.  Patient then developed bilateral axillary increased erythema concerning for candidiasis or cellulitis.  Antibiotics were started on 7/4/2022.  Vancomycin, ceftriaxone, and IV fluconazole.  I was consulted on 7/5/2022 for further evaluation and treatment.    7/6/2022 history reviewed.  Tolerating fluconazole and Lotrisone for axillary candidiasis also involving the back.  No fever.  Still on high flow oxygen at 45 L/min.  Being treated for ARDS related inflammation of the lungs.  Also on high-dose steroids.    7/7/2022 history reviewed.  Continues on fluconazole for severe  axillary candidiasis as well as Lotrisone.  No high fever.  Being treated for ARDS with steroids.  Continues on high flow oxygen at 45 L/min.  75% O2 concentration.  Infection work-up in lungs negative to date including negative Fungitell.    2022 history reviewed.  Her skin candidiasis improved.  Breathing is still a problem from ARDS.  Axillary and back decreased itching and pain and erythema.  Remains on high flow oxygen at 45 L/min, 65% O2 concentration.  On fluconazole and Lotrisone for her candidiasis, steroids for her ARDS.    2022 history reviewed.  No high fevers or chills.  Axillary and back candidiasis improved.  Remains on high flow oxygen at 55 L/min, 73% O2 concentration.  Struggling with her ARDS.    Past Medical History:   Diagnosis Date   • Arthritis    • Diabetes mellitus (HCC)    • Fibromyalgia    • Hyperlipidemia    • Hypertension        Past Surgical History:   Procedure Laterality Date   • BACK SURGERY     •  SECTION     • CHOLECYSTECTOMY     • HYSTERECTOMY     • REPLACEMENT TOTAL KNEE         Pediatric History   Patient Parents   • Not on file     Other Topics Concern   • Not on file   Social History Narrative   • Not on file       family history includes No Known Problems in her father and mother.    Allergies   Allergen Reactions   • Altace [Ramipril] Palpitations   • Trazodone Hallucinations       Immunization History   Administered Date(s) Administered   • COVID-19 (PFIZER) PURPLE CAP 2020, 2021, 09/10/2021   • Covid-19 (Pfizer) Gray Cap 2022       Medication:    Current Facility-Administered Medications:   •  acetaminophen (TYLENOL) tablet 650 mg, 650 mg, Oral, Q6H PRN, Sarath Gutiérrez PA-C, 650 mg at 222  •  albuterol (PROVENTIL) nebulizer solution 0.083% 2.5 mg/3mL, 2.5 mg, Nebulization, Q6H PRN, Vanesa Enciso APRN  •  ALPRAZolam (XANAX) tablet 0.5 mg, 0.5 mg, Oral, Daily PRN, Lola Neri DO, 0.5 mg at 07/10/22 5807  •   sennosides-docusate (PERICOLACE) 8.6-50 MG per tablet 2 tablet, 2 tablet, Oral, BID, 2 tablet at 07/11/22 0825 **AND** polyethylene glycol (MIRALAX) packet 17 g, 17 g, Oral, Daily PRN **AND** bisacodyl (DULCOLAX) EC tablet 5 mg, 5 mg, Oral, Daily PRN, 5 mg at 07/08/22 0804 **AND** bisacodyl (DULCOLAX) suppository 10 mg, 10 mg, Rectal, Daily PRN, Elsie Griffith DO, 10 mg at 07/03/22 1413  •  clotrimazole-betamethasone (LOTRISONE) 1-0.05 % cream 1 application, 1 application, Topical, Q12H, Percy Parker MD, 1 application at 07/11/22 0824  •  dextrose (D50W) (25 g/50 mL) IV injection 25 g, 25 g, Intravenous, Q15 Min PRN, Vanesa Enciso APRN  •  dextrose (GLUTOSE) oral gel 15 g, 15 g, Oral, Q15 Min PRN, Vanesa Enciso APRN  •  diphenhydrAMINE (BENADRYL) capsule 25 mg, 25 mg, Oral, Q6H PRN, Adithya Omer DO, 25 mg at 07/08/22 0804  •  Enoxaparin Sodium (LOVENOX) syringe 40 mg, 40 mg, Subcutaneous, Q24H, Vanesa Enciso APRN, 40 mg at 07/11/22 0556  •  fluconazole (DIFLUCAN) tablet 200 mg, 200 mg, Oral, Q24H, Percy Parker MD, 200 mg at 07/10/22 1532  •  FLUoxetine (PROzac) capsule 40 mg, 40 mg, Oral, Daily, Vanesa Enciso APRN, 40 mg at 07/11/22 0825  •  glucagon (human recombinant) (GLUCAGEN DIAGNOSTIC) injection 1 mg, 1 mg, Intramuscular, Q15 Min PRN, Vanesa Enciso APRN  •  hydroCHLOROthiazide (HYDRODIURIL) tablet 25 mg, 25 mg, Oral, Daily, Ketty Bethea MD, 25 mg at 07/11/22 0825  •  HYDROcodone-acetaminophen (NORCO)  MG per tablet 1 tablet, 1 tablet, Oral, Q6H PRN, Adithya Omer DO, 1 tablet at 07/11/22 0824  •  hydrOXYzine (ATARAX) tablet 25 mg, 25 mg, Oral, TID PRN, Ketty Bethea MD, 25 mg at 07/11/22 1013  •  insulin detemir (LEVEMIR) injection 20 Units, 20 Units, Subcutaneous, Q12H, Adithya Omer DO, 20 Units at 07/11/22 0826  •  Insulin Lispro (humaLOG) injection 0-24 Units, 0-24 Units, Subcutaneous, TID AC, Adithya Omer DO, 8 Units at 07/10/22 1652  •  Insulin Lispro (humaLOG)  injection 10 Units, 10 Units, Subcutaneous, TID With Meals, Elsie Griffith, , 10 Units at 07/10/22 1652  •  ipratropium-albuterol (DUO-NEB) nebulizer solution 3 mL, 3 mL, Nebulization, Q4H PRN, Vanesa Enciso APRN  •  ipratropium-albuterol (DUO-NEB) nebulizer solution 3 mL, 3 mL, Nebulization, 4x Daily - RT, Vanesa Enciso APRN, 3 mL at 07/11/22 0837  •  lactobacillus acidophilus (RISAQUAD) capsule 1 capsule, 1 capsule, Oral, Daily, Adithya Omer DO, 1 capsule at 07/11/22 0826  •  losartan (COZAAR) tablet 50 mg, 50 mg, Oral, Q12H, Rema Augustin DO, 50 mg at 07/11/22 0826  •  Magnesium Sulfate 2 gram Bolus, followed by 8 gram infusion (total Mg dose 10 grams)- Mg less than or equal to 1mg/dL, 2 g, Intravenous, PRN **OR** Magnesium Sulfate 2 gram / 50mL Infusion (GIVE X 3 BAGS TO EQUAL 6GM TOTAL DOSE) - Mg 1.1 - 1.5 mg/dl, 2 g, Intravenous, PRN, Last Rate: 25 mL/hr at 07/01/22 1848, 2 g at 07/01/22 1848 **OR** Magnesium Sulfate 4 gram infusion- Mg 1.6-1.9 mg/dL, 4 g, Intravenous, PRN, Lola Neri G, DO, Last Rate: 25 mL/hr at 07/03/22 1320, 4 g at 07/03/22 1320  •  [START ON 7/12/2022] methylPREDNISolone sodium succinate (SOLU-Medrol) injection 40 mg, 40 mg, Intravenous, Q24H, Rema Augutsin DO  •  miconazole (MICOTIN) 2 % cream 1 application, 1 application, Topical, Q12H, Adithya Omer DO, 1 application at 07/11/22 0824  •  oxybutynin XL (DITROPAN-XL) 24 hr tablet 5 mg, 5 mg, Oral, Daily, Vanesa Enciso APRN, 5 mg at 07/11/22 0826  •  pantoprazole (PROTONIX) EC tablet 40 mg, 40 mg, Oral, Q AM, Vanesa Enciso APRN, 40 mg at 07/11/22 0556  •  pravastatin (PRAVACHOL) tablet 40 mg, 40 mg, Oral, Nightly, Vanesa Enciso APRN, 40 mg at 07/10/22 2014  •  pregabalin (LYRICA) capsule 150 mg, 150 mg, Oral, TID, Lola Neri DO, 150 mg at 07/11/22 0825  •  sodium chloride 0.9 % flush 10 mL, 10 mL, Intravenous, PRN, Deborah, Andrea Clark MD  •  sodium chloride 0.9 % flush 10 mL, 10 mL,  "Intravenous, Q12H, Vanesa Enciso APRN, 10 mL at 07/11/22 0829  •  sodium chloride 0.9 % flush 10 mL, 10 mL, Intravenous, PRN, Vanesa Enciso APRN  •  sodium chloride 0.9 % flush 10 mL, 10 mL, Intravenous, Q12H, Elsie Griffith DO, 10 mL at 07/11/22 0824  •  sodium chloride 0.9 % flush 10 mL, 10 mL, Intravenous, PRN, Elsie Griffith DO    Please refer to the medical record for a full medication list    Review of Systems:    Constitutional-- No Fever, chills or sweats.  Appetite good, and no malaise. No fatigue.  HEENT-- No new vision, hearing or throat complaints.  No epistaxis or oral sores.  Denies odynophagia or dysphagia.  No odynophagia or dysphagia. No headache, photophobia or neck stiffness.  CV-- No chest pain, palpitation or syncope  Resp--continued SOB/nonproductive cough/no hemoptysis  GI- No nausea, vomiting, or diarrhea.  No hematochezia, melena, or hematemesis. Denies jaundice or chronic liver disease.  -- No dysuria, hematuria, or flank pain.  Denies hesitancy, urgency or flank pain.  Lymph- no swollen lymph nodes in neck/axilla or groin.   Heme- No active bruising or bleeding; no Hx of DVT or PE.  MS-- no swelling or pain in the bones or joints of arms/legs.  No new back pain.  Neuro-- No acute focal weakness or numbness in the arms or legs.  No seizures.  Skin--No rashes or lesions, except bilateral axillary as per HPI, feels improved    Physical Exam:   Vital Signs   Temp:  [95.6 °F (35.3 °C)-98 °F (36.7 °C)] 96.6 °F (35.9 °C)  Heart Rate:  [72-94] 85  Resp:  [19-22] 19  BP: (148-174)/(101-120) 173/120    Temp  Min: 95.6 °F (35.3 °C)  Max: 98 °F (36.7 °C)  BP  Min: 148/102  Max: 174/109  Pulse  Min: 72  Max: 94  Resp  Min: 19  Max: 22  SpO2  Min: 82 %  Max: 95 %    Blood pressure (!) 173/120, pulse 85, temperature 96.6 °F (35.9 °C), temperature source Oral, resp. rate 19, height 162.6 cm (64\"), weight 93.4 kg (206 lb), SpO2 (!) 82 %.  GENERAL: Awake and alert, in moderate distress. " Appears older than stated age.  Resting in bed watching TV.  HEENT:  Normocephalic, atraumatic.  Oropharynx without thrush. Dentition in good repair. No cervical adenopathy. No neck masses.  Ears externally normal, Nose externally normal.  EYES: No conjunctival injection. No icterus. EOM full.  LYMPHATICS: No lymphadenopathy of the neck or axillary or inguinal regions.   HEART: No murmur, gallop, or pericardial friction rub. Reg rate rhythm.  LUNGS: Bilateral rales at bases. No respiratory distress, no use of accessory muscles.  No wheezes.  ABDOMEN: Soft, nontender, nondistended. No appreciable HSM.  Bowel sounds normal.  Obese.  SKIN: Warm and dry without cutaneous eruptions, except bilateral axillary with fading erythema, no crepitus or bullae.  No nodules.  Some extension on the right side to the back.  PSYCHIATRIC: Mental status lucid. No confusion.  EXT:  No cellulitic change.  NEURO: Oriented to name, nonfocal    Results Review:   I reviewed the patient's new clinical results.  I reviewed the patient's new imaging results and agree with the interpretation.  I reviewed the patient's other test results and agree with the interpretation    Results from last 7 days   Lab Units 07/11/22  0350 07/08/22  0503 07/07/22  0742   WBC 10*3/mm3 17.15* 13.00* 14.51*   HEMOGLOBIN g/dL 13.4 12.1 11.6*   HEMATOCRIT % 42.6 37.8 36.9   PLATELETS 10*3/mm3 468* 367 333     Results from last 7 days   Lab Units 07/11/22  0350   SODIUM mmol/L 135*   POTASSIUM mmol/L 4.3   CHLORIDE mmol/L 93*   CO2 mmol/L 34.0*   BUN mg/dL 28*   CREATININE mg/dL 0.55*   GLUCOSE mg/dL 158*   CALCIUM mg/dL 9.6     Results from last 7 days   Lab Units 07/09/22  0921   ALK PHOS U/L 83   BILIRUBIN mg/dL 0.2   ALT (SGPT) U/L 14   AST (SGOT) U/L 17                 Results from last 7 days   Lab Units 07/07/22  0742   LACTATE mmol/L 2.0     Estimated Creatinine Clearance: 93.4 mL/min (A) (by C-G formula based on SCr of 0.55 mg/dL  (L)).    Microbiology:  Microbiology Results Abnormal     Procedure Component Value - Date/Time    COVID PRE-OP / PRE-PROCEDURE SCREENING ORDER (NO ISOLATION) - Swab, Nasopharynx [[id number]]  (Normal) Collected: 06/28/22 0027    Lab Status: Final result Specimen: Swab from Nasopharynx Updated: 06/28/22 0104    Narrative:      The following orders were created for panel order COVID PRE-OP / PRE-PROCEDURE SCREENING ORDER (NO ISOLATION) - Swab, Nasopharynx.  Procedure                               Abnormality         Status                     ---------                               -----------         ------                     COVID-19 and FLU A/B PCR...[[id number]]  Normal              Final result                 Please view results for these tests on the individual orders.    COVID-19 and FLU A/B PCR - Swab, Nasopharynx [[id number]]  (Normal) Collected: 06/28/22 0027    Lab Status: Final result Specimen: Swab from Nasopharynx Updated: 06/28/22 0104     COVID19 Not Detected     Influenza A PCR Not Detected     Influenza B PCR Not Detected    Narrative:      Fact sheet for providers: https://www.fda.gov/media/777504/download    Fact sheet for patients: https://www.fda.gov/media/782787/download    Test performed by PCR.          Radiology:  Imaging Results (Last 72 Hours)     Procedure Component Value Units Date/Time    XR Chest 1 View [[id number]] Collected: 07/09/22 1549     Updated: 07/09/22 1554    Narrative:      DATE OF EXAM:  7/9/2022 1:43 PM     PROCEDURE:  XR CHEST 1 VW-     INDICATIONS:  persistent dyspnea; J96.01-Acute respiratory failure with hypoxia;  R06.02-Shortness of breath; J18.9-Pneumonia, unspecified organism;  R13.10-Dysphagia, unspecified     COMPARISON:  07/06/2022, and prior     TECHNIQUE:   Single radiographic view of the chest was obtained.     FINDINGS:  Diffuse interstitial and airspace opacities appear grossly unchanged  compared to the prior exam. No significant new focal consolidation.        The heart appears enlarged, as before. Mediastinal contour appears  grossly unchanged.  No significant new pleural effusion or pneumothorax.       Impression:      1.  Diffuse interstitial and airspace opacities, similar to recent prior  studies. Findings again may be related to pneumonia.  2.  Stable cardiomegaly.     This report was finalized on 7/9/2022 3:51 PM by Fadi Saunders MD.             IMPRESSION:     1.  Axillary candidiasis in the setting of diabetes mellitus type 2 on steroids for worsening respiratory failure.  May have superimposed cellulitis but seems more like Candida and yeast and has been on antibiotics.  2.  Possible back cellulitis versus candidiasis.  As per above.  Previously MRSA colonized.  Favor candidiasis.  3.  Atypical pneumonia with likely ARDS postinfectious fibrotic changes with acute hypoxic respiratory failure.  On steroids to see how she responds.  High risk for invasive diagnostic testing, and does not want to be intubated.  4.  Leukocytosis, neutrophilic related to above issues.  Worse.  Steroids likely pushing white count higher.  5.  Anemia, chronic disease related to above issues.  6.  Acute hypoxic respiratory failure.  On 65 L/min on 7/5/2022 related to above issues.  45 L/min on 7/6, 7/7, 7/8.  7.  Diabetes mellitus type 2 with increased risk for infection.   8.  Hyponatremia 135, new.    Plan:    1.  Diagnostically, continue to follow patient's physical exam, CBC, CMP, CRP, Aspergillus galactomannan assay, histoplasma and blastomycosis urine antigen, Fungitell, strongyloidiasis serology, and radiographic studies.  Previous Legionella and streptococcal antigen negative.  2.  Therapeutically, continue fluconazole 200 mg daily, triamcinolone/clotrimazole cream twice daily to axillary and back, duration to be determined, but likely to be for an additional 10 to 14 days given the ongoing need for steroids and her diabetes.  QTc interval 422 ms on 6/27.  May need to consider  pneumocystis prevention if continues on high-dose steroids of greater than 20 mg a day of equivalent prednisone, for longer than 2 to 3 weeks.  3.  Oxygen support therapy.  65 L/min on 7/5/2022.  45 L/min on 7/6, 7/7.  55 L/min on 7/11.  Worse.    I discussed the patient's findings and my recommendations with patient, family and nursing staff    Our group would be pleased to follow this patient over the course of their hospitalization and assist with outpatient antimicrobial therapy, as indicated.  Further recommendations depend on the results of the cultures and clinical course.    Percy Parker MD  7/11/2022

## 2022-07-11 NOTE — CASE MANAGEMENT/SOCIAL WORK
Continued Stay Note  [facility]     Patient Name: Chikis Cuellar  MRN: [id number]  Today's Date: 7/11/2022    Admit Date: 6/27/2022     Discharge Plan     Row Name 07/11/22 1336       Plan    Plan discharge plan    Plan Comments Pt remains on high flow O2 and unable to place while on high flow O2. CM will make referrals to skilled nursing facilities when appropriate.    Final Discharge Disposition Code 03 - skilled nursing facility (SNF)               Discharge Codes    No documentation.               Expected Discharge Date and Time     Expected Discharge Date Expected Discharge Time    Jul 15, 2022             Agata Carter RN

## 2022-07-11 NOTE — PROGRESS NOTES
Palliative Care Progress Note    Date of Admission: 6/27/2022    Subjective: Patient continues to complain of dyspnea as well as noted that she had an episode today that required her high flow oxygen to be increased.  Current Code Status     Date Active Code Status Order ID Comments User Context       7/1/2022 1617 No CPR (Do Not Attempt to Resuscitate) [id number]  Elsie Griffith, DO Inpatient     Advance Care Planning Activity      Questions for Current Code Status     Question Answer    Code Status (Patient has no pulse and is not breathing) No CPR (Do Not Attempt to Resuscitate)    Medical Interventions (Patient has pulse or is breathing) Limited Support    Medical Intervention Limits: NO intubation (DNI)        No current facility-administered medications on file prior to encounter.     Current Outpatient Medications on File Prior to Encounter   Medication Sig Dispense Refill   • FLUoxetine (PROzac) 20 MG capsule Take 40 mg by mouth Daily.     • HYDROcodone-acetaminophen (NORCO)  MG per tablet Take 1 tablet by mouth Every 12 (Twelve) Hours As Needed for Moderate Pain . 6 tablet 0   • indapamide (LOZOL) 2.5 MG tablet Take 2.5 mg by mouth Daily.     • insulin detemir (LEVEMIR) 100 UNIT/ML injection Inject 5 Units under the skin into the appropriate area as directed Every 12 (Twelve) Hours.  12   • losartan (COZAAR) 25 MG tablet Take 1 tablet by mouth Daily.     • omeprazole (priLOSEC) 40 MG capsule Take 40 mg by mouth Daily.     • oxybutynin XL (DITROPAN-XL) 5 MG 24 hr tablet Take 5 mg by mouth Daily.     • pravastatin (PRAVACHOL) 40 MG tablet Take 40 mg by mouth Daily.     • pregabalin (LYRICA) 150 MG capsule Take 1 capsule by mouth 3 (Three) Times a Day. 9 capsule 0   • albuterol (PROVENTIL) (2.5 MG/3ML) 0.083% nebulizer solution Take 2.5 mg by nebulization Every 6 (Six) Hours As Needed for Shortness of Air.  12   • ALPRAZolam (XANAX) 0.5 MG tablet Take 1 tablet by mouth Daily As Needed for Anxiety. 3  "tablet 0   • cyanocobalamin 1000 MCG/ML injection Inject 1,000 mcg under the skin into the appropriate area as directed Every 14 (Fourteen) Days.     • glipiZIDE (GLUCOTROL) 10 MG tablet Take 10 mg by mouth 2 (Two) Times a Day Before Meals.     • Insulin Aspart (novoLOG) 100 UNIT/ML injection Inject  under the skin into the appropriate area as directed 3 (Three) Times a Day Before Meals. Patient states she gets it from pharmacy although they did not have record of it     • metFORMIN (GLUCOPHAGE) 500 MG tablet Take 500 mg by mouth 2 (Two) Times a Day With Meals.     • [] predniSONE (DELTASONE) 5 MG tablet Take 3 tablets by mouth Daily With Breakfast for 4 days, THEN 2 tablets Daily With Breakfast for 4 days, THEN 1 tablet Daily With Breakfast for 4 days. 24 tablet 0        •  acetaminophen  •  albuterol  •  ALPRAZolam  •  senna-docusate sodium **AND** polyethylene glycol **AND** bisacodyl **AND** bisacodyl  •  dextrose  •  dextrose  •  diphenhydrAMINE  •  glucagon (human recombinant)  •  HYDROcodone-acetaminophen  •  hydrOXYzine  •  ipratropium-albuterol  •  magnesium sulfate **OR** magnesium sulfate **OR** magnesium sulfate  •  sodium chloride  •  sodium chloride  •  sodium chloride    Objective: /91   Pulse 115   Temp 96.6 °F (35.9 °C) (Oral)   Resp 19   Ht 162.6 cm (64\")   Wt 93.4 kg (206 lb)   SpO2 (!) 89%   BMI 35.36 kg/m²      Intake/Output Summary (Last 24 hours) at 2022 1147  Last data filed at 2022 0000  Gross per 24 hour   Intake --   Output 1300 ml   Net -1300 ml     Physical Exam:      General Appearance:    Alert, cooperative, in no acute distress   Head:    Normocephalic, without obvious abnormality, atraumatic   Eyes:            Lids and lashes normal, conjunctivae and sclerae normal, no   icterus, no pallor, corneas clear, PERRLA   Ears:    Ears appear intact with no abnormalities noted   Throat:   No oral lesions, no thrush, oral mucosa moist   Neck:   No adenopathy, " supple, trachea midline, no thyromegaly, no     carotid bruit, no JVD   Back:     No kyphosis present, no scoliosis present, no skin lesions,       erythema or scars, no tenderness to percussion or                   palpation,   range of motion normal   Lungs:     Diminshed breathe sounds    Heart:    Regular rhythm and normal rate, normal S1 and S2, no            murmur, no gallop, no rub, no click   Breast Exam:    Deferred   Abdomen:     Normal bowel sounds, no masses, no organomegaly, soft        non-tender, non-distended, no guarding, no rebound                 tenderness   Genitalia:    Deferred   Extremities:   Moves all extremities well, no edema, no cyanosis, no              redness   Pulses:   Pulses palpable and equal bilaterally   Skin:   No bleeding, bruising or rash   Lymph nodes:   No palpable adenopathy   Neurologic:   Cranial nerves 2 - 12 grossly intact, sensation intact, DTR        present and equal bilaterally     Results from last 7 days   Lab Units 07/11/22  0350   WBC 10*3/mm3 17.15*   HEMOGLOBIN g/dL 13.4   HEMATOCRIT % 42.6   PLATELETS 10*3/mm3 468*     Results from last 7 days   Lab Units 07/11/22  0350 07/09/22  0921   SODIUM mmol/L 135* 140   POTASSIUM mmol/L 4.3 4.7   CHLORIDE mmol/L 93* 96*   CO2 mmol/L 34.0* 33.0*   BUN mg/dL 28* 27*   CREATININE mg/dL 0.55* 0.59   CALCIUM mg/dL 9.6 9.2   BILIRUBIN mg/dL  --  0.2   ALK PHOS U/L  --  83   ALT (SGPT) U/L  --  14   AST (SGOT) U/L  --  17   GLUCOSE mg/dL 158* 165*       Impression: Resp failure  Dyspnea  Hypoxia  GOC  Plan: Patient does not appear to be showing any type of improvement as she remains on about the same amount of high flow oxygen over the last 4 to 5 days.  There is also appear to have had some episodes of the setting over the weekend.  We will plan on touching base with the hospitalist to see what else may be offered to the patient and they will probably need to have a conversation with the patient as well as the patient's  family about where to go to from here.  Unfortunately the options in this situation are extremely limited as the amount of high flow she is on right now more than likely cannot be done outside of the hospital.      Amilcar Kumar DO  07/11/22  11:47 EDT

## 2022-07-11 NOTE — PLAN OF CARE
Goal Outcome Evaluation:  Plan of Care Reviewed With: patient        Progress: improving  Outcome Evaluation: patient able to take steps to recliner with mod assist x2 and B UE support, completed B UE/LE exercise with frequent rest breaks and cues for breathing technique. SaO2 79%-90% on HFNC, monitor closely.

## 2022-07-11 NOTE — PLAN OF CARE
Goal Outcome Evaluation:  Plan of Care Reviewed With: patient        Progress: no change  Outcome Evaluation: Pt continues on HFNC.  Mild dyspnea at rest. Pt states she knows her lungs are not going to get better. Encouraged her to talk with her family about her quality of life.  Discussed management of symptoms and if pt is unable to wean from HFNC then  transition to comfort. Pt states she is very anxious all the time. Pt reports xanax helps her sleep at night and she feels she needs it 2x a day. Pt had hydroxyzine this am with some relief but xanax works better. Scheduled bid. Pt is up the chair. decreased appetite.  continue palliative support and symptom control      Team Meeting 1300 Amilcar Kumar DO,  Twila Aparicio LCSW, Danae LEVINE, Emelina Olson RN CHPN, Katherine Parson RN CHPN, Adrianna White RN , ARDHIKA, OCN, Colleen Garcia, RN, CHPN, VANDANA Villa MSW

## 2022-07-11 NOTE — PROGRESS NOTES
[facility]  PROGRESS NOTE    Patient Name: Chikis Cuellar  : 1945  MRN: [id number]    Date of Admission: 2022  Primary Care Physician: Eduardo Gamboa MD    Subjective   Subjective     CC:  hypoxia    HPI:  Still requiring high flow, taking short shallow breaths    ROS:  Gen- No fevers, chills  CV- No chest pain, palpitations  GI- No N/V/D, abd pain         Objective   Objective     Vital Signs:   Temp:  [95.6 °F (35.3 °C)-98 °F (36.7 °C)] 97.6 °F (36.4 °C)  Heart Rate:  [] 102  Resp:  [18-22] 18  BP: (123-174)/() 123/91  Flow (L/min):  [45-55] 55     Physical Exam:  Constitutional: No acute distress, awake, alert  HENT: NCAT, mucous membranes moist  Respiratory: poor inspiratory effort, b/l crackles  Cardiovascular: RRR, no murmurs, rubs, or gallops  Gastrointestinal: obese, soft, nontender, nondistended  Musculoskeletal: trace LE edema  Psychiatric: Appropriate affect, cooperative  Neurologic: Oriented x 3,  speech clear  Skin: No rashes      Results Reviewed:  LAB RESULTS:      Lab 22  0350 22  0503 22  0742 22  0702 22  0735   WBC 17.15* 13.00* 14.51* 13.87* 16.22*   HEMOGLOBIN 13.4 12.1 11.6* 11.0* 10.8*   HEMATOCRIT 42.6 37.8 36.9 35.8 33.1*   PLATELETS 468* 367 333 316 279   NEUTROS ABS 14.89* 11.17* 12.73*  --   --    IMMATURE GRANS (ABS) 0.25* 0.17* 0.11*  --   --    LYMPHS ABS 1.16 0.95 0.90  --   --    MONOS ABS 0.77 0.65 0.69  --   --    EOS ABS 0.04 0.03 0.05  --   --    MCV 85.0 84.9 85.6 88.2 83.6   PROCALCITONIN  --   --  0.06  --   --    LACTATE  --   --  2.0  --   --          Lab 22  0350 22  0921 22  0503 22  0742 22  0702   SODIUM 135* 140 141 136 142   POTASSIUM 4.3 4.7 4.5 5.2 5.2   CHLORIDE 93* 96* 98 93* 99   CO2 34.0* 33.0* 35.0* 37.0* 37.0*   ANION GAP 8.0 11.0 8.0 6.0 6.0   BUN 28* 27* 24* 24* 23   CREATININE 0.55* 0.59 0.64 0.69 0.65   EGFR 94.0 92.4 90.6  89.0 90.2   GLUCOSE 158* 165* 162* 181* 221*   CALCIUM 9.6 9.2 9.2 9.9 9.7         Lab 07/09/22  0921 07/08/22  0503 07/07/22  0742   TOTAL PROTEIN 5.9* 5.9* 5.6*   ALBUMIN 3.20* 3.20* 3.10*   GLOBULIN 2.7 2.7 2.5   ALT (SGPT) 14 13 13   AST (SGOT) 17 18 16   BILIRUBIN 0.2 0.2 0.2   ALK PHOS 83 92 79                     Brief Urine Lab Results     None          Microbiology Results Abnormal     Procedure Component Value - Date/Time    COVID PRE-OP / PRE-PROCEDURE SCREENING ORDER (NO ISOLATION) - Swab, Nasopharynx [[id number]]  (Normal) Collected: 06/28/22 0027    Lab Status: Final result Specimen: Swab from Nasopharynx Updated: 06/28/22 0104    Narrative:      The following orders were created for panel order COVID PRE-OP / PRE-PROCEDURE SCREENING ORDER (NO ISOLATION) - Swab, Nasopharynx.  Procedure                               Abnormality         Status                     ---------                               -----------         ------                     COVID-19 and FLU A/B PCR...[[id number]]  Normal              Final result                 Please view results for these tests on the individual orders.    COVID-19 and FLU A/B PCR - Swab, Nasopharynx [[id number]]  (Normal) Collected: 06/28/22 0027    Lab Status: Final result Specimen: Swab from Nasopharynx Updated: 06/28/22 0104     COVID19 Not Detected     Influenza A PCR Not Detected     Influenza B PCR Not Detected    Narrative:      Fact sheet for providers: https://www.fda.gov/media/378710/download    Fact sheet for patients: https://www.fda.gov/media/345389/download    Test performed by PCR.          XR Chest 1 View    Result Date: 7/9/2022  DATE OF EXAM: 7/9/2022 1:43 PM  PROCEDURE: XR CHEST 1 VW-  INDICATIONS: persistent dyspnea; J96.01-Acute respiratory failure with hypoxia; R06.02-Shortness of breath; J18.9-Pneumonia, unspecified organism; R13.10-Dysphagia, unspecified  COMPARISON: 07/06/2022, and prior  TECHNIQUE: Single radiographic view of the chest  was obtained.  FINDINGS: Diffuse interstitial and airspace opacities appear grossly unchanged compared to the prior exam. No significant new focal consolidation.   The heart appears enlarged, as before. Mediastinal contour appears grossly unchanged.  No significant new pleural effusion or pneumothorax.      Impression: 1.  Diffuse interstitial and airspace opacities, similar to recent prior studies. Findings again may be related to pneumonia. 2.  Stable cardiomegaly.  This report was finalized on 7/9/2022 3:51 PM by Fadi Saunders MD.        Results for orders placed during the hospital encounter of 06/01/22    Adult Transthoracic Echo Complete W/ Cont if Necessary Per Protocol    Interpretation Summary  · The right atrial cavity is mildly dilated.  · Estimated right ventricular systolic pressure from tricuspid regurgitation is mildly elevated (35-45 mmHg). Calculated right ventricular systolic pressure from tricuspid regurgitation is 40 mmHg.  · Estimated left ventricular EF = 60% Estimated left ventricular EF was in agreement with the calculated left ventricular EF. Left ventricular ejection fraction appears to be 56 - 60%. Left ventricular systolic function is normal.  · Left ventricular diastolic function is consistent with age.  · Mild pulmonary hypertension is present.  · Normal right ventricular wall thickness, systolic function and septal motion noted with the right ventricular cavity mild to moderately dilated.  · There is no evidence of pericardial effusion.  · No significant structural or functional valvular abnormalities demonstrated.      I have reviewed the medications:  Scheduled Meds:clotrimazole-betamethasone, 1 application, Topical, Q12H  enoxaparin, 40 mg, Subcutaneous, Q24H  fluconazole, 200 mg, Oral, Q24H  FLUoxetine, 40 mg, Oral, Daily  hydroCHLOROthiazide Oral, 25 mg, Oral, Daily  insulin detemir, 20 Units, Subcutaneous, Q12H  insulin lispro, 0-24 Units, Subcutaneous, TID AC  Insulin Lispro, 10  Units, Subcutaneous, TID With Meals  ipratropium-albuterol, 3 mL, Nebulization, 4x Daily - RT  lactobacillus acidophilus, 1 capsule, Oral, Daily  losartan, 50 mg, Oral, Q12H  [START ON 7/12/2022] methylPREDNISolone sodium succinate, 40 mg, Intravenous, Q24H  miconazole, 1 application, Topical, Q12H  oxybutynin XL, 5 mg, Oral, Daily  pantoprazole, 40 mg, Oral, Q AM  pravastatin, 40 mg, Oral, Nightly  pregabalin, 150 mg, Oral, TID  senna-docusate sodium, 2 tablet, Oral, BID  sodium chloride, 10 mL, Intravenous, Q12H  sodium chloride, 10 mL, Intravenous, Q12H      Continuous Infusions:   PRN Meds:.•  acetaminophen  •  albuterol  •  ALPRAZolam  •  senna-docusate sodium **AND** polyethylene glycol **AND** bisacodyl **AND** bisacodyl  •  dextrose  •  dextrose  •  diphenhydrAMINE  •  glucagon (human recombinant)  •  HYDROcodone-acetaminophen  •  hydrOXYzine  •  ipratropium-albuterol  •  magnesium sulfate **OR** magnesium sulfate **OR** magnesium sulfate  •  sodium chloride  •  sodium chloride  •  sodium chloride    Assessment & Plan   Assessment & Plan     Active Hospital Problems    Diagnosis  POA   • **Acute respiratory failure with hypoxia (HCC) [J96.01]  Yes   • Hypomagnesemia [E83.42]  Unknown   • Leukocytosis [D72.829]  Yes   • Chest pain [R07.9]  Yes   • Fibromyalgia [M79.7]  Yes   • Type 2 diabetes mellitus, with long-term current use of insulin (HCC) [E11.9, Z79.4]  Not Applicable   • Essential hypertension [I10]  Yes   • Hyperlipemia [E78.5]  Yes   • GERD without esophagitis [K21.9]  Yes   • Mood disorder (HCC) [F39]  Yes      Resolved Hospital Problems   No resolved problems to display.        Brief Hospital Course to date:  Chikis Cuellar is a 77 y.o. female with history of T2DM, HTN, HLD, GERD, fibromyalgia, anxiety who presented to the ED w/ c/o hypoxia.   Pt admitted to [facility] 6/1 - 6/27/22. Pt initially presented w/ c/o severe dyspnea. Pt was admitted to the ICU from the ED and placed on HFNC, IV steroids  and IV antibiotics. Pt underwent an extensive work-up including infectious, autoimmune, cardiac which were predominantly unrevealing.  Advanced chest imaging was felt to be consistent with atypical pneumonia somewhat classic for the appearance of COVID-19 although COVID remained negative despite pt reports of several family members w/ recent positive COVID tests. Pt was d/porter on a steroid taper and on 5 liters of supplemental oxygen.        This patient's problems and plans were partially entered by my partner and updated as appropriate by me 07/11/22.      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 Repeat CXR 7/9 shows stable process  -trial of lasix today     Leukocytosis   -afebrile   -likely related to ongoing steroids        B/L underarm Candidiasis/R upper back cellulitis   Superimposed Cellulitis   -Continue Nystatin powder  -Interdry cloths  -Received 1x dose oral Fluconazole 7/1  -WOC following  -ID following aspergillus galactomannan assay, histo and blasto urine Ag, Fungitell, strongyloidiasis serology and radiographic studies     T2DM  -hem a1c 6.2% on 6/3/22  -holding routine glipizide, novolog, metformin    -continue levemir  -on Humalog 10 units TID meals  -SSI    HTN  HLD  -increase losartan to 50 mg BID     Anxiety   Fibromyalgia   -continue Lyrica, Norco       Hypomagnesemia  -Replace per protocol      Constipation  -Continue bowel regimen  -On Linzess at home, not on formulary here       Expected Discharge Location and Transportation: TBD, still on high  flow  Expected Discharge Date: TBD    DVT prophylaxis:  Medical DVT prophylaxis orders are present.     AM-PAC 6 Clicks Score (PT): 14 (07/11/22 0900)    CODE STATUS:   Code Status and Medical Interventions:   Ordered at: 07/01/22 1618     Medical Intervention Limits:    NO intubation (DNI)     Code Status (Patient has no pulse and is not breathing):    No CPR (Do Not Attempt to Resuscitate)     Medical Interventions (Patient has pulse or is breathing):    Limited Support       Rema Augustin,   07/11/22

## 2022-07-12 NOTE — SIGNIFICANT NOTE
07/12/22 1256   SLP Deferred Reason   SLP Deferred Reason Patient/family declined treatment, not feeling well  (ST to follow.)

## 2022-07-12 NOTE — DISCHARGE PLACEMENT REQUEST
"Chikis Cuellar (77 y.o. Female)             Date of Birth   1945    Social Security Number       Address   [address] [place] [address]    Home Phone   [phone]    MRN   [id number]       [employer]ational   [employer]    Marital Status                               Admission Date   6/27/22    Admission Type   Emergency    Admitting Provider   Rema Augustin DO    Attending Provider   Rema Augustin DO    Department, Room/Bed   [address], S568/1       Discharge Date       Discharge Disposition       Discharge Destination                               Attending Provider: Rema Augustin DO    Allergies: Altace [Ramipril], Trazodone    Isolation: None   Infection: MRSA (06/02/22)   Code Status: No CPR   Advance Care Planning Activity    Ht: 162.6 cm (64\")   Wt: 89.4 kg (197 lb)    Admission Cmt: None   Principal Problem: Acute respiratory failure with hypoxia (HCC) [J96.01]                 Active Insurance as of 6/27/2022     Primary Coverage     Payor Plan Insurance Group Employer/Plan Group    HUMANA MEDICARE REPLACEMENT HUMANA MEDICARE REPLACEMENT [id number]     Payor Plan Address Payor Plan Phone Number Payor Plan Fax Number Effective Dates    [address] [address] [phone]  1/1/2018 - None Entered    [facility] [id number]       Subscriber Name Subscriber Birth Date Member ID       CHIKIS CUELLAR 1945 [id number]                 Emergency Contacts      (Rel.) Home Phone Work Phone Mobile Phone    CHULAKEVEN(JOHNNAA) (Son) -- -- [phone]    SOFIE HENRIQUEZ(POA) (Daughter) [phone] -- [phone]            Emergency Contact Information     Name Relation Home Work Mobile    KEVEN CUELLAR(POA) Son   [phone]    SOFIE HENRIQUEZ(POA) Daughter [phone]          Insurance Information                HUMANA MEDICARE REPLACEMENT/HUMANA MEDICARE REPLACEMENT Phone: [phone]    Subscriber: Chikis Cuellar " "P Subscriber#: [id number]    Group#: [id number] Precert#: [id number]             History & Physical      DaronLola DO at 22 2314              [facility] Medicine Services  HISTORY AND PHYSICAL    Patient Name: Chikis Cuellar  : 1945  MRN: [id number]  Primary Care Physician: Eduardo Gamboa MD  Date of admission: 2022    Subjective   Subjective     Chief Complaint:  Hypoxia     HPI:  Chikis Cuellar is a 77 y.o. female w/ a hx of T2DM, HTN, HLD, GERD, fibromyalgia, anxiety who presented to the ED w/ c/o hypoxia.   Pt admitted to [facility]  - 22. Pt initially presented w/ c/o severe dyspnea. Pt was admitted to the ICU from the ED and placed on HFNC, IV steroids and IV antibiotics. Pt underwent an extensive work-up including infectious, autoimmune, cardiac which were predominantly unrevealing.  Advanced chest imaging was felt to be consistent with atypical pneumonia somewhat classic for the appearance of COVID-19 although COVID remained negative despite pt reports of several family members w/ recent positive COVID tests. Pt was d/c on a steroid taper and on 5 liters of supplemental oxygen. Pt was on room air prior to her admission. Pt was d/c to [facility] earlier today for inpatient rehabilitation. Pt was scheduled to f/u w/ Pulmonary (TBD) and PCP within 1-2 wks of d/c from rehab.   Pt was taken to rehab after d/c this afternoon. Pt reports that there were \"issues\" with her oxygen not being hooked up upon her arrival to rehab. Per pt report, she was without supplemental oxygen for about 30 minutes. Pt reports that oxygen saturations dropped to the 70's and she then developed left sided chest pain. Despite a nebulizer treatment pt's oxygen saturations did not improve. EMS placed pt on a non-rebreather and pt's saturations responded. EMS attempted to wean pt back to 5 liters but Pt was unable to maintain saturations on 5 liters and was brought back to the ED for " further evaluation. Pt reports that her chest pain resolved w/ improvement in her oxygenation.   Pt denies fever/chills, N/V/D, abdominal pain, dysuria, edema, syncope, confusion.   Pt evaluated in the ED then admitted to the hospital medicine service for further evaluation.     Review of Systems   Constitutional: Negative.  Negative for chills, diaphoresis, fatigue and fever.   HENT: Negative.  Negative for congestion, postnasal drip, rhinorrhea, sinus pressure, sinus pain, sneezing, sore throat and trouble swallowing.    Eyes: Negative.  Negative for visual disturbance.   Respiratory: Positive for chest tightness and shortness of breath. Negative for cough and wheezing.    Cardiovascular: Positive for chest pain. Negative for palpitations and leg swelling.   Gastrointestinal: Negative.  Negative for abdominal distention, abdominal pain, blood in stool, constipation, diarrhea, nausea and vomiting.   Endocrine: Negative.    Genitourinary: Negative.  Negative for decreased urine volume, difficulty urinating, dysuria, flank pain, frequency, hematuria, pelvic pain and urgency.   Musculoskeletal: Negative.  Negative for arthralgias, back pain, myalgias, neck pain and neck stiffness.   Skin: Negative.  Negative for wound.   Allergic/Immunologic: Negative.  Negative for immunocompromised state.   Neurological: Negative.  Negative for dizziness, tremors, seizures, syncope, facial asymmetry, speech difficulty, weakness, light-headedness, numbness and headaches.   Hematological: Negative.  Does not bruise/bleed easily.   Psychiatric/Behavioral: Negative.  Negative for confusion.   All other systems reviewed and are negative.     Personal History     Past Medical History:   Diagnosis Date   • Arthritis    • Diabetes mellitus (HCC)    • Fibromyalgia    • Hyperlipidemia    • Hypertension      Past Surgical History:   Procedure Laterality Date   • BACK SURGERY     •  SECTION     • CHOLECYSTECTOMY     • HYSTERECTOMY     •  REPLACEMENT TOTAL KNEE       Family History:  family history includes No Known Problems in her father and mother. Otherwise pertinent FHx was reviewed and unremarkable.     Social History:  reports that she has never smoked. She has never used smokeless tobacco. She reports that she does not drink alcohol and does not use drugs.  Social History     Social History Narrative   • Not on file     Medications:  ALPRAZolam, FLUoxetine, HYDROcodone-acetaminophen, Insulin Aspart, albuterol, cyanocobalamin, glipizide, indapamide, insulin detemir, losartan, metFORMIN, omeprazole, oxybutynin XL, pravastatin, predniSONE, and pregabalin    Allergies   Allergen Reactions   • Altace [Ramipril] Palpitations   • Trazodone Hallucinations     Objective   Objective     Vital Signs:   Temp:  [97.6 °F (36.4 °C)-98.5 °F (36.9 °C)] 98.5 °F (36.9 °C)  Heart Rate:  [] 85  Resp:  [16-24] 24  BP: (110-149)/(68-91) 110/72  Flow (L/min):  [5-15] 15    Physical Exam     Constitutional: Awake, alert; non-toxic appearing   Eyes: PERRLA, sclerae anicteric, no conjunctival injection  HENT: NCAT, mucous membranes moist  Neck: Supple, no thyromegaly, no lymphadenopathy, trachea midline  Respiratory: diminished/course throughout, nonlabored respirations   Cardiovascular: RRR, no murmurs, rubs, or gallops, no peripheral edema   Gastrointestinal: Positive bowel sounds, soft, nontender, nondistended  Musculoskeletal: Normal ROM bilaterally   Psychiatric: Appropriate affect, cooperative  Neurologic: Oriented x 3, strength symmetric in all extremities, Cranial Nerves grossly intact to confrontation, speech clear  Skin: No rashes, lesions or wounds     Results Reviewed:  I have personally reviewed most recent indicated data and agree with findings including:  [x]  Laboratory  [x]  Radiology  [x]  EKG/Telemetry  []  Pathology  []  Cardiac/Vascular Studies  [x]  Old records    LAB RESULTS:      Lab 06/27/22 2035 06/22/22  0838 06/21/22  0627   WBC 14.70*  18.19* 16.54*   HEMOGLOBIN 12.8 13.9 13.0   HEMATOCRIT 39.7 43.2 39.6   PLATELETS 219 302 347   NEUTROS ABS 12.74*  --   --    IMMATURE GRANS (ABS) 0.13*  --   --    LYMPHS ABS 0.75  --   --    MONOS ABS 0.82  --   --    EOS ABS 0.23  --   --    MCV 84.5 83.9 80.7         Lab 06/27/22 2035 06/24/22  0434 06/23/22  0519 06/22/22  0838 06/21/22 0627   SODIUM 139  --  139 145 142   POTASSIUM 5.1  --  4.1 3.4* 3.6   CHLORIDE 97*  --  100 101 99   CO2 32.0*  --  32.0* 36.0* 35.0*   ANION GAP 10.0  --  7.0 8.0 8.0   BUN 20  --  37* 41* 46*   CREATININE 0.76  --  0.75 0.81 0.93   EGFR 80.3  --  81.6 74.4 63.0   GLUCOSE 340*  --  136* 72 133*   CALCIUM 9.1  --  9.4 9.6 10.0   MAGNESIUM  --  2.2 1.9  --   --          Lab 06/27/22 2035   TOTAL PROTEIN 6.3   ALBUMIN 3.40*   GLOBULIN 2.9   ALT (SGPT) 18   AST (SGOT) 18   BILIRUBIN 0.2   ALK PHOS 86         Lab 06/27/22 2035   PROBNP 485.9   TROPONIN T 0.025                 Brief Urine Lab Results     None        Microbiology Results (last 10 days)     ** No results found for the last 240 hours. **        XR Chest 1 View    Result Date: 6/27/2022  DATE OF EXAM: 6/27/2022 8:43 PM  PROCEDURE: XR CHEST 1 VW-  INDICATIONS: SOA triage protocol  COMPARISON: CT chest 06/17/2022, AP chest x-ray 06/17/2022, CT chest 06/01/2022, AP chest x-ray 06/03/2022, AP chest x-ray 04/13/2019.  TECHNIQUE: Single radiographic AP view of the chest was obtained.  FINDINGS: Cardiac silhouette remains enlarged. Mediastinal widening appears stable, as well as tracheal shift to the right. Extensive bilateral interstitial and airspace opacities have not significantly changed. No pneumothorax or large pleural effusion is seen.      Impression: Stable appearance of diffuse interstitial and airspace opacities throughout both lungs compared to 06/17/2022 chest x-ray, which may be due to ongoing multifocal pneumonia, pulmonary edema, and/or chronic lung disease.  Enlarged cardiac silhouette.  This report was  finalized on 6/27/2022 9:05 PM by Vanesa Armendariz MD.      Results for orders placed during the hospital encounter of 06/01/22    Adult Transthoracic Echo Complete W/ Cont if Necessary Per Protocol    Interpretation Summary  · The right atrial cavity is mildly dilated.  · Estimated right ventricular systolic pressure from tricuspid regurgitation is mildly elevated (35-45 mmHg). Calculated right ventricular systolic pressure from tricuspid regurgitation is 40 mmHg.  · Estimated left ventricular EF = 60% Estimated left ventricular EF was in agreement with the calculated left ventricular EF. Left ventricular ejection fraction appears to be 56 - 60%. Left ventricular systolic function is normal.  · Left ventricular diastolic function is consistent with age.  · Mild pulmonary hypertension is present.  · Normal right ventricular wall thickness, systolic function and septal motion noted with the right ventricular cavity mild to moderately dilated.  · There is no evidence of pericardial effusion.  · No significant structural or functional valvular abnormalities demonstrated.    Assessment & Plan   Assessment & Plan       Acute respiratory failure with hypoxia (HCC)    Type 2 diabetes mellitus, with long-term current use of insulin (HCC)    Essential hypertension    Hyperlipemia    GERD without esophagitis    Mood disorder (HCC)    Atypical pneumonia    Leukocytosis    Chest pain    Fibromyalgia    Chikis BRISSA Polo is a 77 y.o. female w/ a hx of T2DM, HTN, HLD, GERD, fibromyalgia, anxiety who presented to the ED w/ c/o hypoxia.     **Acute respiratory failure w/ hypoxia   **Chest pain; resolved   **Recent atypical pneumonia   -pt d/c earlier today on 5 liters after prolonged admission for resp failure 2/2 atypical PNA (pt completed course of IV antibiotics, IV steroids and underwent extensive w/u); d/c on prednisone taper   -pt d/c to [facility] for rehabilitation; per pt there was an issue w/ her oxygen set-up and she did  not receive supplemental oxygen once there for ~30 minutes- EMS placed pt on non-rebreather and attempt to wean back to 5 liters was unsuccessful; pt w/ chest pain onset during hypoxic episode- resolved w/ oxygenation improvement   -pt currently on non-rebreather; wean as tolerated   -CXR without new findings; stable compared to last w/ ongoing opacities present   -EKG c/w sinus tach; repeat in am   -troponin 0.025; trend   -continue prednisone taper- pharmacy consult   -duo nebs   -case mgt consult in am   -pt,ot consult   -consider Pulmonary consult if unable to wean oxygen (previously followed by Pulm)   -consider Cardiology consult pending troponin trend   -patient requesting discharge to a different rehab center.    **Leukocytosis   -WBC 14.70, on steroids  -previously 18.19 on 6/22  -afebrile   -CXR without new findings   -UA pending   -trend   -Monitor for need for antibiotics    **T2DM  -hem a1c 6.2% on 6/3/22  -holding routine glipizide, novolog, metformin    -continue levemir 5 units BID   -FSBG q ac/hs w/ LDSS     **HTN  **HLD  -continue routine Lozol, losartan, pravastatin     **Anxiety   -continue routine prozac, xanax     **Fibromyalgia   -continue Lyrica, Norco     DVT prophylaxis:  Lovenox     CODE STATUS:    Code Status (Patient has no pulse and is not breathing): CPR (Attempt to Resuscitate)  Medical Interventions (Patient has pulse or is breathing): Full Support    This note has been completed as part of a split-shared workflow.     Signature: Electronically signed by JULIANNA Cloud, 06/27/22, 11:58 PM EDT.      Attending   Admission Attestation       I have seen and examined the patient, performing an independent face-to-face diagnostic evaluation with plan of care reviewed and developed with the advanced practice clinician (APC).      Brief Summary Statement:   Chikis Cuellar is a 77 y.o. female With a PMH significant for type 2 diabetes mellitus, HTN, HLD, GERD, fibromyalgia, anxiety  who presents to the ED due to hypoxia.  Patient was hospitalized at [facility] from 6/136/27/22 where she was treated for acute hypoxic respiratory failure.  Patient underwent an extensive work-up but etiology of patient's respiratory failure was not clear.  She was treated with IV antibiotics, steroids and evaluated by pulmonology.  She was discharged to rehab today on a prednisone taper and 5 L nasal cannula.  After arrival at rehab patient reports that oxygen tubing was not hooked up correctly.  She began to experience difficulty breathing, became cyanotic.  Her son was at bedside and called attention to the situation.  Patient was found to be hypoxic with O2 sats in the 70s with associated chest pain.  Patient was placed on a nonrebreather.  However she was not able to maintain adequate O2 sats on 5 L nasal cannula after that point.  She was brought back to [facility] for further treatment.    Remainder of detailed HPI is as noted by APC and has been reviewed and/or edited by me for completeness.    Attending Physical Exam:  Constitutional: Awake, alert  Eyes: PERRLA, sclerae anicteric, no conjunctival injection  HENT: NCAT, mucous membranes moist  Neck: Supple, no thyromegaly, no lymphadenopathy, trachea midline  Respiratory: Moderate air movement, no wheezes or rhonchi appreciated.  Patient easily winded, speaks in short phrases  Cardiovascular: RRR, no murmurs, rubs, or gallops, palpable pedal pulses bilaterally  Gastrointestinal: Positive bowel sounds, soft, nontender, nondistended  Musculoskeletal: No bilateral ankle edema, no clubbing or cyanosis to extremities  Psychiatric: Appropriate affect, cooperative  Neurologic: Oriented x 3, strength symmetric in all extremities, Cranial Nerves grossly intact to confrontation, speech clear  Skin: No rashes      Brief Assessment/Plan :  See detailed assessment and plan developed with APC which I have reviewed and/or edited for completeness.      Lola Neri, DO  06/28/22                           Electronically signed by Lola Neri DO at 06/28/22 2004

## 2022-07-12 NOTE — PROGRESS NOTES
[facility]  PROGRESS NOTE    Patient Name: Chikis Cuellar  : 1945  MRN: [id number]    Date of Admission: 2022  Primary Care Physician: Eduardo Gamboa MD    Subjective   Subjective     CC:  Hypoxia, ARDS    HPI:  Tired, states she is at peace and wanting to meet with hospice.  Says she has to focus on every breath now and she does not want to continue this way    ROS:  Gen- No fevers, chills  CV- No chest pain, palpitations  GI- No N/V/D, abd pain        Objective   Objective     Vital Signs:   Temp:  [96.5 °F (35.8 °C)-97.8 °F (36.6 °C)] 97.3 °F (36.3 °C)  Heart Rate:  [] 90  Resp:  [17-25] 25  BP: (101-158)/(67-94) 117/67  Flow (L/min):  [55] 55     Physical Exam:  Constitutional: No acute distress, awake, alert  HENT: NCAT, mucous membranes moist  Respiratory: Poor effort, shallow breaths, increased high flow requirements  Cardiovascular: Borderline tachycardic but regular  Gastrointestinal: Soft, nontender, nondistended  Musculoskeletal: No bilateral ankle edema  Psychiatric: Appropriate affect, cooperative  Neurologic: Oriented x 3, speaking in short sentences due to dyspnea  Skin: No rashes      Results Reviewed:  LAB RESULTS:      Lab 22  0633 22  0350 22  0503 22  0742 22  0702   WBC 27.61* 17.15* 13.00* 14.51* 13.87*   HEMOGLOBIN 15.3 13.4 12.1 11.6* 11.0*   HEMATOCRIT 47.6* 42.6 37.8 36.9 35.8   PLATELETS 519* 468* 367 333 316   NEUTROS ABS 22.41* 14.89* 11.17* 12.73*  --    IMMATURE GRANS (ABS) 0.68* 0.25* 0.17* 0.11*  --    LYMPHS ABS 2.20 1.16 0.95 0.90  --    MONOS ABS 1.21* 0.77 0.65 0.69  --    EOS ABS 0.99* 0.04 0.03 0.05  --    MCV 84.8 85.0 84.9 85.6 88.2   PROCALCITONIN 0.09  --   --  0.06  --    LACTATE  --   --   --  2.0  --          Lab 22  0633 22  0350 22  0921 22  0503 22  0742   SODIUM 139 135* 140 141 136   POTASSIUM 4.8 4.3 4.7 4.5 5.2   CHLORIDE 92* 93* 96* 98  93*   CO2 34.0* 34.0* 33.0* 35.0* 37.0*   ANION GAP 13.0 8.0 11.0 8.0 6.0   BUN 37* 28* 27* 24* 24*   CREATININE 0.65 0.55* 0.59 0.64 0.69   EGFR 90.2 94.0 92.4 90.6 89.0   GLUCOSE 65 158* 165* 162* 181*   CALCIUM 9.9 9.6 9.2 9.2 9.9   PHOSPHORUS 4.1  --   --   --   --          Lab 07/12/22  0633 07/09/22  0921 07/08/22  0503 07/07/22  0742   TOTAL PROTEIN  --  5.9* 5.9* 5.6*   ALBUMIN 3.40* 3.20* 3.20* 3.10*   GLOBULIN  --  2.7 2.7 2.5   ALT (SGPT)  --  14 13 13   AST (SGOT)  --  17 18 16   BILIRUBIN  --  0.2 0.2 0.2   ALK PHOS  --  83 92 79                     Brief Urine Lab Results     None          Microbiology Results Abnormal     Procedure Component Value - Date/Time    COVID PRE-OP / PRE-PROCEDURE SCREENING ORDER (NO ISOLATION) - Swab, Nasopharynx [[id number]]  (Normal) Collected: 06/28/22 0027    Lab Status: Final result Specimen: Swab from Nasopharynx Updated: 06/28/22 0104    Narrative:      The following orders were created for panel order COVID PRE-OP / PRE-PROCEDURE SCREENING ORDER (NO ISOLATION) - Swab, Nasopharynx.  Procedure                               Abnormality         Status                     ---------                               -----------         ------                     COVID-19 and FLU A/B PCR...[[id number]]  Normal              Final result                 Please view results for these tests on the individual orders.    COVID-19 and FLU A/B PCR - Swab, Nasopharynx [[id number]]  (Normal) Collected: 06/28/22 0027    Lab Status: Final result Specimen: Swab from Nasopharynx Updated: 06/28/22 0104     COVID19 Not Detected     Influenza A PCR Not Detected     Influenza B PCR Not Detected    Narrative:      Fact sheet for providers: https://www.fda.gov/media/225713/download    Fact sheet for patients: https://www.[facility].gov/media/041002/download    Test performed by PCR.          XR Chest 1 View    Result Date: 7/12/2022  DATE OF EXAM: 7/12/2022 6:07 AM  PROCEDURE: XR CHEST 1 VW-  INDICATIONS:  eval pna; J96.01-Acute respiratory failure with hypoxia; R06.02-Shortness of breath; J18.9-Pneumonia, unspecified organism; R13.10-Dysphagia, unspecified  COMPARISON: 7/9/2022  TECHNIQUE: Single radiographic AP view of the chest was obtained.  FINDINGS: Left upper extremity PICC line remains in the mid SVC. Heart is enlarged. Diffuse interstitial disease pattern is largely stable, perhaps minimally improved. No consolidation, effusion or pneumothorax is seen. There is now a small amount of subcutaneous emphysema of the upper chest and lower neck and apparently mild pneumomediastinum. No pneumothorax is appreciated.        Impression:  1. Extensive bilateral pneumonia, stable to slightly improved from 7/9/2022. 2. Interval development of mild pneumomediastinum and subcutaneous emphysema of the upper chest and lower neck.  This report was finalized on 7/12/2022 7:51 AM by Dr. Nelson Valdez MD.        Results for orders placed during the hospital encounter of 06/01/22    Adult Transthoracic Echo Complete W/ Cont if Necessary Per Protocol    Interpretation Summary  · The right atrial cavity is mildly dilated.  · Estimated right ventricular systolic pressure from tricuspid regurgitation is mildly elevated (35-45 mmHg). Calculated right ventricular systolic pressure from tricuspid regurgitation is 40 mmHg.  · Estimated left ventricular EF = 60% Estimated left ventricular EF was in agreement with the calculated left ventricular EF. Left ventricular ejection fraction appears to be 56 - 60%. Left ventricular systolic function is normal.  · Left ventricular diastolic function is consistent with age.  · Mild pulmonary hypertension is present.  · Normal right ventricular wall thickness, systolic function and septal motion noted with the right ventricular cavity mild to moderately dilated.  · There is no evidence of pericardial effusion.  · No significant structural or functional valvular abnormalities demonstrated.      I have  reviewed the medications:  Scheduled Meds:ALPRAZolam, 0.5 mg, Oral, Q12H  clotrimazole-betamethasone, 1 application, Topical, Q12H  enoxaparin, 40 mg, Subcutaneous, Q24H  FLUoxetine, 40 mg, Oral, Daily  hydroCHLOROthiazide Oral, 25 mg, Oral, Daily  insulin detemir, 20 Units, Subcutaneous, Q12H  insulin lispro, 0-24 Units, Subcutaneous, TID AC  Insulin Lispro, 10 Units, Subcutaneous, TID With Meals  ipratropium-albuterol, 3 mL, Nebulization, 4x Daily - RT  lactobacillus acidophilus, 1 capsule, Oral, Daily  losartan, 50 mg, Oral, Q12H  methylPREDNISolone sodium succinate, 40 mg, Intravenous, Q24H  miconazole, 1 application, Topical, Q12H  oxybutynin XL, 5 mg, Oral, Daily  pantoprazole, 40 mg, Oral, Q AM  pravastatin, 40 mg, Oral, Nightly  pregabalin, 150 mg, Oral, TID  senna-docusate sodium, 2 tablet, Oral, BID  sodium chloride, 10 mL, Intravenous, Q12H  sodium chloride, 10 mL, Intravenous, Q12H      Continuous Infusions:   PRN Meds:.•  acetaminophen  •  albuterol  •  ALPRAZolam  •  senna-docusate sodium **AND** polyethylene glycol **AND** bisacodyl **AND** bisacodyl  •  dextrose  •  dextrose  •  diphenhydrAMINE  •  glucagon (human recombinant)  •  HYDROcodone-acetaminophen  •  hydrOXYzine  •  ipratropium-albuterol  •  magnesium sulfate **OR** magnesium sulfate **OR** magnesium sulfate  •  Morphine  •  sodium chloride  •  sodium chloride  •  sodium chloride    Assessment & Plan   Assessment & Plan     Active Hospital Problems    Diagnosis  POA   • **Acute respiratory failure with hypoxia (HCC) [J96.01]  Yes   • Hypomagnesemia [E83.42]  Unknown   • Leukocytosis [D72.829]  Yes   • Chest pain [R07.9]  Yes   • Fibromyalgia [M79.7]  Yes   • Type 2 diabetes mellitus, with long-term current use of insulin (HCC) [E11.9, Z79.4]  Not Applicable   • Essential hypertension [I10]  Yes   • Hyperlipemia [E78.5]  Yes   • GERD without esophagitis [K21.9]  Yes   • Mood disorder (HCC) [F39]  Yes      Resolved Hospital Problems   No  resolved problems to display.        Brief Hospital Course to date:  Chikis Cuellar is a 77 y.o. female  with history of T2DM, HTN, HLD, GERD, fibromyalgia, anxiety who presented to the ED w/ c/o hypoxia.   Pt admitted to [facility] 6/1 - 6/27/22. Pt initially presented w/ c/o severe dyspnea. Pt was admitted to the ICU from the ED and placed on HFNC, IV steroids and IV antibiotics. Pt underwent an extensive work-up including infectious, autoimmune, cardiac which were predominantly unrevealing.  Advanced chest imaging was felt to be consistent with atypical pneumonia somewhat classic for the appearance of COVID-19 although COVID remained negative despite pt reports of several family members w/ recent positive COVID tests. Pt was d/porter on a steroid taper and on 5 liters of supplemental oxygen.        This patient's problems and plans were partially entered by my partner and updated as appropriate by me 07/12/22.      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   -No improvement with Lasix, diuresis on 7/11  -Chest x-ray on 7/12 shows mild pneumomediastinum, discussed with pulmonary.  We will add IV morphine to help with air hunger.  Patient is getting tired, asked to have hospice evaluate her and feels like her quality of life continues to decline.       B/L underarm Candidiasis/R upper back cellulitis   Superimposed Cellulitis   -Continue Nystatin powder  -Interdry cloths  -Received 1x dose oral Fluconazole 7/1  -WOC following  -ID following aspergillus galactomannan assay, histo and  blasto urine Ag, Fungitell, strongyloidiasis serology and radiographic studies     T2DM  -hem a1c 6.2% on 6/3/22  -Decrease Levemir as she has not been eating much  -on Humalog 10 units TID meals  -SSI     HTN  HLD  -Continue losartan 50 mg twice daily     Anxiety   Fibromyalgia   -continue Lyrica, Norco       Hypomagnesemia  -Replace per protocol      Constipation  -Continue bowel regimen  -On Linzess at home, not on formulary here     Discussed with her son today over the phone - family requests that they get updated every few days    Expected Discharge Location and Transportation: TBD, hospice to evaluate  Expected Discharge Date: TBD, guarded prognosis that she is not showing much improvement to wean from high flow    DVT prophylaxis:  Medical DVT prophylaxis orders are present.     AM-PAC 6 Clicks Score (PT): 14 (07/11/22 2200)    CODE STATUS:   Code Status and Medical Interventions:   Ordered at: 07/01/22 1617     Medical Intervention Limits:    NO intubation (DNI)     Code Status (Patient has no pulse and is not breathing):    No CPR (Do Not Attempt to Resuscitate)     Medical Interventions (Patient has pulse or is breathing):    Limited Support       Rema Augustin, DO  07/12/22

## 2022-07-12 NOTE — PROGRESS NOTES
INFECTIOUS DISEASE Progress Note    Chikis Cuellar  1945  [id number]    Consult: 7/5/22  Admit date: 6/27/2022    Requesting Provider: No ref. provider found  Evaluating physician: Percy Parker MD  Reason for Consultation: Back and arm cellulitis, intertrigo, recent pneumonia, candidiasis axillary  Chief Complaint: Above      Subjective   History of present illness:  Patient is a very pleasant  77 y.o.  Yr old female with diabetes mellitus type 2, essential hypertension, hyperlipidemia, arthritis, fibromyalgia, recent hospitalization 6/1/2022 until 6/27/2022 with pneumonia and ARDS, discharged to rehabilitation on 6/27 and readmitted on the same day related to worsening acute hypoxic respiratory failure.  Patient on first admission was thought to have an atypical pneumonia treated with IV antibiotics and steroids.  COVID-19 testing was negative x2.  Viral panel is negative.  Cultures were negative.  Pulmonary continues to follow and felt that her respiratory problems were related to ARDS fibrotic changes.  RICARDO and ANCA from 6/8 were negative.  Rheumatoid factor elevated at 21.0 (upper limit of normal 14).  Legionella urine antigen and streptococcal urine antigen were negative on 6/3.  MRSA screen was positive on 6/2.  The patient has been treated with steroids which have been increased because of worsening respiratory function.  Patient then developed bilateral axillary increased erythema concerning for candidiasis or cellulitis.  Antibiotics were started on 7/4/2022.  Vancomycin, ceftriaxone, and IV fluconazole.  I was consulted on 7/5/2022 for further evaluation and treatment.    7/6/2022 history reviewed.  Tolerating fluconazole and Lotrisone for axillary candidiasis also involving the back.  No fever.  Still on high flow oxygen at 45 L/min.  Being treated for ARDS related inflammation of the lungs.  Also on high-dose steroids.    7/7/2022 history reviewed.  Continues on fluconazole for severe  axillary candidiasis as well as Lotrisone.  No high fever.  Being treated for ARDS with steroids.  Continues on high flow oxygen at 45 L/min.  75% O2 concentration.  Infection work-up in lungs negative to date including negative Fungitell.    2022 history reviewed.  Her skin candidiasis improved.  Breathing is still a problem from ARDS.  Axillary and back decreased itching and pain and erythema.  Remains on high flow oxygen at 45 L/min, 65% O2 concentration.  On fluconazole and Lotrisone for her candidiasis, steroids for her ARDS.    2022 history reviewed.  No high fevers or chills.  Axillary and back candidiasis improved.  Remains on high flow oxygen at 55 L/min, 73% O2 concentration.  Struggling with her ARDS.    2022 history reviewed.  No fever.  Her candidiasis of her body and extremities improved.  Still struggling with shortness of breath which is making her uncomfortable.  No fever.  Oxygen continuous at 55 L/min, 68% O2 concentration.  Also working with palliative care for possible hospice.    Past Medical History:   Diagnosis Date   • Arthritis    • Diabetes mellitus (HCC)    • Fibromyalgia    • Hyperlipidemia    • Hypertension        Past Surgical History:   Procedure Laterality Date   • BACK SURGERY     •  SECTION     • CHOLECYSTECTOMY     • HYSTERECTOMY     • REPLACEMENT TOTAL KNEE         Pediatric History   Patient Parents   • Not on file     Other Topics Concern   • Not on file   Social History Narrative   • Not on file       family history includes No Known Problems in her father and mother.    Allergies   Allergen Reactions   • Altace [Ramipril] Palpitations   • Trazodone Hallucinations       Immunization History   Administered Date(s) Administered   • COVID-19 (PFIZER) PURPLE CAP 2020, 2021, 09/10/2021   • Covid-19 (Pfizer) Gray Cap 2022       Medication:    Current Facility-Administered Medications:   •  acetaminophen (TYLENOL) tablet 650 mg, 650 mg, Oral,  Q6H PRN, Sarath Gutiérrez PA-C, 650 mg at 06/29/22 2132  •  albuterol (PROVENTIL) nebulizer solution 0.083% 2.5 mg/3mL, 2.5 mg, Nebulization, Q6H PRN, Vanesa Enciso APRN  •  ALPRAZolam (XANAX) tablet 0.5 mg, 0.5 mg, Oral, Daily PRN, Lola Neri, DO, 0.5 mg at 07/11/22 2132  •  ALPRAZolam (XANAX) tablet 0.5 mg, 0.5 mg, Oral, Q12H, Amilcar Kumar, DO, 0.5 mg at 07/12/22 1606  •  sennosides-docusate (PERICOLACE) 8.6-50 MG per tablet 2 tablet, 2 tablet, Oral, BID, 2 tablet at 07/12/22 0922 **AND** polyethylene glycol (MIRALAX) packet 17 g, 17 g, Oral, Daily PRN **AND** bisacodyl (DULCOLAX) EC tablet 5 mg, 5 mg, Oral, Daily PRN, 5 mg at 07/08/22 0804 **AND** bisacodyl (DULCOLAX) suppository 10 mg, 10 mg, Rectal, Daily PRN, Elsie Griffith, DO, 10 mg at 07/03/22 1413  •  clotrimazole-betamethasone (LOTRISONE) 1-0.05 % cream 1 application, 1 application, Topical, Q12H, Percy Parker MD, 1 application at 07/12/22 0923  •  dextrose (D50W) (25 g/50 mL) IV injection 25 g, 25 g, Intravenous, Q15 Min PRN, Vanesa Enciso APRN  •  dextrose (GLUTOSE) oral gel 15 g, 15 g, Oral, Q15 Min PRN, Vanesa Enciso APRN  •  diphenhydrAMINE (BENADRYL) capsule 25 mg, 25 mg, Oral, Q6H PRN, Adithya Omer, DO, 25 mg at 07/08/22 0804  •  Enoxaparin Sodium (LOVENOX) syringe 40 mg, 40 mg, Subcutaneous, Q24H, Vanesa Enciso APRN, 40 mg at 07/12/22 0449  •  FLUoxetine (PROzac) capsule 40 mg, 40 mg, Oral, Daily, Vanesa Enciso APRN, 40 mg at 07/12/22 0921  •  glucagon (human recombinant) (GLUCAGEN DIAGNOSTIC) injection 1 mg, 1 mg, Intramuscular, Q15 Min PRN, Vanesa Enciso APRN  •  hydroCHLOROthiazide (HYDRODIURIL) tablet 25 mg, 25 mg, Oral, Daily, Rema Augustin DO, 25 mg at 07/11/22 0825  •  HYDROcodone-acetaminophen (NORCO)  MG per tablet 1 tablet, 1 tablet, Oral, Q6H PRN, Adithya Omer DO, 1 tablet at 07/12/22 0449  •  hydrOXYzine (ATARAX) tablet 25 mg, 25 mg, Oral, TID PRN, Ketty Bethea MD, 25 mg at 07/11/22  1013  •  insulin detemir (LEVEMIR) injection 15 Units, 15 Units, Subcutaneous, Q12H, Rema Augustin DO  •  Insulin Lispro (humaLOG) injection 0-24 Units, 0-24 Units, Subcutaneous, TID AC, Adithya Omer, , 8 Units at 07/12/22 1223  •  Insulin Lispro (humaLOG) injection 10 Units, 10 Units, Subcutaneous, TID With Meals, Elsie Griffith, , 10 Units at 07/12/22 1223  •  ipratropium-albuterol (DUO-NEB) nebulizer solution 3 mL, 3 mL, Nebulization, Q4H PRN, Vanesa Enciso APRN  •  ipratropium-albuterol (DUO-NEB) nebulizer solution 3 mL, 3 mL, Nebulization, 4x Daily - RT, Vanesa Enciso APRN, 3 mL at 07/12/22 1612  •  lactobacillus acidophilus (RISAQUAD) capsule 1 capsule, 1 capsule, Oral, Daily, Adithya Omer DO, 1 capsule at 07/12/22 1223  •  losartan (COZAAR) tablet 50 mg, 50 mg, Oral, Q12H, Rema Augustin DO, 50 mg at 07/11/22 2104  •  Magnesium Sulfate 2 gram Bolus, followed by 8 gram infusion (total Mg dose 10 grams)- Mg less than or equal to 1mg/dL, 2 g, Intravenous, PRN **OR** Magnesium Sulfate 2 gram / 50mL Infusion (GIVE X 3 BAGS TO EQUAL 6GM TOTAL DOSE) - Mg 1.1 - 1.5 mg/dl, 2 g, Intravenous, PRN, Last Rate: 25 mL/hr at 07/01/22 1848, 2 g at 07/01/22 1848 **OR** Magnesium Sulfate 4 gram infusion- Mg 1.6-1.9 mg/dL, 4 g, Intravenous, PRN, Lola Neri, DO, Last Rate: 25 mL/hr at 07/03/22 1320, 4 g at 07/03/22 1320  •  methylPREDNISolone sodium succinate (SOLU-Medrol) injection 40 mg, 40 mg, Intravenous, Q24H, Rema Augustin DO, 40 mg at 07/12/22 0449  •  miconazole (MICOTIN) 2 % cream 1 application, 1 application, Topical, Q12H, Adithya Omer DO, 1 application at 07/12/22 0921  •  morphine injection 0.5 mg, 0.5 mg, Intravenous, Q4H PRN, Rema Augustin DO, 0.5 mg at 07/12/22 1321  •  oxybutynin XL (DITROPAN-XL) 24 hr tablet 5 mg, 5 mg, Oral, Daily, Vanesa Enciso APRN, 5 mg at 07/12/22 0922  •  pantoprazole (PROTONIX) EC tablet 40 mg, 40 mg, Oral, Q AM, Fernie  JULIANNA Alexis, 40 mg at 07/12/22 0449  •  pravastatin (PRAVACHOL) tablet 40 mg, 40 mg, Oral, Nightly, Vanesa Enciso APRN, 40 mg at 07/11/22 2103  •  pregabalin (LYRICA) capsule 150 mg, 150 mg, Oral, TID, Lola Neri, DO, 150 mg at 07/12/22 1606  •  sodium chloride 0.9 % flush 10 mL, 10 mL, Intravenous, PRN, Andrea Cabrera MD  •  sodium chloride 0.9 % flush 10 mL, 10 mL, Intravenous, Q12H, Vanesa Enciso APRN, 10 mL at 07/12/22 0923  •  sodium chloride 0.9 % flush 10 mL, 10 mL, Intravenous, PRN, Vanesa Enciso, APRN  •  sodium chloride 0.9 % flush 10 mL, 10 mL, Intravenous, Q12H, Elsie Griffith, DO, 10 mL at 07/12/22 0923  •  sodium chloride 0.9 % flush 10 mL, 10 mL, Intravenous, PRN, Elsie Griffith, DO    Please refer to the medical record for a full medication list    Review of Systems:    Constitutional-- No Fever, chills or sweats.  Appetite good, and no malaise. No fatigue.  HEENT-- No new vision, hearing or throat complaints.  No epistaxis or oral sores.  Denies odynophagia or dysphagia.  No odynophagia or dysphagia. No headache, photophobia or neck stiffness.  CV-- No chest pain, palpitation or syncope  Resp--continued SOB/nonproductive cough/no hemoptysis  GI- No nausea, vomiting, or diarrhea.  No hematochezia, melena, or hematemesis. Denies jaundice or chronic liver disease.  -- No dysuria, hematuria, or flank pain.  Denies hesitancy, urgency or flank pain.  Lymph- no swollen lymph nodes in neck/axilla or groin.   Heme- No active bruising or bleeding; no Hx of DVT or PE.  MS-- no swelling or pain in the bones or joints of arms/legs.  No new back pain.  Neuro-- No acute focal weakness or numbness in the arms or legs.  No seizures.  Skin--No rashes or lesions, except bilateral axillary as per HPI, feels improved, no nodules    Physical Exam:   Vital Signs   Temp:  [96.8 °F (36 °C)-97.8 °F (36.6 °C)] 97.3 °F (36.3 °C)  Heart Rate:  [87-98] 98  Resp:  [18-30] 26  BP: (101-158)/(67-94)  "117/67    Temp  Min: 96.8 °F (36 °C)  Max: 97.8 °F (36.6 °C)  BP  Min: 101/79  Max: 158/94  Pulse  Min: 87  Max: 98  Resp  Min: 18  Max: 30  SpO2  Min: 90 %  Max: 93 %    Blood pressure 117/67, pulse 98, temperature 97.3 °F (36.3 °C), temperature source Oral, resp. rate 26, height 162.6 cm (64\"), weight 89.4 kg (197 lb), SpO2 90 %.  GENERAL: Awake and alert, in moderate distress. Appears older than stated age.  Uncomfortable from her respiratory.  HEENT:  Normocephalic, atraumatic.  Oropharynx without thrush. Dentition in good repair. No cervical adenopathy. No neck masses.  Ears externally normal, Nose externally normal.  EYES: No conjunctival injection. No icterus. EOM full.  LYMPHATICS: No lymphadenopathy of the neck or axillary or inguinal regions.   HEART: No murmur, gallop, or pericardial friction rub. Reg rate rhythm.  LUNGS: Bilateral crackles at bases. No respiratory distress, no use of accessory muscles.  No wheezes.  ABDOMEN: Soft, nontender, nondistended. No appreciable HSM.  Bowel sounds normal.  Obese.  SKIN: Warm and dry without cutaneous eruptions, except bilateral axillary with fading erythema, no crepitus or bullae.  No nodules.  Some extension on the right side to the back.  PSYCHIATRIC: Mental status lucid. No confusion.  EXT:  No cellulitic change.  NEURO: Oriented to name, nonfocal    Results Review:   I reviewed the patient's new clinical results.  I reviewed the patient's new imaging results and agree with the interpretation.  I reviewed the patient's other test results and agree with the interpretation    Results from last 7 days   Lab Units 07/12/22  0633 07/11/22  0350 07/08/22  0503   WBC 10*3/mm3 27.61* 17.15* 13.00*   HEMOGLOBIN g/dL 15.3 13.4 12.1   HEMATOCRIT % 47.6* 42.6 37.8   PLATELETS 10*3/mm3 519* 468* 367     Results from last 7 days   Lab Units 07/12/22  0633   SODIUM mmol/L 139   POTASSIUM mmol/L 4.8   CHLORIDE mmol/L 92*   CO2 mmol/L 34.0*   BUN mg/dL 37*   CREATININE mg/dL " 0.65   GLUCOSE mg/dL 65   CALCIUM mg/dL 9.9     Results from last 7 days   Lab Units 07/09/22  0921   ALK PHOS U/L 83   BILIRUBIN mg/dL 0.2   ALT (SGPT) U/L 14   AST (SGOT) U/L 17                 Results from last 7 days   Lab Units 07/07/22  0742   LACTATE mmol/L 2.0     Estimated Creatinine Clearance: 77.2 mL/min (by C-G formula based on SCr of 0.65 mg/dL).    Microbiology:  Microbiology Results Abnormal     Procedure Component Value - Date/Time    Histoplasma Ag Ur - Urine, Urinary Bladder [[id number]] Collected: 07/07/22 1805    Lab Status: Final result Specimen: Urine from Urinary Bladder Updated: 07/12/22 1711     Histoplasma Galactomannan Ag Ur <0.5    Narrative:      Test(s) 183562-Histoplasma Gal'irene Ag Ur  was developed and its performance characteristics determined  by [employer]. It has not been cleared or approved by the Food  and Drug Administration.  Performed at:  [address] [place] [address]  [id number]  : Renate Smith MD, Phone:  [phone]    COVID PRE-OP / PRE-PROCEDURE SCREENING ORDER (NO ISOLATION) - Swab, Nasopharynx [[id number]]  (Normal) Collected: 06/28/22 0027    Lab Status: Final result Specimen: Swab from Nasopharynx Updated: 06/28/22 0104    Narrative:      The following orders were created for panel order COVID PRE-OP / PRE-PROCEDURE SCREENING ORDER (NO ISOLATION) - Swab, Nasopharynx.  Procedure                               Abnormality         Status                     ---------                               -----------         ------                     COVID-19 and FLU A/B PCR...[[id number]]  Normal              Final result                 Please view results for these tests on the individual orders.    COVID-19 and FLU A/B PCR - Swab, Nasopharynx [[id number]]  (Normal) Collected: 06/28/22 0027    Lab Status: Final result Specimen: Swab from Nasopharynx Updated: 06/28/22 0104     COVID19 Not Detected     Influenza A PCR Not Detected      Influenza B PCR Not Detected    Narrative:      Fact sheet for providers: https://www.fda.gov/media/285010/download    Fact sheet for patients: https://www.fda.gov/media/023712/download    Test performed by PCR.          Radiology:  Imaging Results (Last 72 Hours)     Procedure Component Value Units Date/Time    XR Chest 1 View [[id number]] Collected: 07/12/22 0749     Updated: 07/12/22 0754    Narrative:      DATE OF EXAM: 7/12/2022 6:07 AM     PROCEDURE: XR CHEST 1 VW-     INDICATIONS: eval pna; J96.01-Acute respiratory failure with hypoxia;  R06.02-Shortness of breath; J18.9-Pneumonia, unspecified organism;  R13.10-Dysphagia, unspecified     COMPARISON: 7/9/2022     TECHNIQUE: Single radiographic AP view of the chest was obtained.     FINDINGS:  Left upper extremity PICC line remains in the mid SVC. Heart is  enlarged. Diffuse interstitial disease pattern is largely stable,  perhaps minimally improved. No consolidation, effusion or pneumothorax  is seen. There is now a small amount of subcutaneous emphysema of the  upper chest and lower neck and apparently mild pneumomediastinum. No  pneumothorax is appreciated.             Impression:         1. Extensive bilateral pneumonia, stable to slightly improved from  7/9/2022.  2. Interval development of mild pneumomediastinum and subcutaneous  emphysema of the upper chest and lower neck.     This report was finalized on 7/12/2022 7:51 AM by Dr. Nelson Valdez MD.             IMPRESSION:     1.  Axillary candidiasis in the setting of diabetes mellitus type 2 on steroids for worsening respiratory failure.  May have superimposed cellulitis but seems more like Candida and yeast and has been on antibiotics.  2.  Possible back cellulitis versus candidiasis.  As per above.  Previously MRSA colonized.  Favor candidiasis and responding to antifungal therapies.  3.  Atypical pneumonia with likely ARDS postinfectious fibrotic changes with acute hypoxic respiratory failure.  On  steroids to see how she responds.  High risk for invasive diagnostic testing, and does not want to be intubated.  Infectious issues resolved from the pneumonia standpoint.  Histoplasma antibodies negative.  Blastomycosis antibodies negative, Fungitell negative.  COVID-19 6/28 negative.  4.  Leukocytosis, neutrophilic related to above issues.  Slightly worse.  Steroids likely pushing white count higher.  5.  Anemia, chronic disease related to above issues.  6.  Acute hypoxic respiratory failure.  On 65 L/min on 7/5/2022 related to above issues.  45 L/min on 7/6, 7/7, 7/8.  55 L/min 7/12.  7.  Diabetes mellitus type 2 with increased risk for infection.   8.  Hyponatremia resolved.    Plan:    1.  Diagnostically, continue to follow patient's physical exam, CBC, CMP, CRP, Aspergillus galactomannan assay, histoplasma and blastomycosis urine antigen, Fungitell, strongyloidiasis serology, and radiographic studies.  Previous Legionella and streptococcal antigen negative.  2.  Therapeutically, continue fluconazole 200 mg daily, triamcinolone/clotrimazole cream twice daily to axillary and back, duration to be determined, but likely to be for an additional 10 to 14 days given the ongoing need for steroids and her diabetes.  QTc interval 422 ms on 6/27.  May need to consider pneumocystis prevention if continues on high-dose steroids of greater than 20 mg a day of equivalent prednisone, for longer than 2 to 3 weeks.  3.  Oxygen support therapy.  65 L/min on 7/5/2022.  45 L/min on 7/6, 7/7.  55 L/min on 7/11.  Worse.    Considering hospice.    I discussed the patient's findings and my recommendations with patient, family and nursing staff    Our group would be pleased to follow this patient over the course of their hospitalization and assist with outpatient antimicrobial therapy, as indicated.  Further recommendations depend on the results of the cultures and clinical course.    Percy Parker MD  7/12/2022

## 2022-07-12 NOTE — CASE MANAGEMENT/SOCIAL WORK
Continued Stay Note  [facility]     Patient Name: Chikis Cuellar  MRN: [id number]  Today's Date: 7/12/2022    Admit Date: 6/27/2022     Discharge Plan     Row Name 07/12/22 1555       Plan    Plan discharge plan TBD    Plan Comments Pt remains on high flow O2. and has hospice consult. CM will cont to follow for discharge needs.    Final Discharge Disposition Code 30 - still a patient               Discharge Codes    No documentation.               Expected Discharge Date and Time     Expected Discharge Date Expected Discharge Time    Jul 16, 2022             Agata Carter RN

## 2022-07-12 NOTE — PLAN OF CARE
"  Problem: Palliative Care  Goal: Enhanced Quality of Life  Intervention: Optimize Psychosocial Wellbeing  Recent Flowsheet Documentation  Taken 7/12/2022 1236 by Cassi Aparicio, MSW  Supportive Measures: (symptom assessment)   active listening utilized   decision-making supported   positive reinforcement provided   self-care encouraged   verbalization of feelings encouraged   other (see comments)  Visit with patient at bedside - HFNC, labored breathing, reports 5/10 dyspnea, attempting to start to eat lunch but limited by fatigue and dyspnea.  Patient reports she is interested in Hospice - she does not wish to \"live on machine-this is no quality of life\".  Patient reports she wants to be comfortable and peaceful in the end of life and she knows she is not going to get better.  Reminded her that she does have medicines available to ease dyspnea - encourage her to ask RN for medication as needed.  Patient reports her son is having difficulty accepting patient decisions but she clear about her wishes.  Patient receptive to supportive/empathic, active listening, symptom assessment, validation of feelings and normalization of grief.  Palliative Care continues to follow for support and assist with plan of care and symptom management needs.  Inpatient Hospice team to evaluate.     1300 Palliative IDT - Palliative team members present:  CELIO Kumar DO; SARAH Zamarripa APRN; [facility]paricio [facility], [facility]-; ROMEL Garcia RN, CHPN; NOHEMY Salazar RN, CHPN; GHANSHYAM Hilliard MDiv, [facility]; Hospice Liaison VID[facility]       "

## 2022-07-13 PROBLEM — J96.00 ACUTE RESPIRATORY FAILURE: Status: ACTIVE | Noted: 2022-01-01

## 2022-07-13 NOTE — PROGRESS NOTES
INFECTIOUS DISEASE Progress Note    Chikis Cuellar  1945  [id number]    Consult: 7/5/22  Admit date: 6/27/2022    Requesting Provider: No ref. provider found  Evaluating physician: Percy Parker MD  Reason for Consultation: Back and arm cellulitis, intertrigo, recent pneumonia, candidiasis axillary  Chief Complaint: Above      Subjective   History of present illness:  Patient is a very pleasant  77 y.o.  Yr old female with diabetes mellitus type 2, essential hypertension, hyperlipidemia, arthritis, fibromyalgia, recent hospitalization 6/1/2022 until 6/27/2022 with pneumonia and ARDS, discharged to rehabilitation on 6/27 and readmitted on the same day related to worsening acute hypoxic respiratory failure.  Patient on first admission was thought to have an atypical pneumonia treated with IV antibiotics and steroids.  COVID-19 testing was negative x2.  Viral panel is negative.  Cultures were negative.  Pulmonary continues to follow and felt that her respiratory problems were related to ARDS fibrotic changes.  RICARDO and ANCA from 6/8 were negative.  Rheumatoid factor elevated at 21.0 (upper limit of normal 14).  Legionella urine antigen and streptococcal urine antigen were negative on 6/3.  MRSA screen was positive on 6/2.  The patient has been treated with steroids which have been increased because of worsening respiratory function.  Patient then developed bilateral axillary increased erythema concerning for candidiasis or cellulitis.  Antibiotics were started on 7/4/2022.  Vancomycin, ceftriaxone, and IV fluconazole.  I was consulted on 7/5/2022 for further evaluation and treatment.    7/6/2022 history reviewed.  Tolerating fluconazole and Lotrisone for axillary candidiasis also involving the back.  No fever.  Still on high flow oxygen at 45 L/min.  Being treated for ARDS related inflammation of the lungs.  Also on high-dose steroids.    7/7/2022 history reviewed.  Continues on fluconazole for severe  axillary candidiasis as well as Lotrisone.  No high fever.  Being treated for ARDS with steroids.  Continues on high flow oxygen at 45 L/min.  75% O2 concentration.  Infection work-up in lungs negative to date including negative Fungitell.    2022 history reviewed.  Her skin candidiasis improved.  Breathing is still a problem from ARDS.  Axillary and back decreased itching and pain and erythema.  Remains on high flow oxygen at 45 L/min, 65% O2 concentration.  On fluconazole and Lotrisone for her candidiasis, steroids for her ARDS.    2022 history reviewed.  No high fevers or chills.  Axillary and back candidiasis improved.  Remains on high flow oxygen at 55 L/min, 73% O2 concentration.  Struggling with her ARDS.    2022 history reviewed.  No fever.  Her candidiasis of her body and extremities improved.  Still struggling with shortness of breath which is making her uncomfortable.  No fever.  Oxygen continuous at 55 L/min, 68% O2 concentration.  Also working with palliative care for possible hospice.    2022 history reviewed.  Slept better last night.  No issues with her candidiasis.  Still depressed about her respiratory status.  Continues on 55 L/min, 60% O2 concentration.  Continues on fluconazole and topical Lotrisone for her candidiasis.    Past Medical History:   Diagnosis Date   • Arthritis    • Diabetes mellitus (HCC)    • Fibromyalgia    • Hyperlipidemia    • Hypertension        Past Surgical History:   Procedure Laterality Date   • BACK SURGERY     •  SECTION     • CHOLECYSTECTOMY     • HYSTERECTOMY     • REPLACEMENT TOTAL KNEE         Pediatric History   Patient Parents   • Not on file     Other Topics Concern   • Not on file   Social History Narrative   • Not on file       family history includes No Known Problems in her father and mother.    Allergies   Allergen Reactions   • Altace [Ramipril] Palpitations   • Trazodone Hallucinations       Immunization History   Administered  Date(s) Administered   • COVID-19 (PFIZER) PURPLE CAP 12/30/2020, 01/20/2021, 09/10/2021   • Covid-19 (Pfizer) Gray Cap 04/29/2022       Medication:    Current Facility-Administered Medications:   •  acetaminophen (TYLENOL) tablet 650 mg, 650 mg, Oral, Q6H PRN, Sarath Gutiérrez PA-C, 650 mg at 06/29/22 2132  •  albuterol (PROVENTIL) nebulizer solution 0.083% 2.5 mg/3mL, 2.5 mg, Nebulization, Q6H PRN, Vanesa Enciso APRN  •  ALPRAZolam (XANAX) tablet 0.5 mg, 0.5 mg, Oral, Daily PRN, Lola Neri DO, 0.5 mg at 07/11/22 2132  •  ALPRAZolam (XANAX) tablet 0.5 mg, 0.5 mg, Oral, Q12H, Amilcar Kumar DO, 0.5 mg at 07/13/22 0224  •  sennosides-docusate (PERICOLACE) 8.6-50 MG per tablet 2 tablet, 2 tablet, Oral, BID, 2 tablet at 07/13/22 0924 **AND** polyethylene glycol (MIRALAX) packet 17 g, 17 g, Oral, Daily PRN **AND** bisacodyl (DULCOLAX) EC tablet 5 mg, 5 mg, Oral, Daily PRN, 5 mg at 07/08/22 0804 **AND** bisacodyl (DULCOLAX) suppository 10 mg, 10 mg, Rectal, Daily PRN, Elsie Griffith DO, 10 mg at 07/03/22 1413  •  clotrimazole-betamethasone (LOTRISONE) 1-0.05 % cream 1 application, 1 application, Topical, Q12H, Percy Parker MD, 1 application at 07/13/22 0924  •  dextrose (D50W) (25 g/50 mL) IV injection 25 g, 25 g, Intravenous, Q15 Min PRN, Vanesa Enciso, APRN  •  dextrose (GLUTOSE) oral gel 15 g, 15 g, Oral, Q15 Min PRN, Vanesa Enciso APRN  •  diphenhydrAMINE (BENADRYL) capsule 25 mg, 25 mg, Oral, Q6H PRN, Adithya Omer DO, 25 mg at 07/08/22 0804  •  Enoxaparin Sodium (LOVENOX) syringe 40 mg, 40 mg, Subcutaneous, Q24H, Vanesa Enciso APRN, 40 mg at 07/13/22 0614  •  FLUoxetine (PROzac) capsule 40 mg, 40 mg, Oral, Daily, Vanesa Enciso APRN, 40 mg at 07/13/22 0924  •  glucagon (human recombinant) (GLUCAGEN DIAGNOSTIC) injection 1 mg, 1 mg, Intramuscular, Q15 Min PRN, Vanesa Enciso APRN  •  hydroCHLOROthiazide (HYDRODIURIL) tablet 25 mg, 25 mg, Oral, Daily, Rema Augustin, DO, 25  mg at 07/13/22 0924  •  HYDROcodone-acetaminophen (NORCO)  MG per tablet 1 tablet, 1 tablet, Oral, Q6H PRN, Adithya Omer DO, 1 tablet at 07/12/22 0449  •  hydrOXYzine (ATARAX) tablet 25 mg, 25 mg, Oral, TID PRN, Ketty Bethea MD, 25 mg at 07/11/22 1013  •  [START ON 7/14/2022] insulin detemir (LEVEMIR) injection 15 Units, 15 Units, Subcutaneous, Daily, Rema Augustin DO  •  Insulin Lispro (humaLOG) injection 0-24 Units, 0-24 Units, Subcutaneous, TID AC, Adithya Omer DO, 8 Units at 07/12/22 1826  •  ipratropium-albuterol (DUO-NEB) nebulizer solution 3 mL, 3 mL, Nebulization, Q4H PRN, Vanesa Enciso APRN  •  ipratropium-albuterol (DUO-NEB) nebulizer solution 3 mL, 3 mL, Nebulization, 4x Daily - RT, Vanesa Enciso APRN, 3 mL at 07/13/22 0751  •  lactobacillus acidophilus (RISAQUAD) capsule 1 capsule, 1 capsule, Oral, Daily, Adithya Omer DO, 1 capsule at 07/12/22 1223  •  losartan (COZAAR) tablet 50 mg, 50 mg, Oral, Q12H, Rema Augustin DO, 50 mg at 07/13/22 0923  •  Magnesium Sulfate 2 gram Bolus, followed by 8 gram infusion (total Mg dose 10 grams)- Mg less than or equal to 1mg/dL, 2 g, Intravenous, PRN **OR** Magnesium Sulfate 2 gram / 50mL Infusion (GIVE X 3 BAGS TO EQUAL 6GM TOTAL DOSE) - Mg 1.1 - 1.5 mg/dl, 2 g, Intravenous, PRN, Last Rate: 25 mL/hr at 07/01/22 1848, 2 g at 07/01/22 1848 **OR** Magnesium Sulfate 4 gram infusion- Mg 1.6-1.9 mg/dL, 4 g, Intravenous, PRN, Lola Neri, DO, Last Rate: 25 mL/hr at 07/03/22 1320, 4 g at 07/03/22 1320  •  methylPREDNISolone sodium succinate (SOLU-Medrol) injection 40 mg, 40 mg, Intravenous, Q24H, Rema Augustin DO, 40 mg at 07/13/22 0614  •  miconazole (MICOTIN) 2 % cream 1 application, 1 application, Topical, Q12H, Adithya Omer DO, 1 application at 07/13/22 0924  •  morphine injection 1 mg, 1 mg, Intravenous, Q2H PRN, Rema Augustin DO, 1 mg at 07/13/22 0924  •  oxybutynin XL (DITROPAN-XL) 24 hr tablet 5 mg, 5  mg, Oral, Daily, Vanesa Enciso APRN, 5 mg at 07/13/22 0924  •  pantoprazole (PROTONIX) EC tablet 40 mg, 40 mg, Oral, Q AM, Vanesa Enciso APRN, 40 mg at 07/13/22 0614  •  pravastatin (PRAVACHOL) tablet 40 mg, 40 mg, Oral, Nightly, Vanesa Enciso APRN, 40 mg at 07/12/22 2148  •  pregabalin (LYRICA) capsule 150 mg, 150 mg, Oral, TID, Lola Neri, DO, 150 mg at 07/13/22 0924  •  sodium chloride 0.9 % flush 10 mL, 10 mL, Intravenous, PRN, Andrea Cabrera MD  •  sodium chloride 0.9 % flush 10 mL, 10 mL, Intravenous, Q12H, Vanesa Enciso APRN, 10 mL at 07/13/22 0924  •  sodium chloride 0.9 % flush 10 mL, 10 mL, Intravenous, PRN, Vanesa Enciso APRN  •  sodium chloride 0.9 % flush 10 mL, 10 mL, Intravenous, Q12H, Elsie Griffith DO, 10 mL at 07/13/22 0924  •  sodium chloride 0.9 % flush 10 mL, 10 mL, Intravenous, PRN, Elsie Griffith, DO    Please refer to the medical record for a full medication list    Review of Systems:    Constitutional-- No Fever, chills or sweats.  Appetite good, and no malaise. No fatigue.  HEENT-- No new vision, hearing or throat complaints.  No epistaxis or oral sores.  Denies odynophagia or dysphagia.  No odynophagia or dysphagia. No headache, photophobia or neck stiffness.  CV-- No chest pain, palpitation or syncope  Resp--continued SOB/nonproductive cough/no hemoptysis  GI- No nausea, vomiting, or diarrhea.  No hematochezia, melena, or hematemesis. Denies jaundice or chronic liver disease.  -- No dysuria, hematuria, or flank pain.  Denies hesitancy, urgency or flank pain.  Lymph- no swollen lymph nodes in neck/axilla or groin.   Heme- No active bruising or bleeding; no Hx of DVT or PE.  MS-- no swelling or pain in the bones or joints of arms/legs.  No new back pain.  Neuro-- No acute focal weakness or numbness in the arms or legs.  No seizures.  Skin--No rashes or lesions, except bilateral axillary as per HPI, feels improved    Physical Exam:   Vital Signs   Temp:  [97.3  "°F (36.3 °C)-98.2 °F (36.8 °C)] 98.2 °F (36.8 °C)  Heart Rate:  [] 100  Resp:  [20-30] 22  BP: (117-140)/(67-99) 127/82    Temp  Min: 97.3 °F (36.3 °C)  Max: 98.2 °F (36.8 °C)  BP  Min: 117/67  Max: 140/99  Pulse  Min: 81  Max: 101  Resp  Min: 20  Max: 30  SpO2  Min: 84 %  Max: 92 %    Blood pressure 127/82, pulse 100, temperature 98.2 °F (36.8 °C), temperature source Oral, resp. rate 22, height 162.6 cm (64\"), weight 88.5 kg (195 lb 1.6 oz), SpO2 (!) 89 %.  GENERAL: Awake and alert, in moderate distress. Appears older than stated age.  Uncomfortable from her respiratory, with some use of accessory muscles.  HEENT:  Normocephalic, atraumatic.  Oropharynx without thrush. Dentition in good repair. No cervical adenopathy. No neck masses.  Ears externally normal, Nose externally normal.  EYES: No conjunctival injection. No icterus. EOM full.  LYMPHATICS: No lymphadenopathy of the neck or axillary or inguinal regions.   HEART: No murmur, gallop, or pericardial friction rub. Reg rate rhythm.  LUNGS: Bilateral rales. No respiratory distress, no use of accessory muscles.  No wheezes.  ABDOMEN: Soft, nontender, nondistended. No appreciable HSM.  Bowel sounds normal.  Obese.  SKIN: Warm and dry without cutaneous eruptions, except bilateral axillary with fading erythema, no crepitus or bullae.  No nodules.  Some extension on the right side to the back.  PSYCHIATRIC: Mental status lucid. No confusion.  EXT:  No cellulitic change.  NEURO: Oriented to name, nonfocal    Results Review:   I reviewed the patient's new clinical results.  I reviewed the patient's new imaging results and agree with the interpretation.  I reviewed the patient's other test results and agree with the interpretation    Results from last 7 days   Lab Units 07/13/22  0610 07/12/22  0633 07/11/22  0350   WBC 10*3/mm3 22.10* 27.61* 17.15*   HEMOGLOBIN g/dL 15.0 15.3 13.4   HEMATOCRIT % 47.0* 47.6* 42.6   PLATELETS 10*3/mm3 441 519* 468*     Results from " last 7 days   Lab Units 07/13/22  0610   SODIUM mmol/L 139   POTASSIUM mmol/L 4.2   CHLORIDE mmol/L 94*   CO2 mmol/L 36.0*   BUN mg/dL 36*   CREATININE mg/dL 0.59   GLUCOSE mg/dL 72   CALCIUM mg/dL 9.8     Results from last 7 days   Lab Units 07/13/22  0610   ALK PHOS U/L 90   BILIRUBIN mg/dL 0.3   ALT (SGPT) U/L 35*   AST (SGOT) U/L 38*                 Results from last 7 days   Lab Units 07/13/22  0610   LACTATE mmol/L 1.5     Estimated Creatinine Clearance: 84.6 mL/min (by C-G formula based on SCr of 0.59 mg/dL).    Microbiology:  Microbiology Results Abnormal     Procedure Component Value - Date/Time    Histoplasma Ag Ur - Urine, Urinary Bladder [[id number]] Collected: 07/07/22 1805    Lab Status: Final result Specimen: Urine from Urinary Bladder Updated: 07/12/22 1711     Histoplasma Galactomannan Ag Ur <0.5    Narrative:      Test(s) 183562-Histoplasma Gal'irene Ag Ur  was developed and its performance characteristics determined  by [employer]. It has not been cleared or approved by the Food  and Drug Administration.  Performed at:  [address]  [id number]  : Renate Smith MD, Phone:  [phone]    COVID PRE-OP / PRE-PROCEDURE SCREENING ORDER (NO ISOLATION) - Swab, Nasopharynx [[id number]]  (Normal) Collected: 06/28/22 0027    Lab Status: Final result Specimen: Swab from Nasopharynx Updated: 06/28/22 0104    Narrative:      The following orders were created for panel order COVID PRE-OP / PRE-PROCEDURE SCREENING ORDER (NO ISOLATION) - Swab, Nasopharynx.  Procedure                               Abnormality         Status                     ---------                               -----------         ------                     COVID-19 and FLU A/B PCR...[[id number]]  Normal              Final result                 Please view results for these tests on the individual orders.    COVID-19 and FLU A/B PCR - Swab, Nasopharynx [[id number]]  (Normal) Collected:  06/28/22 0027    Lab Status: Final result Specimen: Swab from Nasopharynx Updated: 06/28/22 0104     COVID19 Not Detected     Influenza A PCR Not Detected     Influenza B PCR Not Detected    Narrative:      Fact sheet for providers: https://www.fda.gov/media/095520/download    Fact sheet for patients: https://www.fda.gov/media/606970/download    Test performed by PCR.          Radiology:  Imaging Results (Last 72 Hours)     Procedure Component Value Units Date/Time    XR Chest 1 View [[id number]] Collected: 07/12/22 0749     Updated: 07/12/22 0754    Narrative:      DATE OF EXAM: 7/12/2022 6:07 AM     PROCEDURE: XR CHEST 1 VW-     INDICATIONS: eval pna; J96.01-Acute respiratory failure with hypoxia;  R06.02-Shortness of breath; J18.9-Pneumonia, unspecified organism;  R13.10-Dysphagia, unspecified     COMPARISON: 7/9/2022     TECHNIQUE: Single radiographic AP view of the chest was obtained.     FINDINGS:  Left upper extremity PICC line remains in the mid SVC. Heart is  enlarged. Diffuse interstitial disease pattern is largely stable,  perhaps minimally improved. No consolidation, effusion or pneumothorax  is seen. There is now a small amount of subcutaneous emphysema of the  upper chest and lower neck and apparently mild pneumomediastinum. No  pneumothorax is appreciated.             Impression:         1. Extensive bilateral pneumonia, stable to slightly improved from  7/9/2022.  2. Interval development of mild pneumomediastinum and subcutaneous  emphysema of the upper chest and lower neck.     This report was finalized on 7/12/2022 7:51 AM by Dr. Nelson Valdez MD.             IMPRESSION:     1.  Axillary candidiasis in the setting of diabetes mellitus type 2 on steroids for worsening respiratory failure.  May have superimposed cellulitis but seems more like Candida and yeast and has been on antibiotics.  2.  Possible back cellulitis versus candidiasis.  As per above.  Previously MRSA colonized.  Favor candidiasis and  responding to antifungal therapies.  3.  Atypical pneumonia with likely ARDS postinfectious fibrotic changes with acute hypoxic respiratory failure.  On steroids to see how she responds.  High risk for invasive diagnostic testing, and does not want to be intubated.  Infectious issues resolved from the pneumonia standpoint.  Histoplasma antibodies negative.  Blastomycosis antibodies negative, Fungitell negative.  COVID-19 6/28 negative.  4.  Leukocytosis, neutrophilic related to above issues.  Improved.  Steroids likely pushing white count higher.  5.  Anemia, chronic disease related to above issues.  6.  Acute hypoxic respiratory failure.  On 65 L/min on 7/5/2022 related to above issues.  45 L/min on 7/6, 7/7, 7/8.  55 L/min 7/12, 7/13.  6a.  Pneumomediastinum is new from 7/12 chest x-ray.  7.  Diabetes mellitus type 2 with increased risk for infection.   8.  Hyponatremia resolved.    Plan:    1.  Diagnostically, continue to follow patient's physical exam, CBC, CMP, CRP, Aspergillus galactomannan assay, histoplasma and blastomycosis urine antigen, Fungitell, strongyloidiasis serology, and radiographic studies.  Previous Legionella and streptococcal antigen negative.  2.  Therapeutically, continue fluconazole 200 mg daily, triamcinolone/clotrimazole cream twice daily to axillary and back, duration to be determined, but likely to be for an additional 10 to 14 days given the ongoing need for steroids and her diabetes.  QTc interval 422 ms on 6/27.  Add Bactrim double strength Monday, Wednesday, Friday for pneumocystis prevention given continued high-dose steroids of greater than 20 mg a day of equivalent prednisone, for longer than 2.  Can discontinue the Bactrim when steroids are less than 20 mg a day.  3.  Oxygen support therapy.  65 L/min on 7/5/2022.  45 L/min on 7/6, 7/7.  55 L/min on 7/11, 7/13.    Considering hospice.    I discussed the patient's findings and my recommendations with patient, family and nursing  staff    Our group would be pleased to follow this patient over the course of their hospitalization and assist with outpatient antimicrobial therapy, as indicated.  Further recommendations depend on the results of the cultures and clinical course.    Percy Parker MD  7/13/2022

## 2022-07-13 NOTE — PLAN OF CARE
Goal Outcome Evaluation:  Plan of Care Reviewed With: patient        Progress: no change  Outcome Evaluation: VSS, denies pain or discomfort, remains on Hi flow NC, sats > 92%,  pt slept well most of the night, no new concerns, will continue to monitor.      Problem: Adult Inpatient Plan of Care  Goal: Absence of Hospital-Acquired Illness or Injury  Intervention: Identify and Manage Fall Risk  Recent Flowsheet Documentation  Taken 7/12/2022 2000 by Robyn Ford RN  Safety Promotion/Fall Prevention: activity supervised  Intervention: Prevent Skin Injury  Recent Flowsheet Documentation  Taken 7/12/2022 2000 by Robyn Ford RN  Body Position: supine  Intervention: Prevent and Manage VTE (Venous Thromboembolism) Risk  Recent Flowsheet Documentation  Taken 7/12/2022 2000 by Robyn Ford RN  Activity Management: activity adjusted per tolerance     Problem: Adult Inpatient Plan of Care  Goal: Absence of Hospital-Acquired Illness or Injury  Intervention: Prevent and Manage VTE (Venous Thromboembolism) Risk  Recent Flowsheet Documentation  Taken 7/12/2022 2000 by Robyn Ford RN  Activity Management: activity adjusted per tolerance

## 2022-07-13 NOTE — DISCHARGE SUMMARY
[facility]  DISCHARGE SUMMARY    Patient Name: Chikis Cuellar  : 1945  MRN: [id number]    Date of Admission: 2022  8:17 PM  Date of Discharge: 2022  Primary Care Physician: Eduardo Gamboa MD    Consults     Date and Time Order Name Status Description    2022 11:50 AM Inpatient Infectious Diseases Consult Completed     2022  9:13 AM Inpatient Palliative Care MD Consult Completed     2022 10:16 AM Inpatient Pulmonology Consult Completed     2022  9:27 AM Inpatient Pulmonology Consult Completed           Hospital Course     Presenting Problem:   Acute respiratory failure with hypoxia (HCC) [J96.01]    Active Hospital Problems    Diagnosis  POA   • **Acute respiratory failure with hypoxia (HCC) [J96.01]  Yes   • Hypomagnesemia [E83.42]  Unknown   • Leukocytosis [D72.829]  Yes   • Chest pain [R07.9]  Yes   • Fibromyalgia [M79.7]  Yes   • Type 2 diabetes mellitus, with long-term current use of insulin (HCC) [E11.9, Z79.4]  Not Applicable   • Essential hypertension [I10]  Yes   • Hyperlipemia [E78.5]  Yes   • GERD without esophagitis [K21.9]  Yes   • Mood disorder (HCC) [F39]  Yes      Resolved Hospital Problems   No resolved problems to display.          Hospital Course:  Chikis Cuellar is a 77 y.o. female  with history of T2DM, HTN, HLD, GERD, fibromyalgia, anxiety who presented to the ED w/ c/o hypoxia.   Pt admitted to [facility]  - 22. Pt initially presented w/ c/o severe dyspnea. Pt was admitted to the ICU from the ED and placed on HFNC, IV steroids and IV antibiotics. Pt underwent an extensive work-up including infectious, autoimmune, cardiac which were predominantly unrevealing.  Advanced chest imaging was felt to be consistent with atypical pneumonia somewhat classic for the appearance of COVID-19 although COVID remained negative despite pt reports of several family members w/ recent positive COVID tests. Pt was d/porter on a  steroid taper and on 5 liters of supplemental oxygen.     Ultimately, patient continued to decline and she opted to pursue inpatient hospice due to quality of life and not wanting to continue on high flow without improvement.  She was admitted to inpatient hospice on 7/13     Acute respiratory failure w/ hypoxia   COPD/vs ARDS fibrosis  Chest pain; resolved   Recent atypical pneumonia        B/L underarm Candidiasis/R upper back cellulitis   Superimposed Cellulitis      T2DM     HTN  HLD       Anxiety   Fibromyalgia            Discharge Follow Up Recommendations for outpatient labs/diagnostics:  DC to hospice    Day of Discharge     HPI:   Continues to decline, feels at peace with herself    Review of Systems  Significant dyspnea, limited due to breathing    Vital Signs:   Temp:  [97.3 °F (36.3 °C)-98.2 °F (36.8 °C)] 98.2 °F (36.8 °C)  Heart Rate:  [] 100  Resp:  [20-30] 22  BP: (117-140)/(67-99) 127/82  Flow (L/min):  [55] 55    Constitutional: Anxious, chronically ill-appearing  HENT: NCAT, mucous membranes moist  Respiratory: On high flow, poor respiratory effort, crackles bilaterally  Cardiovascular: Tachycardic but regular  Gastrointestinal: Positive bowel sounds, soft, nontender, nondistended  Musculoskeletal: Trace LE edema  Psychiatric: Appropriate affect, cooperative  Neurologic: Oriented x 3, speech clear  Skin: No rashes      Pertinent  and/or Most Recent Results     LAB RESULTS:      Lab 07/13/22  0610 07/12/22  0633 07/11/22  0350 07/08/22  0503 07/07/22  0742   WBC 22.10* 27.61* 17.15* 13.00* 14.51*   HEMOGLOBIN 15.0 15.3 13.4 12.1 11.6*   HEMATOCRIT 47.0* 47.6* 42.6 37.8 36.9   PLATELETS 441 519* 468* 367 333   NEUTROS ABS 16.88* 22.41* 14.89* 11.17* 12.73*   IMMATURE GRANS (ABS) 0.45* 0.68* 0.25* 0.17* 0.11*   LYMPHS ABS 2.56 2.20 1.16 0.95 0.90   MONOS ABS 1.37* 1.21* 0.77 0.65 0.69   EOS ABS 0.73* 0.99* 0.04 0.03 0.05   MCV 85.0 84.8 85.0 84.9 85.6   PROCALCITONIN 0.08 0.09  --   --  0.06    LACTATE 1.5  --   --   --  2.0         Lab 07/13/22  0610 07/12/22  0633 07/11/22  0350 07/09/22  0921 07/08/22  0503   SODIUM 139 139 135* 140 141   POTASSIUM 4.2 4.8 4.3 4.7 4.5   CHLORIDE 94* 92* 93* 96* 98   CO2 36.0* 34.0* 34.0* 33.0* 35.0*   ANION GAP 9.0 13.0 8.0 11.0 8.0   BUN 36* 37* 28* 27* 24*   CREATININE 0.59 0.65 0.55* 0.59 0.64   EGFR 92.4 90.2 94.0 92.4 90.6   GLUCOSE 72 65 158* 165* 162*   CALCIUM 9.8 9.9 9.6 9.2 9.2   PHOSPHORUS  --  4.1  --   --   --          Lab 07/13/22  0610 07/12/22  0633 07/09/22  0921 07/08/22  0503 07/07/22  0742   TOTAL PROTEIN 5.9*  --  5.9* 5.9* 5.6*   ALBUMIN 3.30* 3.40* 3.20* 3.20* 3.10*   GLOBULIN 2.6  --  2.7 2.7 2.5   ALT (SGPT) 35*  --  14 13 13   AST (SGOT) 38*  --  17 18 16   BILIRUBIN 0.3  --  0.2 0.2 0.2   ALK PHOS 90  --  83 92 79                     Brief Urine Lab Results     None        Microbiology Results (last 10 days)     Procedure Component Value - Date/Time    Histoplasma Ag Ur - Urine, Urinary Bladder [[id number]] Collected: 07/07/22 1805    Lab Status: Final result Specimen: Urine from Urinary Bladder Updated: 07/12/22 1711     Histoplasma Galactomannan Ag Ur <0.5    Narrative:      Test(s) 183562-Histoplasma Gal'irene Ag Ur  was developed and its performance characteristics determined  [facility]. It has not been cleared or approved by the Food  and Drug Administration.  Performed at:  [address]  [id number]  : Renate Smith MD, Phone:  [phone]          SLP FEES - Fiberoptic Endo Eval Swallow    Result Date: 7/7/2022  This procedure was auto-finalized with no dictation required.    XR Chest 1 View    Result Date: 7/12/2022  DATE OF EXAM: 7/12/2022 6:07 AM  PROCEDURE: XR CHEST 1 VW-  INDICATIONS: eval pna; J96.01-Acute respiratory failure with hypoxia; R06.02-Shortness of breath; J18.9-Pneumonia, unspecified organism; R13.10-Dysphagia, unspecified  COMPARISON: 7/9/2022  TECHNIQUE:  Single radiographic AP view of the chest was obtained.  FINDINGS: Left upper extremity PICC line remains in the mid SVC. Heart is enlarged. Diffuse interstitial disease pattern is largely stable, perhaps minimally improved. No consolidation, effusion or pneumothorax is seen. There is now a small amount of subcutaneous emphysema of the upper chest and lower neck and apparently mild pneumomediastinum. No pneumothorax is appreciated.         1. Extensive bilateral pneumonia, stable to slightly improved from 7/9/2022. 2. Interval development of mild pneumomediastinum and subcutaneous emphysema of the upper chest and lower neck.  This report was finalized on 7/12/2022 7:51 AM by Dr. Nelson Valdez MD.      XR Chest 1 View    Result Date: 7/9/2022  DATE OF EXAM: 7/9/2022 1:43 PM  PROCEDURE: XR CHEST 1 VW-  INDICATIONS: persistent dyspnea; J96.01-Acute respiratory failure with hypoxia; R06.02-Shortness of breath; J18.9-Pneumonia, unspecified organism; R13.10-Dysphagia, unspecified  COMPARISON: 07/06/2022, and prior  TECHNIQUE: Single radiographic view of the chest was obtained.  FINDINGS: Diffuse interstitial and airspace opacities appear grossly unchanged compared to the prior exam. No significant new focal consolidation.   The heart appears enlarged, as before. Mediastinal contour appears grossly unchanged.  No significant new pleural effusion or pneumothorax.      1.  Diffuse interstitial and airspace opacities, similar to recent prior studies. Findings again may be related to pneumonia. 2.  Stable cardiomegaly.  This report was finalized on 7/9/2022 3:51 PM by Fadi Saunders MD.      XR Chest 1 View    Result Date: 7/6/2022  DATE OF EXAM: 7/6/2022 9:55 AM  PROCEDURE: XR CHEST 1 VW-  INDICATIONS: aspiration event, increased hypoxia; J96.01-Acute respiratory failure with hypoxia; R06.02-Shortness of breath; J18.9-Pneumonia, unspecified organism  COMPARISON: 7/5/2022  TECHNIQUE: Single radiographic AP view of the chest was  obtained.  FINDINGS: Left-sided PICC line is again seen in the mid SVC. Heart shadow is normal in size. Prominence of the mediastinal silhouette is likely due to rotation of patient to the right again on today's exam. This may be due to the patient's scoliosis. Extensive and diffuse pulmonary interstitial disease appears mildly improved on the right, significantly improved on the left. No effusion or pneumothorax is seen.      Improving pneumonia. No new chest pathology is seen.  This report was finalized on 7/6/2022 10:20 AM by Dr. Nelson Valdez MD.      XR Chest 1 View    Result Date: 7/4/2022  EXAMINATION: XR CHEST 1 VW  DATE OF EXAM: 7/4/2022 6:15 AM HISTORY: PICC line placement. COMPARISON: None. FINDINGS: There is a new left PIC line with the tip in the SVC. There is cardiomegaly with some patchy airspace disease in the lungs that appears similar and there is a right pleural effusion. There is cardiomegaly. Bones are demineralized.     1.  PICC line tip is in the SVC. Electronically signed by:  Blaze Bond M.D.  7/4/2022 5:08 AM Mountain Time    CT Angiogram Chest    Result Date: 7/2/2022  DATE OF EXAM: 7/2/2022 1:53 PM  PROCEDURE: CT ANGIOGRAM CHEST-  INDICATIONS: Hypoxemia, recent pneumonia and worsening O2 needs.; J96.01-Acute respiratory failure with hypoxia; R06.02-Shortness of breath; J18.9-Pneumonia, unspecified organism  COMPARISON: No comparisons available.  TECHNIQUE: Contiguous axial imaging was obtained from the thoracic inlet through the upper abdomen following the intravenous administration of 42 mL of Isovue 370. Reconstructed coronal and sagittal images were also obtained. Automated exposure control and iterative reconstruction methods were used.   FINDINGS: VASCULAR FINDINGS: There is no definite evidence for pulmonary embolus on this exam. There is coronary artery calcification.  NONVASCULAR FINDINGS: There are diffuse bilateral pulmonary infiltrates which have been noted. There has not  been improvement in appearance of the lungs. There are no large pleural effusions. It looks like there is some underlying bronchiectasis and septal thickening. There is slight depression of the antrum endplate of T12 which is unchanged.       1.  No definite evidence for pulmonary embolus. 2.  Diffuse bilateral pulmonary parenchymal changes similar to prior study. 3.  Coronary artery calcification is noted.  This report was finalized on 7/2/2022 2:18 PM by Salomón Cardenas MD.                Results for orders placed during the hospital encounter of 06/01/22    Adult Transthoracic Echo Complete W/ Cont if Necessary Per Protocol    Interpretation Summary  · The right atrial cavity is mildly dilated.  · Estimated right ventricular systolic pressure from tricuspid regurgitation is mildly elevated (35-45 mmHg). Calculated right ventricular systolic pressure from tricuspid regurgitation is 40 mmHg.  · Estimated left ventricular EF = 60% Estimated left ventricular EF was in agreement with the calculated left ventricular EF. Left ventricular ejection fraction appears to be 56 - 60%. Left ventricular systolic function is normal.  · Left ventricular diastolic function is consistent with age.  · Mild pulmonary hypertension is present.  · Normal right ventricular wall thickness, systolic function and septal motion noted with the right ventricular cavity mild to moderately dilated.  · There is no evidence of pericardial effusion.  · No significant structural or functional valvular abnormalities demonstrated.      Plan for Follow-up of Pending Labs/Results:   Pending Labs     Order Current Status    Strongyloides Antibody IgG, SAMUEL In process        Discharge Details        Discharge Medications      ASK your doctor about these medications      Instructions Start Date   albuterol (2.5 MG/3ML) 0.083% nebulizer solution  Commonly known as: PROVENTIL   2.5 mg, Nebulization, Every 6 Hours PRN      ALPRAZolam 0.5 MG tablet  Commonly  known as: XANAX   0.5 mg, Oral, Daily PRN      cyanocobalamin 1000 MCG/ML injection   1,000 mcg, Subcutaneous, Every 14 Days      FLUoxetine 20 MG capsule  Commonly known as: PROzac   40 mg, Oral, Daily      glipizide 10 MG tablet  Commonly known as: GLUCOTROL   10 mg, Oral, 2 Times Daily Before Meals      HYDROcodone-acetaminophen  MG per tablet  Commonly known as: NORCO   1 tablet, Oral, Every 12 Hours PRN      indapamide 2.5 MG tablet  Commonly known as: LOZOL   2.5 mg, Oral, Daily      Insulin Aspart 100 UNIT/ML injection  Commonly known as: novoLOG   Subcutaneous, 3 Times Daily Before Meals, Patient states she gets it from pharmacy although they did not have record of it      insulin detemir 100 UNIT/ML injection  Commonly known as: LEVEMIR   5 Units, Subcutaneous, Every 12 Hours Scheduled      losartan 25 MG tablet  Commonly known as: COZAAR   25 mg, Oral, Daily      metFORMIN 500 MG tablet  Commonly known as: GLUCOPHAGE   500 mg, Oral, 2 Times Daily With Meals      omeprazole 40 MG capsule  Commonly known as: priLOSEC   40 mg, Oral, Daily      oxybutynin XL 5 MG 24 hr tablet  Commonly known as: DITROPAN-XL   5 mg, Oral, Daily      pravastatin 40 MG tablet  Commonly known as: PRAVACHOL   40 mg, Oral, Daily      predniSONE 5 MG tablet  Commonly known as: DELTASONE  Ask about: Should I take this medication?   Take 3 tablets by mouth Daily With Breakfast for 4 days, THEN 2 tablets Daily With Breakfast for 4 days, THEN 1 tablet Daily With Breakfast for 4 days.   Start Date: June 28, 2022     pregabalin 150 MG capsule  Commonly known as: LYRICA   150 mg, Oral, 3 Times Daily             Allergies   Allergen Reactions   • Altace [Ramipril] Palpitations   • Trazodone Hallucinations         Discharge Disposition:      Diet:  Hospital:  Diet Order   Procedures   • Diet Regular; Thin; Cardiac, Consistent Carbohydrate       Activity:      Restrictions or Other Recommendations:         CODE STATUS:    Code Status and  Medical Interventions:   Ordered at: 07/01/22 1617     Medical Intervention Limits:    NO intubation (DNI)     Code Status (Patient has no pulse and is not breathing):    No CPR (Do Not Attempt to Resuscitate)     Medical Interventions (Patient has pulse or is breathing):    Limited Support       Future Appointments   Date Time Provider Department Center   7/25/2022  9:30 AM Miri Vera APRN [facility]   8/23/2022  1:00 PM Vianey Todd PA-C [facility]                 Rema Augustin DO  07/13/22      Time Spent on Discharge:  I spent  35  minutes on this discharge activity which included: face-to-face encounter with the patient, reviewing the data in the system, coordination of the care with the nursing staff as well as consultants, documentation, and entering orders.

## 2022-07-13 NOTE — INTERVAL H&P NOTE
Ms. Cuellar was admitted to [facility] on 7/13/2022. Please see Hospice documentation for further information.

## 2022-07-13 NOTE — PROGRESS NOTES
Continued Stay Note  [facility]     Patient Name: Chikis Cuellar  MRN: [id number]  Today's Date: 7/13/2022    Admit Date: 7/13/2022     Discharge Plan     Row Name 07/13/22 1327       Plan    Plan IPU admission    Plan Comments   Patient admitted to inpatient hospice today. POC formulated with patient and son. Dr. Augustin aware of the admission.               Discharge Codes    No documentation.                     Davida Pond RN

## 2022-07-14 NOTE — PLAN OF CARE
Problem: Adult Inpatient Plan of Care  Goal: Plan of Care Review  Flowsheets (Taken 7/14/2022 1921)  Progress: declining  Plan of Care Reviewed With: patient  Outcome Evaluation: Started weaning process today from High flow to NC. Hourly prn medication administered for comfort. Patient started on Morphine PCA. No family at bedside during shift so called and spoke with daughter prior to shift change. Daughter informed me that she was unaware of the POC in terms of treatment and weaning process. I called and spoke with Dr. Romero with hospice and recieved orders to delay weaning of high flow until tomorrow. I informed daughter of plan of care and she was understanding. Patient remianed alert throughout shift. Denied pain.  Goal: Absence of Hospital-Acquired Illness or Injury  Intervention: Identify and Manage Fall Risk  Recent Flowsheet Documentation  Taken 7/14/2022 1800 by Tirso Riggs RN  Safety Promotion/Fall Prevention:   activity supervised   assistive device/personal items within reach   clutter free environment maintained   fall prevention program maintained   gait belt   lighting adjusted   nonskid shoes/slippers when out of bed   room organization consistent   safety round/check completed   toileting scheduled  Taken 7/14/2022 1600 by Tirso Riggs, RN  Safety Promotion/Fall Prevention:   activity supervised   assistive device/personal items within reach   clutter free environment maintained   fall prevention program maintained   gait belt   lighting adjusted   nonskid shoes/slippers when out of bed   room organization consistent   safety round/check completed   toileting scheduled  Taken 7/14/2022 1400 by Tirso Riggs, RN  Safety Promotion/Fall Prevention:   activity supervised   assistive device/personal items within reach   clutter free environment maintained   gait belt   fall prevention program maintained   lighting adjusted   nonskid shoes/slippers when out of bed   room organization  consistent   safety round/check completed   toileting scheduled  Taken 7/14/2022 1200 by Tirso Riggs RN  Safety Promotion/Fall Prevention:   assistive device/personal items within reach   clutter free environment maintained   fall prevention program maintained   gait belt   lighting adjusted   nonskid shoes/slippers when out of bed   room organization consistent   safety round/check completed   toileting scheduled  Taken 7/14/2022 1000 by Tirso Riggs RN  Safety Promotion/Fall Prevention:   activity supervised   assistive device/personal items within reach   clutter free environment maintained   fall prevention program maintained   gait belt   lighting adjusted   nonskid shoes/slippers when out of bed   safety round/check completed   toileting scheduled   room organization consistent  Taken 7/14/2022 0800 by Tirso Riggs RN  Safety Promotion/Fall Prevention:   activity supervised   assistive device/personal items within reach   clutter free environment maintained   fall prevention program maintained   gait belt   lighting adjusted   nonskid shoes/slippers when out of bed   room organization consistent   safety round/check completed   toileting scheduled  Intervention: Prevent Skin Injury  Recent Flowsheet Documentation  Taken 7/14/2022 1800 by Tirso Riggs RN  Body Position:   tilted   right  Skin Protection:   adhesive use limited   incontinence pads utilized   tubing/devices free from skin contact  Taken 7/14/2022 1600 by Tirso Riggs RN  Body Position:   left   tilted  Skin Protection:   adhesive use limited   incontinence pads utilized   tubing/devices free from skin contact  Taken 7/14/2022 1400 by Tirso Riggs RN  Body Position: neutral body alignment  Skin Protection:   adhesive use limited   incontinence pads utilized   tubing/devices free from skin contact  Taken 7/14/2022 1200 by Tirso Riggs RN  Body Position:   right   tilted  Taken 7/14/2022 1000 by Oneil  Tirso BRUMFIELD RN  Body Position:   tilted   right  Taken 7/14/2022 0800 by Tirso Riggs RN  Body Position: neutral body alignment  Skin Protection:   adhesive use limited   incontinence pads utilized   tubing/devices free from skin contact   skin sealant/moisture barrier applied  Intervention: Prevent and Manage VTE (Venous Thromboembolism) Risk  Recent Flowsheet Documentation  Taken 7/14/2022 1800 by Tirso Riggs RN  Activity Management: activity adjusted per tolerance  Taken 7/14/2022 1600 by Tirso Riggs RN  Activity Management: activity adjusted per tolerance  Taken 7/14/2022 1400 by Tirso Riggs RN  Activity Management: activity adjusted per tolerance  Taken 7/14/2022 1200 by Tirso Riggs RN  Activity Management: activity adjusted per tolerance  Taken 7/14/2022 1000 by Tirso Riggs RN  Activity Management: activity adjusted per tolerance  Taken 7/14/2022 0800 by Tirso Riggs RN  Activity Management: activity adjusted per tolerance  VTE Prevention/Management:   bilateral   dorsiflexion/plantar flexion performed  Intervention: Prevent Infection  Recent Flowsheet Documentation  Taken 7/14/2022 1800 by Tirso Riggs RN  Infection Prevention:   environmental surveillance performed   rest/sleep promoted   single patient room provided  Taken 7/14/2022 1600 by Tirso Riggs RN  Infection Prevention:   environmental surveillance performed   rest/sleep promoted   single patient room provided  Taken 7/14/2022 1400 by Tirso Riggs RN  Infection Prevention:   environmental surveillance performed   single patient room provided   rest/sleep promoted  Taken 7/14/2022 1200 by Tirso Riggs RN  Infection Prevention:   environmental surveillance performed   rest/sleep promoted   single patient room provided  Taken 7/14/2022 1000 by Tirso Riggs RN  Infection Prevention:   environmental surveillance performed   single patient room provided   rest/sleep  promoted  Taken 7/14/2022 0800 by Tirso Riggs RN  Infection Prevention:   environmental surveillance performed   rest/sleep promoted   single patient room provided  Goal: Optimal Comfort and Wellbeing  Intervention: Monitor Pain and Promote Comfort  Recent Flowsheet Documentation  Taken 7/14/2022 0800 by Tirso Riggs RN  Pain Management Interventions:   see MAR   around-the-clock dosing utilized  Intervention: Provide Person-Centered Care  Recent Flowsheet Documentation  Taken 7/14/2022 0800 by Tirso Riggs RN  Trust Relationship/Rapport:   care explained   choices provided   questions answered   questions encouraged     Problem: End-of-Life Care  Goal: Comfort, Peace and Preserved Dignity  Intervention: Promote Physical Comfort  Recent Flowsheet Documentation  Taken 7/14/2022 0800 by Tirso Riggs RN  Sensory Stimulation Regulation: care clustered  Intervention: Promote Peace and Maintain Dignity  Recent Flowsheet Documentation  Taken 7/14/2022 0800 by Tirso Riggs RN  Supportive Measures:   active listening utilized   decision-making supported   positive reinforcement provided   relaxation techniques promoted     Problem: Skin Injury Risk Increased  Goal: Skin Health and Integrity  Intervention: Optimize Skin Protection  Recent Flowsheet Documentation  Taken 7/14/2022 1800 by Tirso Riggs RN  Pressure Reduction Techniques:   frequent weight shift encouraged   pressure points protected   rest period provided between sit times  Head of Bed (HOB) Positioning: HOB at 30 degrees  Pressure Reduction Devices:   pressure-redistributing mattress utilized   positioning supports utilized  Skin Protection:   adhesive use limited   incontinence pads utilized   tubing/devices free from skin contact  Taken 7/14/2022 1600 by Tirso Riggs RN  Pressure Reduction Techniques:   frequent weight shift encouraged   heels elevated off bed   pressure points protected   rest period provided between  sit times  Head of Bed (HOB) Positioning: HOB at 30 degrees  Pressure Reduction Devices:   pressure-redistributing mattress utilized   positioning supports utilized  Skin Protection:   adhesive use limited   incontinence pads utilized   tubing/devices free from skin contact  Taken 7/14/2022 1400 by Tirso Riggs RN  Pressure Reduction Techniques:   frequent weight shift encouraged   pressure points protected   rest period provided between sit times  Head of Bed (HOB) Positioning: HOB at 30 degrees  Pressure Reduction Devices:   pressure-redistributing mattress utilized   positioning supports utilized  Skin Protection:   adhesive use limited   incontinence pads utilized   tubing/devices free from skin contact  Taken 7/14/2022 1200 by Tirso Riggs RN  Head of Bed ([facility]) Positioning: HOB at 30 degrees  Taken 7/14/2022 1000 by Tirso Riggs RN  Head of Bed ([facility]) Positioning: HOB at 20-30 degrees  Taken 7/14/2022 0800 by Tirso Riggs RN  Pressure Reduction Techniques:   frequent weight shift encouraged   pressure points protected   rest period provided between sit times  Head of Bed (HOB) Positioning: HOB at 30 degrees  Pressure Reduction Devices:   pressure-redistributing mattress utilized   positioning supports utilized  Skin Protection:   adhesive use limited   incontinence pads utilized   tubing/devices free from skin contact   skin sealant/moisture barrier applied     Problem: Fall Injury Risk  Goal: Absence of Fall and Fall-Related Injury  Intervention: Promote Injury-Free Environment  Recent Flowsheet Documentation  Taken 7/14/2022 1800 by Tirso Riggs RN  Safety Promotion/Fall Prevention:   activity supervised   assistive device/personal items within reach   clutter free environment maintained   fall prevention program maintained   gait belt   lighting adjusted   nonskid shoes/slippers when out of bed   room organization consistent   safety round/check completed   toileting  scheduled  Taken 7/14/2022 1600 by Tirso Riggs RN  Safety Promotion/Fall Prevention:   activity supervised   assistive device/personal items within reach   clutter free environment maintained   fall prevention program maintained   gait belt   lighting adjusted   nonskid shoes/slippers when out of bed   room organization consistent   safety round/check completed   toileting scheduled  Taken 7/14/2022 1400 by Tirso Riggs RN  Safety Promotion/Fall Prevention:   activity supervised   assistive device/personal items within reach   clutter free environment maintained   gait belt   fall prevention program maintained   lighting adjusted   nonskid shoes/slippers when out of bed   room organization consistent   safety round/check completed   toileting scheduled  Taken 7/14/2022 1200 by Tirso Riggs RN  Safety Promotion/Fall Prevention:   assistive device/personal items within reach   clutter free environment maintained   fall prevention program maintained   gait belt   lighting adjusted   nonskid shoes/slippers when out of bed   room organization consistent   safety round/check completed   toileting scheduled  Taken 7/14/2022 1000 by Tirso Riggs RN  Safety Promotion/Fall Prevention:   activity supervised   assistive device/personal items within reach   clutter free environment maintained   fall prevention program maintained   gait belt   lighting adjusted   nonskid shoes/slippers when out of bed   safety round/check completed   toileting scheduled   room organization consistent  Taken 7/14/2022 0800 by Tirso Riggs RN  Safety Promotion/Fall Prevention:   activity supervised   assistive device/personal items within reach   clutter free environment maintained   fall prevention program maintained   gait belt   lighting adjusted   nonskid shoes/slippers when out of bed   room organization consistent   safety round/check completed   toileting scheduled   Goal Outcome Evaluation:  Plan of Care  Reviewed With: patient        Progress: declining  Outcome Evaluation: Started weaning process today from High flow to NC. Hourly prn medication administered for comfort. Patient started on Morphine PCA. No family at bedside during shift so called and spoke with daughter prior to shift change. Daughter informed me that she was unaware of the POC in terms of treatment and weaning process. I called and spoke with Dr. Romero with hospice and recieved orders to delay weaning of high flow until tomorrow. I informed daughter of plan of care and she was understanding. Patient remianed alert throughout shift. Denied pain.

## 2022-07-14 NOTE — PLAN OF CARE
Problem: Adult Inpatient Plan of Care  Goal: Plan of Care Review  7/14/2022 1927 by Tirso Riggs RN  Flowsheets (Taken 7/14/2022 1927)  Progress: improving  Plan of Care Reviewed With: patient  Outcome Evaluation: Pt mentation at baseline. Alert to self only. VSS. RA. Denied pain. Worked with PT. Incisional site care completed per order. POC discussed with family. Verbalized understanding.  7/14/2022 1921 by Tirso Riggs RN  Flowsheets (Taken 7/14/2022 1921)  Progress: declining  Plan of Care Reviewed With: patient  Outcome Evaluation: Started weaning process today from High flow to NC. Hourly prn medication administered for comfort. Patient started on Morphine PCA. No family at bedside during shift so called and spoke with daughter prior to shift change. Daughter informed me that she was unaware of the POC in terms of treatment and weaning process. I called and spoke with Dr. Romero with hospice and recieved orders to delay weaning of high flow until tomorrow. I informed daughter of plan of care and she was understanding. Patient remianed alert throughout shift. Denied pain.  Goal: Absence of Hospital-Acquired Illness or Injury  Intervention: Identify and Manage Fall Risk  Recent Flowsheet Documentation  Taken 7/14/2022 1800 by Tirso Riggs RN  Safety Promotion/Fall Prevention:   activity supervised   assistive device/personal items within reach   clutter free environment maintained   fall prevention program maintained   gait belt   lighting adjusted   nonskid shoes/slippers when out of bed   room organization consistent   safety round/check completed   toileting scheduled  Taken 7/14/2022 1600 by Tirso Riggs, RN  Safety Promotion/Fall Prevention:   activity supervised   assistive device/personal items within reach   clutter free environment maintained   fall prevention program maintained   gait belt   lighting adjusted   nonskid shoes/slippers when out of bed   room organization  consistent   safety round/check completed   toileting scheduled  Taken 7/14/2022 1400 by Tirso Riggs RN  Safety Promotion/Fall Prevention:   activity supervised   assistive device/personal items within reach   clutter free environment maintained   gait belt   fall prevention program maintained   lighting adjusted   nonskid shoes/slippers when out of bed   room organization consistent   safety round/check completed   toileting scheduled  Taken 7/14/2022 1200 by Tirso Riggs RN  Safety Promotion/Fall Prevention:   assistive device/personal items within reach   clutter free environment maintained   fall prevention program maintained   gait belt   lighting adjusted   nonskid shoes/slippers when out of bed   room organization consistent   safety round/check completed   toileting scheduled  Taken 7/14/2022 1000 by Tirso Riggs RN  Safety Promotion/Fall Prevention:   activity supervised   assistive device/personal items within reach   clutter free environment maintained   fall prevention program maintained   gait belt   lighting adjusted   nonskid shoes/slippers when out of bed   safety round/check completed   toileting scheduled   room organization consistent  Taken 7/14/2022 0800 by Tirso Riggs RN  Safety Promotion/Fall Prevention:   activity supervised   assistive device/personal items within reach   clutter free environment maintained   fall prevention program maintained   gait belt   lighting adjusted   nonskid shoes/slippers when out of bed   room organization consistent   safety round/check completed   toileting scheduled  Intervention: Prevent Skin Injury  Recent Flowsheet Documentation  Taken 7/14/2022 1800 by Tirso Riggs RN  Body Position:   tilted   right  Skin Protection:   adhesive use limited   incontinence pads utilized   tubing/devices free from skin contact  Taken 7/14/2022 1600 by Tirso Riggs RN  Body Position:   left   tilted  Skin Protection:   adhesive use  limited   incontinence pads utilized   tubing/devices free from skin contact  Taken 7/14/2022 1400 by Tirso Riggs RN  Body Position: neutral body alignment  Skin Protection:   adhesive use limited   incontinence pads utilized   tubing/devices free from skin contact  Taken 7/14/2022 1200 by Tirso Riggs RN  Body Position:   right   tilted  Taken 7/14/2022 1000 by Tirso Riggs RN  Body Position:   tilted   right  Taken 7/14/2022 0800 by Tirso Riggs RN  Body Position: neutral body alignment  Skin Protection:   adhesive use limited   incontinence pads utilized   tubing/devices free from skin contact   skin sealant/moisture barrier applied  Intervention: Prevent and Manage VTE (Venous Thromboembolism) Risk  Recent Flowsheet Documentation  Taken 7/14/2022 1800 by Tirso Riggs RN  Activity Management: activity adjusted per tolerance  Taken 7/14/2022 1600 by Tirso Riggs RN  Activity Management: activity adjusted per tolerance  Taken 7/14/2022 1400 by Tirso Riggs RN  Activity Management: activity adjusted per tolerance  Taken 7/14/2022 1200 by Tirso Riggs RN  Activity Management: activity adjusted per tolerance  Taken 7/14/2022 1000 by Tirso Riggs RN  Activity Management: activity adjusted per tolerance  Taken 7/14/2022 0800 by Tirso Riggs RN  Activity Management: activity adjusted per tolerance  VTE Prevention/Management:   bilateral   dorsiflexion/plantar flexion performed  Intervention: Prevent Infection  Recent Flowsheet Documentation  Taken 7/14/2022 1800 by Tirso Riggs RN  Infection Prevention:   environmental surveillance performed   rest/sleep promoted   single patient room provided  Taken 7/14/2022 1600 by Tirso Riggs RN  Infection Prevention:   environmental surveillance performed   rest/sleep promoted   single patient room provided  Taken 7/14/2022 1400 by Tirso Riggs RN  Infection Prevention:   environmental surveillance  performed   single patient room provided   rest/sleep promoted  Taken 7/14/2022 1200 by Tirso Riggs RN  Infection Prevention:   environmental surveillance performed   rest/sleep promoted   single patient room provided  Taken 7/14/2022 1000 by Tirso Riggs RN  Infection Prevention:   environmental surveillance performed   single patient room provided   rest/sleep promoted  Taken 7/14/2022 0800 by Tirso Riggs RN  Infection Prevention:   environmental surveillance performed   rest/sleep promoted   single patient room provided  Goal: Optimal Comfort and Wellbeing  Intervention: Monitor Pain and Promote Comfort  Recent Flowsheet Documentation  Taken 7/14/2022 0800 by Tirso Riggs RN  Pain Management Interventions:   see MAR   around-the-clock dosing utilized  Intervention: Provide Person-Centered Care  Recent Flowsheet Documentation  Taken 7/14/2022 0800 by Tirso Riggs RN  Trust Relationship/Rapport:   care explained   choices provided   questions answered   questions encouraged     Problem: End-of-Life Care  Goal: Comfort, Peace and Preserved Dignity  Intervention: Promote Physical Comfort  Recent Flowsheet Documentation  Taken 7/14/2022 0800 by Tirso Riggs RN  Sensory Stimulation Regulation: care clustered  Intervention: Promote Peace and Maintain Dignity  Recent Flowsheet Documentation  Taken 7/14/2022 0800 by Tirso Riggs RN  Supportive Measures:   active listening utilized   decision-making supported   positive reinforcement provided   relaxation techniques promoted     Problem: Skin Injury Risk Increased  Goal: Skin Health and Integrity  Intervention: Optimize Skin Protection  Recent Flowsheet Documentation  Taken 7/14/2022 1800 by Tirso Riggs RN  Pressure Reduction Techniques:   frequent weight shift encouraged   pressure points protected   rest period provided between sit times  Head of Bed (HOB) Positioning: HOB at 30 degrees  Pressure Reduction Devices:    pressure-redistributing mattress utilized   positioning supports utilized  Skin Protection:   adhesive use limited   incontinence pads utilized   tubing/devices free from skin contact  Taken 7/14/2022 1600 by Tirso Riggs RN  Pressure Reduction Techniques:   frequent weight shift encouraged   heels elevated off bed   pressure points protected   rest period provided between sit times  Head of Bed (HOB) Positioning: HOB at 30 degrees  Pressure Reduction Devices:   pressure-redistributing mattress utilized   positioning supports utilized  Skin Protection:   adhesive use limited   incontinence pads utilized   tubing/devices free from skin contact  Taken 7/14/2022 1400 by Tirso Riggs RN  Pressure Reduction Techniques:   frequent weight shift encouraged   pressure points protected   rest period provided between sit times  Head of Bed (HOB) Positioning: HOB at 30 degrees  Pressure Reduction Devices:   pressure-redistributing mattress utilized   positioning supports utilized  Skin Protection:   adhesive use limited   incontinence pads utilized   tubing/devices free from skin contact  Taken 7/14/2022 1200 by Tirso Riggs RN  Head of Bed (HOB) Positioning: HOB at 30 degrees  Taken 7/14/2022 1000 by Tirso Riggs RN  Head of Bed (HOB) Positioning: HOB at 20-30 degrees  Taken 7/14/2022 0800 by Tirso Riggs RN  Pressure Reduction Techniques:   frequent weight shift encouraged   pressure points protected   rest period provided between sit times  Head of Bed (HOB) Positioning: HOB at 30 degrees  Pressure Reduction Devices:   pressure-redistributing mattress utilized   positioning supports utilized  Skin Protection:   adhesive use limited   incontinence pads utilized   tubing/devices free from skin contact   skin sealant/moisture barrier applied     Problem: Fall Injury Risk  Goal: Absence of Fall and Fall-Related Injury  Intervention: Promote Injury-Free Environment  Recent Flowsheet  Documentation  Taken 7/14/2022 1800 by Tirso Riggs RN  Safety Promotion/Fall Prevention:   activity supervised   assistive device/personal items within reach   clutter free environment maintained   fall prevention program maintained   gait belt   lighting adjusted   nonskid shoes/slippers when out of bed   room organization consistent   safety round/check completed   toileting scheduled  Taken 7/14/2022 1600 by Tirso Riggs RN  Safety Promotion/Fall Prevention:   activity supervised   assistive device/personal items within reach   clutter free environment maintained   fall prevention program maintained   gait belt   lighting adjusted   nonskid shoes/slippers when out of bed   room organization consistent   safety round/check completed   toileting scheduled  Taken 7/14/2022 1400 by Tirso Riggs RN  Safety Promotion/Fall Prevention:   activity supervised   assistive device/personal items within reach   clutter free environment maintained   gait belt   fall prevention program maintained   lighting adjusted   nonskid shoes/slippers when out of bed   room organization consistent   safety round/check completed   toileting scheduled  Taken 7/14/2022 1200 by Tirso Riggs RN  Safety Promotion/Fall Prevention:   assistive device/personal items within reach   clutter free environment maintained   fall prevention program maintained   gait belt   lighting adjusted   nonskid shoes/slippers when out of bed   room organization consistent   safety round/check completed   toileting scheduled  Taken 7/14/2022 1000 by Tirso Riggs RN  Safety Promotion/Fall Prevention:   activity supervised   assistive device/personal items within reach   clutter free environment maintained   fall prevention program maintained   gait belt   lighting adjusted   nonskid shoes/slippers when out of bed   safety round/check completed   toileting scheduled   room organization consistent  Taken 7/14/2022 0800 by Tirso Riggs  P, RN  Safety Promotion/Fall Prevention:   activity supervised   assistive device/personal items within reach   clutter free environment maintained   fall prevention program maintained   gait belt   lighting adjusted   nonskid shoes/slippers when out of bed   room organization consistent   safety round/check completed   toileting scheduled   Goal Outcome Evaluation:  Plan of Care Reviewed With: patient        Progress: improving  Outcome Evaluation: Pt mentation at baseline. Alert to self only. VSS. RA. Denied pain. Worked with PT. Incisional site care completed per order. POC discussed with family. Verbalized understanding.

## 2022-07-14 NOTE — H&P
Hospice History and Physical     Patient Name:  Chikis Cuellar   : 1945   Sex: female    Patient Care Team:  Eduardo Gamboa MD as PCP - General (Family Medicine)    Code Status: Comfort Measures    Subjective     Per Hospitalist Discharge Summary:    Chikis Cuellar is a 77 y.o. female  with history of T2DM, HTN, HLD, GERD, fibromyalgia, anxiety who presented to the ED w/ c/o hypoxia.   Pt admitted to [facility]  - 22. Pt initially presented w/ c/o severe dyspnea. Pt was admitted to the ICU from the ED and placed on HFNC, IV steroids and IV antibiotics. Pt underwent an extensive work-up including infectious, autoimmune, cardiac which were predominantly unrevealing.  Advanced chest imaging was felt to be consistent with atypical pneumonia somewhat classic for the appearance of COVID-19 although COVID remained negative despite pt reports of several family members w/ recent positive COVID tests. Pt was d/porter on a steroid taper and on 5 liters of supplemental oxygen.     Ultimately, patient continued to decline and she opted to pursue inpatient hospice due to quality of life and not wanting to continue on high flow without improvement.  She was admitted to inpatient hospice on     [end of copied text]    Ms. Cuellar was admitted to inpt Hospice on 2022 for mgmt of acute symptoms 2/2 acute respiratory failure.    Pt able to take oral medications this morning. Labored breathing. Remains on HFNC - titrating to discontinue as pt tolerates.    PRN use:  Haldol 1mg x1  Morphine 4mg x1 = 4mg  Morphine 2mg x 8 = 16mg      Review of Systems  Review of Systems   Respiratory: Positive for shortness of breath.    Neurological: Positive for weakness.       History  Past Medical History:   Diagnosis Date   • Arthritis    • Diabetes mellitus (HCC)    • Fibromyalgia    • Hyperlipidemia    • Hypertension      Past Surgical History:   Procedure Laterality Date   • BACK SURGERY     •  SECTION     •  CHOLECYSTECTOMY     • HYSTERECTOMY     • REPLACEMENT TOTAL KNEE       Current Facility-Administered Medications   Medication Dose Route Frequency Provider Last Rate Last Admin   • acetaminophen (TYLENOL) suppository 650 mg  650 mg Rectal Q4H PRN Renetta Solorio, APRN       • bisacodyl (DULCOLAX) suppository 10 mg  10 mg Rectal Daily PRN Renetta Solorio, APRN       • diphenhydrAMINE (BENADRYL) capsule 25 mg  25 mg Oral Q6H PRN Renetta Solorio, APRN       • fluconazole (DIFLUCAN) tablet 200 mg  200 mg Oral Q24H Renetta Solorio, APRN   200 mg at 07/14/22 0756   • FLUoxetine (PROzac) capsule 40 mg  40 mg Oral Daily Renetta Solorio, APRN   40 mg at 07/14/22 0755   • furosemide (LASIX) injection 20 mg  20 mg Intravenous Q6H PRN Renetta Solorio, APRN       • glycopyrrolate (ROBINUL) injection 0.2 mg  0.2 mg Intravenous Q6H PRN Renetta Solorio, APRN       • haloperidol lactate (HALDOL) injection 1 mg  1 mg Intravenous Q4H PRN Renetta Solorio, APRN   1 mg at 07/13/22 2320   • ketorolac (TORADOL) injection 15 mg  15 mg Intravenous Q6H PRN Renetta Solorio, APRN       • methylPREDNISolone sodium succinate (SOLU-Medrol) injection 40 mg  40 mg Intravenous Q24H Renetta Solorio, APRN   40 mg at 07/14/22 0556   • midazolam (VERSED) injection 1 mg  1 mg Intravenous Q4H PRN Renetta Solorio, APRN       • morphine injection 2 mg  2 mg Intravenous Q6H Renetta Solorio, APRN   2 mg at 07/14/22 0756   • morphine injection 2 mg  2 mg Intravenous Q1H PRN Renetta Solorio, APRN   2 mg at 07/14/22 1105    Or   • Morphine sulfate (PF) injection 4 mg  4 mg Intravenous Q1H PRN Renetta Solorio H, APRN       • ondansetron (ZOFRAN) injection 4 mg  4 mg Intravenous Q6H PRN Renetta Solorio, APRN       • pantoprazole (PROTONIX) EC tablet 40 mg  40 mg Oral Q AM Renetta Solorio APRN   40 mg at 07/14/22 0556   • pregabalin (LYRICA) capsule 150 mg  150 mg Oral TID Renetta Solorio APRN   150 mg at 07/14/22  0756   • scopolamine patch 1 mg/72 hr  1 patch Transdermal Q72H PRN Renetta Solorio, APRN            •  acetaminophen  •  bisacodyl  •  diphenhydrAMINE  •  furosemide  •  glycopyrrolate  •  haloperidol lactate  •  ketorolac  •  midazolam  •  Morphine **OR** Morphine  •  ondansetron  •  Scopolamine  Allergies   Allergen Reactions   • Altace [Ramipril] Palpitations   • Cymbalta [Duloxetine Hcl] Anxiety and Hallucinations   • Trazodone Hallucinations     Family History   Problem Relation Age of Onset   • No Known Problems Mother    • No Known Problems Father      Social History     Socioeconomic History   • Marital status:    Tobacco Use   • Smoking status: Never Smoker   • Smokeless tobacco: Never Used   Substance and Sexual Activity   • Alcohol use: No   • Drug use: No   • Sexual activity: Defer       Objective     Vital Signs  Temp:  [96.7 °F (35.9 °C)] 96.7 °F (35.9 °C)  Heart Rate:  [87-94] 94  Resp:  [20-34] 34  BP: (128-132)/(82-84) 128/82      PPS: Palliative Performance Scale score as of 7/15/2022, 11:42 EDT is 30% based on the following measures:   Ambulation: Totally bed bound  Activity and Evidence of Disease: Unable to do any work, extensive evidence of disease  Self-Care: Total care  Intake: Reduced   LOC: Full, drowsy or confusion      Physical Exam:  Physical Exam  Constitutional:       General: She is in acute distress.      Appearance: She is ill-appearing.      Comments: On HFNC; + abd breathing; dyspneic; can speak one to two words with prolonged recovery   HENT:      Head: Normocephalic and atraumatic.      Mouth/Throat:      Mouth: Mucous membranes are dry.   Eyes:      Extraocular Movements: Extraocular movements intact.   Neck:      Comments: Increased neck circumference  Cardiovascular:      Rate and Rhythm: Normal rate and regular rhythm.   Pulmonary:      Comments: Tachypneic, RR40; Shallow, mild expir wheezing; labored breathing  Abdominal:      General: Bowel sounds are normal.       Palpations: Abdomen is soft.   Musculoskeletal:      Cervical back: Neck supple.      Right lower leg: No edema.      Left lower leg: No edema.   Skin:     General: Skin is dry.      Coloration: Skin is pale.   Neurological:      Comments: Follows simple commands   Psychiatric:         Mood and Affect: Mood normal.         Behavior: Behavior normal.         Results Reviewed:  LAB RESULTS:      Lab 07/13/22  0610 07/12/22  0633 07/11/22  0350 07/08/22  0503   WBC 22.10* 27.61* 17.15* 13.00*   HEMOGLOBIN 15.0 15.3 13.4 12.1   HEMATOCRIT 47.0* 47.6* 42.6 37.8   PLATELETS 441 519* 468* 367   NEUTROS ABS 16.88* 22.41* 14.89* 11.17*   IMMATURE GRANS (ABS) 0.45* 0.68* 0.25* 0.17*   LYMPHS ABS 2.56 2.20 1.16 0.95   MONOS ABS 1.37* 1.21* 0.77 0.65   EOS ABS 0.73* 0.99* 0.04 0.03   MCV 85.0 84.8 85.0 84.9   PROCALCITONIN 0.08 0.09  --   --    LACTATE 1.5  --   --   --          Lab 07/13/22 0610 07/12/22 0633 07/11/22  0350 07/09/22  0921 07/08/22  0503   SODIUM 139 139 135* 140 141   POTASSIUM 4.2 4.8 4.3 4.7 4.5   CHLORIDE 94* 92* 93* 96* 98   CO2 36.0* 34.0* 34.0* 33.0* 35.0*   ANION GAP 9.0 13.0 8.0 11.0 8.0   BUN 36* 37* 28* 27* 24*   CREATININE 0.59 0.65 0.55* 0.59 0.64   EGFR 92.4 90.2 94.0 92.4 90.6   GLUCOSE 72 65 158* 165* 162*   CALCIUM 9.8 9.9 9.6 9.2 9.2   PHOSPHORUS  --  4.1  --   --   --          Lab 07/13/22  0610 07/12/22  0633 07/09/22  0921 07/08/22  0503   TOTAL PROTEIN 5.9*  --  5.9* 5.9*   ALBUMIN 3.30* 3.40* 3.20* 3.20*   GLOBULIN 2.6  --  2.7 2.7   ALT (SGPT) 35*  --  14 13   AST (SGOT) 38*  --  17 18   BILIRUBIN 0.3  --  0.2 0.2   ALK PHOS 90  --  83 92                     Brief Urine Lab Results     None          Microbiology Results Abnormal     None            Acute respiratory failure (HCC)      Assessment & Plan   Assessment/Plan:     77yoF admitted to [facility] 7/13 for acute respiratory failure    Dyspnea  Anxiety  Congestion  Constipation  Pain, nos  Fever    - Continue oral  medications  Diflucan 200mg daily  Prozac 40mg daily  Solumedrol 40mg IV daily  Protonix 40mg daily  Lyrica 150mg TID    - Weaning protocol placed - per pt's wishes; son aware    - Start morphine pca 1mg/hr  Prn morphine = 20mg   Morphine 2mg scheduled x4 = 8mg  Total= 28mg    - Prns for comfort    - Continue meal tray    - Coordinated care with Nursing and Hospice IDT    No visitors at bedside    Discharge: TBD    Total Visit Time: 60min  Face to Face Time: 20min    Justification for care:  Patient meets criteria for acute in-patient care with required nursing assessment and interventions for symptoms with IV medications.      Renetta Solorio, DNP, MHA, APRN  [facility]  Hospice and Palliative Care Nurse Practitioner  07/14/22  11:56 EDT

## 2022-07-14 NOTE — PROGRESS NOTES
Continued Stay Note  [facility]     Patient Name: Chikis Cuellar  MRN: [id number]  Today's Date: 7/14/2022    Admit Date: 7/13/2022     Discharge Plan     Row Name 07/14/22 1516       Plan    Plan IPU assessment    Plan Comments   7/14 PPS 20 % Pt oriented x 3. Remains on HF 40 L/55 %, Schuyler Corbin to continue weaning protocol. Rash under axilla and back has resolved, barrier cream to coccyx wound. BM 7/10 dulcolax to be given. PRN nay Colace x 1, Haldol 1 mg x 1, morp 1 mg x 2, morp 2m g x 3/ 24 hr given for dyspnea , anxiety and bowels. No family at bedside.                   Discharge Codes    No documentation.                     Davida Pond RN

## 2022-07-14 NOTE — PLAN OF CARE
Goal Outcome Evaluation:  Plan of Care Reviewed With: patient        Progress: declining  Outcome Evaluation: VSS, pt's condition slowly declining, Hi flow NC is beeing weaned down and pt required morphine scheduled and prn for air hunger, O2 sats stays 88 to 92 %. pt denies pain or discomfort.      Problem: Adult Inpatient Plan of Care  Goal: Absence of Hospital-Acquired Illness or Injury  Intervention: Identify and Manage Fall Risk  Recent Flowsheet Documentation  Taken 7/14/2022 0000 by Robyn Ford RN  Safety Promotion/Fall Prevention: activity supervised  Taken 7/13/2022 2000 by Robyn Ford RN  Safety Promotion/Fall Prevention: activity supervised  Intervention: Prevent Skin Injury  Recent Flowsheet Documentation  Taken 7/14/2022 0000 by Robyn Ford RN  Body Position:   turned   right  Taken 7/13/2022 2000 by Robyn Ford RN  Body Position: supine  Intervention: Prevent and Manage VTE (Venous Thromboembolism) Risk  Recent Flowsheet Documentation  Taken 7/14/2022 0000 by Robyn Ford RN  Activity Management: activity adjusted per tolerance  Taken 7/13/2022 2000 by Robyn Ford RN  Activity Management: activity adjusted per tolerance     Problem: Adult Inpatient Plan of Care  Goal: Absence of Hospital-Acquired Illness or Injury  Intervention: Prevent Skin Injury  Recent Flowsheet Documentation  Taken 7/14/2022 0000 by Robyn Ford RN  Body Position:   turned   right  Taken 7/13/2022 2000 by Robyn Ford RN  Body Position: supine     Problem: Adult Inpatient Plan of Care  Goal: Absence of Hospital-Acquired Illness or Injury  Intervention: Prevent and Manage VTE (Venous Thromboembolism) Risk  Recent Flowsheet Documentation  Taken 7/14/2022 0000 by Robyn Ford RN  Activity Management: activity adjusted per tolerance  Taken 7/13/2022 2000 by Robyn Ford RN  Activity Management: activity adjusted per tolerance

## 2022-07-15 NOTE — DISCHARGE SUMMARY
Date of Death:  7/15/2022  Time of Death:  1141    Presenting Problem/History of Present Illness    Acute respiratory failure (HCC)      Hospital Course    Per Hospitalist Discharge Summary:     Chikis Cuellar is a 77 y.o. female  with history of T2DM, HTN, HLD, GERD, fibromyalgia, anxiety who presented to the ED w/ c/o hypoxia.   Pt admitted to [facility] 6/1 - 6/27/22. Pt initially presented w/ c/o severe dyspnea. Pt was admitted to the ICU from the ED and placed on HFNC, IV steroids and IV antibiotics. Pt underwent an extensive work-up including infectious, autoimmune, cardiac which were predominantly unrevealing.  Advanced chest imaging was felt to be consistent with atypical pneumonia somewhat classic for the appearance of COVID-19 although COVID remained negative despite pt reports of several family members w/ recent positive COVID tests. Pt was d/porter on a steroid taper and on 5 liters of supplemental oxygen.     Ultimately, patient continued to decline and she opted to pursue inpatient hospice due to quality of life and not wanting to continue on high flow without improvement.  She was admitted to inpatient hospice on 7/13     [end of copied text]     Ms. Cuellar was admitted to inpt Hospice on 7/14/2022 for mgmt of acute symptoms 2/2 acute respiratory failure.      Social History:  Social History     Tobacco Use   • Smoking status: Never Smoker   • Smokeless tobacco: Never Used   Substance Use Topics   • Alcohol use: No         Consults:   Consults     Date and Time Order Name Status Description    7/5/2022 11:50 AM Inpatient Infectious Diseases Consult Completed     7/2/2022  9:13 AM Inpatient Palliative Care MD Consult Completed     6/28/2022 10:16 AM Inpatient Pulmonology Consult Completed     6/13/2022  9:27 AM Inpatient Pulmonology Consult Completed         Exam confirms with auscultation zero audible heart tones and zero audible respirations. Ms.Elizabeth BRISSA Cuellar was pronounced dead at 1141.  MD notified by  Patient's RN.     Eliana Monroy, RN  Clinical House Supervisor  7/15/2022 12:14 EDT      Renetta Solorio, SOLO, MHA, APRN  [facility]  Hospice and Palliative Care Nurse Practitioner  07/15/22  13:07 EDT

## 2022-07-15 NOTE — SIGNIFICANT NOTE
Exam confirms with auscultation zero audible heart tones and zero audible respirations. Ms.Elizabeth BRISSA Cuellar was pronounced dead at 1141.  MD notified by Patient's RN.    Eliana Monroy RN  Clinical House Supervisor  7/15/2022 12:14 EDT

## 2022-07-15 NOTE — PLAN OF CARE
Goal Outcome Evaluation:  Plan of Care Reviewed With: patient, daughter, durable power of         Progress: declining  Outcome Evaluation: Pt's condition slowly declining, labored respiration, RR 28. Continue on PCA morhine 1mg/hr. pt remains on Hi Flow and the weaning down proces has been halted per hospice order, Hi flow stays on 70/45 with O2 sats 78 to 80% on monitor. Family stays on the bedside. Per daughter the Hi flow could be resume to be weaned down after pt's  to visit and see the pt today.

## 2023-12-17 NOTE — PROGRESS NOTES
Pulmonary Hospital Follow-up     Hospital:  LOS: 4 days   Ms. Chikis Cuellar, 77 y.o. female is followed for:   Acute respiratory failure with hypoxia (HCC)            History of present illness:   78 yo female with history of HTN. Dyslipidemia, GERD, diabetes mellitus admitted to the hospital on June 1 with hypoxemic respiratory failure.  Patient was initially admitted to ICU with severe hypoxemia.  Initially was thought to have atypical pneumonia.  Patient was concerned that she may have contracted COVID-19 infection as she started getting sick in the last week of April this year.  States that she was getting short of breath when she was seen by primary care and apparently a stress test was done.  However patient continued to decline and states that she could not even walk from her living room to the front of the house and came into the hospital at that time.  Prior to that she was doing well.  Denies any prolonged history of any pulmonary condition.  No frequent pneumonias or infections requiring hospitalization.  No other lung problems.  Remote smoker quit many years ago and no recent secondhand smoke exposure either.  No other unusual hobbies pets at home.  Denies any change in appetite or weight loss lately.  Denies any hemoptysis.  Denied any chest pain not admission at that time either.     Patient underwent COVID-19 testing x2 which was negative.  Viral panels were negative.  Cultures remain negative.  Patient was treated with antibiotics and steroids and started to improve and weaned down from high flow nasal cannula to regular nasal cannula.  Decision was made to keep patient on prolonged taper of steroids and she was discharged to rehab facility on June 27.  However on arrival to the facility patient was short of breath and according to patient and they had trouble hooking her up oxygen the right way and she started to feel more short of breath and started having some chest discomfort.  Patient states  that she was turning blue and then realized that her oxygen is not set up right and in the meantime EMS was called and she was brought back to hospital.  Here she was  admitted back to the floor and started on high flow nasal cannula.  Today morning patient states that she is feeling a lot better.  She is down to 55% on high flow nasal cannula saturating 92%.  Denies any chest pain currently.  Denies any cough or sputum production.  Denies any urinary complaints.  Denies any loose stool or diarrhea or abdominal pain.  No fevers noted either.      Subjective   Interval History:  Patient without any acute issues overnight.  Denies any significant cough.  Using flutter valve use intermittently.  Continues on high flow nasal cannula and attempts to wean has not been very successful.             The patient's past medical, surgical and social history were reviewed and updated in Epic as appropriate.       Objective     Infusions:  Pharmacy to dose vancomycin,       Medications:  cefTRIAXone, 1 g, Intravenous, Q24H  enoxaparin, 40 mg, Subcutaneous, Q24H  fluconazole, 400 mg, Intravenous, Daily  FLUoxetine, 40 mg, Oral, Daily  insulin detemir, 14 Units, Subcutaneous, Q12H  insulin lispro, 0-7 Units, Subcutaneous, 4x Daily With Meals & Nightly  Insulin Lispro, 10 Units, Subcutaneous, TID With Meals  ipratropium-albuterol, 3 mL, Nebulization, 4x Daily - RT  losartan, 25 mg, Oral, Daily  nystatin, , Topical, Q12H  oxybutynin XL, 5 mg, Oral, Daily  pantoprazole, 40 mg, Oral, Q AM  pravastatin, 40 mg, Oral, Nightly  predniSONE, 10 mg, Oral, Daily With Breakfast   Followed by  [START ON 7/6/2022] predniSONE, 5 mg, Oral, Daily With Breakfast  pregabalin, 150 mg, Oral, TID  senna-docusate sodium, 2 tablet, Oral, BID  sodium chloride, 10 mL, Intravenous, Q12H  vancomycin, 1,500 mg, Intravenous, Q24H        Vital Sign Min/Max for last 24 hours  Temp  Min: 97.7 °F (36.5 °C)  Max: 98.3 °F (36.8 °C)   BP  Min: 102/75  Max: 132/74  "  Pulse  Min: 77  Max: 99   Resp  Min: 16  Max: 20   SpO2  Min: 85 %  Max: 94 %   Flow (L/min)  Min: 40  Max: 55       Input/Output for last 24 hour shift  07/01 0701 - 07/02 0700  In: 1230 [P.O.:1080; I.V.:150]  Out: 550 [Urine:550]      Objective:  Vital signs: (most recent): Blood pressure 132/74, pulse 85, temperature 97.8 °F (36.6 °C), temperature source Oral, resp. rate 18, height 162.6 cm (64\"), weight 84.7 kg (186 lb 12.8 oz), SpO2 93 %.            General Appearance: Awake, alert, in no acute distress on high flow NC  Lungs:   B/L Breath sounds present with decreased breath sounds on bases, no wheezing heard, occ basilar inspiratory crackles.   Heart: S1 and S2 present, no murmur  Abdomen: Soft, non-tender, no guarding or rigidity, bowel sounds positive.  Extremities: Atraumatic, no cyanosis or clubbing,  no edema, warm to touch.  Neurologic:  Moving all four extremities. Good strength bilaterally.  Psychologic: Appropriate affect, Cooperative.          Results from last 7 days   Lab Units 07/01/22  0424 06/30/22  0727 06/28/22  0038   WBC 10*3/mm3 16.43* 13.36* 15.54*   HEMOGLOBIN g/dL 13.4 13.0 12.1   PLATELETS 10*3/mm3 270 243 215     Results from last 7 days   Lab Units 07/01/22  0423 06/30/22  0727 06/29/22  0827   SODIUM mmol/L 139 142 138   POTASSIUM mmol/L 3.6 3.9 4.0   CO2 mmol/L 33.0* 33.0* 32.0*   BUN mg/dL 24* 21 19   CREATININE mg/dL 0.78 0.61 0.69   MAGNESIUM mg/dL 1.5* 1.8 2.0   GLUCOSE mg/dL 130* 151* 269*     Estimated Creatinine Clearance: 62.6 mL/min (by C-G formula based on SCr of 0.78 mg/dL).          Images:   CTA chest ordered  I reviewed the patient's results and images.     Assessment & Plan   Impression        Acute respiratory failure with hypoxia (HCC)    Type 2 diabetes mellitus, with long-term current use of insulin (HCC)    Essential hypertension    Hyperlipemia    GERD without esophagitis    Mood disorder (HCC)    Atypical pneumonia    Leukocytosis    Chest pain    " Fibromyalgia    Hypomagnesemia       Plan        1.  Patient continues to require high flow nasal cannula and we are unable to wean.  Patient on discharge was on 4 L nasal cannula oxygen but now unable to get her down to that level.  Wondering if we are missing any other pathology.  I will go ahead and get CTA chest to further evaluate the lung parenchyma and also pulmonary vasculature.  Continue current dose of steroids and will reassess after the CT scan to see if any increased dose is needed..  2.  Continue PPI and GERD precautions.  3.  BMP looks acceptable.  Magnesium apparently was replaced yesterday. Will recheck in AM.   4.  Monitor oral intake closely.  5.  Continue bronchodilators.    Will continue to follow with you.    Gold Flores MD, [facility]  Pulmonary, Critical care and Sleep Medicine        Physical signs of mild-moderate muscle wasting and fat depletion Physical signs of mild-moderate muscle wasting and moderate fat depletion